# Patient Record
Sex: FEMALE | Race: WHITE | Employment: OTHER | URBAN - METROPOLITAN AREA
[De-identification: names, ages, dates, MRNs, and addresses within clinical notes are randomized per-mention and may not be internally consistent; named-entity substitution may affect disease eponyms.]

---

## 2018-10-17 ENCOUNTER — OFFICE VISIT (OUTPATIENT)
Dept: GASTROENTEROLOGY | Age: 63
End: 2018-10-17
Payer: MEDICARE

## 2018-10-17 VITALS — DIASTOLIC BLOOD PRESSURE: 81 MMHG | WEIGHT: 162.9 LBS | HEART RATE: 71 BPM | SYSTOLIC BLOOD PRESSURE: 143 MMHG

## 2018-10-17 DIAGNOSIS — K21.9 GASTRO-ESOPHAGEAL REFLUX DISEASE WITHOUT ESOPHAGITIS: ICD-10-CM

## 2018-10-17 DIAGNOSIS — R10.13 EPIGASTRIC ABDOMINAL PAIN: ICD-10-CM

## 2018-10-17 PROCEDURE — 3017F COLORECTAL CA SCREEN DOC REV: CPT | Performed by: INTERNAL MEDICINE

## 2018-10-17 PROCEDURE — 99204 OFFICE O/P NEW MOD 45 MIN: CPT | Performed by: INTERNAL MEDICINE

## 2018-10-17 PROCEDURE — G8421 BMI NOT CALCULATED: HCPCS | Performed by: INTERNAL MEDICINE

## 2018-10-17 PROCEDURE — G8484 FLU IMMUNIZE NO ADMIN: HCPCS | Performed by: INTERNAL MEDICINE

## 2018-10-17 PROCEDURE — 1090F PRES/ABSN URINE INCON ASSESS: CPT | Performed by: INTERNAL MEDICINE

## 2018-10-17 PROCEDURE — G8427 DOCREV CUR MEDS BY ELIG CLIN: HCPCS | Performed by: INTERNAL MEDICINE

## 2018-10-17 PROCEDURE — 1101F PT FALLS ASSESS-DOCD LE1/YR: CPT | Performed by: INTERNAL MEDICINE

## 2018-10-17 RX ORDER — ATORVASTATIN CALCIUM 10 MG/1
20 TABLET, FILM COATED ORAL NIGHTLY
COMMUNITY
End: 2020-04-20 | Stop reason: SDUPTHER

## 2018-10-17 RX ORDER — SERTRALINE HYDROCHLORIDE 100 MG/1
200 TABLET, FILM COATED ORAL NIGHTLY
COMMUNITY

## 2018-10-17 RX ORDER — LEVOFLOXACIN 5 MG/ML
500 INJECTION, SOLUTION INTRAVENOUS
Status: ON HOLD | COMMUNITY
End: 2018-10-26 | Stop reason: ALTCHOICE

## 2018-10-17 RX ORDER — LISINOPRIL 5 MG/1
5 TABLET ORAL DAILY
COMMUNITY
End: 2019-09-10 | Stop reason: ALTCHOICE

## 2018-10-17 RX ORDER — MULTIVIT WITH MINERALS/LUTEIN
1000 TABLET ORAL DAILY
COMMUNITY

## 2018-10-17 RX ORDER — PHENOL 1.4 %
1 AEROSOL, SPRAY (ML) MUCOUS MEMBRANE 2 TIMES DAILY
COMMUNITY

## 2018-10-17 RX ORDER — AZELASTINE HYDROCHLORIDE 0.5 MG/ML
1 SOLUTION/ DROPS OPHTHALMIC 2 TIMES DAILY
COMMUNITY
End: 2019-03-12

## 2018-10-17 RX ORDER — LORAZEPAM 1 MG/1
1 TABLET ORAL EVERY 6 HOURS PRN
COMMUNITY
End: 2019-12-10 | Stop reason: CLARIF

## 2018-10-17 RX ORDER — FLUTICASONE PROPIONATE 50 MCG
1 SPRAY, SUSPENSION (ML) NASAL DAILY
COMMUNITY
End: 2019-03-12

## 2018-10-17 RX ORDER — AMLODIPINE BESYLATE 10 MG/1
10 TABLET ORAL NIGHTLY
COMMUNITY
End: 2020-03-29 | Stop reason: SDUPTHER

## 2018-10-17 ASSESSMENT — ENCOUNTER SYMPTOMS
VOMITING: 0
CONSTIPATION: 0
ANAL BLEEDING: 0
ABDOMINAL DISTENTION: 0
COUGH: 0
RECTAL PAIN: 0
BLOOD IN STOOL: 0
NAUSEA: 0
ABDOMINAL PAIN: 1
DIARRHEA: 0
CHOKING: 0

## 2018-10-17 NOTE — PROGRESS NOTES
Subjective:      Patient ID: Baljeet Lundberg is a 70 y.o. female. HPI . Dr. Galina Carney, APRN - CNP has requested that I see Baljeet Lundberg for a consult for   1. Gastro-esophageal reflux disease without esophagitis    2. Epigastric abdominal pain     . Patient seen along with her . Main symptom appears to be epigastric discomfort, chronic heartburn, dysphagia. Patient does symptoms on and off for several years. About 20 years ago she had hiatal hernia surgery done. I believe that this was Nissen fundoplication. However this surgery appears to be failed. Subsequently she had 2nd surgery done about 20 years ago. Looks like patient did well for awhile and a gain recently is having recurrent symptoms in the last 3-4 years. She was investigated by different gastroenterologist and had several investigations done 4-5 years ago. At present she has ongoing symptom of dysphagia. She feels food staying longer in the substernal area. No symptom of complete obstruction of esophagus. Has chronic GERD. Also has nocturnal symptoms. Denies symptoms suggestive of extra esophageal manifestation of GERD. Recently she had esophagogram done and was told that she may have recurrent hernia. Patient is very anxious stating that she has damage esophagus. She also has history of abdominal bloating, cramps and change of bowel habits. No weight loss. No melena or hematochezia. On further discussion it appears that this patient has significant anxiety /nervousness. She has been on medication. Past Medical, Family, and Social History reviewed and does contribute to the patient presenting condition. patient\"s PMH/PSH,SH,PSYCH hx, MEDs, ALLERGIES, and ROS was all reviewed and updated ion the appropriate sections    Review of Systems   Constitutional: Negative for activity change, appetite change and fatigue. HENT: Negative.     Eyes: Positive for visual disturbance (wears eye

## 2018-10-25 ENCOUNTER — TELEPHONE (OUTPATIENT)
Dept: GASTROENTEROLOGY | Age: 63
End: 2018-10-25

## 2018-10-25 NOTE — TELEPHONE ENCOUNTER
Writer LUCY for patient to return call to office to confirm EGD scheduled 10/26 at [facility] and that patient has a .

## 2018-10-26 ENCOUNTER — HOSPITAL ENCOUNTER (OUTPATIENT)
Age: 63
Setting detail: OUTPATIENT SURGERY
Discharge: HOME | End: 2018-10-26
Attending: INTERNAL MEDICINE | Admitting: INTERNAL MEDICINE
Payer: COMMERCIAL

## 2018-10-26 VITALS
RESPIRATION RATE: 14 BRPM | HEART RATE: 77 BPM | HEIGHT: 64 IN | OXYGEN SATURATION: 98 % | WEIGHT: 160 LBS | TEMPERATURE: 97.9 F | SYSTOLIC BLOOD PRESSURE: 103 MMHG | DIASTOLIC BLOOD PRESSURE: 78 MMHG | BODY MASS INDEX: 27.31 KG/M2

## 2018-10-26 PROCEDURE — 6360000002 HC RX W HCPCS: Performed by: INTERNAL MEDICINE

## 2018-10-26 PROCEDURE — 99152 MOD SED SAME PHYS/QHP 5/>YRS: CPT | Performed by: INTERNAL MEDICINE

## 2018-10-26 PROCEDURE — 2580000003 HC RX 258: Performed by: INTERNAL MEDICINE

## 2018-10-26 PROCEDURE — 2709999900 HC NON-CHARGEABLE SUPPLY: Performed by: INTERNAL MEDICINE

## 2018-10-26 PROCEDURE — 7100000011 HC PHASE II RECOVERY - ADDTL 15 MIN: Performed by: INTERNAL MEDICINE

## 2018-10-26 PROCEDURE — 7100000010 HC PHASE II RECOVERY - FIRST 15 MIN: Performed by: INTERNAL MEDICINE

## 2018-10-26 PROCEDURE — 88305 TISSUE EXAM BY PATHOLOGIST: CPT

## 2018-10-26 PROCEDURE — 6370000000 HC RX 637 (ALT 250 FOR IP): Performed by: INTERNAL MEDICINE

## 2018-10-26 PROCEDURE — 3609012400 HC EGD TRANSORAL BIOPSY SINGLE/MULTIPLE: Performed by: INTERNAL MEDICINE

## 2018-10-26 RX ORDER — SODIUM CHLORIDE 9 MG/ML
INJECTION, SOLUTION INTRAVENOUS CONTINUOUS
Status: DISCONTINUED | OUTPATIENT
Start: 2018-10-26 | End: 2018-10-26 | Stop reason: HOSPADM

## 2018-10-26 RX ORDER — FENTANYL CITRATE 50 UG/ML
INJECTION, SOLUTION INTRAMUSCULAR; INTRAVENOUS PRN
Status: DISCONTINUED | OUTPATIENT
Start: 2018-10-26 | End: 2018-10-26 | Stop reason: HOSPADM

## 2018-10-26 RX ORDER — MIDAZOLAM HYDROCHLORIDE 1 MG/ML
INJECTION INTRAMUSCULAR; INTRAVENOUS PRN
Status: DISCONTINUED | OUTPATIENT
Start: 2018-10-26 | End: 2018-10-26 | Stop reason: HOSPADM

## 2018-10-26 RX ADMIN — SODIUM CHLORIDE: 9 INJECTION, SOLUTION INTRAVENOUS at 07:03

## 2018-10-26 ASSESSMENT — PAIN SCALES - GENERAL
PAINLEVEL_OUTOF10: 0

## 2018-10-26 ASSESSMENT — PAIN - FUNCTIONAL ASSESSMENT: PAIN_FUNCTIONAL_ASSESSMENT: 0-10

## 2018-10-26 NOTE — OP NOTE
Nissen fundoplication changes seen. Body: normal    Antrum: normal    Duodenum:     Descending: normal    Bulb: normal    While withdrawing the scope the above findings were verified and the scope was removed. The patient has tolerated the procedure and conscious sedation without unusual events. Recommendations/Plan:   1. F/U Biopsies  2. F/U In Office as instructed  3. Discussed with the family and  4.   5.  make food into small pieces, chew well and swallow.   6.                    Electronically signed by Dread Moses MD  on 10/26/2018 at 7:59 AM

## 2018-10-29 LAB — SURGICAL PATHOLOGY REPORT: NORMAL

## 2019-03-12 ENCOUNTER — OFFICE VISIT (OUTPATIENT)
Dept: FAMILY MEDICINE CLINIC | Age: 64
End: 2019-03-12
Payer: MEDICARE

## 2019-03-12 VITALS
HEART RATE: 71 BPM | RESPIRATION RATE: 16 BRPM | TEMPERATURE: 97.5 F | SYSTOLIC BLOOD PRESSURE: 100 MMHG | DIASTOLIC BLOOD PRESSURE: 60 MMHG | HEIGHT: 64 IN | OXYGEN SATURATION: 97 % | BODY MASS INDEX: 27.52 KG/M2 | WEIGHT: 161.2 LBS

## 2019-03-12 DIAGNOSIS — K44.9 PARAESOPHAGEAL HERNIA: ICD-10-CM

## 2019-03-12 DIAGNOSIS — M17.0 PRIMARY OSTEOARTHRITIS OF BOTH KNEES: ICD-10-CM

## 2019-03-12 DIAGNOSIS — E78.1 PRIMARY HYPERTRIGLYCERIDEMIA: ICD-10-CM

## 2019-03-12 DIAGNOSIS — R10.13 EPIGASTRIC ABDOMINAL PAIN: ICD-10-CM

## 2019-03-12 DIAGNOSIS — K21.9 GASTRO-ESOPHAGEAL REFLUX DISEASE WITHOUT ESOPHAGITIS: ICD-10-CM

## 2019-03-12 DIAGNOSIS — K22.4 ESOPHAGEAL SPASM: ICD-10-CM

## 2019-03-12 DIAGNOSIS — F41.9 ANXIETY: ICD-10-CM

## 2019-03-12 DIAGNOSIS — I10 ESSENTIAL HYPERTENSION: ICD-10-CM

## 2019-03-12 DIAGNOSIS — F33.9 EPISODE OF RECURRENT MAJOR DEPRESSIVE DISORDER, UNSPECIFIED DEPRESSION EPISODE SEVERITY (HCC): ICD-10-CM

## 2019-03-12 DIAGNOSIS — D12.6 TUBULAR ADENOMA OF COLON: Primary | ICD-10-CM

## 2019-03-12 DIAGNOSIS — E78.2 MIXED HYPERLIPIDEMIA: ICD-10-CM

## 2019-03-12 PROBLEM — K92.89 GAS BLOAT SYNDROME: Status: ACTIVE | Noted: 2018-10-30

## 2019-03-12 PROBLEM — M17.9 OSTEOARTHRITIS OF KNEE: Status: ACTIVE | Noted: 2017-06-21

## 2019-03-12 PROCEDURE — G8427 DOCREV CUR MEDS BY ELIG CLIN: HCPCS | Performed by: INTERNAL MEDICINE

## 2019-03-12 PROCEDURE — 3017F COLORECTAL CA SCREEN DOC REV: CPT | Performed by: INTERNAL MEDICINE

## 2019-03-12 PROCEDURE — 1101F PT FALLS ASSESS-DOCD LE1/YR: CPT | Performed by: INTERNAL MEDICINE

## 2019-03-12 PROCEDURE — 99203 OFFICE O/P NEW LOW 30 MIN: CPT | Performed by: INTERNAL MEDICINE

## 2019-03-12 PROCEDURE — G8419 CALC BMI OUT NRM PARAM NOF/U: HCPCS | Performed by: INTERNAL MEDICINE

## 2019-03-12 PROCEDURE — 4040F PNEUMOC VAC/ADMIN/RCVD: CPT | Performed by: INTERNAL MEDICINE

## 2019-03-12 PROCEDURE — G8400 PT W/DXA NO RESULTS DOC: HCPCS | Performed by: INTERNAL MEDICINE

## 2019-03-12 PROCEDURE — 1036F TOBACCO NON-USER: CPT | Performed by: INTERNAL MEDICINE

## 2019-03-12 PROCEDURE — 1123F ACP DISCUSS/DSCN MKR DOCD: CPT | Performed by: INTERNAL MEDICINE

## 2019-03-12 PROCEDURE — 1090F PRES/ABSN URINE INCON ASSESS: CPT | Performed by: INTERNAL MEDICINE

## 2019-03-12 PROCEDURE — G8484 FLU IMMUNIZE NO ADMIN: HCPCS | Performed by: INTERNAL MEDICINE

## 2019-03-12 RX ORDER — OMEPRAZOLE 40 MG/1
40 CAPSULE, DELAYED RELEASE ORAL 2 TIMES DAILY
COMMUNITY
End: 2019-09-10 | Stop reason: SDUPTHER

## 2019-03-12 ASSESSMENT — PATIENT HEALTH QUESTIONNAIRE - PHQ9
1. LITTLE INTEREST OR PLEASURE IN DOING THINGS: 0
SUM OF ALL RESPONSES TO PHQ9 QUESTIONS 1 & 2: 0
SUM OF ALL RESPONSES TO PHQ QUESTIONS 1-9: 0
SUM OF ALL RESPONSES TO PHQ QUESTIONS 1-9: 0
2. FEELING DOWN, DEPRESSED OR HOPELESS: 0

## 2019-03-18 ENCOUNTER — TELEPHONE (OUTPATIENT)
Dept: FAMILY MEDICINE CLINIC | Age: 64
End: 2019-03-18

## 2019-04-01 ENCOUNTER — OFFICE VISIT (OUTPATIENT)
Dept: FAMILY MEDICINE CLINIC | Age: 64
End: 2019-04-01
Payer: MEDICARE

## 2019-04-01 VITALS
SYSTOLIC BLOOD PRESSURE: 132 MMHG | OXYGEN SATURATION: 98 % | BODY MASS INDEX: 28.77 KG/M2 | WEIGHT: 165 LBS | DIASTOLIC BLOOD PRESSURE: 70 MMHG | HEART RATE: 80 BPM | TEMPERATURE: 98 F

## 2019-04-01 DIAGNOSIS — J01.91 ACUTE RECURRENT SINUSITIS, UNSPECIFIED LOCATION: Primary | ICD-10-CM

## 2019-04-01 PROCEDURE — 4040F PNEUMOC VAC/ADMIN/RCVD: CPT | Performed by: NURSE PRACTITIONER

## 2019-04-01 PROCEDURE — G8419 CALC BMI OUT NRM PARAM NOF/U: HCPCS | Performed by: NURSE PRACTITIONER

## 2019-04-01 PROCEDURE — G8400 PT W/DXA NO RESULTS DOC: HCPCS | Performed by: NURSE PRACTITIONER

## 2019-04-01 PROCEDURE — 3017F COLORECTAL CA SCREEN DOC REV: CPT | Performed by: NURSE PRACTITIONER

## 2019-04-01 PROCEDURE — 1036F TOBACCO NON-USER: CPT | Performed by: NURSE PRACTITIONER

## 2019-04-01 PROCEDURE — 1090F PRES/ABSN URINE INCON ASSESS: CPT | Performed by: NURSE PRACTITIONER

## 2019-04-01 PROCEDURE — 99213 OFFICE O/P EST LOW 20 MIN: CPT | Performed by: NURSE PRACTITIONER

## 2019-04-01 PROCEDURE — 1123F ACP DISCUSS/DSCN MKR DOCD: CPT | Performed by: NURSE PRACTITIONER

## 2019-04-01 PROCEDURE — G8427 DOCREV CUR MEDS BY ELIG CLIN: HCPCS | Performed by: NURSE PRACTITIONER

## 2019-04-01 RX ORDER — FLUTICASONE PROPIONATE 50 MCG
1 SPRAY, SUSPENSION (ML) NASAL DAILY
Qty: 1 BOTTLE | Refills: 2 | COMMUNITY
Start: 2019-04-01 | End: 2020-03-05 | Stop reason: ALTCHOICE

## 2019-04-01 RX ORDER — AMOXICILLIN AND CLAVULANATE POTASSIUM 875; 125 MG/1; MG/1
1 TABLET, FILM COATED ORAL 2 TIMES DAILY
Qty: 20 TABLET | Refills: 0 | Status: SHIPPED | OUTPATIENT
Start: 2019-04-01 | End: 2019-04-11

## 2019-04-01 ASSESSMENT — ENCOUNTER SYMPTOMS
WHEEZING: 0
COUGH: 1
VOICE CHANGE: 1
SORE THROAT: 1
ALLERGIC/IMMUNOLOGIC NEGATIVE: 1
SINUS PRESSURE: 1
SHORTNESS OF BREATH: 0
EYES NEGATIVE: 1
SINUS PAIN: 1

## 2019-04-01 ASSESSMENT — PATIENT HEALTH QUESTIONNAIRE - PHQ9
SUM OF ALL RESPONSES TO PHQ9 QUESTIONS 1 & 2: 0
2. FEELING DOWN, DEPRESSED OR HOPELESS: 0
SUM OF ALL RESPONSES TO PHQ QUESTIONS 1-9: 0
SUM OF ALL RESPONSES TO PHQ QUESTIONS 1-9: 0
1. LITTLE INTEREST OR PLEASURE IN DOING THINGS: 0

## 2019-04-01 NOTE — PROGRESS NOTES
[facility] PHYSICIANS  [facility] PHYS POINT [address] [address] [id number]  Dept: [phone]  Dept Fax: [phone]      Clint Cervantes is a 70 y.o. female who presents today for hermedical conditions/complaints as noted below. Clint Cervantes is c/o of Cough and Congestion            HPI:      JOSHUA Gan is here today for 2 wk illness. She has been congested, coughing, hoarse voice, ear pain, facial pain and pressure. She is not bringing up secretions. She has not had fever or chills. Headaches and fogginess.  was ill. She has had sinus surgery in the past. She cannot recall the provider. No results found for: LABA1C          ( goal A1Cis < 7)   No results found for: LABMICR  No results found for: LDLCHOLESTEROL, LDLCALC    (goal LDL is <100)   No results found for: AST, ALT, BUN  BP Readings from Last 3 Encounters:   19 132/70   19 100/60   10/26/18 103/78          (goal 120/80)    Past Medical History:   Diagnosis Date    ADHD     Hyperlipidemia     Hypertension       Past Surgical History:   Procedure Laterality Date    APPENDECTOMY      BACK SURGERY      CHOLECYSTECTOMY      COLONOSCOPY N/A 2016    ESOPHAGOGASTRIC FUNDOPLICATION      REVISION TOTAL KNEE ARTHROPLASTY Right     UPPER GASTROINTESTINAL ENDOSCOPY      UPPER GASTROINTESTINAL ENDOSCOPY N/A 10/26/2018    EGD BIOPSY AND DILITATION WITH DAMION performed by Kameron Love MD at [address]       No family history on file.        Social History     Tobacco Use    Smoking status: Former Smoker     Packs/day: 0.50     Years: 40.00     Pack years: 20.00     Types: Cigarettes     Last attempt to quit: 2018     Years since quittin.9    Smokeless tobacco: Never Used   Substance Use Topics    Alcohol use: No         Current Outpatient Medications   Medication Sig Dispense Refill    amoxicillin-clavulanate (AUGMENTIN) 875-125 MG per tablet Take 1 tablet by mouth 2 times daily for Negative. Negative for chest pain and palpitations. Endocrine: Negative. Negative for cold intolerance and heat intolerance. Musculoskeletal: Positive for myalgias. Negative for arthralgias. Skin: Negative. Negative for pallor and rash. Allergic/Immunologic: Negative. Neurological: Positive for headaches. Negative for weakness and numbness. Hematological: Negative. Psychiatric/Behavioral: Negative. Negative for sleep disturbance. The patient is not nervous/anxious. Objective:     /70 (Site: Left Upper Arm, Position: Sitting, Cuff Size: Small Adult)   Pulse 80   Temp 98 °F (36.7 °C) (Oral)   Wt 165 lb (74.8 kg)   SpO2 98%   Breastfeeding? No   BMI 28.77 kg/m²   Physical Exam   Constitutional: She is oriented to person, place, and time. Vital signs are normal. She appears well-developed and well-nourished. HENT:   Head: Normocephalic and atraumatic. Right Ear: Tympanic membrane is erythematous. Nose: Mucosal edema present. Right sinus exhibits maxillary sinus tenderness and frontal sinus tenderness. Left sinus exhibits maxillary sinus tenderness and frontal sinus tenderness. Eyes: Conjunctivae are normal. Right eye exhibits no discharge. Left eye exhibits no discharge. Neck: Normal range of motion. Cardiovascular: Normal rate, regular rhythm and normal heart sounds. No murmur heard. Pulmonary/Chest: Effort normal and breath sounds normal. No respiratory distress. She has no wheezes. Musculoskeletal: Normal range of motion. Neurological: She is alert and oriented to person, place, and time. Skin: Skin is warm and dry. Psychiatric: She has a normal mood and affect. Her behavior is normal. Judgment and thought content normal.   Vitals reviewed. Assessment:      1. Acute recurrent sinusitis, unspecified location                     Plan:      No orders of the defined types were placed in this encounter.     Orders Placed This Encounter   Medications    amoxicillin-clavulanate (AUGMENTIN) 875-125 MG per tablet     Sig: Take 1 tablet by mouth 2 times daily for 10 days     Dispense:  20 tablet     Refill:  0    fluticasone (FLONASE) 50 MCG/ACT nasal spray     Si spray by Nasal route daily     Dispense:  1 Bottle     Refill:  2      push fluids, ibu or acetamin as needed. RTO if syx do not improve after 10-14 days. No follow-ups on file. Patient given educational materials - see patientinstructions. Discussed use, benefit, and side effects of prescribed medications. All patient questions answered. Pt voiced understanding. Reviewed health maintenance. Instructed to continue current medications, diet and exercise. Patient agreedwith treatment plan. Follow up as directed.      Electronically signed by ERICA Cai CNP on 2019

## 2019-04-01 NOTE — PATIENT INSTRUCTIONS
Patient Education        Sinusitis: Care Instructions  Your Care Instructions    Sinusitis is an infection of the lining of the sinus cavities in your head. Sinusitis often follows a cold. It causes pain and pressure in your head and face. In most cases, sinusitis gets better on its own in 1 to 2 weeks. But some mild symptoms may last for several weeks. Sometimes antibiotics are needed. Follow-up care is a key part of your treatment and safety. Be sure to make and go to all appointments, and call your doctor if you are having problems. It's also a good idea to know your test results and keep a list of the medicines you take. How can you care for yourself at home? · Take an over-the-counter pain medicine, such as acetaminophen (Tylenol), ibuprofen (Advil, Motrin), or naproxen (Aleve). Read and follow all instructions on the label. · If the doctor prescribed antibiotics, take them as directed. Do not stop taking them just because you feel better. You need to take the full course of antibiotics. · Be careful when taking over-the-counter cold or flu medicines and Tylenol at the same time. Many of these medicines have acetaminophen, which is Tylenol. Read the labels to make sure that you are not taking more than the recommended dose. Too much acetaminophen (Tylenol) can be harmful. · Breathe warm, moist air from a steamy shower, a hot bath, or a sink filled with hot water. Avoid cold, dry air. Using a humidifier in your home may help. Follow the directions for cleaning the machine. · Use saline (saltwater) nasal washes to help keep your nasal passages open and wash out mucus and bacteria. You can buy saline nose drops at a grocery store or drugstore. Or you can make your own at home by adding 1 teaspoon of salt and 1 teaspoon of baking soda to 2 cups of distilled water. If you make your own, fill a bulb syringe with the solution, insert the tip into your nostril, and squeeze gently. [place] your nose.   · Put a hot, wet towel or a warm gel pack on your face 3 or 4 times a day for 5 to 10 minutes each time. · Try a decongestant nasal spray like oxymetazoline (Afrin). Do not use it for more than 3 days in a row. Using it for more than 3 days can make your congestion worse. When should you call for help? Call your doctor now or seek immediate medical care if:    · You have new or worse swelling or redness in your face or around your eyes.     · You have a new or higher fever.    Watch closely for changes in your health, and be sure to contact your doctor if:    · You have new or worse facial pain.     · The mucus from your nose becomes thicker (like pus) or has new blood in it.     · You are not getting better as expected. Where can you learn more? Go to https://[email or web address] Securitypepiceweb.[employer]. org and sign in to your [employer] account. Enter Y915 in the [employer] box to learn more about \"Sinusitis: Care Instructions. \"     If you do not have an account, please click on the \"Sign Up Now\" link. Current as of: March 27, 2018  Content Version: 11.9  © 4567-9811 [employer]. Care instructions adapted under license by [facility] ([facility]). If you have questions about a medical condition or this instruction, always ask your healthcare professional. [address] any warranty or liability for your use of this information.

## 2019-04-17 ENCOUNTER — OFFICE VISIT (OUTPATIENT)
Dept: FAMILY MEDICINE CLINIC | Age: 64
End: 2019-04-17
Payer: MEDICARE

## 2019-04-17 ENCOUNTER — HOSPITAL ENCOUNTER (OUTPATIENT)
Age: 64
Setting detail: SPECIMEN
Discharge: HOME | End: 2019-04-17
Payer: COMMERCIAL

## 2019-04-17 VITALS
HEIGHT: 64 IN | HEART RATE: 75 BPM | DIASTOLIC BLOOD PRESSURE: 64 MMHG | BODY MASS INDEX: 28.31 KG/M2 | OXYGEN SATURATION: 95 % | TEMPERATURE: 97.2 F | SYSTOLIC BLOOD PRESSURE: 100 MMHG | RESPIRATION RATE: 16 BRPM | WEIGHT: 165.8 LBS

## 2019-04-17 DIAGNOSIS — Z13.1 SCREENING FOR DIABETES MELLITUS: ICD-10-CM

## 2019-04-17 DIAGNOSIS — M85.89 OSTEOPENIA OF MULTIPLE SITES: ICD-10-CM

## 2019-04-17 DIAGNOSIS — Z11.59 NEED FOR HEPATITIS C SCREENING TEST: ICD-10-CM

## 2019-04-17 DIAGNOSIS — H60.502 ACUTE OTITIS EXTERNA OF LEFT EAR, UNSPECIFIED TYPE: ICD-10-CM

## 2019-04-17 DIAGNOSIS — E78.5 HYPERLIPIDEMIA, UNSPECIFIED HYPERLIPIDEMIA TYPE: Primary | ICD-10-CM

## 2019-04-17 DIAGNOSIS — E78.5 HYPERLIPIDEMIA, UNSPECIFIED HYPERLIPIDEMIA TYPE: ICD-10-CM

## 2019-04-17 DIAGNOSIS — S62.101D CLOSED FRACTURE OF RIGHT WRIST WITH ROUTINE HEALING, SUBSEQUENT ENCOUNTER: ICD-10-CM

## 2019-04-17 DIAGNOSIS — Z00.00 ROUTINE GENERAL MEDICAL EXAMINATION AT A HEALTH CARE FACILITY: ICD-10-CM

## 2019-04-17 DIAGNOSIS — J30.2 SEASONAL ALLERGIC RHINITIS, UNSPECIFIED TRIGGER: ICD-10-CM

## 2019-04-17 LAB
ALBUMIN SERPL-MCNC: 4.5 G/DL (ref 3.5–5.2)
ALBUMIN/GLOBULIN RATIO: 1.7 (ref 1–2.5)
ALP BLD-CCNC: 96 U/L (ref 35–104)
ALT SERPL-CCNC: 19 U/L (ref 5–33)
ANION GAP SERPL CALCULATED.3IONS-SCNC: 12 MMOL/L (ref 9–17)
AST SERPL-CCNC: 22 U/L
BILIRUB SERPL-MCNC: 0.17 MG/DL (ref 0.3–1.2)
BUN BLDV-MCNC: 14 MG/DL (ref 8–23)
BUN/CREAT BLD: ABNORMAL (ref 9–20)
CALCIUM SERPL-MCNC: 9 MG/DL (ref 8.6–10.4)
CHLORIDE BLD-SCNC: 104 MMOL/L (ref 98–107)
CHOLESTEROL, FASTING: 167 MG/DL
CHOLESTEROL/HDL RATIO: 3.1
CO2: 24 MMOL/L (ref 20–31)
CREAT SERPL-MCNC: 0.83 MG/DL (ref 0.5–0.9)
GFR AFRICAN AMERICAN: >60 ML/MIN
GFR NON-AFRICAN AMERICAN: >60 ML/MIN
GFR SERPL CREATININE-BSD FRML MDRD: ABNORMAL ML/MIN/{1.73_M2}
GFR SERPL CREATININE-BSD FRML MDRD: ABNORMAL ML/MIN/{1.73_M2}
GLUCOSE BLD-MCNC: 95 MG/DL (ref 70–99)
HDLC SERPL-MCNC: 54 MG/DL
HEPATITIS C ANTIBODY: NONREACTIVE
LDL CHOLESTEROL: 67 MG/DL (ref 0–130)
POTASSIUM SERPL-SCNC: 4.2 MMOL/L (ref 3.7–5.3)
SODIUM BLD-SCNC: 140 MMOL/L (ref 135–144)
TOTAL PROTEIN: 7.1 G/DL (ref 6.4–8.3)
TRIGLYCERIDE, FASTING: 229 MG/DL
VLDLC SERPL CALC-MCNC: ABNORMAL MG/DL (ref 1–30)

## 2019-04-17 PROCEDURE — 4040F PNEUMOC VAC/ADMIN/RCVD: CPT | Performed by: INTERNAL MEDICINE

## 2019-04-17 PROCEDURE — G0402 INITIAL PREVENTIVE EXAM: HCPCS | Performed by: INTERNAL MEDICINE

## 2019-04-17 RX ORDER — OFLOXACIN 3 MG/ML
5 SOLUTION AURICULAR (OTIC) 2 TIMES DAILY
Qty: 10 ML | Refills: 0 | Status: SHIPPED | OUTPATIENT
Start: 2019-04-17 | End: 2019-04-27

## 2019-04-17 ASSESSMENT — PATIENT HEALTH QUESTIONNAIRE - PHQ9: SUM OF ALL RESPONSES TO PHQ QUESTIONS 1-9: 8

## 2019-04-17 ASSESSMENT — ANXIETY QUESTIONNAIRES: GAD7 TOTAL SCORE: 1

## 2019-04-17 ASSESSMENT — LIFESTYLE VARIABLES: HOW OFTEN DO YOU HAVE A DRINK CONTAINING ALCOHOL: 0

## 2019-04-17 NOTE — PROGRESS NOTES
N/A 2016    ESOPHAGOGASTRIC FUNDOPLICATION      FINGER SURGERY Left     4th finger    REVISION TOTAL KNEE ARTHROPLASTY Right     UPPER GASTROINTESTINAL ENDOSCOPY      UPPER GASTROINTESTINAL ENDOSCOPY N/A 10/26/2018    EGD BIOPSY AND DILITATION WITH DAMION performed by Fernanda Bull MD at [address]     No family history on file. CareTeam (Including outside providers/suppliers regularly involved in providing care):   Patient Care Team:  Michelle Bazan MD as PCP - General (Internal Medicine)  Fernanda Bull MD as Consulting Physician (Gastroenterology)  Avery Ingram as Consulting Physician (Obstetrics & Gynecology)  Leia Arceo DPM as Consulting Physician (Podiatry)  Magdalena Orozco MD as Consulting Physician (Orthopedic Surgery)    Wt Readings from Last 3 Encounters:   04/17/19 165 lb 12.8 oz (75.2 kg)   04/01/19 165 lb (74.8 kg)   03/12/19 161 lb 3.2 oz (73.1 kg)     Vitals:    04/17/19 1422   BP: 100/64   Site: Right Upper Arm   Position: Sitting   Cuff Size: Medium Adult   Pulse: 75   Resp: 16   Temp: 97.2 °F (36.2 °C)   TempSrc: Oral   SpO2: 95%   Weight: 165 lb 12.8 oz (75.2 kg)   Height: 5' 3.75\" (1.619 m)     Body mass index is 28.68 kg/m². Based upon direct observation of the patient, evaluation of cognition reveals recent and remote memory intact.     General Appearance: alert and oriented to person, place and time, well developed and well- nourished, in no acute distress  Skin: warm and dry, no rash or erythema  Head: normocephalic and atraumatic  Eyes: pupils equal, round, and reactive to light, extraocular eye movements intact, conjunctivae normal  ENT: tympanic membrane, external ear and ear canal normal bilaterally, nose without deformity, nasal mucosa and turbinates normal without polyps  Neck: supple and non-tender without mass, no thyromegaly or thyroid nodules, no cervical lymphadenopathy  Pulmonary/Chest: clear to auscultation bilaterally- no wheezes, rales or rhonchi, normal air movement, no respiratory distress  Cardiovascular: normal rate, regular rhythm, normal S1 and S2, no murmurs, rubs, clicks, or gallops, distal pulses intact, no carotid bruits  Abdomen: soft, non-tender, non-distended, normal bowel sounds, no masses or organomegaly  Extremities: no cyanosis, clubbing or edema  Musculoskeletal: normal range of motion, no joint swelling, deformity or tenderness  Neurologic: reflexes normal and symmetric, no cranial nerve deficit, gait, coordination and speech normal    Patient's complete Health Risk Assessment and screening values have been reviewed and are found in Flowsheets. The following problems were reviewed today and where indicated follow up appointments were made and/or referrals ordered. Positive Risk Factor Screenings with Interventions:     Fall Risk:  Timed Up and Go Test > 12 seconds?: no  2 or more falls in past year?: (!) yes  Fall with injury in past year?: (!) yes  Fall Risk Assessment[de-identified] (!) Positive Screening for Falls  fell just before med when her foot got caught on the ice and she tripped while getting out the car. Hurt hand - fractured her wrist. See ortho - Dr Pacheco Delgaod, declined surgery. Fall Risk Interventions:    · Home safety tips provided  · Home exercises provided to promote strength and balance  · Patient declines any further evaluation/treatment for this issue    General Health:  General  In general, how would you say your health is?: Good  In the past 7 days, have you experienced any of the following?  New or Increased Pain, New or Increased Fatigue, Loneliness, Social Isolation, Stress or Anger?: (!) Stress  Do you get the social and emotional support that you need?: Yes  Do you have a Living Will?: (!) No  General Health Risk Interventions:  · Stress: patient's comments regarding reasons for stress and/or anger: just came back from taking care of her 80year old mother, so getting better now    Health Habits/Nutrition:  Health Habits/Nutrition  Do you exercise for at least 20 minutes 2-3 times per week?: (!) No  Have you lost any weight without trying in the past 3 months?: No  Do you eat fewer than 2 meals per day?: No  Have you seen a dentist within the past year?: Yes  Body mass index is 28.68 kg/m².   Health Habits/Nutrition Interventions:  · Inadequate physical activity:  educational materials provided to promote increased physical activity    Hearing/Vision:  Hearing/Vision  Do you or your family notice any trouble with your hearing?: (!) Yes  Do you have difficulty driving, watching TV, or doing any of your daily activities because of your eyesight?: (!) Yes(reading)  Have you had an eye exam within the past year?: Yes  Hearing/Vision Interventions:  · Hearing concerns:  audiology referral provided  · Vision concerns:  patient encouraged to make appointment with his/her eye specialist    Personalized Preventive Plan   Current Health Maintenance Status  Immunization History   Administered Date(s) Administered    Influenza Vaccine, unspecified formulation 10/17/2017    Influenza Virus Vaccine 10/20/2016    Influenza, Intradermal, Preservative free 11/02/2011, 09/27/2012, 09/30/2015    Influenza, Triv, 3 Years and older, IM (Afluria (5 yrs and older) 10/29/2013, 10/17/2014        Health Maintenance   Topic Date Due    Potassium monitoring  1955    Creatinine monitoring  1955    Hepatitis C screen  1955    Cervical cancer screen  09/16/1974    Lipid screen  09/16/1993    Diabetes screen  09/16/1993    Breast cancer screen  09/16/2003    Colon cancer screen colonoscopy  09/16/2003    DEXA (modify frequency per FRAX score)  09/16/2018    Shingles Vaccine (1 of 2) 03/12/2020 (Originally 9/16/2003)    HIV screen  03/12/2020 (Originally 9/16/1968)    DTaP/Tdap/Td vaccine (1 - Tdap) 04/17/2020 (Originally 9/16/1972)    Pneumococcal 65+ years Vaccine (1 of 2 - PCV13) 04/17/2020 (Originally 9/16/2018)    Flu vaccine (Season Ended) 09/01/2019     Recommendations for Preventive Services Due: see orders and patient instructions/AVS.  .   Recommended screening schedule for the next 5-10 years is provided to the patient in written form: see Patient Instructions/AVS.

## 2019-04-17 NOTE — PATIENT INSTRUCTIONS
that they can break easily. The older you are, the more likely you are to get osteoporosis. But with plenty of calcium, vitamin D, and exercise, you can help prevent osteoporosis. The preteen and teen years are a key time for bone building. With the help of calcium, vitamin D, and exercise in those early years and beyond, the bones reach their peak density and strength by age 27. After age 27, your bones naturally start to thin and weaken. The stronger your bones are at around age 27, the lower your risk for osteoporosis. But no matter what your age and risk are, your bones still need calcium, vitamin D, and exercise to stay strong. Also avoid smoking, and limit alcohol. Smoking and heavy alcohol use can make your bones thinner. Talk to your doctor about any special risks you might have, such as having a close relative with osteoporosis or taking a medicine that can weaken bones. Your doctor can tell you the best ways to protect your bones from thinning. Follow-up care is a key part of your treatment and safety. Be sure to make and go to all appointments, and call your doctor if you are having problems. It's also a good idea to know your test results and keep a list of the medicines you take. How can you care for yourself at home? Get enough calcium and vitamin D. The [place] of [employer] recommends adults younger than age 46 need 1,000 mg of calcium and 600 IU of vitamin D each day. Women ages 46 to 79 need 1,200 mg of calcium and 600 IU of vitamin D each day. Men ages 46 to 79 need 1,000 mg of calcium and 600 IU of vitamin D each day. Adults 71 and older need 1,200 mg of calcium and 800 IU of vitamin D each day. Eat foods rich in calcium, like yogurt, cheese, milk, and dark green vegetables. Eat foods rich in vitamin D, like eggs, fatty fish, cereal, and fortified milk. Get some sunshine. Your body uses sunshine to make its own vitamin D.  The safest time to be out in the sun is before 10 a.m. or after 3 p.m. Avoid getting sunburned. Sunburn can increase your risk of skin cancer. Talk to your doctor about taking a calcium plus vitamin D supplement. Ask about what type of calcium is right for you, and how much to take at a time. Adults ages 23 to 48 should not get more than 2,500 mg of calcium and 4,000 IU of vitamin D each day, whether it is from supplements and/or food. Adults ages 46 and older should not get more than 2,000 mg of calcium and 4,000 IU of vitamin D each day from supplements and/or food. Get regular bone-building exercise. Weight-bearing and resistance exercises keep bones healthy by working the muscles and bones against gravity. Start out at an exercise level that feels right for you. Add a little at a time until you can do the following:  Do 30 minutes of weight-bearing exercise on most days of the week. Walking, jogging, stair climbing, and dancing are good choices. Do resistance exercises with weights or elastic bands 2 to 3 days a week. Limit alcohol. Drink no more than 1 alcohol drink a day if you are a woman. Drink no more than 2 alcohol drinks a day if you are a man. Do not smoke. Smoking can make bones thin faster. If you need help quitting, talk to your doctor about stop-smoking programs and medicines. These can increase your chances of quitting for good. When should you call for help? Watch closely for changes in your health, and be sure to contact your doctor if:  You need help with a healthy eating plan. You need help with an exercise plan    © 8815-8842 [employer]. Care instructions adapted under license by [facility]. This care instruction is for use with your licensed healthcare professional. If you have questions about a medical condition or this instruction, always ask your healthcare professional. [place] [address] any warranty or liability for your use of this information. Content Version: 9.4.92313;  Last Revised: June 20, 2011              ·     Keeping Home a [facility]       As we get older, changes in balance, gait, strength, vision, hearing, and cognition make even the most youthful senior more prone to accidents. Falls are one of the leading health risks for older people. This increased risk of falling is related to:   Aging process (eg, decreased muscle strength, slowed reflexes)   Higher incidence of chronic health problems (eg, arthritis, diabetes) that may limit mobility, agility or sensory awareness   Side effects of medicine (eg, dizziness, blurred vision)especially medicines like prescription pain medicines and drugs used to treat mental health conditions   Depending on the brittleness of your bones, the consequences of a fall can be serious and long lasting. Home Life   Research by the Association of Aging [facility]) shows that some home accidents among older adults can be prevented by making simple lifestyle changes and basic modifications and repairs to the home environment. Here are some lifestyle changes that experts recommend:   Have your hearing and vision checked regularly. Be sure to wear prescription glasses that are right for you. Speak to your doctor or pharmacist about the possible side effects of your medicines. A number of medicines can cause dizziness. If you have problems with sleep, talk to your doctor. Limit your intake of alcohol. If necessary, use a cane or walker to help maintain your balance. Wear supportive, rubber-soled shoes, even at home. If you live in a region that gets wintry weather, you may want to put special cleats on your shoes to prevent you from slipping on the snow and ice. Exercise regularly to help maintain muscle tone, agility, and balance. Always hold the banister when going up or down stairs. Also, use  bars when getting in or out of the bath or shower, or using the toilet. To avoid dizziness, get up slowly from a lying down position.  Sit up first, dangling your legs for a minute or two before rising to a standing position. Overall Home Safety Check   According to the Consumer Product Safety Commision's \"Older Consumer Home Safety Checklist,\" it is important to check for potential hazards in each room. And remember, proper lighting is an essential factor in home safety. If you cannot see clearly, you are more likely to fall. Important questions to ask yourself include:   Are lamp, electric, extension, and telephone cords placed out of the flow of traffic and maintained in good condition? Have frayed cords been replaced? Are all small rugs and runners slip resistant? If not, you can secure them to the floor with a special double-sided carpet tape. Are smoke detectors properly locatedone on every floor of your home and one outside of every sleeping area? Are they in good working order? Are batteries replaced at least once a year? Do you have a well-maintained carbon monoxide detector outside every sleeping are in your home? Does your furniture layout leave plenty of space to maneuver between and around chairs, tables, beds, and sofas? Are hallways, stairs and passages between rooms well lit? Can you reach a lamp without getting out of bed? Are floor surfaces well maintained? Shag rugs, high-pile carpeting, tile floors, and polished wood floors can be particularly slippery. Stairs should always have handrails and be carpeted or fitted with a non-skid tread. Is your telephone easily reachable. Is the cord safely tucked away? Room by Room   According to the Association of Aging, bathrooms and ramiro are the two most potentially hazardous rooms in your home. In the Kitchen    Be sure your stove is in proper working order and always make sure burners and the oven are off before you go out or go to sleep. Keep pots on the back burners, turn handles away from the front of the stove, and keep stove clean and free of grease build-up.     Kitchen ventilation systems and range exhausts should be working properly. Keep flammable objects such as towels and pot holders away from the cooking area except when in use. Make sure kitchen curtains are tied back. Move cords and appliances away from the sink and hot surfaces. If extension cords are needed, install wiring guides so they do not hang over the sink, range, or working areas. Look for coffee pots, kettles and toaster ovens with automatic shut-offs. Keep a mop handy in the kitchen so you can wipe up spills instantly. You should also have a small fire extinguisher. Arrange your kitchen with frequently used items on lower shelves to avoid the need to stand on a stepstool to reach them. Make sure countertops are well-lit to avoid injuries while cutting and preparing food. In the Bathroom    Use a non-slip mat or decals in the tub and shower, since wet, soapy tile or porcelain surfaces are extremely slippery. Make sure bathroom rugs are non-skid or tape them firmly to the floor. Bathtubs should have at least one, preferably two, grab bars, firmly attached to structural supports in the wall. (Do not use built-in soap holders or glass shower doors as grab bars.)    Tub seats fitted with non-slip material on the legs allow you to wash sitting down. For people with limited mobility, bathtub transfer benches allow you to slide safely into the tub. Raised toilet seats and toilet safety rails are helpful for those with knee or hip problems. In the [facility]    Make sure you use a nightlight and that the area around your bed is clear of potential obstacles. Be careful with electric blankets and never go to sleep with a heating pad, which can cause serious burns even if on a low setting. Use fire-resistant mattress covers and pillows, and NEVER smoke in bed. Keep a phone next to the bed that is programmed to dial 911 at the push of a button.       If you have a chronic condition, you may want to sign on with an automatic call-in service. Typically the system includes a small pendant that connects directly to an emergency medical voice-response system. You should also make arrangements to stay in contact with someonefriend, neighbor, family memberon a regular schedule. Fire Prevention   According to the [employer]. (Smoke Alarms for Every) [address] [place] [address], senior citizens are one of the two highest risk groups for death and serious injuries due to residential fires. When cooking, wear short-sleeved items, never a bulky long-sleeved robe. The [facility]'s Safety Checklist for Older Consumers emphasizes the importance of checking basements, garages, workshops and storage areas for fire hazards, such as volatile liquids, piles of old rags or clothing and overloaded circuits. Never smoke in bed or when lying down on a couch or recliner chair. Small portable electric or kerosene heaters are responsible for many home fires and should be used cautiously if at all. If you do use one, be sure to keep them away from flammable materials. In case of fire, make sure you have a pre-established emergency exit plan. Have a professional check your fireplace and other fuel-burning appliances yearly. [employer]   Baby boomers entering the davis years will continue to see the development of new products to help older adults live safely and independently in spite of age-related changes. Making Life More Livable  , by Sarita Cabrera, lists over 1,000 products for \"living well in the mature years,\" such as bathing and mobility aids, household security devices, ergonomically designed knives and peelers, and faucet valves and knobs for temperature control. Medical supply stores and organizations are good sources of information about products that improve your quality of life and insure your safety.      Last Reviewed: November 2009 Nini Cat MD   Updated: 3/7/2011     ·

## 2019-04-17 NOTE — PROGRESS NOTES
Patient is present for an annual wellness check. Medications and pharmacy reviewed. Visit Information    Have you changed or started any medications since your last visit including any over-the-counter medicines, vitamins, or herbal medicines? no   Have you stopped taking any of your medications? Is so, why? -  no  Are you having any side effects from any of your medications? - no    Have you seen any other physician or provider since your last visit?  no   Have you had any other diagnostic tests since your last visit? Yes mammo, dexa   Have you been seen in the emergency room and/or had an admission in a hospital since we last saw you?  no   Have you had your routine dental cleaning in the past 6 months?  yes -      Do you have an active MyChart account? If no, what is the barrier?   Yes    Patient Care Team:  Thais Rosario MD as PCP - General (Internal Medicine)  Bridgett Gavin MD as Consulting Physician (Gastroenterology)  Ofe Oviedo as Consulting Physician (Obstetrics & Gynecology)  Merry Caal DPM as Consulting Physician (Podiatry)  Hannah Angel MD as Consulting Physician (Orthopedic Surgery)    Medical History Review  Past Medical, Family, and Social History reviewed and does not contribute to the patient presenting condition    Health Maintenance   Topic Date Due    Potassium monitoring  1955    Creatinine monitoring  1955    Hepatitis C screen  1955    DTaP/Tdap/Td vaccine (1 - Tdap) 09/16/1972    Cervical cancer screen  09/16/1974    Lipid screen  09/16/1993    Diabetes screen  09/16/1993    Breast cancer screen  09/16/2003    Colon cancer screen colonoscopy  09/16/2003    DEXA (modify frequency per FRAX score)  09/16/2018    Pneumococcal 65+ years Vaccine (1 of 2 - PCV13) 09/16/2018    Shingles Vaccine (1 of 2) 03/12/2020 (Originally 9/16/2003)    HIV screen  03/12/2020 (Originally 9/16/1968)    Flu vaccine (Season Ended) 09/01/2019

## 2019-08-02 ENCOUNTER — OFFICE VISIT (OUTPATIENT)
Dept: FAMILY MEDICINE CLINIC | Age: 64
End: 2019-08-02
Payer: MEDICARE

## 2019-08-02 VITALS
HEIGHT: 64 IN | HEART RATE: 82 BPM | WEIGHT: 160 LBS | SYSTOLIC BLOOD PRESSURE: 118 MMHG | BODY MASS INDEX: 27.31 KG/M2 | TEMPERATURE: 97.2 F | DIASTOLIC BLOOD PRESSURE: 60 MMHG | OXYGEN SATURATION: 98 %

## 2019-08-02 DIAGNOSIS — B96.89 ACUTE BACTERIAL SINUSITIS: Primary | ICD-10-CM

## 2019-08-02 DIAGNOSIS — M94.0 COSTOCHONDRITIS, ACUTE: ICD-10-CM

## 2019-08-02 DIAGNOSIS — J01.90 ACUTE BACTERIAL SINUSITIS: Primary | ICD-10-CM

## 2019-08-02 PROCEDURE — 4040F PNEUMOC VAC/ADMIN/RCVD: CPT | Performed by: INTERNAL MEDICINE

## 2019-08-02 PROCEDURE — G8400 PT W/DXA NO RESULTS DOC: HCPCS | Performed by: INTERNAL MEDICINE

## 2019-08-02 PROCEDURE — G8419 CALC BMI OUT NRM PARAM NOF/U: HCPCS | Performed by: INTERNAL MEDICINE

## 2019-08-02 PROCEDURE — 1036F TOBACCO NON-USER: CPT | Performed by: INTERNAL MEDICINE

## 2019-08-02 PROCEDURE — 1123F ACP DISCUSS/DSCN MKR DOCD: CPT | Performed by: INTERNAL MEDICINE

## 2019-08-02 PROCEDURE — 1090F PRES/ABSN URINE INCON ASSESS: CPT | Performed by: INTERNAL MEDICINE

## 2019-08-02 PROCEDURE — G8427 DOCREV CUR MEDS BY ELIG CLIN: HCPCS | Performed by: INTERNAL MEDICINE

## 2019-08-02 PROCEDURE — 99213 OFFICE O/P EST LOW 20 MIN: CPT | Performed by: INTERNAL MEDICINE

## 2019-08-02 PROCEDURE — 3017F COLORECTAL CA SCREEN DOC REV: CPT | Performed by: INTERNAL MEDICINE

## 2019-08-02 RX ORDER — AMOXICILLIN AND CLAVULANATE POTASSIUM 875; 125 MG/1; MG/1
1 TABLET, FILM COATED ORAL 2 TIMES DAILY
Qty: 14 TABLET | Refills: 0 | Status: SHIPPED | OUTPATIENT
Start: 2019-08-02 | End: 2019-08-09

## 2019-08-02 ASSESSMENT — ENCOUNTER SYMPTOMS
SHORTNESS OF BREATH: 1
HEMOPTYSIS: 0
HEARTBURN: 0
RHINORRHEA: 1
WHEEZING: 0
COUGH: 1
SORE THROAT: 1

## 2019-08-02 NOTE — PROGRESS NOTES
[facility]  [facility] [address]  [facility] [address]  Dept: [phone]  Dept Fax: [phone]      Geraldine Raya is a 70 y.o. female who presents today for hermedical conditions/complaints as noted below. Geraldine Raya is c/o of Cough; Pharyngitis; and Otalgia (pain in both ears )            HPI:     Cough   This is a new problem. The current episode started 1 to 4 weeks ago. The problem has been gradually worsening. The problem occurs constantly. The cough is non-productive. Associated symptoms include ear pain (R >L), nasal congestion, postnasal drip, rhinorrhea, a sore throat (hurts all the time, worse at night and when she first wakes up) and shortness of breath. Pertinent negatives include no chest pain, chills, ear congestion, fever, headaches, heartburn, hemoptysis, myalgias, rash, sweats, weight loss or wheezing. The symptoms are aggravated by lying down. Treatments tried: allergy med,OTC cough suppressant, tylenol/ibuprofen. The treatment provided mild relief. Her past medical history is significant for environmental allergies. There is no history of asthma or COPD.        No results found for: LABA1C          ( goal A1Cis < 7)   No results found for: LABMICR  LDL Cholesterol (mg/dL)   Date Value   04/17/2019 67       (goal LDL is <100)   AST (U/L)   Date Value   04/17/2019 22     ALT (U/L)   Date Value   04/17/2019 19     BUN (mg/dL)   Date Value   04/17/2019 14     BP Readings from Last 3 Encounters:   08/02/19 118/60   04/17/19 100/64   04/01/19 132/70          (goal 120/80)    Past Medical History:   Diagnosis Date    ADHD     Hyperlipidemia     Hypertension 2007      Past Surgical History:   Procedure Laterality Date    APPENDECTOMY      BACK SURGERY      CHOLECYSTECTOMY      COLONOSCOPY N/A 2016    ESOPHAGOGASTRIC FUNDOPLICATION      FINGER SURGERY Left     4th finger    REVISION TOTAL KNEE ARTHROPLASTY Right     UPPER GASTROINTESTINAL normal and breath sounds normal. No respiratory distress. She has no wheezes. Abdominal: Soft. Bowel sounds are normal. She exhibits no mass. There is no tenderness. There is no guarding. Musculoskeletal: Normal range of motion. Lymphadenopathy:     She has cervical adenopathy. Right cervical: Superficial cervical and posterior cervical adenopathy present. Left cervical: Superficial cervical and posterior cervical adenopathy present. Neurological: She is alert. Nursing note and vitals reviewed. Assessment/Plan:     1. Acute bacterial sinusitis  - amoxicillin-clavulanate (AUGMENTIN) 875-125 MG per tablet; Take 1 tablet by mouth 2 times daily for 7 days  Dispense: 14 tablet; Refill: 0    2. Costochondritis, acute  Reassurance - PRN tylenol/ibuprofen         No follow-ups on file. Patient given educational materials- see patient instructions. Discussed use, benefit, and side effects of prescribedmedications. All patient questions answered. Pt voiced understanding. Reviewedhealth maintenance. Instructed to continue current medications, diet and exercise. Patient agreed with treatment plan. Follow up as directed.      Electronically signedby Caryl Mcbride MD on 8/2/2019

## 2019-08-02 NOTE — PROGRESS NOTES
Visit Information    Have you changed or started any medications since your last visit including any over-the-counter medicines, vitamins, or herbal medicines? no   Have you stopped taking any of your medications? Is so, why? -  no  Are you having any side effects from any of your medications? - no    Have you seen any other physician or provider since your last visit?  no   Have you had any other diagnostic tests since your last visit?  no   Have you been seen in the emergency room and/or had an admission in a hospital since we last saw you?  no   Have you had your routine dental cleaning in the past 6 months?  no     Do you have an active MyChart account? If no, what is the barrier?   Yes    Patient Care Team:  Matty Reid MD as PCP - General (Internal Medicine)  Matty Reid MD as PCP - [facility]  Chacho Vazquez MD as Consulting Physician (Gastroenterology)  Gerry Nguyen as Consulting Physician (Obstetrics & Gynecology)  Elaine Rodriguez DPM as Consulting Physician (Podiatry)  Keya Nix MD as Consulting Physician (Orthopedic Surgery)    Medical History Review  Past Medical, Family, and Social History reviewed and does not contribute to the patient presenting condition    Health Maintenance   Topic Date Due    Cervical cancer screen  09/16/1974    Breast cancer screen  09/16/2003    DEXA (modify frequency per FRAX score)  09/16/2018    Shingles Vaccine (1 of 2) 03/12/2020 (Originally 9/16/2003)    HIV screen  03/12/2020 (Originally 9/16/1968)    DTaP/Tdap/Td vaccine (1 - Tdap) 04/17/2020 (Originally 9/16/1972)    Pneumococcal 65+ years Vaccine (1 of 2 - PCV13) 04/17/2020 (Originally 9/16/2018)    Flu vaccine (1) 09/01/2019    Annual Wellness Visit (AWV)  04/16/2020    Potassium monitoring  04/17/2020    Creatinine monitoring  04/17/2020    Lipid screen  04/17/2024    Colon cancer screen colonoscopy  09/06/2027    Hepatitis C screen  Completed

## 2019-09-10 ENCOUNTER — OFFICE VISIT (OUTPATIENT)
Dept: FAMILY MEDICINE CLINIC | Age: 64
End: 2019-09-10
Payer: MEDICARE

## 2019-09-10 VITALS
HEIGHT: 64 IN | WEIGHT: 165.6 LBS | DIASTOLIC BLOOD PRESSURE: 63 MMHG | BODY MASS INDEX: 28.27 KG/M2 | TEMPERATURE: 97.1 F | SYSTOLIC BLOOD PRESSURE: 102 MMHG | HEART RATE: 80 BPM

## 2019-09-10 DIAGNOSIS — F33.1 MODERATE EPISODE OF RECURRENT MAJOR DEPRESSIVE DISORDER (HCC): ICD-10-CM

## 2019-09-10 DIAGNOSIS — E78.1 PRIMARY HYPERTRIGLYCERIDEMIA: ICD-10-CM

## 2019-09-10 DIAGNOSIS — R42 VERTIGO: Primary | ICD-10-CM

## 2019-09-10 DIAGNOSIS — K21.9 GASTRO-ESOPHAGEAL REFLUX DISEASE WITHOUT ESOPHAGITIS: ICD-10-CM

## 2019-09-10 DIAGNOSIS — M17.0 PRIMARY OSTEOARTHRITIS OF BOTH KNEES: ICD-10-CM

## 2019-09-10 DIAGNOSIS — H91.93 HEARING DIFFICULTY OF BOTH EARS: ICD-10-CM

## 2019-09-10 DIAGNOSIS — Z23 FLU VACCINE NEED: ICD-10-CM

## 2019-09-10 DIAGNOSIS — Z87.891 PERSONAL HISTORY OF TOBACCO USE: ICD-10-CM

## 2019-09-10 DIAGNOSIS — E78.2 MIXED HYPERLIPIDEMIA: ICD-10-CM

## 2019-09-10 DIAGNOSIS — I10 ESSENTIAL HYPERTENSION: ICD-10-CM

## 2019-09-10 PROCEDURE — G8427 DOCREV CUR MEDS BY ELIG CLIN: HCPCS | Performed by: INTERNAL MEDICINE

## 2019-09-10 PROCEDURE — 99214 OFFICE O/P EST MOD 30 MIN: CPT | Performed by: INTERNAL MEDICINE

## 2019-09-10 PROCEDURE — 4040F PNEUMOC VAC/ADMIN/RCVD: CPT | Performed by: INTERNAL MEDICINE

## 2019-09-10 PROCEDURE — 90653 IIV ADJUVANT VACCINE IM: CPT | Performed by: INTERNAL MEDICINE

## 2019-09-10 PROCEDURE — 1036F TOBACCO NON-USER: CPT | Performed by: INTERNAL MEDICINE

## 2019-09-10 PROCEDURE — G8400 PT W/DXA NO RESULTS DOC: HCPCS | Performed by: INTERNAL MEDICINE

## 2019-09-10 PROCEDURE — 1090F PRES/ABSN URINE INCON ASSESS: CPT | Performed by: INTERNAL MEDICINE

## 2019-09-10 PROCEDURE — 1123F ACP DISCUSS/DSCN MKR DOCD: CPT | Performed by: INTERNAL MEDICINE

## 2019-09-10 PROCEDURE — 3017F COLORECTAL CA SCREEN DOC REV: CPT | Performed by: INTERNAL MEDICINE

## 2019-09-10 PROCEDURE — G0008 ADMIN INFLUENZA VIRUS VAC: HCPCS | Performed by: INTERNAL MEDICINE

## 2019-09-10 PROCEDURE — G8419 CALC BMI OUT NRM PARAM NOF/U: HCPCS | Performed by: INTERNAL MEDICINE

## 2019-09-10 PROCEDURE — G0296 VISIT TO DETERM LDCT ELIG: HCPCS | Performed by: INTERNAL MEDICINE

## 2019-09-10 RX ORDER — OMEPRAZOLE 40 MG/1
40 CAPSULE, DELAYED RELEASE ORAL 2 TIMES DAILY
Qty: 180 CAPSULE | Refills: 1 | Status: SHIPPED | OUTPATIENT
Start: 2019-09-10 | End: 2020-02-28

## 2019-09-10 RX ORDER — MECLIZINE HCL 12.5 MG/1
12.5 TABLET ORAL 3 TIMES DAILY PRN
Qty: 30 TABLET | Refills: 3 | Status: SHIPPED | OUTPATIENT
Start: 2019-09-10 | End: 2020-01-20

## 2019-09-15 ENCOUNTER — HOSPITAL ENCOUNTER (EMERGENCY)
Age: 64
Discharge: HOME | End: 2019-09-15
Attending: EMERGENCY MEDICINE
Payer: MEDICARE

## 2019-09-15 ENCOUNTER — APPOINTMENT (OUTPATIENT)
Dept: GENERAL RADIOLOGY | Age: 64
End: 2019-09-15
Payer: MEDICARE

## 2019-09-15 ENCOUNTER — APPOINTMENT (OUTPATIENT)
Dept: CT IMAGING | Age: 64
End: 2019-09-15
Payer: MEDICARE

## 2019-09-15 VITALS
BODY MASS INDEX: 28.17 KG/M2 | WEIGHT: 165 LBS | RESPIRATION RATE: 18 BRPM | HEIGHT: 64 IN | TEMPERATURE: 98.8 F | HEART RATE: 77 BPM | SYSTOLIC BLOOD PRESSURE: 141 MMHG | DIASTOLIC BLOOD PRESSURE: 90 MMHG | OXYGEN SATURATION: 99 %

## 2019-09-15 DIAGNOSIS — S43.014A ANTERIOR DISLOCATION OF RIGHT SHOULDER, INITIAL ENCOUNTER: Primary | ICD-10-CM

## 2019-09-15 PROCEDURE — 23650 CLTX SHO DSLC W/MNPJ WO ANES: CPT

## 2019-09-15 PROCEDURE — 96375 TX/PRO/DX INJ NEW DRUG ADDON: CPT

## 2019-09-15 PROCEDURE — 73020 X-RAY EXAM OF SHOULDER: CPT

## 2019-09-15 PROCEDURE — 72125 CT NECK SPINE W/O DYE: CPT

## 2019-09-15 PROCEDURE — 94761 N-INVAS EAR/PLS OXIMETRY MLT: CPT

## 2019-09-15 PROCEDURE — 2700000000 HC OXYGEN THERAPY PER DAY

## 2019-09-15 PROCEDURE — 94770 HC ETCO2 MONITOR DAILY: CPT

## 2019-09-15 PROCEDURE — 6360000002 HC RX W HCPCS: Performed by: PHYSICIAN ASSISTANT

## 2019-09-15 PROCEDURE — 6360000002 HC RX W HCPCS

## 2019-09-15 PROCEDURE — 96374 THER/PROPH/DIAG INJ IV PUSH: CPT

## 2019-09-15 PROCEDURE — 2500000003 HC RX 250 WO HCPCS: Performed by: EMERGENCY MEDICINE

## 2019-09-15 PROCEDURE — 99284 EMERGENCY DEPT VISIT MOD MDM: CPT

## 2019-09-15 PROCEDURE — 96376 TX/PRO/DX INJ SAME DRUG ADON: CPT

## 2019-09-15 PROCEDURE — 73030 X-RAY EXAM OF SHOULDER: CPT

## 2019-09-15 PROCEDURE — 72040 X-RAY EXAM NECK SPINE 2-3 VW: CPT

## 2019-09-15 RX ORDER — FENTANYL CITRATE 50 UG/ML
INJECTION, SOLUTION INTRAMUSCULAR; INTRAVENOUS
Status: COMPLETED
Start: 2019-09-15 | End: 2019-09-15

## 2019-09-15 RX ORDER — MORPHINE SULFATE 4 MG/ML
4 INJECTION, SOLUTION INTRAMUSCULAR; INTRAVENOUS ONCE
Status: COMPLETED | OUTPATIENT
Start: 2019-09-15 | End: 2019-09-15

## 2019-09-15 RX ORDER — ETOMIDATE 2 MG/ML
INJECTION INTRAVENOUS DAILY PRN
Status: COMPLETED | OUTPATIENT
Start: 2019-09-15 | End: 2019-09-15

## 2019-09-15 RX ORDER — ONDANSETRON 2 MG/ML
4 INJECTION INTRAMUSCULAR; INTRAVENOUS ONCE
Status: COMPLETED | OUTPATIENT
Start: 2019-09-15 | End: 2019-09-15

## 2019-09-15 RX ORDER — ETOMIDATE 2 MG/ML
INJECTION INTRAVENOUS
Status: DISCONTINUED
Start: 2019-09-15 | End: 2019-09-15 | Stop reason: HOSPADM

## 2019-09-15 RX ORDER — ONDANSETRON 2 MG/ML
INJECTION INTRAMUSCULAR; INTRAVENOUS
Status: COMPLETED
Start: 2019-09-15 | End: 2019-09-15

## 2019-09-15 RX ORDER — FENTANYL CITRATE 50 UG/ML
25 INJECTION, SOLUTION INTRAMUSCULAR; INTRAVENOUS ONCE
Status: COMPLETED | OUTPATIENT
Start: 2019-09-15 | End: 2019-09-15

## 2019-09-15 RX ORDER — HYDROCODONE BITARTRATE AND ACETAMINOPHEN 5; 325 MG/1; MG/1
1 TABLET ORAL EVERY 6 HOURS PRN
Qty: 18 TABLET | Refills: 0 | Status: SHIPPED | OUTPATIENT
Start: 2019-09-15 | End: 2019-09-18

## 2019-09-15 RX ADMIN — FENTANYL CITRATE 100 MCG: 50 INJECTION, SOLUTION INTRAMUSCULAR; INTRAVENOUS at 19:10

## 2019-09-15 RX ADMIN — FENTANYL CITRATE 100 MCG: 50 INJECTION INTRAMUSCULAR; INTRAVENOUS at 19:10

## 2019-09-15 RX ADMIN — ONDANSETRON 4 MG: 2 INJECTION INTRAMUSCULAR; INTRAVENOUS at 19:11

## 2019-09-15 RX ADMIN — ONDANSETRON 4 MG: 2 INJECTION INTRAMUSCULAR; INTRAVENOUS at 18:08

## 2019-09-15 RX ADMIN — ETOMIDATE 5 MG: 2 INJECTION, SOLUTION INTRAVENOUS at 19:14

## 2019-09-15 RX ADMIN — MORPHINE SULFATE 4 MG: 4 INJECTION INTRAVENOUS at 18:08

## 2019-09-15 RX ADMIN — ETOMIDATE 5 MG: 2 INJECTION, SOLUTION INTRAVENOUS at 19:09

## 2019-09-15 ASSESSMENT — PAIN DESCRIPTION - ORIENTATION: ORIENTATION: RIGHT

## 2019-09-15 ASSESSMENT — ENCOUNTER SYMPTOMS
WHEEZING: 0
CHEST TIGHTNESS: 0
SHORTNESS OF BREATH: 0
BACK PAIN: 0

## 2019-09-15 ASSESSMENT — PAIN DESCRIPTION - DESCRIPTORS: DESCRIPTORS: CONSTANT;DISCOMFORT;THROBBING

## 2019-09-15 ASSESSMENT — PAIN SCALES - GENERAL
PAINLEVEL_OUTOF10: 5
PAINLEVEL_OUTOF10: 10
PAINLEVEL_OUTOF10: 10
PAINLEVEL_OUTOF10: 6

## 2019-09-15 ASSESSMENT — PAIN DESCRIPTION - PAIN TYPE: TYPE: ACUTE PAIN

## 2019-09-15 ASSESSMENT — PAIN DESCRIPTION - FREQUENCY: FREQUENCY: CONTINUOUS

## 2019-09-15 ASSESSMENT — PAIN DESCRIPTION - LOCATION: LOCATION: SHOULDER;ARM

## 2019-09-15 NOTE — ED PROVIDER NOTES
days. Take lowest dose possible to manage pain         (Please note that portions of this note were completed with a voice recognition program.  Efforts were made to edit the dictations but occasionally words are mis-transcribed.)    Fatoumata HAMILTON PA-C  Attending Emergency Physician         Anthony Ferrera PA-C  09/15/19 1935

## 2019-09-16 ENCOUNTER — TELEPHONE (OUTPATIENT)
Dept: ONCOLOGY | Age: 64
End: 2019-09-16

## 2019-09-16 DIAGNOSIS — Z87.891 PERSONAL HISTORY OF NICOTINE DEPENDENCE: Primary | ICD-10-CM

## 2019-09-16 NOTE — LETTER
9/16/2019        Angela [address] 35    Dear Kyree William: Your healthcare provider has ordered a low dose CT scan of the chest for lung cancer screening. You will find enclosed, information about CT lung screening. Please review the statement of understanding, you will be asked to sign a copy of this at the time of your CT scan    If you have not already been contacted to make the appointment for your scan, please call our scheduling department at [phone]    Keep in mind that CT lung screening does not take the place of smoking cessation. If you are a current smoker, you will find enclosed smoking cessation resources. Please do not hesitate to contact me if you have any questions or concerns.     [address] [place] [address] Lung Screening Program  [phone]

## 2019-09-19 ENCOUNTER — HOSPITAL ENCOUNTER (OUTPATIENT)
Dept: CT IMAGING | Age: 64
Discharge: HOME | End: 2019-09-21
Payer: MEDICARE

## 2019-09-19 DIAGNOSIS — Z87.891 PERSONAL HISTORY OF TOBACCO USE: ICD-10-CM

## 2019-09-19 PROCEDURE — G0297 LDCT FOR LUNG CA SCREEN: HCPCS

## 2019-10-15 ENCOUNTER — HOSPITAL ENCOUNTER (OUTPATIENT)
Dept: ULTRASOUND IMAGING | Facility: CLINIC | Age: 64
Discharge: HOME | End: 2019-10-17
Payer: MEDICARE

## 2019-10-15 DIAGNOSIS — M25.511 RIGHT SHOULDER PAIN, UNSPECIFIED CHRONICITY: ICD-10-CM

## 2019-10-15 PROCEDURE — 76881 US COMPL JOINT R-T W/IMG: CPT

## 2019-11-13 ENCOUNTER — OFFICE VISIT (OUTPATIENT)
Dept: FAMILY MEDICINE CLINIC | Age: 64
End: 2019-11-13
Payer: MEDICARE

## 2019-11-13 VITALS
OXYGEN SATURATION: 98 % | HEIGHT: 64 IN | WEIGHT: 167 LBS | BODY MASS INDEX: 28.51 KG/M2 | DIASTOLIC BLOOD PRESSURE: 69 MMHG | HEART RATE: 82 BPM | SYSTOLIC BLOOD PRESSURE: 119 MMHG

## 2019-11-13 DIAGNOSIS — K21.9 GASTRO-ESOPHAGEAL REFLUX DISEASE WITHOUT ESOPHAGITIS: ICD-10-CM

## 2019-11-13 DIAGNOSIS — I10 ESSENTIAL HYPERTENSION: ICD-10-CM

## 2019-11-13 DIAGNOSIS — R42 DIZZINESS ON STANDING: Primary | ICD-10-CM

## 2019-11-13 DIAGNOSIS — M85.89 OSTEOPENIA OF MULTIPLE SITES: ICD-10-CM

## 2019-11-13 DIAGNOSIS — R29.6 FREQUENT FALLS: ICD-10-CM

## 2019-11-13 DIAGNOSIS — E78.2 MIXED HYPERLIPIDEMIA: ICD-10-CM

## 2019-11-13 PROCEDURE — G8427 DOCREV CUR MEDS BY ELIG CLIN: HCPCS | Performed by: INTERNAL MEDICINE

## 2019-11-13 PROCEDURE — G8400 PT W/DXA NO RESULTS DOC: HCPCS | Performed by: INTERNAL MEDICINE

## 2019-11-13 PROCEDURE — G8482 FLU IMMUNIZE ORDER/ADMIN: HCPCS | Performed by: INTERNAL MEDICINE

## 2019-11-13 PROCEDURE — 99214 OFFICE O/P EST MOD 30 MIN: CPT | Performed by: INTERNAL MEDICINE

## 2019-11-13 PROCEDURE — 4040F PNEUMOC VAC/ADMIN/RCVD: CPT | Performed by: INTERNAL MEDICINE

## 2019-11-13 PROCEDURE — 3017F COLORECTAL CA SCREEN DOC REV: CPT | Performed by: INTERNAL MEDICINE

## 2019-11-13 PROCEDURE — G8417 CALC BMI ABV UP PARAM F/U: HCPCS | Performed by: INTERNAL MEDICINE

## 2019-11-13 PROCEDURE — 1123F ACP DISCUSS/DSCN MKR DOCD: CPT | Performed by: INTERNAL MEDICINE

## 2019-11-13 PROCEDURE — 1090F PRES/ABSN URINE INCON ASSESS: CPT | Performed by: INTERNAL MEDICINE

## 2019-11-13 PROCEDURE — 1036F TOBACCO NON-USER: CPT | Performed by: INTERNAL MEDICINE

## 2019-11-13 RX ORDER — FLUDROCORTISONE ACETATE 0.1 MG/1
0.1 TABLET ORAL DAILY
Qty: 30 TABLET | Refills: 3 | Status: SHIPPED | OUTPATIENT
Start: 2019-11-13 | End: 2019-12-10 | Stop reason: SDUPTHER

## 2019-11-18 ENCOUNTER — HOSPITAL ENCOUNTER (OUTPATIENT)
Dept: VASCULAR LAB | Age: 64
Discharge: HOME | End: 2019-11-18
Payer: MEDICARE

## 2019-11-18 DIAGNOSIS — R42 DIZZINESS ON STANDING: ICD-10-CM

## 2019-11-18 PROCEDURE — 93880 EXTRACRANIAL BILAT STUDY: CPT

## 2019-11-22 ENCOUNTER — TELEPHONE (OUTPATIENT)
Dept: FAMILY MEDICINE CLINIC | Age: 64
End: 2019-11-22

## 2019-11-22 DIAGNOSIS — I65.21 MORE THAN 50 PERCENT STENOSIS OF RIGHT INTERNAL CAROTID ARTERY: Primary | ICD-10-CM

## 2019-11-27 ENCOUNTER — TELEPHONE (OUTPATIENT)
Dept: FAMILY MEDICINE CLINIC | Age: 64
End: 2019-11-27

## 2019-11-27 DIAGNOSIS — I65.21 MORE THAN 50 PERCENT STENOSIS OF RIGHT INTERNAL CAROTID ARTERY: Primary | ICD-10-CM

## 2019-12-05 ENCOUNTER — HOSPITAL ENCOUNTER (OUTPATIENT)
Dept: CT IMAGING | Age: 64
Discharge: HOME | End: 2019-12-07
Payer: MEDICARE

## 2019-12-05 DIAGNOSIS — I65.21 MORE THAN 50 PERCENT STENOSIS OF RIGHT INTERNAL CAROTID ARTERY: ICD-10-CM

## 2019-12-05 LAB
CREAT SERPL-MCNC: 0.85 MG/DL (ref 0.5–0.9)
GFR AFRICAN AMERICAN: >60 ML/MIN
GFR NON-AFRICAN AMERICAN: >60 ML/MIN
GFR SERPL CREATININE-BSD FRML MDRD: NORMAL ML/MIN/{1.73_M2}
GFR SERPL CREATININE-BSD FRML MDRD: NORMAL ML/MIN/{1.73_M2}

## 2019-12-05 PROCEDURE — 82565 ASSAY OF CREATININE: CPT

## 2019-12-05 PROCEDURE — 70496 CT ANGIOGRAPHY HEAD: CPT

## 2019-12-05 PROCEDURE — 2580000003 HC RX 258: Performed by: INTERNAL MEDICINE

## 2019-12-05 PROCEDURE — 6360000004 HC RX CONTRAST MEDICATION: Performed by: INTERNAL MEDICINE

## 2019-12-05 PROCEDURE — 36415 COLL VENOUS BLD VENIPUNCTURE: CPT

## 2019-12-05 RX ORDER — SODIUM CHLORIDE 0.9 % (FLUSH) 0.9 %
10 SYRINGE (ML) INJECTION PRN
Status: DISCONTINUED | OUTPATIENT
Start: 2019-12-05 | End: 2019-12-08 | Stop reason: HOSPADM

## 2019-12-05 RX ORDER — 0.9 % SODIUM CHLORIDE 0.9 %
80 INTRAVENOUS SOLUTION INTRAVENOUS ONCE
Status: COMPLETED | OUTPATIENT
Start: 2019-12-05 | End: 2019-12-05

## 2019-12-05 RX ADMIN — SODIUM CHLORIDE 80 ML: 9 INJECTION, SOLUTION INTRAVENOUS at 15:02

## 2019-12-05 RX ADMIN — Medication 10 ML: at 15:02

## 2019-12-05 RX ADMIN — IOPAMIDOL 75 ML: 755 INJECTION, SOLUTION INTRAVENOUS at 15:02

## 2019-12-10 ENCOUNTER — OFFICE VISIT (OUTPATIENT)
Dept: FAMILY MEDICINE CLINIC | Age: 64
End: 2019-12-10
Payer: MEDICARE

## 2019-12-10 VITALS
OXYGEN SATURATION: 97 % | HEIGHT: 64 IN | WEIGHT: 167 LBS | HEART RATE: 77 BPM | BODY MASS INDEX: 28.51 KG/M2 | SYSTOLIC BLOOD PRESSURE: 120 MMHG | DIASTOLIC BLOOD PRESSURE: 73 MMHG

## 2019-12-10 DIAGNOSIS — R42 DIZZINESS ON STANDING: ICD-10-CM

## 2019-12-10 DIAGNOSIS — R42 DIZZINESS: ICD-10-CM

## 2019-12-10 DIAGNOSIS — I65.23 BILATERAL CAROTID ARTERY STENOSIS: Primary | ICD-10-CM

## 2019-12-10 PROCEDURE — 1090F PRES/ABSN URINE INCON ASSESS: CPT | Performed by: INTERNAL MEDICINE

## 2019-12-10 PROCEDURE — 4040F PNEUMOC VAC/ADMIN/RCVD: CPT | Performed by: INTERNAL MEDICINE

## 2019-12-10 PROCEDURE — G8427 DOCREV CUR MEDS BY ELIG CLIN: HCPCS | Performed by: INTERNAL MEDICINE

## 2019-12-10 PROCEDURE — 1036F TOBACCO NON-USER: CPT | Performed by: INTERNAL MEDICINE

## 2019-12-10 PROCEDURE — G8417 CALC BMI ABV UP PARAM F/U: HCPCS | Performed by: INTERNAL MEDICINE

## 2019-12-10 PROCEDURE — G8482 FLU IMMUNIZE ORDER/ADMIN: HCPCS | Performed by: INTERNAL MEDICINE

## 2019-12-10 PROCEDURE — 3017F COLORECTAL CA SCREEN DOC REV: CPT | Performed by: INTERNAL MEDICINE

## 2019-12-10 PROCEDURE — 99214 OFFICE O/P EST MOD 30 MIN: CPT | Performed by: INTERNAL MEDICINE

## 2019-12-10 PROCEDURE — 1123F ACP DISCUSS/DSCN MKR DOCD: CPT | Performed by: INTERNAL MEDICINE

## 2019-12-10 PROCEDURE — G8400 PT W/DXA NO RESULTS DOC: HCPCS | Performed by: INTERNAL MEDICINE

## 2019-12-10 PROCEDURE — G8599 NO ASA/ANTIPLAT THER USE RNG: HCPCS | Performed by: INTERNAL MEDICINE

## 2019-12-10 RX ORDER — FLUDROCORTISONE ACETATE 0.1 MG/1
0.2 TABLET ORAL DAILY
Qty: 60 TABLET | Refills: 3 | Status: SHIPPED | OUTPATIENT
Start: 2019-12-10 | End: 2020-01-20

## 2019-12-10 RX ORDER — CILOSTAZOL 100 MG/1
100 TABLET ORAL 2 TIMES DAILY
Qty: 60 TABLET | Refills: 3 | Status: SHIPPED | OUTPATIENT
Start: 2019-12-10 | End: 2019-12-23

## 2019-12-23 ENCOUNTER — TELEPHONE (OUTPATIENT)
Dept: FAMILY MEDICINE CLINIC | Age: 64
End: 2019-12-23

## 2019-12-23 DIAGNOSIS — I65.23 BILATERAL CAROTID ARTERY STENOSIS: ICD-10-CM

## 2019-12-23 DIAGNOSIS — R42 DIZZINESS: ICD-10-CM

## 2019-12-23 RX ORDER — CILOSTAZOL 50 MG/1
50 TABLET ORAL 2 TIMES DAILY
Qty: 60 TABLET | Refills: 3 | Status: SHIPPED | OUTPATIENT
Start: 2019-12-23 | End: 2020-01-29 | Stop reason: SINTOL

## 2019-12-30 ENCOUNTER — HOSPITAL ENCOUNTER (OUTPATIENT)
Age: 64
Discharge: HOME | End: 2019-12-30
Payer: MEDICARE

## 2019-12-30 ENCOUNTER — OFFICE VISIT (OUTPATIENT)
Dept: FAMILY MEDICINE CLINIC | Age: 64
End: 2019-12-30
Payer: MEDICARE

## 2019-12-30 ENCOUNTER — TELEPHONE (OUTPATIENT)
Dept: FAMILY MEDICINE CLINIC | Age: 64
End: 2019-12-30

## 2019-12-30 VITALS
WEIGHT: 167 LBS | TEMPERATURE: 98.5 F | DIASTOLIC BLOOD PRESSURE: 87 MMHG | OXYGEN SATURATION: 97 % | BODY MASS INDEX: 29.12 KG/M2 | HEART RATE: 81 BPM | SYSTOLIC BLOOD PRESSURE: 160 MMHG

## 2019-12-30 DIAGNOSIS — M79.671 FOOT PAIN, RIGHT: Primary | ICD-10-CM

## 2019-12-30 DIAGNOSIS — M79.671 FOOT PAIN, RIGHT: ICD-10-CM

## 2019-12-30 LAB — URIC ACID: 4.1 MG/DL (ref 2.4–5.7)

## 2019-12-30 PROCEDURE — 99213 OFFICE O/P EST LOW 20 MIN: CPT | Performed by: FAMILY MEDICINE

## 2019-12-30 PROCEDURE — G8417 CALC BMI ABV UP PARAM F/U: HCPCS | Performed by: FAMILY MEDICINE

## 2019-12-30 PROCEDURE — 4040F PNEUMOC VAC/ADMIN/RCVD: CPT | Performed by: FAMILY MEDICINE

## 2019-12-30 PROCEDURE — G8482 FLU IMMUNIZE ORDER/ADMIN: HCPCS | Performed by: FAMILY MEDICINE

## 2019-12-30 PROCEDURE — 1123F ACP DISCUSS/DSCN MKR DOCD: CPT | Performed by: FAMILY MEDICINE

## 2019-12-30 PROCEDURE — G8427 DOCREV CUR MEDS BY ELIG CLIN: HCPCS | Performed by: FAMILY MEDICINE

## 2019-12-30 PROCEDURE — G8400 PT W/DXA NO RESULTS DOC: HCPCS | Performed by: FAMILY MEDICINE

## 2019-12-30 PROCEDURE — G8599 NO ASA/ANTIPLAT THER USE RNG: HCPCS | Performed by: FAMILY MEDICINE

## 2019-12-30 PROCEDURE — 84550 ASSAY OF BLOOD/URIC ACID: CPT

## 2019-12-30 PROCEDURE — 1036F TOBACCO NON-USER: CPT | Performed by: FAMILY MEDICINE

## 2019-12-30 PROCEDURE — 3017F COLORECTAL CA SCREEN DOC REV: CPT | Performed by: FAMILY MEDICINE

## 2019-12-30 PROCEDURE — 1090F PRES/ABSN URINE INCON ASSESS: CPT | Performed by: FAMILY MEDICINE

## 2019-12-30 PROCEDURE — 36415 COLL VENOUS BLD VENIPUNCTURE: CPT

## 2019-12-30 RX ORDER — PREDNISONE 20 MG/1
TABLET ORAL
Qty: 10 TABLET | Refills: 0 | Status: SHIPPED | OUTPATIENT
Start: 2019-12-30 | End: 2020-01-13

## 2019-12-30 ASSESSMENT — ENCOUNTER SYMPTOMS
GASTROINTESTINAL NEGATIVE: 1
RESPIRATORY NEGATIVE: 1
COLOR CHANGE: 1

## 2020-01-15 ENCOUNTER — TELEPHONE (OUTPATIENT)
Dept: FAMILY MEDICINE CLINIC | Age: 65
End: 2020-01-15

## 2020-01-15 NOTE — TELEPHONE ENCOUNTER
Pt called in stating that she lost her Pletal?    She knows its not the omeprazole and she cant remember the name of the one she lost. She states its one that was recent and she takes 2x daily, so I assumed it was this one.  If appropriate may she have a new prescription

## 2020-01-20 ENCOUNTER — OFFICE VISIT (OUTPATIENT)
Dept: NEUROLOGY | Age: 65
End: 2020-01-20
Payer: MEDICARE

## 2020-01-20 VITALS
BODY MASS INDEX: 28.85 KG/M2 | DIASTOLIC BLOOD PRESSURE: 88 MMHG | WEIGHT: 169 LBS | HEART RATE: 90 BPM | SYSTOLIC BLOOD PRESSURE: 144 MMHG | HEIGHT: 64 IN

## 2020-01-20 PROCEDURE — G8427 DOCREV CUR MEDS BY ELIG CLIN: HCPCS | Performed by: PSYCHIATRY & NEUROLOGY

## 2020-01-20 PROCEDURE — G8482 FLU IMMUNIZE ORDER/ADMIN: HCPCS | Performed by: PSYCHIATRY & NEUROLOGY

## 2020-01-20 PROCEDURE — G8417 CALC BMI ABV UP PARAM F/U: HCPCS | Performed by: PSYCHIATRY & NEUROLOGY

## 2020-01-20 PROCEDURE — 1090F PRES/ABSN URINE INCON ASSESS: CPT | Performed by: PSYCHIATRY & NEUROLOGY

## 2020-01-20 PROCEDURE — 99204 OFFICE O/P NEW MOD 45 MIN: CPT | Performed by: PSYCHIATRY & NEUROLOGY

## 2020-01-20 NOTE — PROGRESS NOTES
will radiate up to her head. The pain is brief, last for a few seconds but severe, 10/10. They also occur typically in the mornings, and has been more frequent recently. They occur at least 5 times a week, with no clear inciting event, no aggravating or alleviating factors. In the past 6 to 9 months, patient also reports worsening blurring of vision. She has always worn glasses, but her vision has deteriorated to the point that she has trouble reading and sometimes even driving. She does not have an ophthalmologist.       Prior testing reviewed:  CTA head and neck with contrast 12/5/2019: Atherosclerotic plaque at the carotid bifurcations and bulbs causing 60%   stenosis of the proximal bilateral internal carotid arteries.  Otherwise, no   significant arterial stenosis in the head or neck. CT cervical spine without contrast 9/15/2019:  Mild C4-5 and C5-6 degenerative disc disease.  Right   greater than left C4-5 and C5-6 facet hypertrophy.        PAST MEDICAL HISTORY:         Diagnosis Date    ADHD     Arthritis     Carotid artery disease (HCC)     Cataracts, both eyes     GERD (gastroesophageal reflux disease)     Hearing loss     High cholesterol     Hx of gastric ulcer     Hyperlipidemia     Hypertension 2007    Tension headache         PAST SURGICAL HISTORY:         Procedure Laterality Date    APPENDECTOMY      BACK SURGERY      CHOLECYSTECTOMY      COLONOSCOPY N/A 2016    ESOPHAGOGASTRIC FUNDOPLICATION      FINGER SURGERY Left     4th finger    REVISION TOTAL KNEE ARTHROPLASTY Right     UPPER GASTROINTESTINAL ENDOSCOPY      UPPER GASTROINTESTINAL ENDOSCOPY N/A 10/26/2018    EGD BIOPSY AND DILITATION WITH Rodolfo Cobb performed by Dudleyeliu Hardy MD at [address] [place] [address]:     Social History     Socioeconomic History    Marital status:      Spouse name: Not on file    Number of children: Not on file    Years of education: Not on file    Highest education level: absent, Lymph gland changes: absent           PHYSICAL EXAMINATION:       Vitals:    01/20/20 1258   BP: (!) 144/88   Pulse: 90                                              .                                                                                                    General Appearance:  Alert, cooperative, no signs of distress, appears stated age   Head:  Normocephalic, no signs of trauma   Eyes:  Conjunctiva/corneas clear;  eyelids intact   Ears:  Normal external ear and canals   Nose: Nares normal, mucosa normal, no drainage    Throat: Lips and tongue normal; teeth normal;  gums normal   Neck: Supple, intact flexion, extension and rotation;   trachea midline;  no adenopathy;   thyroid: not enlarged;   no carotid pulse abnormality   Back:   Symmetric, no curvature, ROM adequate   Lungs:   Respirations unlabored   Heart:  Regular rate and rhythm           Extremities: Extremities normal, no cyanosis, no edema   Pulses: Symmetric over head and neck   Skin: Skin color, texture normal, no rashes, no lesions                                     NEUROLOGIC EXAMINATION      Mental status    Alert and oriented x 3; intact memory with no confusion, speech or language problems; no hallucinations or delusions  Fund of information appropriate for level of education    Cranial nerves    II - visual fields intact to confrontation , fundoscopy showed no  papiledema                                                III, IV, VI - extra-ocular muscles full: no pupillary defect; no KEYUR, no nystagmus, no ptosis   V - normal facial sensation                                                               VII - normal facial symmetry                                                             VIII - intact hearing                                                                             IX, X - symmetrical palate                                                                  XI - symmetrical shoulder shrug XII - tongue midline without atrophy or fasciculation      Motor function  Normal muscle bulk and tone; strength 5/5 on all 4 extremities, no pronator drift      Sensory function Decreased to light touch and pinprick on the left upper and lower extremity      Cerebellar Intact fine motor movement. No involuntary movements or tremors. No ataxia or dysmetria on finger to nose or heel to shin testing      Reflex function DTR 2+ on bilateral UE and LE, symmetric. Negative Babinski      Gait                   normal base and arm swing              ASSESSMENT AND PLAN:       This is a 75 y.o. female who comes in for valuation for a 1 year history of recurrent falls as well as postural dizziness and right-sided facial pain. 1.  Her falls seem to be occurring independent of her dizzy spells. On exam, she has left-sided numbness. We will pursue an MRI of the brain with and without contrast.  If unremarkable, will consider an EMG of the bilateral lower extremities. No evidence of myelopathy seen on exam today  2. She is on medications that both increase in lower blood pressure. Advised patient to stop Florinef for now, and will obtain a tilt table test to evaluate her for orthostatic hypotension. If this is positive, first step will be to decrease her BP meds. 3.  Patient would like to transfer to a different PT facility that can help with her balance. We will send [facility]  4. Patient also reports worsening blurring of vision, causing trouble with reading and even driving. Will send referral to ophthalmology. Follow-up in 8 to 10 weeks      Rosio Garcia MD  Neurology and Sleep Medicine  [place] [address].    Please note that this chart was generated using voice recognition Dragon dictation software. Although every effort was made to ensure the accuracy of this automated transcription, some errors in transcription may have occurred.

## 2020-01-23 ENCOUNTER — HOSPITAL ENCOUNTER (OUTPATIENT)
Dept: MRI IMAGING | Age: 65
Discharge: HOME | End: 2020-01-25
Payer: MEDICARE

## 2020-01-23 LAB
BUN BLDV-MCNC: 18 MG/DL (ref 8–23)
CREAT SERPL-MCNC: 0.89 MG/DL (ref 0.5–0.9)
GFR AFRICAN AMERICAN: >60 ML/MIN
GFR NON-AFRICAN AMERICAN: >60 ML/MIN
GFR SERPL CREATININE-BSD FRML MDRD: NORMAL ML/MIN/{1.73_M2}
GFR SERPL CREATININE-BSD FRML MDRD: NORMAL ML/MIN/{1.73_M2}

## 2020-01-23 PROCEDURE — 82565 ASSAY OF CREATININE: CPT

## 2020-01-23 PROCEDURE — 6360000004 HC RX CONTRAST MEDICATION: Performed by: PSYCHIATRY & NEUROLOGY

## 2020-01-23 PROCEDURE — 36415 COLL VENOUS BLD VENIPUNCTURE: CPT

## 2020-01-23 PROCEDURE — 2580000003 HC RX 258: Performed by: PSYCHIATRY & NEUROLOGY

## 2020-01-23 PROCEDURE — A9579 GAD-BASE MR CONTRAST NOS,1ML: HCPCS | Performed by: PSYCHIATRY & NEUROLOGY

## 2020-01-23 PROCEDURE — 84520 ASSAY OF UREA NITROGEN: CPT

## 2020-01-23 PROCEDURE — 70553 MRI BRAIN STEM W/O & W/DYE: CPT

## 2020-01-23 RX ORDER — SODIUM CHLORIDE 0.9 % (FLUSH) 0.9 %
10 SYRINGE (ML) INJECTION PRN
Status: DISCONTINUED | OUTPATIENT
Start: 2020-01-23 | End: 2020-01-26 | Stop reason: HOSPADM

## 2020-01-23 RX ADMIN — Medication 10 ML: at 10:51

## 2020-01-23 RX ADMIN — GADOTERIDOL 15 ML: 279.3 INJECTION, SOLUTION INTRAVENOUS at 10:51

## 2020-01-27 ENCOUNTER — INITIAL CONSULT (OUTPATIENT)
Dept: VASCULAR SURGERY | Age: 65
End: 2020-01-27
Payer: MEDICARE

## 2020-01-27 VITALS
SYSTOLIC BLOOD PRESSURE: 132 MMHG | DIASTOLIC BLOOD PRESSURE: 78 MMHG | OXYGEN SATURATION: 98 % | BODY MASS INDEX: 28.48 KG/M2 | WEIGHT: 166.8 LBS | HEIGHT: 64 IN | HEART RATE: 84 BPM

## 2020-01-27 PROCEDURE — 1090F PRES/ABSN URINE INCON ASSESS: CPT | Performed by: SURGERY

## 2020-01-27 PROCEDURE — 99203 OFFICE O/P NEW LOW 30 MIN: CPT | Performed by: SURGERY

## 2020-01-27 PROCEDURE — G8482 FLU IMMUNIZE ORDER/ADMIN: HCPCS | Performed by: SURGERY

## 2020-01-27 PROCEDURE — G8417 CALC BMI ABV UP PARAM F/U: HCPCS | Performed by: SURGERY

## 2020-01-27 PROCEDURE — G8427 DOCREV CUR MEDS BY ELIG CLIN: HCPCS | Performed by: SURGERY
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
## 2020-01-29 ASSESSMENT — ENCOUNTER SYMPTOMS
ALLERGIC/IMMUNOLOGIC NEGATIVE: 1
COLOR CHANGE: 0
CHEST TIGHTNESS: 0
ABDOMINAL PAIN: 0
SHORTNESS OF BREATH: 0

## 2020-02-06 ENCOUNTER — HOSPITAL ENCOUNTER (OUTPATIENT)
Dept: CARDIAC CATH/INVASIVE PROCEDURES | Age: 65
Discharge: HOME | End: 2020-02-06
Payer: MEDICARE

## 2020-02-06 PROCEDURE — 93660 TILT TABLE EVALUATION: CPT | Performed by: INTERNAL MEDICINE

## 2020-02-07 NOTE — PROCEDURES
[facility] Card[facility] Cardiology    Tilt Table Test Report                       Today's Date: 2/7/2020  Patient Name: Andrea Solano  Date of admission: 2/6/2020  8:09 AM  Patient's age: 75 y.o., 1955  Admission Dx: Syncope [R55]    Requesting Physician: No admitting provider for patient encounter. Procedures performed:    Tilt table testing    Indication of the procedure:  Andrea Solano is a 75 y.o. female presented with syncope. Details of procedure:    Procedure's risks, benefits and alternatives of procedure were explained to patient. All questions were answered. Patient understood and informed consent was obtained. The patient was brought to the electrophysiology lab in a fasting nonsedated state. Peripheral IV access was obtained and he was put on the tilt table test.    Initial vital signs at baseline:    BP: 134/79 . HR: 67     Then the tilt table was raised to 70 degree. Immediately upon raising the table the vitals were:     BP: 123/80  HR: 77     After 20 minutes, patient received 0.4 mg NTG sublingual. 10 minutes after NTG, patient vital signs were:    BP: 69/32  HR: 105      At this time patient felt hot, nauseous, light headed . The Tilt table test was immediately brought back to [place] position and she received IV fluid bolus. At the end of procedure:  BP: 126/83  HR: 83      The patient tolerated the procedure well and there were no complications. Conclusion:    Positive tilt table test for neurocardiogenic syncope    Recommendation:   (choose applicable one)    · Recommended to avoid dehydration, paying close attention to any prodromal symptoms and how to avert syncope by lying down and elevating legs. · Patient also to have compression stocking and encouraged to use them when working long hours and need to stand for a long time.    · Specific techniques to decrease the pooling of blood in legs such as foot exercises, or tensing leg muscles when standing and increasing salt in diet were given. · Medication (e.g. fluorinef or others as instructed) if symptoms recurs despite these measurements, then medication can be used. Discussed with Nurse.       Electronically signed by Gato Guaman MD on 02/06/2020 at [place] [address] [facility]  [phone]

## 2020-02-28 RX ORDER — OMEPRAZOLE 40 MG/1
CAPSULE, DELAYED RELEASE ORAL
Qty: 180 CAPSULE | Refills: 1 | Status: SHIPPED | OUTPATIENT
Start: 2020-02-28 | End: 2020-10-12

## 2020-02-28 NOTE — TELEPHONE ENCOUNTER
spasm     Gas bloat syndrome     Hyperlipidemia     Hypertension     Irritable bowel syndrome     Osteoarthritis of knee     Osteopenia     Paraesophageal hernia     Primary hypertriglyceridemia     Tubular adenoma of colon

## 2020-03-05 RX ORDER — EPINEPHRINE 0.3 MG/.3ML
0.3 INJECTION SUBCUTANEOUS PRN
COMMUNITY
End: 2020-07-01 | Stop reason: SDUPTHER

## 2020-03-10 ENCOUNTER — HOSPITAL ENCOUNTER (OUTPATIENT)
Age: 65
Setting detail: OUTPATIENT SURGERY
Discharge: HOME | End: 2020-03-10
Attending: OPHTHALMOLOGY | Admitting: OPHTHALMOLOGY
Payer: MEDICARE

## 2020-03-10 ENCOUNTER — ANESTHESIA (OUTPATIENT)
Dept: OPERATING ROOM | Age: 65
End: 2020-03-10
Payer: MEDICARE

## 2020-03-10 ENCOUNTER — ANESTHESIA EVENT (OUTPATIENT)
Dept: OPERATING ROOM | Age: 65
End: 2020-03-10
Payer: MEDICARE

## 2020-03-10 VITALS — SYSTOLIC BLOOD PRESSURE: 99 MMHG | DIASTOLIC BLOOD PRESSURE: 67 MMHG | OXYGEN SATURATION: 100 %

## 2020-03-10 VITALS
HEIGHT: 64 IN | OXYGEN SATURATION: 98 % | WEIGHT: 163.14 LBS | TEMPERATURE: 97.3 F | HEART RATE: 75 BPM | BODY MASS INDEX: 27.85 KG/M2 | DIASTOLIC BLOOD PRESSURE: 72 MMHG | SYSTOLIC BLOOD PRESSURE: 113 MMHG | RESPIRATION RATE: 18 BRPM

## 2020-03-10 PROBLEM — H35.351 MACULAR EDEMA, CYSTOID, RIGHT: Status: ACTIVE | Noted: 2020-03-10

## 2020-03-10 PROBLEM — H35.371 MACULAR PUCKER, RIGHT: Status: ACTIVE | Noted: 2020-03-10

## 2020-03-10 LAB
EKG ATRIAL RATE: 72 BPM
EKG P AXIS: 51 DEGREES
EKG P-R INTERVAL: 160 MS
EKG Q-T INTERVAL: 404 MS
EKG QRS DURATION: 74 MS
EKG QTC CALCULATION (BAZETT): 442 MS
EKG R AXIS: 41 DEGREES
EKG T AXIS: 57 DEGREES
EKG VENTRICULAR RATE: 72 BPM
GFR NON-AFRICAN AMERICAN: 54 ML/MIN
GFR SERPL CREATININE-BSD FRML MDRD: >60 ML/MIN
GFR SERPL CREATININE-BSD FRML MDRD: ABNORMAL ML/MIN/{1.73_M2}
POC CHLORIDE: 107 MMOL/L (ref 98–107)
POC CREATININE: 1.03 MG/DL (ref 0.51–1.19)
POC HEMATOCRIT: 35 % (ref 36–46)
POC HEMOGLOBIN: 12 G/DL (ref 12–16)
POC IONIZED CALCIUM: 1.23 MMOL/L (ref 1.15–1.33)
POC POTASSIUM: 4.1 MMOL/L (ref 3.5–4.5)
POC SODIUM: 146 MMOL/L (ref 138–146)

## 2020-03-10 PROCEDURE — 7100000040 HC SPAR PHASE II RECOVERY - FIRST 15 MIN: Performed by: OPHTHALMOLOGY

## 2020-03-10 PROCEDURE — 3600000014 HC SURGERY LEVEL 4 ADDTL 15MIN: Performed by: OPHTHALMOLOGY

## 2020-03-10 PROCEDURE — 6360000002 HC RX W HCPCS: Performed by: OPHTHALMOLOGY

## 2020-03-10 PROCEDURE — 84132 ASSAY OF SERUM POTASSIUM: CPT

## 2020-03-10 PROCEDURE — 2720000010 HC SURG SUPPLY STERILE: Performed by: OPHTHALMOLOGY

## 2020-03-10 PROCEDURE — 3700000000 HC ANESTHESIA ATTENDED CARE: Performed by: OPHTHALMOLOGY

## 2020-03-10 PROCEDURE — 2709999900 HC NON-CHARGEABLE SUPPLY: Performed by: OPHTHALMOLOGY

## 2020-03-10 PROCEDURE — 93005 ELECTROCARDIOGRAM TRACING: CPT | Performed by: ANESTHESIOLOGY

## 2020-03-10 PROCEDURE — 2580000003 HC RX 258: Performed by: OPHTHALMOLOGY

## 2020-03-10 PROCEDURE — 6360000002 HC RX W HCPCS: Performed by: NURSE ANESTHETIST, CERTIFIED REGISTERED

## 2020-03-10 PROCEDURE — 6370000000 HC RX 637 (ALT 250 FOR IP): Performed by: OPHTHALMOLOGY

## 2020-03-10 PROCEDURE — 3700000001 HC ADD 15 MINUTES (ANESTHESIA): Performed by: OPHTHALMOLOGY

## 2020-03-10 PROCEDURE — 85014 HEMATOCRIT: CPT

## 2020-03-10 PROCEDURE — 82435 ASSAY OF BLOOD CHLORIDE: CPT

## 2020-03-10 PROCEDURE — 82330 ASSAY OF CALCIUM: CPT

## 2020-03-10 PROCEDURE — 7100000041 HC SPAR PHASE II RECOVERY - ADDTL 15 MIN: Performed by: OPHTHALMOLOGY

## 2020-03-10 PROCEDURE — 84295 ASSAY OF SERUM SODIUM: CPT

## 2020-03-10 PROCEDURE — 2500000003 HC RX 250 WO HCPCS: Performed by: NURSE ANESTHETIST, CERTIFIED REGISTERED

## 2020-03-10 PROCEDURE — 2500000003 HC RX 250 WO HCPCS: Performed by: OPHTHALMOLOGY

## 2020-03-10 PROCEDURE — 3600000004 HC SURGERY LEVEL 4 BASE: Performed by: OPHTHALMOLOGY

## 2020-03-10 PROCEDURE — 82565 ASSAY OF CREATININE: CPT

## 2020-03-10 PROCEDURE — 2580000003 HC RX 258: Performed by: ANESTHESIOLOGY

## 2020-03-10 RX ORDER — LIDOCAINE HYDROCHLORIDE 10 MG/ML
INJECTION, SOLUTION EPIDURAL; INFILTRATION; INTRACAUDAL; PERINEURAL PRN
Status: DISCONTINUED | OUTPATIENT
Start: 2020-03-10 | End: 2020-03-10 | Stop reason: SDUPTHER

## 2020-03-10 RX ORDER — CYCLOPENTOLATE HYDROCHLORIDE 10 MG/ML
SOLUTION/ DROPS OPHTHALMIC PRN
Status: DISCONTINUED | OUTPATIENT
Start: 2020-03-10 | End: 2020-03-10 | Stop reason: ALTCHOICE

## 2020-03-10 RX ORDER — SODIUM CHLORIDE, SODIUM LACTATE, POTASSIUM CHLORIDE, CALCIUM CHLORIDE 600; 310; 30; 20 MG/100ML; MG/100ML; MG/100ML; MG/100ML
INJECTION, SOLUTION INTRAVENOUS CONTINUOUS
Status: DISCONTINUED | OUTPATIENT
Start: 2020-03-10 | End: 2020-03-10 | Stop reason: HOSPADM

## 2020-03-10 RX ORDER — TOBRAMYCIN AND DEXAMETHASONE 3; 1 MG/ML; MG/ML
1 SUSPENSION/ DROPS OPHTHALMIC
Status: COMPLETED | OUTPATIENT
Start: 2020-03-10 | End: 2020-03-10

## 2020-03-10 RX ORDER — PHENYLEPHRINE HYDROCHLORIDE 100 MG/ML
1 SOLUTION/ DROPS OPHTHALMIC EVERY 5 MIN PRN
Status: COMPLETED | OUTPATIENT
Start: 2020-03-10 | End: 2020-03-10

## 2020-03-10 RX ORDER — BALANCED SALT SOLUTION ENRICHED WITH BICARBONATE, DEXTROSE, AND GLUTATHIONE
KIT INTRAOCULAR PRN
Status: DISCONTINUED | OUTPATIENT
Start: 2020-03-10 | End: 2020-03-10 | Stop reason: ALTCHOICE

## 2020-03-10 RX ORDER — PROPOFOL 10 MG/ML
INJECTION, EMULSION INTRAVENOUS PRN
Status: DISCONTINUED | OUTPATIENT
Start: 2020-03-10 | End: 2020-03-10 | Stop reason: SDUPTHER

## 2020-03-10 RX ORDER — MIDAZOLAM HYDROCHLORIDE 1 MG/ML
INJECTION INTRAMUSCULAR; INTRAVENOUS PRN
Status: DISCONTINUED | OUTPATIENT
Start: 2020-03-10 | End: 2020-03-10 | Stop reason: SDUPTHER

## 2020-03-10 RX ORDER — DEXTROSE MONOHYDRATE 25 G/50ML
INJECTION, SOLUTION INTRAVENOUS PRN
Status: DISCONTINUED | OUTPATIENT
Start: 2020-03-10 | End: 2020-03-10 | Stop reason: ALTCHOICE

## 2020-03-10 RX ORDER — TROPICAMIDE 10 MG/ML
1 SOLUTION/ DROPS OPHTHALMIC EVERY 5 MIN PRN
Status: COMPLETED | OUTPATIENT
Start: 2020-03-10 | End: 2020-03-10

## 2020-03-10 RX ORDER — INDOCYANINE GREEN AND WATER 25 MG
KIT INJECTION PRN
Status: DISCONTINUED | OUTPATIENT
Start: 2020-03-10 | End: 2020-03-10 | Stop reason: ALTCHOICE

## 2020-03-10 RX ORDER — ERYTHROMYCIN 5 MG/G
OINTMENT OPHTHALMIC PRN
Status: DISCONTINUED | OUTPATIENT
Start: 2020-03-10 | End: 2020-03-10 | Stop reason: ALTCHOICE

## 2020-03-10 RX ADMIN — LIDOCAINE HYDROCHLORIDE 50 MG: 10 INJECTION, SOLUTION EPIDURAL; INFILTRATION; INTRACAUDAL; PERINEURAL at 07:59

## 2020-03-10 RX ADMIN — MIDAZOLAM HYDROCHLORIDE 1 MG: 1 INJECTION, SOLUTION INTRAMUSCULAR; INTRAVENOUS at 07:53

## 2020-03-10 RX ADMIN — TOBRAMYCIN AND DEXAMETHASONE 1 DROP: 3; 1 SUSPENSION/ DROPS OPHTHALMIC at 06:47

## 2020-03-10 RX ADMIN — PHENYLEPHRINE HYDROCHLORIDE 1 DROP: 100 SOLUTION/ DROPS OPHTHALMIC at 07:01

## 2020-03-10 RX ADMIN — MIDAZOLAM HYDROCHLORIDE 1 MG: 1 INJECTION, SOLUTION INTRAMUSCULAR; INTRAVENOUS at 07:58

## 2020-03-10 RX ADMIN — TROPICAMIDE 1 DROP: 10 SOLUTION/ DROPS OPHTHALMIC at 06:37

## 2020-03-10 RX ADMIN — TROPICAMIDE 1 DROP: 10 SOLUTION/ DROPS OPHTHALMIC at 06:47

## 2020-03-10 RX ADMIN — SODIUM CHLORIDE, POTASSIUM CHLORIDE, SODIUM LACTATE AND CALCIUM CHLORIDE: 600; 310; 30; 20 INJECTION, SOLUTION INTRAVENOUS at 07:11

## 2020-03-10 RX ADMIN — TROPICAMIDE 1 DROP: 10 SOLUTION/ DROPS OPHTHALMIC at 07:01

## 2020-03-10 RX ADMIN — PHENYLEPHRINE HYDROCHLORIDE 1 DROP: 100 SOLUTION/ DROPS OPHTHALMIC at 06:47

## 2020-03-10 RX ADMIN — PHENYLEPHRINE HYDROCHLORIDE 1 DROP: 100 SOLUTION/ DROPS OPHTHALMIC at 06:37

## 2020-03-10 RX ADMIN — PROPOFOL 60 MG: 10 INJECTION, EMULSION INTRAVENOUS at 07:59

## 2020-03-10 ASSESSMENT — PULMONARY FUNCTION TESTS
PIF_VALUE: 1
PIF_VALUE: 1
PIF_VALUE: 0
PIF_VALUE: 1
PIF_VALUE: 0
PIF_VALUE: 1
PIF_VALUE: 1
PIF_VALUE: 0
PIF_VALUE: 1
PIF_VALUE: 1
PIF_VALUE: 0
PIF_VALUE: 0
PIF_VALUE: 1
PIF_VALUE: 0
PIF_VALUE: 1
PIF_VALUE: 0
PIF_VALUE: 1
PIF_VALUE: 0
PIF_VALUE: 1
PIF_VALUE: 0
PIF_VALUE: 1

## 2020-03-10 ASSESSMENT — PAIN SCALES - GENERAL
PAINLEVEL_OUTOF10: 0

## 2020-03-10 ASSESSMENT — PAIN - FUNCTIONAL ASSESSMENT: PAIN_FUNCTIONAL_ASSESSMENT: 0-10

## 2020-03-10 NOTE — OP NOTE
[facility]                                OPERATIVE REPORT    PATIENT NAME: Zuleyma Sanford                   :        1955  MED REC NO:   [id number]                             ROOM:  ACCOUNT NO:   [de-identified]                           ADMIT DATE: 03/10/2020  PROVIDER:     Yetta Holter Dabbs    DATE OF PROCEDURE:  03/10/2020    PREOPERATIVE DIAGNOSES:  1. Macular pucker, right eye. 2.  Pseudophakia, right eye. 3.  Macular edema, right eye. POSTOPERATIVE DIAGNOSES:  1. Macular pucker, right eye. 2.  Pseudophakia, right eye. 3.  Macular edema, right eye. SURGEON:  Yetta Holter. MD Roe    BLOOD LOSS:  None. COMPLICATIONS:  None. SURGERIES PERFORMED:  1.  Pars plana vitrectomy. 2.  Membrane stripping and membrane removal of macular pucker under ICG  guidance. 3.  Partial fluid-air exchange, right eye. JUSTIFICATION:  This is a very pleasant 68-year-old female who has a  history of painless progressive loss of vision with significant and  symptomatic distortion in the right eye from a macular pucker. The  patient was told of the risks, benefits, options, and management and  signed her consent. She was taken to the operating room, where IV  sedation was given, and a retrobulbar block was applied with a 50:50  mixture of Marcaine and lidocaine. After good akinesia and anesthesia  were established, the patient was prepped and draped in the usual  sterile fashion. A wire lid speculum was used to open up the lids of  the right eye. 23-gauge trocars were placed 3 mm posterior to the  limbus in the temporal and superonasal quadrants. The infusion cannula  was placed in the inferotemporal trocar and directly visually inspected  prior to being turned on. Core vitrectomy was performed. The  peripheral vitreous was trimmed out to the limits of visualization.   ICG  was then added to the eye to

## 2020-03-10 NOTE — ANESTHESIA PRE PROCEDURE
Department of Anesthesiology  Preprocedure Note       Name:  Kathy Lyons   Age:  75 y.o.  :  1955                                          MRN:  [id number]         Date:  3/10/2020      Surgeon: Donny Puente):  Chucho Fontanez MD    Procedure: VITRECTOMY 23 GAUGE, MEMBRANE PEELING, GAS FLUID EXCHANGE, ICG (Right )    Medications prior to admission:   Prior to Admission medications    Medication Sig Start Date End Date Taking? Authorizing Provider   aspirin 81 MG tablet Take 81 mg by mouth daily   Yes Historical Provider, MD   EPINEPHrine (EPIPEN 2-JONATHAN) 0.3 MG/0.3ML SOAJ injection Inject 0.3 mg into the muscle as needed Use as directed for allergic reaction   Yes Historical Provider, MD   omeprazole (PRILOSEC) 40 MG delayed release capsule TAKE ONE CAPSULE BY MOUTH TWICE A DAY 20  Yes Rosalia Celis MD   Elastic Bandages & Supports (SLING & SWATHE SHLDR IMMOBILIZ) MISC 1 box by Does not apply route daily 9/15/19  Yes China Rain PA-C   OMEGA-3 KRILL OIL PO Take by mouth daily   Yes Historical Provider, MD   atorvastatin (LIPITOR) 10 MG tablet Take 10 mg by mouth nightly    Yes Historical Provider, MD   amLODIPine (NORVASC) 10 MG tablet Take 10 mg by mouth nightly    Yes Historical Provider, MD   calcium carbonate 600 MG TABS tablet Take 1 tablet by mouth 2 times daily   Yes Historical Provider, MD   vitamin D 1000 units CAPS Take by mouth daily   Yes Historical Provider, MD   sertraline (ZOLOFT) 100 MG tablet Take 200 mg by mouth nightly    Yes Historical Provider, MD   vitamin E 1000 units capsule Take 1,000 Units by mouth daily   Yes Historical Provider, MD       Current medications:    No current facility-administered medications for this encounter. Allergies:     Allergies   Allergen Reactions    Morphine     Bee Venom     Morpholine Salicylate Itching       Problem List:    Patient Active Problem List   Diagnosis Code    Gastro-esophageal reflux disease without esophagitis K21.9  Epigastric abdominal pain R10.13    Allergic to bees Z91.030    Anxiety F41.9    Depression F32.9    Esophageal spasm K22.4    Gas bloat syndrome K92.89    Hyperlipidemia E78.5    Hypertension I10    Irritable bowel syndrome K58.9    Osteoarthritis of knee M17.10    Osteopenia M85.80    Paraesophageal hernia K44.9    Primary hypertriglyceridemia E78.1    Tubular adenoma of colon D12.6       Past Medical History:        Diagnosis Date    ADHD     mild, no RX    Arthritis     Blurred vision, right eye     Carotid artery disease (HCC)     dr Masha Haskins vascular last appt     GERD (gastroesophageal reflux disease)     Hearing loss     High cholesterol     History of closed shoulder dislocation     rt from recent fall    [place] (hard of hearing)     bet[employer] hearing aides    Hx of gastric ulcer     Hyperlipidemia     Hypertension     IBS (irritable bowel syndrome)     Macular pucker, right eye     Skin cancer of forehead     Syncope     hx recent falls    Tension headache     Wears dentures     lower bridge plate,1 tooth upper    Wears glasses        Past Surgical History:        Procedure Laterality Date    APPENDECTOMY      BACK SURGERY      CATARACT REMOVAL WITH IMPLANT Bilateral     CHOLECYSTECTOMY      COLONOSCOPY N/A     ESOPHAGOGASTRIC FUNDOPLICATION      EYE SURGERY      FINGER SURGERY Left     4th finger    HIATAL HERNIA REPAIR  1999    x2    REVISION TOTAL KNEE ARTHROPLASTY Right     UPPER GASTROINTESTINAL ENDOSCOPY      UPPER GASTROINTESTINAL ENDOSCOPY N/A 10/26/2018    EGD BIOPSY AND DILITATION WITH Bailey Anglin performed by Debora Martinez MD at [address]       Social History:    Social History     Tobacco Use    Smoking status: Former Smoker     Packs/day: 1.00     Years: 40.00     Pack years: 40.00     Types: Cigarettes     Last attempt to quit: 2018     Years since quittin.8    Smokeless tobacco: Never Used    Tobacco comment: spoke with Pt to update the most she has smoked   Substance Use Topics    Alcohol use: No                                Counseling given: Not Answered  Comment: spoke with Pt to update the most she has smoked      Vital Signs (Current):   Vitals:    03/05/20 0952 03/10/20 0646   BP:  120/79   Pulse:  83   Resp:  18   Temp:  97.3 °F (36.3 °C)   TempSrc:  Temporal   SpO2:  94%   Weight: 163 lb (73.9 kg) 163 lb 2.3 oz (74 kg)   Height: 5' 4\" (1.626 m) 5' 4\" (1.626 m)                                              BP Readings from Last 3 Encounters:   03/10/20 120/79   01/27/20 132/78   01/20/20 (!) 144/88       NPO Status: Time of last liquid consumption: 2200                        Time of last solid consumption: 2200                        Date of last liquid consumption: 03/09/20                        Date of last solid food consumption: 03/09/20    BMI:   Wt Readings from Last 3 Encounters:   03/10/20 163 lb 2.3 oz (74 kg)   01/27/20 166 lb 12.8 oz (75.7 kg)   01/20/20 169 lb (76.7 kg)     Body mass index is 28 kg/m². CBC: No results found for: WBC, RBC, HGB, HCT, MCV, RDW, PLT    CMP:   Lab Results   Component Value Date     04/17/2019    K 4.2 04/17/2019     04/17/2019    CO2 24 04/17/2019    BUN 18 01/23/2020    CREATININE 0.89 01/23/2020    GFRAA >60 01/23/2020    LABGLOM >60 01/23/2020    GLUCOSE 95 04/17/2019    PROT 7.1 04/17/2019    CALCIUM 9.0 04/17/2019    BILITOT 0.17 04/17/2019    ALKPHOS 96 04/17/2019    AST 22 04/17/2019    ALT 19 04/17/2019       POC Tests: No results for input(s): POCGLU, POCNA, POCK, POCCL, POCBUN, POCHEMO, POCHCT in the last 72 hours.     Coags: No results found for: PROTIME, INR, APTT    HCG (If Applicable): No results found for: PREGTESTUR, PREGSERUM, HCG, HCGQUANT     ABGs: No results found for: PHART, PO2ART, PJO3AYH, OAP9TSM, BEART, F2GHNDID     Type & Screen (If Applicable):  No results found for: LABABO, LABRH    Anesthesia Evaluation    Airway: Mallampati: I     Neck ROM:

## 2020-03-10 NOTE — BRIEF OP NOTE
Brief Postoperative Note  ______________________________________________________________    Patient: Ashish Burrows  YOB: 1955  MRN: [id number]  Date of Procedure: 3/10/2020    Pre-Op Diagnosis: MACULAR PUCKER RIGHT EYE    Post-Op Diagnosis: Same       Procedure(s):  VITRECTOMY 23 GAUGE, MEMBRANE PEELING, AIR FLUID EXCHANGE, ICG    Anesthesia: Monitor Anesthesia Care    Surgeon(s):  Jennie Mccurdy MD    Assistant: Laura Kaiser    Estimated Blood Loss (mL): less than 50     Complications: None    Specimens:   * No specimens in log *    Implants:  * No implants in log *      Drains: * No LDAs found *    Findings: Maliha Ureña MD  Date: 3/10/2020  Time: 8:33 AM

## 2020-03-10 NOTE — ANESTHESIA POSTPROCEDURE EVALUATION
Department of Anesthesiology  Postprocedure Note    Patient: Vernal Scale  MRN: [id number]  YOB: 1955  Date of evaluation: 3/10/2020  Time:  9:53 AM     Procedure Summary     Date:  03/10/20 Room / Location:  [address]    Anesthesia Start:  1755 Anesthesia Stop:  3343    Procedure:  VITRECTOMY 23 GAUGE, MEMBRANE PEELING, AIR FLUID EXCHANGE, ICG (Right ) Diagnosis:  (MACULAR PUCKER RIGHT EYE)    Surgeon:  Alfonso Anderson MD Responsible Provider:  Lisbet Pedraza MD    Anesthesia Type:  MAC ASA Status:  2          Anesthesia Type: MAC    Kristel Phase I:      Kristel Phase II: Kristel Score: 9    Last vitals: Reviewed and per EMR flowsheets.        Anesthesia Post Evaluation    Patient location during evaluation: PACU  Patient participation: complete - patient participated  Level of consciousness: awake and alert  Pain score: 0  Airway patency: patent  Nausea & Vomiting: no nausea and no vomiting  Complications: no  Cardiovascular status: hemodynamically stable  Respiratory status: acceptable  Hydration status: euvolemic

## 2020-03-10 NOTE — H&P
History and Physical    Pt Name: Sebastian Beach  MRN: [id number]  YOB: 1955  Date of evaluation: 3/10/2020  Primary Care Physician: Jacqui Jimenez MD    SUBJECTIVE:   History of Chief Complaint:    Sebastian Beach is a 75 y.o. female who is scheduled today for right vitrectomy. She complains of distorted vision. She reports this issue for a couple of years, progressively worsening. She has been found to have a macular pucker. Allergies  is allergic to morphine; bee venom; and morpholine salicylate. Medications  Prior to Admission medications    Medication Sig Start Date End Date Taking?  Authorizing Provider   aspirin 81 MG tablet Take 81 mg by mouth daily   Yes Historical Provider, MD   EPINEPHrine (EPIPEN 2-JONATHAN) 0.3 MG/0.3ML SOAJ injection Inject 0.3 mg into the muscle as needed Use as directed for allergic reaction   Yes Historical Provider, MD   omeprazole (PRILOSEC) 40 MG delayed release capsule TAKE ONE CAPSULE BY MOUTH TWICE A DAY 2/28/20  Yes Rosalia Dunn MD   Elastic Bandages & Supports (SLING & SWATHE SHLDR IMMOBILIZ) MISC 1 box by Does not apply route daily 9/15/19  Yes Yadiel Souza PA-C   OMEGA-3 KRILL OIL PO Take by mouth daily   Yes Historical Provider, MD   atorvastatin (LIPITOR) 10 MG tablet Take 10 mg by mouth nightly    Yes Historical Provider, MD   amLODIPine (NORVASC) 10 MG tablet Take 10 mg by mouth nightly    Yes Historical Provider, MD   calcium carbonate 600 MG TABS tablet Take 1 tablet by mouth 2 times daily   Yes Historical Provider, MD   vitamin D 1000 units CAPS Take by mouth daily   Yes Historical Provider, MD   sertraline (ZOLOFT) 100 MG tablet Take 200 mg by mouth nightly    Yes Historical Provider, MD   vitamin E 1000 units capsule Take 1,000 Units by mouth daily   Yes Historical Provider, MD     Past Medical History    has a past medical history of ADHD, Arthritis, Blurred vision, right eye, Carotid artery disease ([address] GERD (gastroesophageal reflux disease), Hearing loss, High cholesterol, History of closed shoulder dislocation, [place] (hard of hearing), Hx of gastric ulcer, Hyperlipidemia, Hypertension, IBS (irritable bowel syndrome), Macular pucker, right eye, Skin cancer of forehead, Syncope, Tension headache, Wears dentures, and Wears glasses. Past Surgical History   has a past surgical history that includes Colonoscopy (N/A, 2016); Revision total knee arthroplasty (Right); Esophagogastric fundoplication; Appendectomy; Upper gastrointestinal endoscopy; Cholecystectomy; Upper gastrointestinal endoscopy (N/A, 10/26/2018); back surgery; Finger surgery (Left); Cataract removal with implant (Bilateral); eye surgery; and hiatal hernia repair (1999). Social History   reports that she quit smoking about 22 months ago. Her smoking use included cigarettes. She has a 40.00 pack-year smoking history. She has never used smokeless tobacco.   reports no history of alcohol use. reports no history of drug use. Marital Status    Children   Occupation retired   Family History  Family Status   Relation Name Status    Father  (Not Specified)     family history includes Cancer in her father. OBJECTIVE:   VITALS:  height is 5' 4\" (1.626 m) and weight is 163 lb 2.3 oz (74 kg). Her temporal temperature is 97.3 °F (36.3 °C). Her blood pressure is 120/79 and her pulse is 83. Her respiration is 18 and oxygen saturation is 94%. CONSTITUTIONAL:Alert and orientated to person, place and time. No acute distress. Friendly. SKIN:  Warm & dry, no rashes on exposed skin. Hyperpigmented lesion on forehead. HEENT: HEAD: Normocephalic, atraumatic        EYES:  PERRL, EOMs intact, conjunctiva clear, wearing glasses       EARS:  Equal bilaterally, no edema or thickening, skin is intact without lumps or lesions. No discharge. Hard of hearing.        NOSE:  Nares patent, septum midline, no rhinorrhea      MOUTH/THROAT:  Mucous membranes moist, tongue is pink, uvula midline, teeth appear to be intact, one upper ttooth denture x 1, lower bridge not in use today. NECK:  Supple, no lymphadenopathy, full ROM  LUNGS: Respirations even and non-labored. Clear to auscultation bilaterally, no wheezes/rales/rhonchi   CARDIOVASCULAR: regular rate and rhythm, no murmurs/rubs/gallops   ABDOMEN: soft, non-tender, non-distended, bowel sounds active x 4   MUSCULOSKELETAL: Full ROM bilateral upper extremities, Full ROM bilateral lower extremities. Strength of 3/5 bilateral upper extremities. Strength 5/5 bilateral lower extremities. VASCULAR:  Brisk cap refill bilateral fingers. Radial pulses are intact, 2+ bilaterally. Dorsalis pedis pulse 2+ bilaterally. No edema or varicosities bilateral lower extremities  NEUROLOGIC: CN II-XII are grossly intact. Gait not assessed. IMPRESSIONS:   1. Macular pucker      Diagnosis Date    ADHD     mild, no RX    Arthritis     Blurred vision, right eye     Carotid artery disease ([address]     dr Temitope Brown vascular last appt 1/20    GERD (gastroesophageal reflux disease)     Hearing loss     High cholesterol     History of closed shoulder dislocation     rt from recent fall    [place] (hard of hearing)     beto hearing aides    Hx of gastric ulcer     Hyperlipidemia     Hypertension 2007    IBS (irritable bowel syndrome)     Macular pucker, right eye     Skin cancer of forehead     Syncope     hx recent falls    Tension headache     Wears dentures     lower bridge plate,1 tooth upper    Wears glasses    2.    PLANS:   1. Vitrectomy- OD    AUSTIN MALDONADO-CNP  Electronically signed 3/10/2020 at 6:49 AM

## 2020-03-26 ENCOUNTER — TELEPHONE (OUTPATIENT)
Dept: FAMILY MEDICINE CLINIC | Age: 65
End: 2020-03-26

## 2020-03-26 RX ORDER — PERMETHRIN 50 MG/G
CREAM TOPICAL
Qty: 1 TUBE | Refills: 0 | Status: SHIPPED
Start: 2020-03-26 | End: 2020-04-03 | Stop reason: CLARIF

## 2020-03-26 NOTE — TELEPHONE ENCOUNTER
Patient called stating that her  was diagnosed with scabies.  She is wondering if she can get a script called in as a precaution

## 2020-03-27 ENCOUNTER — E-VISIT (OUTPATIENT)
Dept: FAMILY MEDICINE CLINIC | Facility: CLINIC | Age: 65
End: 2020-03-27
Payer: MEDICARE

## 2020-03-27 PROCEDURE — 99421 OL DIG E/M SVC 5-10 MIN: CPT | Performed by: NURSE PRACTITIONER

## 2020-03-29 RX ORDER — AMLODIPINE BESYLATE 10 MG/1
10 TABLET ORAL NIGHTLY
Qty: 30 TABLET | Refills: 0 | Status: SHIPPED | OUTPATIENT
Start: 2020-03-29 | End: 2020-04-20 | Stop reason: SDUPTHER

## 2020-04-03 ENCOUNTER — TELEMEDICINE (OUTPATIENT)
Dept: FAMILY MEDICINE CLINIC | Age: 65
End: 2020-04-03
Payer: MEDICARE

## 2020-04-03 VITALS — WEIGHT: 164 LBS | BODY MASS INDEX: 28 KG/M2 | HEIGHT: 64 IN

## 2020-04-03 PROCEDURE — G8400 PT W/DXA NO RESULTS DOC: HCPCS | Performed by: NURSE PRACTITIONER

## 2020-04-03 PROCEDURE — 1123F ACP DISCUSS/DSCN MKR DOCD: CPT | Performed by: NURSE PRACTITIONER

## 2020-04-03 PROCEDURE — G8427 DOCREV CUR MEDS BY ELIG CLIN: HCPCS | Performed by: NURSE PRACTITIONER

## 2020-04-03 PROCEDURE — 99213 OFFICE O/P EST LOW 20 MIN: CPT | Performed by: NURSE PRACTITIONER

## 2020-04-03 PROCEDURE — 1090F PRES/ABSN URINE INCON ASSESS: CPT | Performed by: NURSE PRACTITIONER

## 2020-04-03 PROCEDURE — 3017F COLORECTAL CA SCREEN DOC REV: CPT | Performed by: NURSE PRACTITIONER

## 2020-04-03 PROCEDURE — 4040F PNEUMOC VAC/ADMIN/RCVD: CPT | Performed by: NURSE PRACTITIONER

## 2020-04-03 RX ORDER — ALBUTEROL SULFATE 90 UG/1
2 AEROSOL, METERED RESPIRATORY (INHALATION) 4 TIMES DAILY PRN
Qty: 1 INHALER | Refills: 0 | Status: SHIPPED | OUTPATIENT
Start: 2020-04-03 | End: 2021-05-04 | Stop reason: SDUPTHER

## 2020-04-03 RX ORDER — METHYLPREDNISOLONE 4 MG/1
TABLET ORAL
Qty: 1 KIT | Refills: 0 | Status: SHIPPED | OUTPATIENT
Start: 2020-04-03 | End: 2020-04-09

## 2020-04-03 ASSESSMENT — ENCOUNTER SYMPTOMS
SHORTNESS OF BREATH: 1
COUGH: 0

## 2020-04-03 NOTE — PROGRESS NOTES
tooth upper    Wears glasses    ,   Past Surgical History:   Procedure Laterality Date    APPENDECTOMY      BACK SURGERY      CATARACT REMOVAL WITH IMPLANT Bilateral     CHOLECYSTECTOMY      COLONOSCOPY N/A 2016    ESOPHAGOGASTRIC FUNDOPLICATION      EYE SURGERY      FINGER SURGERY Left     4th finger    HIATAL HERNIA REPAIR  1999    x2    REVISION TOTAL KNEE ARTHROPLASTY Right     UPPER GASTROINTESTINAL ENDOSCOPY      UPPER GASTROINTESTINAL ENDOSCOPY N/A 10/26/2018    EGD BIOPSY AND DILITATION WITH DAMION performed by Einar Scheuermann, MD at [address] VITRECTOMY Right 03/10/2020    VITRECTOMY 23 GAUGE, MEMBRANE PEELING, GAS FLUID EXCHANGE, ICG     VITRECTOMY Right 3/10/2020    VITRECTOMY 23 GAUGE, MEMBRANE PEELING, AIR FLUID EXCHANGE, ICG performed by Micki Rose MD at [address]:     [x] Alert  [x] Oriented to person/place/time    [x] No apparent distress  [] Toxic appearing     Skin tone normal      [x] Breathing appears normal  [] Appears tachypneic      [] Rash on visible skin      [x] Motor grossly intact in visible upper extremities    [x] Motor grossly intact in visible lower extremities    [x] Normal Mood  [] Anxious appearing    [] Depressed appearing  [] Confused appearing         [] OTHER:      Due to this being a TeleHealth encounter, evaluation of the following organ systems is limited: Vitals/Constitutional/EENT/Resp/CV/GI//MS/Neuro/Skin/Heme-Lymph-Imm. ASSESSMENT/PLAN:  Encounter Diagnoses   Name Primary?     LAZO (dyspnea on exertion) Yes    Viral URI        Orders Placed This Encounter   Medications    methylPREDNISolone (MEDROL, JONATHAN,) 4 MG tablet     Sig: Take as directed     Dispense:  1 kit     Refill:  0    albuterol sulfate HFA (VENTOLIN HFA) 108 (90 Base) MCG/ACT inhaler     Sig: Inhale 2 puffs into the lungs 4 times daily as needed for Shortness of Breath     Dispense:  1 Inhaler     Refill:  0     Angela is no acute distress, she

## 2020-04-17 NOTE — PROGRESS NOTES
HISTORY:         Procedure Laterality Date    APPENDECTOMY      BACK SURGERY      CATARACT REMOVAL WITH IMPLANT Bilateral     CHOLECYSTECTOMY      COLONOSCOPY N/A 2016    ESOPHAGOGASTRIC FUNDOPLICATION      EYE SURGERY      FINGER SURGERY Left     4th finger    HIATAL HERNIA REPAIR  1999    x2    REVISION TOTAL KNEE ARTHROPLASTY Right     UPPER GASTROINTESTINAL ENDOSCOPY      UPPER GASTROINTESTINAL ENDOSCOPY N/A 10/26/2018    EGD BIOPSY AND DILITATION WITH DAMION performed by Latrell Duffy MD at [address] VITRECTOMY Right 03/10/2020    VITRECTOMY 23 GAUGE, MEMBRANE PEELING, GAS FLUID EXCHANGE, ICG     VITRECTOMY Right 3/10/2020    VITRECTOMY 23 GAUGE, MEMBRANE PEELING, AIR FLUID EXCHANGE, ICG performed by Lalito De La Rosa MD at [address] [place] [address] [place]:     Social History     Socioeconomic History    Marital status:      Spouse name: Not on file    Number of children: Not on file    Years of education: Not on file    Highest education level: Not on file   Occupational History    Not on file   Social Needs    Financial resource strain: Not on file    Food insecurity     Worry: Not on file     Inability: Not on file    Transportation needs     Medical: Not on file     Non-medical: Not on file   Tobacco Use    Smoking status: Former Smoker     Packs/day: 1.00     Years: 40.00     Pack years: 40.00     Types: Cigarettes     Last attempt to quit: 2018     Years since quittin.0    Smokeless tobacco: Never Used    Tobacco comment: spoke with Pt to update the most she has smoked   Substance and Sexual Activity    Alcohol use: No    Drug use: No    Sexual activity: Not Currently   Lifestyle    Physical activity     Days per week: Not on file     Minutes per session: Not on file    Stress: Not on file   Relationships    Social connections     Talks on phone: Not on file     Gets together: Not on file     Attends [employer] service: Not on file     Active member of club or organization: Not on file     Attends meetings of clubs or organizations: Not on file     Relationship status: Not on file    Intimate partner violence     Fear of current or ex partner: Not on file     Emotionally abused: Not on file     Physically abused: Not on file     Forced sexual activity: Not on file   Other Topics Concern    Not on file   Social History Narrative    Not on file       CURRENT MEDICATIONS:     Current Outpatient Medications   Medication Sig Dispense Refill    Probiotic Product (PROBIOTIC-10 PO) Take 1 capsule by mouth daily      albuterol sulfate HFA (VENTOLIN HFA) 108 (90 Base) MCG/ACT inhaler Inhale 2 puffs into the lungs 4 times daily as needed for Shortness of Breath 1 Inhaler 0    amLODIPine (NORVASC) 10 MG tablet Take 1 tablet by mouth nightly 30 tablet 0    aspirin 81 MG tablet Take 81 mg by mouth daily      EPINEPHrine (EPIPEN 2-JONATHAN) 0.3 MG/0.3ML SOAJ injection Inject 0.3 mg into the muscle as needed Use as directed for allergic reaction      omeprazole (PRILOSEC) 40 MG delayed release capsule TAKE ONE CAPSULE BY MOUTH TWICE A  capsule 1    OMEGA-3 KRILL OIL PO Take by mouth daily      atorvastatin (LIPITOR) 10 MG tablet Take 20 mg by mouth nightly       calcium carbonate 600 MG TABS tablet Take 1 tablet by mouth 2 times daily      vitamin D 1000 units CAPS Take by mouth daily      sertraline (ZOLOFT) 100 MG tablet Take 200 mg by mouth nightly       vitamin E 1000 units capsule Take 1,000 Units by mouth daily       No current facility-administered medications for this visit. ALLERGIES:     Allergies   Allergen Reactions    Morphine     Bee Venom     Morpholine Salicylate Itching                             REVIEW OF SYSTEMS  10 point Review of Systems was performed and was negative other than for those mentioned above.       PHYSICAL EXAMINATION:                                         . General Appearance:  Alert, cooperative, no signs of distress, appears stated age   Head:  Normocephalic, no signs of trauma   Eyes:  Conjunctiva/corneas clear;  eyelids intact   Ears:  Normal external ear and canals   Nose: Nares normal, no drainage    Throat: Lips and tongue normal; teeth normal;  gums normal   Extremities: Extremities normal, no cyanosis, no edema   Skin: Skin color, texture normal, no rashes, no lesions                                     NEUROLOGIC EXAMINATION      Mental status    Alert and oriented x 3; able to follow commands, speech and language intact; no hallucinations or delusions  Fund of information appropriate for level of education    Cranial nerves    II - grossly intact  III, IV, VI - extra-ocular muscles full: no nystagmus, no ptosis   V - normal facial sensation                                                               VII - normal facial symmetry                                                             VIII - intact hearing                                                                             IX, X - symmetrical palate                                                                  XI - symmetrical shoulder shrug                                                       XII - tongue midline without atrophy      Motor function  Normal muscle bulk. Strength at least 4+/5 on all 4 extremities, no pronator drift      Sensory function Unable to test      Cerebellar Intact fine motor movement. No involuntary movements or tremors. No ataxia or dysmetria on finger to nose or heel to shin testing      Reflex function Unable to test      Gait                   normal base and arm swing              ASSESSMENT AND PLAN:       This is a 75 y.o. female who comes in for follow up for recurrent falls, dizziness and right-sided facial pain.   Her balance has improved after doing gait training through PT, and getting eye surgery for a macular

## 2020-04-19 ENCOUNTER — PATIENT MESSAGE (OUTPATIENT)
Dept: FAMILY MEDICINE CLINIC | Age: 65
End: 2020-04-19

## 2020-04-20 RX ORDER — AMLODIPINE BESYLATE 10 MG/1
10 TABLET ORAL NIGHTLY
Qty: 90 TABLET | Refills: 1 | Status: SHIPPED | OUTPATIENT
Start: 2020-04-20 | End: 2020-11-30

## 2020-04-20 RX ORDER — ATORVASTATIN CALCIUM 10 MG/1
20 TABLET, FILM COATED ORAL NIGHTLY
Qty: 90 TABLET | Refills: 1 | Status: SHIPPED | OUTPATIENT
Start: 2020-04-20 | End: 2020-08-19 | Stop reason: ALTCHOICE

## 2020-04-20 NOTE — TELEPHONE ENCOUNTER
From: Rosie Hutchinson  To: Ryan Smith MD  Sent: 4/19/2020 1:54 PM EDT  Subject: Prescription Question    I need 2 prescriptions refilled. They were originally prescribed by Nela Araujo, past health care provider. Amlodipine 10mg  Atorvastatin 10 mg.   [place] on [address]

## 2020-04-22 ENCOUNTER — TELEMEDICINE (OUTPATIENT)
Dept: NEUROLOGY | Age: 65
End: 2020-04-22
Payer: MEDICARE

## 2020-04-22 PROCEDURE — 1090F PRES/ABSN URINE INCON ASSESS: CPT | Performed by: PSYCHIATRY & NEUROLOGY

## 2020-04-22 PROCEDURE — 99214 OFFICE O/P EST MOD 30 MIN: CPT | Performed by: PSYCHIATRY & NEUROLOGY

## 2020-04-22 PROCEDURE — 4040F PNEUMOC VAC/ADMIN/RCVD: CPT | Performed by: PSYCHIATRY & NEUROLOGY

## 2020-04-22 PROCEDURE — G8400 PT W/DXA NO RESULTS DOC: HCPCS | Performed by: PSYCHIATRY & NEUROLOGY

## 2020-04-22 PROCEDURE — 1123F ACP DISCUSS/DSCN MKR DOCD: CPT | Performed by: PSYCHIATRY & NEUROLOGY

## 2020-04-22 PROCEDURE — 3017F COLORECTAL CA SCREEN DOC REV: CPT | Performed by: PSYCHIATRY & NEUROLOGY

## 2020-04-22 PROCEDURE — G8427 DOCREV CUR MEDS BY ELIG CLIN: HCPCS | Performed by: PSYCHIATRY & NEUROLOGY

## 2020-04-27 ENCOUNTER — TELEMEDICINE (OUTPATIENT)
Dept: FAMILY MEDICINE CLINIC | Age: 65
End: 2020-04-27
Payer: MEDICARE

## 2020-04-27 PROCEDURE — 1123F ACP DISCUSS/DSCN MKR DOCD: CPT | Performed by: INTERNAL MEDICINE

## 2020-04-27 PROCEDURE — 1036F TOBACCO NON-USER: CPT | Performed by: INTERNAL MEDICINE

## 2020-04-27 PROCEDURE — 4040F PNEUMOC VAC/ADMIN/RCVD: CPT | Performed by: INTERNAL MEDICINE

## 2020-04-27 PROCEDURE — 99213 OFFICE O/P EST LOW 20 MIN: CPT | Performed by: INTERNAL MEDICINE

## 2020-04-27 PROCEDURE — G8400 PT W/DXA NO RESULTS DOC: HCPCS | Performed by: INTERNAL MEDICINE

## 2020-04-27 PROCEDURE — G8427 DOCREV CUR MEDS BY ELIG CLIN: HCPCS | Performed by: INTERNAL MEDICINE

## 2020-04-27 PROCEDURE — 1090F PRES/ABSN URINE INCON ASSESS: CPT | Performed by: INTERNAL MEDICINE

## 2020-04-27 PROCEDURE — 3017F COLORECTAL CA SCREEN DOC REV: CPT | Performed by: INTERNAL MEDICINE

## 2020-04-27 PROCEDURE — G8417 CALC BMI ABV UP PARAM F/U: HCPCS | Performed by: INTERNAL MEDICINE

## 2020-04-27 RX ORDER — TRAZODONE HYDROCHLORIDE 50 MG/1
1 TABLET ORAL DAILY
COMMUNITY
Start: 2020-04-07 | End: 2022-10-26

## 2020-04-27 RX ORDER — PHENAZOPYRIDINE HYDROCHLORIDE 200 MG/1
200 TABLET, FILM COATED ORAL 3 TIMES DAILY PRN
Qty: 15 TABLET | Refills: 0 | Status: SHIPPED | OUTPATIENT
Start: 2020-04-27 | End: 2020-05-02

## 2020-04-27 RX ORDER — SULFAMETHOXAZOLE AND TRIMETHOPRIM 800; 160 MG/1; MG/1
1 TABLET ORAL 2 TIMES DAILY
Qty: 14 TABLET | Refills: 0 | Status: SHIPPED | OUTPATIENT
Start: 2020-04-27 | End: 2020-05-04

## 2020-04-27 NOTE — PROGRESS NOTES
URINARY    Patient calling with symptoms of possible urinary tract infection  Symptoms include urgency, burnign and mid back pain  Symptoms have persisted for 4 day  Patient is also complaining of back pain and burning when urinating  When was their last UTI 3 months ago    *This condition is eligible for an eVisit. If not already active, sign patient up for MyChart to improve access and communication with the provider. *

## 2020-06-26 ENCOUNTER — PATIENT MESSAGE (OUTPATIENT)
Dept: NEUROLOGY | Age: 65
End: 2020-06-26

## 2020-07-01 ENCOUNTER — OFFICE VISIT (OUTPATIENT)
Dept: FAMILY MEDICINE CLINIC | Age: 65
End: 2020-07-01
Payer: MEDICARE

## 2020-07-01 VITALS
SYSTOLIC BLOOD PRESSURE: 122 MMHG | HEART RATE: 76 BPM | WEIGHT: 166.6 LBS | HEIGHT: 64 IN | TEMPERATURE: 96.9 F | OXYGEN SATURATION: 98 % | DIASTOLIC BLOOD PRESSURE: 80 MMHG | BODY MASS INDEX: 28.44 KG/M2

## 2020-07-01 PROCEDURE — 1123F ACP DISCUSS/DSCN MKR DOCD: CPT | Performed by: INTERNAL MEDICINE

## 2020-07-01 PROCEDURE — G8417 CALC BMI ABV UP PARAM F/U: HCPCS | Performed by: INTERNAL MEDICINE

## 2020-07-01 PROCEDURE — 4040F PNEUMOC VAC/ADMIN/RCVD: CPT | Performed by: INTERNAL MEDICINE

## 2020-07-01 PROCEDURE — G8427 DOCREV CUR MEDS BY ELIG CLIN: HCPCS | Performed by: INTERNAL MEDICINE

## 2020-07-01 PROCEDURE — 3017F COLORECTAL CA SCREEN DOC REV: CPT | Performed by: INTERNAL MEDICINE

## 2020-07-01 PROCEDURE — G8400 PT W/DXA NO RESULTS DOC: HCPCS | Performed by: INTERNAL MEDICINE

## 2020-07-01 PROCEDURE — 1090F PRES/ABSN URINE INCON ASSESS: CPT | Performed by: INTERNAL MEDICINE

## 2020-07-01 PROCEDURE — 99214 OFFICE O/P EST MOD 30 MIN: CPT | Performed by: INTERNAL MEDICINE

## 2020-07-01 PROCEDURE — 1036F TOBACCO NON-USER: CPT | Performed by: INTERNAL MEDICINE

## 2020-07-01 RX ORDER — EPINEPHRINE 0.3 MG/.3ML
0.3 INJECTION SUBCUTANEOUS PRN
Qty: 2 EACH | Refills: 1 | Status: SHIPPED | OUTPATIENT
Start: 2020-07-01 | End: 2022-05-17 | Stop reason: SDUPTHER

## 2020-07-01 ASSESSMENT — ENCOUNTER SYMPTOMS
CHOKING: 0
ANAL BLEEDING: 0
BLOOD IN STOOL: 0
NAUSEA: 0
DIARRHEA: 0
CHEST TIGHTNESS: 0
CONSTIPATION: 0
COUGH: 0
VOMITING: 0
ORTHOPNEA: 0
SHORTNESS OF BREATH: 0
WHEEZING: 0
BLURRED VISION: 0
ABDOMINAL PAIN: 0

## 2020-07-01 ASSESSMENT — VISUAL ACUITY: OU: 1

## 2020-07-01 NOTE — PROGRESS NOTES
[facility]  [address] [address]  Dept: [phone]  Dept Fax: [phone]      Judith Chandra is a 75 y.o. female who presents today for hermedical conditions/complaints as noted below. Judith Chandra is c/o of Discuss Medications (statin) and Orders (wants testing on thyroid)            HPI:     Hyperlipidemia   This is a chronic problem. The current episode started more than 1 year ago. The problem is uncontrolled. Recent lipid tests were reviewed and are high. There are no known factors aggravating her hyperlipidemia. Pertinent negatives include no chest pain, focal sensory loss, focal weakness, leg pain, myalgias or shortness of breath. Current antihyperlipidemic treatment includes statins. Compliance problems include medication side effects. Risk factors for coronary artery disease include hypertension. Hypertension   This is a chronic problem. The current episode started more than 1 year ago. The problem is unchanged. The problem is controlled. Pertinent negatives include no anxiety, blurred vision, chest pain, headaches, malaise/fatigue, neck pain, orthopnea, palpitations, peripheral edema, PND, shortness of breath or sweats. There are no associated agents to hypertension. Risk factors for coronary artery disease include dyslipidemia. Past treatments include calcium channel blockers. The current treatment provides significant improvement. There are no compliance problems.         No results found for: LABA1C          ( goal A1Cis < 7)   No results found for: LABMICR  LDL Cholesterol (mg/dL)   Date Value   04/17/2019 67       (goal LDL is <100)   AST (U/L)   Date Value   04/17/2019 22     ALT (U/L)   Date Value   04/17/2019 19     BUN (mg/dL)   Date Value   01/23/2020 18     BP Readings from Last 3 Encounters:   07/01/20 122/80   03/10/20 113/72   03/10/20 99/67          (goal 120/80)    Past Medical History:   Diagnosis Date    ADHD     mild, no RX    Arthritis     Blurred vision, right eye     Carotid artery disease ([address]     dr Priya Elise vascular last appt     GERD (gastroesophageal reflux disease)     Hearing loss     High cholesterol     History of closed shoulder dislocation     rt from recent fall    [place] (hard of hearing)     beto hearing aides    Hx of gastric ulcer     Hyperlipidemia     Hypertension     IBS (irritable bowel syndrome)     Macular pucker, right eye     Skin cancer of forehead     Syncope     hx recent falls    Tension headache     Wears dentures     lower bridge plate,1 tooth upper    Wears glasses       Past Surgical History:   Procedure Laterality Date    APPENDECTOMY      BACK SURGERY      CATARACT REMOVAL WITH IMPLANT Bilateral     CHOLECYSTECTOMY      COLONOSCOPY N/A     ESOPHAGOGASTRIC FUNDOPLICATION      EYE SURGERY      FINGER SURGERY Left     4th finger    HIATAL HERNIA REPAIR  1999    x2    REVISION TOTAL KNEE ARTHROPLASTY Right     UPPER GASTROINTESTINAL ENDOSCOPY      UPPER GASTROINTESTINAL ENDOSCOPY N/A 10/26/2018    EGD BIOPSY AND DILITATION WITH Ivonne Jones performed by Smiley Shook MD at [address] [place] [address] VITRECTOMY Right 03/10/2020    VITRECTOMY 23 GAUGE, MEMBRANE PEELING, GAS FLUID EXCHANGE, ICG     VITRECTOMY Right 3/10/2020    VITRECTOMY 23 GAUGE, MEMBRANE PEELING, AIR FLUID EXCHANGE, ICG performed by Priscilla Benítez MD at [place] History   Problem Relation Age of Onset    Cancer Father        Social History     Tobacco Use    Smoking status: Former Smoker     Packs/day: 1.00     Years: 40.00     Pack years: 40.00     Types: Cigarettes     Last attempt to quit: 2018     Years since quittin.2    Smokeless tobacco: Never Used    Tobacco comment: spoke with Pt to update the most she has smoked   Substance Use Topics    Alcohol use: No      Current Outpatient Medications   Medication Sig Dispense Refill    traZODone (DESYREL) 50 MG tablet Take 1 tablet by mouth daily      amLODIPine (NORVASC) 10 MG tablet Take 1 tablet by mouth nightly 90 tablet 1    atorvastatin (LIPITOR) 10 MG tablet Take 2 tablets by mouth nightly 90 tablet 1    Probiotic Product (PROBIOTIC-10 PO) Take 1 capsule by mouth daily      aspirin 81 MG tablet Take 81 mg by mouth daily      EPINEPHrine (EPIPEN 2-JONATHAN) 0.3 MG/0.3ML SOAJ injection Inject 0.3 mg into the muscle as needed Use as directed for allergic reaction      omeprazole (PRILOSEC) 40 MG delayed release capsule TAKE ONE CAPSULE BY MOUTH TWICE A  capsule 1    OMEGA-3 KRILL OIL PO Take by mouth daily      calcium carbonate 600 MG TABS tablet Take 1 tablet by mouth 2 times daily      vitamin D 1000 units CAPS Take by mouth daily      sertraline (ZOLOFT) 100 MG tablet Take 200 mg by mouth nightly       vitamin E 1000 units capsule Take 1,000 Units by mouth daily      albuterol sulfate HFA (VENTOLIN HFA) 108 (90 Base) MCG/ACT inhaler Inhale 2 puffs into the lungs 4 times daily as needed for Shortness of Breath (Patient not taking: Reported on 7/1/2020) 1 Inhaler 0     No current facility-administered medications for this visit.       Allergies   Allergen Reactions    Morphine     Bee Venom        Health Maintenance   Topic Date Due    DTaP/Tdap/Td vaccine (1 - Tdap) 09/16/1972    DEXA (modify frequency per FRAX score)  09/16/2008    Shingles Vaccine (2 of 3) 11/20/2016    Pneumococcal 65+ years Vaccine (1 of 1 - PPSV23) 09/16/2018    Annual Wellness Visit (AWV)  05/29/2019    Lipid screen  04/17/2020    Flu vaccine (1) 09/01/2020    Low dose CT lung screening  09/19/2020    Breast cancer screen  03/21/2021    Colon cancer screen colonoscopy  09/06/2027    Hepatitis C screen  Completed    Hepatitis A vaccine  Aged Out    Hepatitis B vaccine  Aged Out    Hib vaccine  Aged Out    Meningococcal (ACWY) vaccine  Aged Out          Review of Systems   Constitutional: Negative for fatigue, fever, malaise/fatigue and unexpected weight change. Eyes: Negative for blurred vision. Respiratory: Negative for cough, choking, chest tightness, shortness of breath and wheezing. Cardiovascular: Negative for chest pain, palpitations, orthopnea, leg swelling and PND. Gastrointestinal: Negative for abdominal pain, anal bleeding, blood in stool, constipation, diarrhea, nausea and vomiting. Endocrine: Negative. Musculoskeletal: Negative for joint swelling, myalgias and neck pain. Skin: Negative. Neurological: Negative for dizziness, focal weakness and headaches. Psychiatric/Behavioral: Negative for sleep disturbance. All other systems reviewed and are negative. Objective:     /80 (Site: Right Upper Arm, Position: Sitting, Cuff Size: Medium Adult)   Pulse 76   Temp 96.9 °F (36.1 °C) (Temporal)   Ht 5' 4\" (1.626 m)   Wt 166 lb 9.6 oz (75.6 kg)   SpO2 98%   BMI 28.60 kg/m²    Wt Readings from Last 3 Encounters:   07/01/20 166 lb 9.6 oz (75.6 kg)   04/03/20 164 lb (74.4 kg)   03/10/20 163 lb 2.3 oz (74 kg)       Physical Exam  Vitals signs and nursing note reviewed. Constitutional:       General: She is not in acute distress. Appearance: She is well-developed. She is not ill-appearing. Eyes:      General: Lids are normal. Vision grossly intact. Cardiovascular:      Rate and Rhythm: Normal rate and regular rhythm. Heart sounds: Normal heart sounds, S1 normal and S2 normal. No murmur. No friction rub. No gallop. Pulmonary:      Effort: Pulmonary effort is normal. No respiratory distress. Breath sounds: Normal breath sounds. No wheezing. Abdominal:      General: Bowel sounds are normal.      Palpations: Abdomen is soft. There is no mass. Tenderness: There is no abdominal tenderness. There is no guarding. Musculoskeletal: Normal range of motion. Skin:     General: Skin is warm and dry. Capillary Refill: Capillary refill takes less than 2 seconds. Neurological:      General: No focal deficit present. Mental Status: She is alert and oriented to person, place, and time. Assessment/Plan:     1. Essential hypertension  Continue amlodipine   - Comprehensive Metabolic Panel; Future    2. Mixed hyperlipidemia  D/c atorvastatin  Will check labs, will start lovastain as needed   - Lipid Panel; Future  - Comprehensive Metabolic Panel; Future    3. Primary hypertriglyceridemia  Continue omega 3 supplements   - Lipid Panel; Future  - Comprehensive Metabolic Panel; Future    4. Moderate episode of recurrent major depressive disorder ([facility] [address]  Continue current regimen per psychiatry     5. Allergy to honey bee venom  - EPINEPHrine (EPIPEN 2-JONATHAN) 0.3 MG/0.3ML SOAJ injection; Inject 0.3 mLs into the muscle as needed (PRN) Use as directed for allergic reaction  Dispense: 2 each; Refill: 1    6. Screening for thyroid disorder  - TSH With Reflex Ft4; Future    7. Screening, anemia, deficiency, iron  - CBC With Auto Differential; Future    8. Recurrent falls  - External Referral To Physical Therapy        No follow-ups on file. Patient given educational materials- see patient instructions. Discussed use, benefit, and side effects of prescribedmedications. All patient questions answered. Pt voiced understanding. Reviewedhealth maintenance. Instructed to continue current medications, diet and exercise. Patient agreed with treatment plan. Follow up as directed.      Electronically signedby Samantha Zheng MD on 7/1/2020

## 2020-07-01 NOTE — PROGRESS NOTES
Pt is here today to discuss Atorvastatin due to causing her legs to hurt. She states is having severe dry skin, hair and tired. She states thyroid issues run in her family and she would like it checked.

## 2020-07-14 NOTE — TELEPHONE ENCOUNTER
I called Angela's phone and left a message about the reason the EMG test was ordered and she can call the office if she does want to schedule it.

## 2020-07-17 ENCOUNTER — PATIENT MESSAGE (OUTPATIENT)
Dept: FAMILY MEDICINE CLINIC | Age: 65
End: 2020-07-17

## 2020-07-17 ENCOUNTER — HOSPITAL ENCOUNTER (OUTPATIENT)
Age: 65
Discharge: HOME | End: 2020-07-17
Payer: MEDICARE

## 2020-07-17 LAB
ABSOLUTE EOS #: 0.1 K/UL (ref 0–0.4)
ABSOLUTE IMMATURE GRANULOCYTE: ABNORMAL K/UL (ref 0–0.3)
ABSOLUTE LYMPH #: 1.8 K/UL (ref 1–4.8)
ABSOLUTE MONO #: 0.5 K/UL (ref 0.1–1.3)
ALBUMIN SERPL-MCNC: 4.2 G/DL (ref 3.5–5.2)
ALBUMIN/GLOBULIN RATIO: ABNORMAL (ref 1–2.5)
ALP BLD-CCNC: 91 U/L (ref 35–104)
ALT SERPL-CCNC: 21 U/L (ref 5–33)
ANION GAP SERPL CALCULATED.3IONS-SCNC: 7 MMOL/L (ref 9–17)
AST SERPL-CCNC: 22 U/L
BASOPHILS # BLD: 1 % (ref 0–2)
BASOPHILS ABSOLUTE: 0 K/UL (ref 0–0.2)
BILIRUB SERPL-MCNC: 0.19 MG/DL (ref 0.3–1.2)
BUN BLDV-MCNC: 17 MG/DL (ref 8–23)
BUN/CREAT BLD: ABNORMAL (ref 9–20)
CALCIUM SERPL-MCNC: 9.3 MG/DL (ref 8.6–10.4)
CHLORIDE BLD-SCNC: 107 MMOL/L (ref 98–107)
CHOLESTEROL/HDL RATIO: 4.6
CHOLESTEROL: 264 MG/DL
CO2: 25 MMOL/L (ref 20–31)
CREAT SERPL-MCNC: 0.92 MG/DL (ref 0.5–0.9)
DIFFERENTIAL TYPE: ABNORMAL
EOSINOPHILS RELATIVE PERCENT: 2 % (ref 0–4)
GFR AFRICAN AMERICAN: >60 ML/MIN
GFR NON-AFRICAN AMERICAN: >60 ML/MIN
GFR SERPL CREATININE-BSD FRML MDRD: ABNORMAL ML/MIN/{1.73_M2}
GFR SERPL CREATININE-BSD FRML MDRD: ABNORMAL ML/MIN/{1.73_M2}
GLUCOSE BLD-MCNC: 92 MG/DL (ref 70–99)
HCT VFR BLD CALC: 40.9 % (ref 36–46)
HDLC SERPL-MCNC: 57 MG/DL
HEMOGLOBIN: 13.4 G/DL (ref 12–16)
IMMATURE GRANULOCYTES: ABNORMAL %
LDL CHOLESTEROL: 159 MG/DL (ref 0–130)
LYMPHOCYTES # BLD: 31 % (ref 24–44)
MCH RBC QN AUTO: 27.7 PG (ref 26–34)
MCHC RBC AUTO-ENTMCNC: 32.8 G/DL (ref 31–37)
MCV RBC AUTO: 84.4 FL (ref 80–100)
MONOCYTES # BLD: 9 % (ref 1–7)
NRBC AUTOMATED: ABNORMAL PER 100 WBC
PDW BLD-RTO: 14 % (ref 11.5–14.9)
PLATELET # BLD: 241 K/UL (ref 150–450)
PLATELET ESTIMATE: ABNORMAL
PMV BLD AUTO: 7.8 FL (ref 6–12)
POTASSIUM SERPL-SCNC: 4.6 MMOL/L (ref 3.7–5.3)
RBC # BLD: 4.84 M/UL (ref 4–5.2)
RBC # BLD: ABNORMAL 10*6/UL
SEG NEUTROPHILS: 57 % (ref 36–66)
SEGMENTED NEUTROPHILS ABSOLUTE COUNT: 3.2 K/UL (ref 1.3–9.1)
SODIUM BLD-SCNC: 139 MMOL/L (ref 135–144)
TOTAL PROTEIN: 7.2 G/DL (ref 6.4–8.3)
TRIGL SERPL-MCNC: 242 MG/DL
TSH SERPL DL<=0.05 MIU/L-ACNC: 2.14 MIU/L (ref 0.3–5)
VLDLC SERPL CALC-MCNC: ABNORMAL MG/DL (ref 1–30)
WBC # BLD: 5.7 K/UL (ref 3.5–11)
WBC # BLD: ABNORMAL 10*3/UL

## 2020-07-17 PROCEDURE — 80061 LIPID PANEL: CPT

## 2020-07-17 PROCEDURE — 85025 COMPLETE CBC W/AUTO DIFF WBC: CPT

## 2020-07-17 PROCEDURE — 36415 COLL VENOUS BLD VENIPUNCTURE: CPT

## 2020-07-17 PROCEDURE — 84443 ASSAY THYROID STIM HORMONE: CPT

## 2020-07-17 PROCEDURE — 80053 COMPREHEN METABOLIC PANEL: CPT

## 2020-07-20 NOTE — TELEPHONE ENCOUNTER
From: Sierra Hutchinson  To: Maia Garces MD  Sent: 7/17/2020 9:13 PM EDT  Subject: Non-Urgent Medical Question    I will make an appointment to get a tetanus shot.  I also need a dexa scan

## 2020-07-21 RX ORDER — LOVASTATIN 20 MG/1
20 TABLET ORAL NIGHTLY
Qty: 30 TABLET | Refills: 3 | Status: SHIPPED
Start: 2020-07-21 | End: 2020-10-20 | Stop reason: SINTOL

## 2020-08-03 ENCOUNTER — TELEPHONE (OUTPATIENT)
Dept: FAMILY MEDICINE CLINIC | Age: 65
End: 2020-08-03

## 2020-08-03 NOTE — TELEPHONE ENCOUNTER
Please get more information, I will call her when I am in office tomorrow morning. Is she being treated? Is there a UA that was done somewhere?

## 2020-08-03 NOTE — TELEPHONE ENCOUNTER
Pt has a bladder infection and would like her doctor to call her. This is her second infection in 6 weeks and would like a call from doctor about what she should do about it.

## 2020-08-03 NOTE — TELEPHONE ENCOUNTER
Patient states she is having urinary frequency, pressure, burning, and cloudy urine. States she has IBS and states when she has diarrhea she usually gets a UTI. Patient states she does not remember when she talked to you about this. Last thing was a virtual visit for this in April.

## 2020-08-04 RX ORDER — PHENAZOPYRIDINE HYDROCHLORIDE 200 MG/1
200 TABLET, FILM COATED ORAL 3 TIMES DAILY PRN
Qty: 15 TABLET | Refills: 0 | Status: SHIPPED | OUTPATIENT
Start: 2020-08-04 | End: 2020-08-09

## 2020-08-04 RX ORDER — SULFAMETHOXAZOLE AND TRIMETHOPRIM 800; 160 MG/1; MG/1
1 TABLET ORAL 2 TIMES DAILY
Qty: 20 TABLET | Refills: 0 | Status: SHIPPED | OUTPATIENT
Start: 2020-08-04 | End: 2020-08-14

## 2020-08-04 NOTE — TELEPHONE ENCOUNTER
States that she never used to gt UTIs, she seems to get them when she has diarrhea from her IBS for the last four years. Thinks this year has been the worst.   She does have a bladder stimulator, but hasnt used it in years. Having dysuria, lower abdominal pain. No fevers, chills, nausea, vomiting. Diarrhea is resolved. She has been having bladder symptoms now for five days. Will treat with bactrim, she feels the bactrim works but she might be developing resistance to it. Will do ten day course, then check urine culture. Will treat with pyridium for now. Once urine culture completed she would be candidate for preventive antibiotic therapy for recurrent UTI. Pt agreeable to plan.

## 2020-08-17 ENCOUNTER — HOSPITAL ENCOUNTER (OUTPATIENT)
Age: 65
Setting detail: SPECIMEN
Discharge: HOME | End: 2020-08-17
Payer: MEDICARE

## 2020-08-17 ENCOUNTER — NURSE ONLY (OUTPATIENT)
Dept: FAMILY MEDICINE CLINIC | Age: 65
End: 2020-08-17
Payer: MEDICARE

## 2020-08-17 LAB
BILIRUBIN, POC: ABNORMAL
BLOOD URINE, POC: ABNORMAL
CLARITY, POC: ABNORMAL
COLOR, POC: ABNORMAL
GLUCOSE URINE, POC: ABNORMAL
KETONES, POC: ABNORMAL
LEUKOCYTE EST, POC: ABNORMAL
NITRITE, POC: ABNORMAL
PH, POC: 5
PROTEIN, POC: 30
SPECIFIC GRAVITY, POC: 1.01
UROBILINOGEN, POC: 0.2

## 2020-08-17 PROCEDURE — 81002 URINALYSIS NONAUTO W/O SCOPE: CPT | Performed by: INTERNAL MEDICINE

## 2020-08-18 LAB
CULTURE: NORMAL
Lab: NORMAL
SPECIMEN DESCRIPTION: NORMAL

## 2020-08-19 ENCOUNTER — TELEMEDICINE (OUTPATIENT)
Dept: NEUROLOGY | Age: 65
End: 2020-08-19
Payer: MEDICARE

## 2020-08-19 PROCEDURE — 1090F PRES/ABSN URINE INCON ASSESS: CPT | Performed by: PSYCHIATRY & NEUROLOGY

## 2020-08-19 PROCEDURE — 1123F ACP DISCUSS/DSCN MKR DOCD: CPT | Performed by: PSYCHIATRY & NEUROLOGY

## 2020-08-19 PROCEDURE — G8428 CUR MEDS NOT DOCUMENT: HCPCS | Performed by: PSYCHIATRY & NEUROLOGY

## 2020-08-19 PROCEDURE — 4040F PNEUMOC VAC/ADMIN/RCVD: CPT | Performed by: PSYCHIATRY & NEUROLOGY

## 2020-08-19 PROCEDURE — G8400 PT W/DXA NO RESULTS DOC: HCPCS | Performed by: PSYCHIATRY & NEUROLOGY

## 2020-08-19 PROCEDURE — 3017F COLORECTAL CA SCREEN DOC REV: CPT | Performed by: PSYCHIATRY & NEUROLOGY

## 2020-08-19 PROCEDURE — 99214 OFFICE O/P EST MOD 30 MIN: CPT | Performed by: PSYCHIATRY & NEUROLOGY

## 2020-08-19 NOTE — PROGRESS NOTES
[facility]  Offices: [address], [facility], [address] [facility]  [address] [facility], [address], [place], [address] [facility]  [address] [place] [address] Jess[place], [facility], [address]  Phone: [phone]  Fax: [phone]    MD Frandy Davis MD Ahmed B. Evetta Pitter, MD Criss Heckle, MD Branson Churches, MD Joetta Generous, CNP    TELEHEALTH VISIT        8/19/2020      HISTORY OF PRESENT ILLNESS:       I had the pleasure of seeing Meggan Guevara, who returns for continuing neurologic care for facial pain, recurrent falls and dizziness. Today, patient reports she has not been able to go back to therapy because of the pandemic. She has been more careful with walking and climbing steps, she feels that she just moves too quickly and too fast, and this causes her to become out of balance. She reports having a fall about a month ago when she was climbing up steps and carrying something heavy, denies any injuries. She also continues to report episodes of lightheadedness and dizziness that are postural, usually occurs if she stands up from a sitting or laying position. She has not talked her PCP yet about this, still on the same blood pressure medications. She has also not been checking her blood pressure regularly at home. She denies any actual syncope, chest pain or palpitations. With regards to her facial pain, she reports this is doing reasonably well. She was supposed to have an implant removed, but has not been able to do it yet because of the pandemic. Despite this, she only has occasional episodes of pain, about once a week or so, which is dull and achy with a severity ranging from 5-8 over 10. It only lasts for a few seconds and typically does not recur later on in the day. Typically, her pain is sharp and stabbing, starts in the cheek and then radiates up to her head and last for a few seconds.   The pain is severe, 10/10 and typically occurs when she first wakes up in the morning. They occur on a daily basis with no clear inciting event or aggravating/alleviating factors. Prior testing reviewed:  MRI brain with and without contrast 1/23/2020:  1. No acute intracranial abnormality.  No acute infarct. 2. Minimal global parenchymal volume loss with minimal chronic microvascular   ischemic changes. CTA head and neck with contrast 12/5/2019: Atherosclerotic plaque at the carotid bifurcations and bulbs causing 60%   stenosis of the proximal bilateral internal carotid arteries.  Otherwise, no   significant arterial stenosis in the head or neck. CT cervical spine without contrast 9/15/2019:  Mild C4-5 and C5-6 degenerative disc disease.  Right   greater than left C4-5 and C5-6 facet hypertrophy.          PAST MEDICAL HISTORY:         Diagnosis Date    ADHD     mild, no RX    Arthritis     Blurred vision, right eye     Carotid artery disease (HCC)     dr Marlys Mack vascular last appt 1/20    GERD (gastroesophageal reflux disease)     Hearing loss     High cholesterol     History of closed shoulder dislocation     rt from recent fall    [place] (hard of hearing)     beto hearing aides    Hx of gastric ulcer     Hyperlipidemia     Hypertension 2007    IBS (irritable bowel syndrome)     Macular pucker, right eye     Skin cancer of forehead     Syncope     hx recent falls    Tension headache     Wears dentures     lower bridge plate,1 tooth upper    Wears glasses         PAST SURGICAL HISTORY:         Procedure Laterality Date    APPENDECTOMY      BACK SURGERY      CATARACT REMOVAL WITH IMPLANT Bilateral     CHOLECYSTECTOMY      COLONOSCOPY N/A 2016    ESOPHAGOGASTRIC FUNDOPLICATION      EYE SURGERY      FINGER SURGERY Left     4th finger    HIATAL HERNIA REPAIR  1999    x2    REVISION TOTAL KNEE ARTHROPLASTY Right     UPPER GASTROINTESTINAL ENDOSCOPY      UPPER GASTROINTESTINAL ENDOSCOPY N/A 10/26/2018    EGD BIOPSY AND DILITATION WITH Vester Milling performed by Bienvenido Palmer MD at [facility] VITRECTOMY Right 03/10/2020    VITRECTOMY 23 GAUGE, MEMBRANE PEELING, GAS FLUID EXCHANGE, ICG     VITRECTOMY Right 3/10/2020    VITRECTOMY 23 GAUGE, MEMBRANE PEELING, AIR FLUID EXCHANGE, ICG performed by Lauren De La Paz MD at [facility] [place]:     Social History     Socioeconomic History    Marital status:      Spouse name: Not on file    Number of children: Not on file    Years of education: Not on file    Highest education level: Not on file   Occupational History    Not on file   Social Needs    Financial resource strain: Not on file    Food insecurity     Worry: Not on file     Inability: Not on file    Transportation needs     Medical: Not on file     Non-medical: Not on file   Tobacco Use    Smoking status: Former Smoker     Packs/day: 1.00     Years: 40.00     Pack years: 40.00     Types: Cigarettes     Last attempt to quit: 2018     Years since quittin.3    Smokeless tobacco: Never Used    Tobacco comment: spoke with Pt to update the most she has smoked   Substance and Sexual Activity    Alcohol use: No    Drug use: No    Sexual activity: Not Currently   Lifestyle    Physical activity     Days per week: Not on file     Minutes per session: Not on file    Stress: Not on file   Relationships    Social connections     Talks on phone: Not on file     Gets together: Not on file     Attends Spiritism service: Not on file     Active member of club or organization: Not on file     Attends meetings of clubs or organizations: Not on file     Relationship status: Not on file    Intimate partner violence     Fear of current or ex partner: Not on file     Emotionally abused: Not on file     Physically abused: Not on file     Forced sexual activity: Not on file   Other Topics Concern    Not on file   Social History Narrative    Not on file       CURRENT MEDICATIONS:     Current Outpatient Medications   Medication Sig Dispense Refill    lovastatin (MEVACOR) 20 MG tablet Take 1 tablet by mouth nightly 30 tablet 3    EPINEPHrine (EPIPEN 2-JONATHAN) 0.3 MG/0.3ML SOAJ injection Inject 0.3 mLs into the muscle as needed (PRN) Use as directed for allergic reaction 2 each 1    traZODone (DESYREL) 50 MG tablet Take 1 tablet by mouth daily      amLODIPine (NORVASC) 10 MG tablet Take 1 tablet by mouth nightly 90 tablet 1    Probiotic Product (PROBIOTIC-10 PO) Take 1 capsule by mouth daily      aspirin 81 MG tablet Take 81 mg by mouth daily      omeprazole (PRILOSEC) 40 MG delayed release capsule TAKE ONE CAPSULE BY MOUTH TWICE A  capsule 1    OMEGA-3 KRILL OIL PO Take by mouth daily      calcium carbonate 600 MG TABS tablet Take 1 tablet by mouth 2 times daily      vitamin D 1000 units CAPS Take by mouth daily      sertraline (ZOLOFT) 100 MG tablet Take 200 mg by mouth nightly       vitamin E 1000 units capsule Take 1,000 Units by mouth daily      albuterol sulfate HFA (VENTOLIN HFA) 108 (90 Base) MCG/ACT inhaler Inhale 2 puffs into the lungs 4 times daily as needed for Shortness of Breath (Patient not taking: Reported on 7/1/2020) 1 Inhaler 0     No current facility-administered medications for this visit. ALLERGIES:     Allergies   Allergen Reactions    Morphine     Bee Venom                              REVIEW OF SYSTEMS     All items selected indicate a positive finding. Those items not selected are negative.   Constitutional [] Weight loss/gain   [] Fatigue  [] Fever/Chills   HEENT [] Hearing Loss  [] Visual Disturbance  [] Tinnitus  [] Eye pain   Respiratory [] Shortness of Breath  [] Cough  [] Snoring   Cardiovascular [] Chest Pain  [] Palpitations  [] Lightheaded   GI [] Constipation  [] Diarrhea  [] Swallowing change    [] Urinary Frequency  [] Urinary Urgency   Musculoskeletal [] Neck pain  [] Back pain  [] Muscle pain  [] Restless legs   Dermatologic [] Skin changes   Neurologic [] Memory loss/confusion  [] Seizures  [] Trouble walking or imbalance  [] Dizziness  [] Weakness  [] Numbness  [] Tremors  [] Speech Difficulty  [] Headaches  [] Light Sensitivity  [] Sound Sensitivity   Endocrinology []Excessive thirst  []Excessive hunger   Psychiatric [] Anxiety/Depression  [] Hallucination   Allergy/immunology []Hives/environmental allergies   Hematologic/lymph [] Abnormal bleeding  [] Abnormal bruising           PHYSICAL EXAMINATION:                                         .                                                                                                    General Appearance:  Alert, cooperative, no signs of distress, appears stated age   Head:  Normocephalic, no signs of trauma   Eyes:  Conjunctiva/corneas clear;  eyelids intact   Ears:  Normal external ear and canals   Nose: Nares normal, no drainage    Throat: Lips and tongue normal; teeth normal;  gums normal   Extremities: Extremities normal, no cyanosis, no edema   Skin: Skin color, texture normal, no rashes, no lesions                                     NEUROLOGIC EXAMINATION      Mental status    Alert and oriented x 3; able to follow commands, speech and language intact; no hallucinations or delusions  Fund of information appropriate for level of education    Cranial nerves    II - grossly intact  III, IV, VI - extra-ocular muscles full: no nystagmus, no ptosis   V - normal facial sensation                                                               VII - normal facial symmetry                                                             VIII - intact hearing                                                                             IX, X - symmetrical palate                                                                  XI - symmetrical shoulder shrug                                                       XII - tongue midline without atrophy      Motor function  Normal muscle bulk.  Strength at least 4+/5 on all 4 extremities, no pronator drift      Sensory function Unable to test      Cerebellar Intact fine motor movement. No involuntary movements or tremors. No ataxia or dysmetria on finger to nose or heel to shin testing      Reflex function Unable to test      Gait                   normal base and arm swing              ASSESSMENT AND PLAN:       This is a 75 y.o. female who comes in for follow up for recurrent falls, postural dizziness and right-sided facial pain. She had improvement with physical therapy, but has been unable to do it since the spring due to the pandemic. She also has persistent postural dizziness, again encourage patient to speak with her PCP about possibly changing her blood pressure medications. Also encouraged her to check her blood pressure on a regular basis daily. We will reassess her again in 6 months. Patient ID verified by me prior to start of this visit    This is a telehealth visit that was performed with the originating site at Patient Location: home and Provider Location of [place], [place]. Verbal consent to participate in video visit was obtained. Pursuant to the emergency declaration under the [facility], [facility] waiver authority and the [employer] Act, this Virtual Visit was conducted, with patient's consent, to reduce the patient's risk of exposure to COVID-19 and provide continuity of care for an established/new patient. Services were provided through a video synchronous discussion virtually to substitute for in-person clinic visit.  I discussed with the patient the nature of our telehealth visits via interactive/real-time audio/video that:  - I would evaluate the patient and recommend diagnostics and treatments based on my assessment  - Our sessions are not being recorded and that personal health information is protected  - Our team would provide follow up care in person if/when the patient needs it. Mylinda Cogan, MD  Neurology and Sleep Medicine  [facility]    Please note that this chart was generated using voice recognition Dragon dictation software. Although every effort was made to ensure the accuracy of this automated transcription, some errors in transcription may have occurred.

## 2020-08-21 ENCOUNTER — PATIENT MESSAGE (OUTPATIENT)
Dept: FAMILY MEDICINE CLINIC | Age: 65
End: 2020-08-21

## 2020-08-21 NOTE — TELEPHONE ENCOUNTER
From: Hernesto Hutchinson  To: Rosemary Vu MD  Sent: 8/21/2020 1:31 PM EDT  Subject: Non-Urgent Medical Question    I'm do for a lung screening test. Can u please schedule this for me ASAP since I'm having trouble breathing. Also can you explain my urinalysis resaults to me.

## 2020-08-28 ENCOUNTER — HOSPITAL ENCOUNTER (OUTPATIENT)
Dept: WOMENS IMAGING | Age: 65
Discharge: HOME | End: 2020-08-30
Payer: MEDICARE

## 2020-08-28 PROCEDURE — 77080 DXA BONE DENSITY AXIAL: CPT

## 2020-09-09 ENCOUNTER — NURSE ONLY (OUTPATIENT)
Dept: FAMILY MEDICINE CLINIC | Age: 65
End: 2020-09-09
Payer: MEDICARE

## 2020-09-09 PROCEDURE — G0008 ADMIN INFLUENZA VIRUS VAC: HCPCS | Performed by: INTERNAL MEDICINE

## 2020-09-09 PROCEDURE — 90694 VACC AIIV4 NO PRSRV 0.5ML IM: CPT | Performed by: INTERNAL MEDICINE

## 2020-09-09 PROCEDURE — 90471 IMMUNIZATION ADMIN: CPT | Performed by: INTERNAL MEDICINE

## 2020-09-09 PROCEDURE — 90714 TD VACC NO PRESV 7 YRS+ IM: CPT | Performed by: INTERNAL MEDICINE

## 2020-10-05 ENCOUNTER — TELEPHONE (OUTPATIENT)
Dept: ONCOLOGY | Age: 65
End: 2020-10-05

## 2020-10-05 NOTE — LETTER
10/5/2020        Angela REBECCA Hutchinson  [address] [facility] [address]    Dear Alyson Hess: Your healthcare provider has ordered a low dose CT scan of the chest for lung cancer screening. You will find enclosed, information about CT lung screening. Please review the statement of understanding, you will be asked to sign a copy of this at the time of your CT scan    If you have not already been contacted to make the appointment for your scan, please call our scheduling department at [phone]    Keep in mind that CT lung screening does not take the place of smoking cessation. If you are a current smoker, you will find enclosed smoking cessation resources. Please do not hesitate to contact me if you have any questions or concerns.     [address] [facility],      [facility] Lung Screening Program  [phone]

## 2020-10-08 ENCOUNTER — HOSPITAL ENCOUNTER (OUTPATIENT)
Dept: CT IMAGING | Age: 65
Discharge: HOME | End: 2020-10-10
Payer: MEDICARE

## 2020-10-08 PROCEDURE — G0297 LDCT FOR LUNG CA SCREEN: HCPCS

## 2020-10-12 ENCOUNTER — PATIENT MESSAGE (OUTPATIENT)
Dept: FAMILY MEDICINE CLINIC | Age: 65
End: 2020-10-12

## 2020-10-12 NOTE — TELEPHONE ENCOUNTER
Last visit: 7/1/20  Last Med refill: 2/28/20  Does patient have enough medication for 72 hours: No:     Next Visit Date:  Future Appointments   Date Time Provider Bekah Mittal   10/20/2020  2:30 PM Rosalia Sears MD [facility]   1/4/2021  1:00 PM [facility]   3/3/2021  2:00 PM Patricia Bull MD [facility] [address] Maintenance   Topic Date Due    DTaP/Tdap/Td vaccine (1 - Tdap) 09/16/1972    Shingles Vaccine (2 of 3) 11/20/2016    Annual Wellness Visit (AWV)  05/29/2019    Breast cancer screen  03/21/2021    Lipid screen  07/17/2021    Pneumococcal 65+ years Vaccine (2 of 2 - PPSV23) 07/26/2021    Low dose CT lung screening  10/08/2021    Colon cancer screen colonoscopy  09/06/2027    DEXA (modify frequency per FRAX score)  Completed    Flu vaccine  Completed    Hepatitis C screen  Completed    Hepatitis A vaccine  Aged Out    Hepatitis B vaccine  Aged Out    Hib vaccine  Aged Out    Meningococcal (ACWY) vaccine  Aged Out       No results found for: LABA1C          ( goal A1C is < 7)   No results found for: LABMICR  LDL Cholesterol (mg/dL)   Date Value   07/17/2020 159 (H)   04/17/2019 67       (goal LDL is <100)   AST (U/L)   Date Value   07/17/2020 22     ALT (U/L)   Date Value   07/17/2020 21     BUN (mg/dL)   Date Value   07/17/2020 17     BP Readings from Last 3 Encounters:   07/01/20 122/80   03/10/20 113/72   03/10/20 99/67          (goal 120/80)    All Future Testing planned in [facility]  Lab Frequency Next Occurrence   EMG Once 10/30/2020   VL DUP CAROTID BILATERAL Once 01/27/2021               Patient Active Problem List:     Gastro-esophageal reflux disease without esophagitis     Epigastric abdominal pain     Allergic to bees     Anxiety     Depression     Esophageal spasm     Gas bloat syndrome     Hyperlipidemia     Hypertension     Irritable bowel syndrome     Osteoarthritis of knee     Osteopenia     Paraesophageal hernia     Primary hypertriglyceridemia     Tubular adenoma of colon     Macular pucker, right     Macular edema, cystoid, right

## 2020-10-12 NOTE — TELEPHONE ENCOUNTER
From: Jayant Hutchinson  To: Anaid Conti MD  Sent: 10/12/2020 11:22 AM EDT  Subject: Non-Urgent Medical Question    I am wondering if my results from my lung test have come in

## 2020-10-14 RX ORDER — OMEPRAZOLE 40 MG/1
CAPSULE, DELAYED RELEASE ORAL
Qty: 180 CAPSULE | Refills: 1 | Status: SHIPPED | OUTPATIENT
Start: 2020-10-14 | End: 2021-04-20

## 2020-10-19 ASSESSMENT — LIFESTYLE VARIABLES
AUDIT-C TOTAL SCORE: INCOMPLETE
AUDIT TOTAL SCORE: INCOMPLETE
HOW OFTEN DO YOU HAVE A DRINK CONTAINING ALCOHOL: NEVER
HOW OFTEN DO YOU HAVE A DRINK CONTAINING ALCOHOL: 0

## 2020-10-19 ASSESSMENT — PATIENT HEALTH QUESTIONNAIRE - PHQ9
2. FEELING DOWN, DEPRESSED OR HOPELESS: 0
SUM OF ALL RESPONSES TO PHQ QUESTIONS 1-9: 1
1. LITTLE INTEREST OR PLEASURE IN DOING THINGS: 1
SUM OF ALL RESPONSES TO PHQ QUESTIONS 1-9: 1
SUM OF ALL RESPONSES TO PHQ9 QUESTIONS 1 & 2: 1
SUM OF ALL RESPONSES TO PHQ QUESTIONS 1-9: 1

## 2020-10-20 ENCOUNTER — OFFICE VISIT (OUTPATIENT)
Dept: FAMILY MEDICINE CLINIC | Age: 65
End: 2020-10-20
Payer: MEDICARE

## 2020-10-20 VITALS
TEMPERATURE: 98 F | SYSTOLIC BLOOD PRESSURE: 124 MMHG | WEIGHT: 165.2 LBS | HEIGHT: 64 IN | OXYGEN SATURATION: 99 % | DIASTOLIC BLOOD PRESSURE: 80 MMHG | BODY MASS INDEX: 28.2 KG/M2 | HEART RATE: 70 BPM

## 2020-10-20 PROCEDURE — 3017F COLORECTAL CA SCREEN DOC REV: CPT | Performed by: INTERNAL MEDICINE

## 2020-10-20 PROCEDURE — 99497 ADVNCD CARE PLAN 30 MIN: CPT | Performed by: INTERNAL MEDICINE

## 2020-10-20 PROCEDURE — G0446 INTENS BEHAVE THER CARDIO DX: HCPCS | Performed by: INTERNAL MEDICINE

## 2020-10-20 PROCEDURE — G8484 FLU IMMUNIZE NO ADMIN: HCPCS | Performed by: INTERNAL MEDICINE

## 2020-10-20 PROCEDURE — 1123F ACP DISCUSS/DSCN MKR DOCD: CPT | Performed by: INTERNAL MEDICINE

## 2020-10-20 PROCEDURE — 4040F PNEUMOC VAC/ADMIN/RCVD: CPT | Performed by: INTERNAL MEDICINE

## 2020-10-20 PROCEDURE — G0438 PPPS, INITIAL VISIT: HCPCS | Performed by: INTERNAL MEDICINE

## 2020-10-20 RX ORDER — FLUTICASONE PROPIONATE 50 MCG
1 SPRAY, SUSPENSION (ML) NASAL DAILY
Qty: 1 BOTTLE | Refills: 6 | Status: SHIPPED | OUTPATIENT
Start: 2020-10-20 | End: 2022-05-17 | Stop reason: ALTCHOICE

## 2020-10-20 RX ORDER — UMECLIDINIUM 62.5 UG/1
1 AEROSOL, POWDER ORAL DAILY
Qty: 1 EACH | Refills: 3 | Status: SHIPPED | OUTPATIENT
Start: 2020-10-20 | End: 2021-01-04 | Stop reason: SDUPTHER

## 2020-10-20 RX ORDER — OLOPATADINE HYDROCHLORIDE 1 MG/ML
1 SOLUTION/ DROPS OPHTHALMIC
COMMUNITY
Start: 2020-08-24 | End: 2022-01-25 | Stop reason: ALTCHOICE

## 2020-10-20 RX ORDER — PRAVASTATIN SODIUM 20 MG
20 TABLET ORAL DAILY
Qty: 30 TABLET | Refills: 3 | Status: SHIPPED | OUTPATIENT
Start: 2020-10-20 | End: 2021-02-18

## 2020-10-20 NOTE — PROGRESS NOTES
Pt is here today for her yearly Medicare [address]  She states had Tetanus at [facility] [address], I'll call for info   Depression screening done

## 2020-10-20 NOTE — PROGRESS NOTES
Medicare Annual Wellness Visit  Name: Nai Finley Date: 10/20/2020   MRN: [id number] Sex: Female   Age: 77 y.o. Ethnicity: Non-/Non    : 1955 Race: Ayan Hutchinson is here for Medicare AWV    Screenings for behavioral, psychosocial and functional/safety risks, and cognitive dysfunction are all negative except as indicated below. These results, as well as other patient data from the [address] form, are documented in Flowsheets linked to this Encounter. Allergies   Allergen Reactions    Morphine     Bee Venom        Prior to Visit Medications    Medication Sig Taking?  Authorizing Provider   olopatadine (PATADAY) 0.1 % ophthalmic solution Apply 1 drop to eye Yes Historical Provider, MD   omeprazole (PRILOSEC) 40 MG delayed release capsule TAKE ONE CAPSULE BY MOUTH TWICE A DAY Yes Rosalia Ibarra MD   lovastatin (MEVACOR) 20 MG tablet Take 1 tablet by mouth nightly Yes Rosalia Ibarra MD   EPINEPHrine (EPIPEN 2-JONATHAN) 0.3 MG/0.3ML SOAJ injection Inject 0.3 mLs into the muscle as needed (PRN) Use as directed for allergic reaction Yes Rosalia Ibarra MD   traZODone (DESYREL) 50 MG tablet Take 1 tablet by mouth daily Yes ERICA Rocha CNP   amLODIPine (NORVASC) 10 MG tablet Take 1 tablet by mouth nightly Yes Shellie Swo MD   Probiotic Product (PROBIOTIC-10 PO) Take 1 capsule by mouth daily Yes Historical Provider, MD   albuterol sulfate HFA (VENTOLIN HFA) 108 (90 Base) MCG/ACT inhaler Inhale 2 puffs into the lungs 4 times daily as needed for Shortness of Breath Yes ERICA Jacinto CNP   aspirin 81 MG tablet Take 81 mg by mouth daily Yes Historical Provider, MD   OMEGA-3 KRILL OIL PO Take by mouth daily Yes Historical Provider, MD   calcium carbonate 600 MG TABS tablet Take 1 tablet by mouth 2 times daily Yes Historical Provider, MD   vitamin D 1000 units CAPS Take by mouth daily Yes Historical Provider, MD   sertraline (ZOLOFT) 100 MG tablet Take 200 mg by mouth nightly  Yes Historical Provider, MD   vitamin E 1000 units capsule Take 1,000 Units by mouth daily Yes Historical Provider, MD       Past Medical History:   Diagnosis Date    ADHD     mild, no RX    Arthritis     Blurred vision, right eye     Carotid artery disease ([address]     dr Trice Carrillo vascular last appt 1/20    GERD (gastroesophageal reflux disease)     Hearing loss     High cholesterol     History of closed shoulder dislocation     rt from recent fall    [place] (hard of hearing)     beto hearing aides    Hx of gastric ulcer     Hyperlipidemia     Hypertension 2007    IBS (irritable bowel syndrome)     Macular pucker, right eye     Skin cancer of forehead     Syncope     hx recent falls    Tension headache     Wears dentures     lower bridge plate,1 tooth upper    Wears glasses        Past Surgical History:   Procedure Laterality Date    APPENDECTOMY      BACK SURGERY      CATARACT REMOVAL WITH IMPLANT Bilateral     CHOLECYSTECTOMY      COLONOSCOPY N/A 2016    ESOPHAGOGASTRIC FUNDOPLICATION      EYE SURGERY      FINGER SURGERY Left     4th finger    HIATAL HERNIA REPAIR  1999    x2    REVISION TOTAL KNEE ARTHROPLASTY Right     UPPER GASTROINTESTINAL ENDOSCOPY      UPPER GASTROINTESTINAL ENDOSCOPY N/A 10/26/2018    EGD BIOPSY AND DILITATION WITH Ian Buster performed by Cherelle Murphy MD at [address] [place] [address] VITRECTOMY Right 03/10/2020    VITRECTOMY 23 GAUGE, MEMBRANE PEELING, GAS FLUID EXCHANGE, ICG     VITRECTOMY Right 3/10/2020    VITRECTOMY 23 GAUGE, MEMBRANE PEELING, AIR FLUID EXCHANGE, ICG performed by Tramaine Dooley MD at [place] History   Problem Relation Age of Onset    Cancer Father        CareTeam (Including outside providers/suppliers regularly involved in providing care):   Patient Care Team:  Marla Melendrez MD as PCP - General (Internal Medicine)  Marla Melendrez MD as PCP - [facility] Empaneled Provider  Cherelle Murphy MD as Consulting Physician (Gastroenterology)  Corry Palm as Consulting Physician ([address] Gynecology)  Elmira Keane DPM as Consulting Physician (Podiatry)  Adelaida Peter MD as Consulting Physician (Orthopedic Surgery)  Damien Green, RN as Nurse Navigator    Wt Readings from Last 3 Encounters:   10/20/20 165 lb 3.2 oz (74.9 kg)   07/01/20 166 lb 9.6 oz (75.6 kg)   04/03/20 164 lb (74.4 kg)     Vitals:    10/20/20 1441   BP: 124/80   Site: Left Upper Arm   Position: Sitting   Cuff Size: Medium Adult   Pulse: 70   Temp: 98 °F (36.7 °C)   TempSrc: Temporal   SpO2: 99%   Weight: 165 lb 3.2 oz (74.9 kg)   Height: 5' 4\" (1.626 m)     Body mass index is 28.36 kg/m². Based upon direct observation of the patient, evaluation of cognition reveals recent and remote memory intact. She has been having myalgias due to the statin, muscles are tight. She is going to try CoQ-10 to see if that helps.      General Appearance: alert and oriented to person, place and time, well developed and well- nourished, in no acute distress  Skin: warm and dry, no rash or erythema  Head: normocephalic and atraumatic  Eyes: pupils equal, round, and reactive to light, extraocular eye movements intact, conjunctivae normal  ENT: tympanic membrane, external ear and ear canal normal bilaterally, nose without deformity, nasal mucosa and turbinates pale and swollen with clear discharge   Neck: supple and non-tender without mass, no thyromegaly or thyroid nodules, no cervical lymphadenopathy  Pulmonary/Chest: clear to auscultation bilaterally- no wheezes, rales or rhonchi, normal air movement, no respiratory distress  Cardiovascular: normal rate, regular rhythm, normal S1 and S2, no murmurs, rubs, clicks, or gallops, distal pulses intact, no carotid bruits  Abdomen: soft, non-tender, non-distended, normal bowel sounds, no masses or organomegaly  Extremities: no cyanosis, clubbing or edema  Musculoskeletal: normal range of motion, no joint swelling, deformity or tenderness  Neurologic: reflexes normal and symmetric, no cranial nerve deficit, gait, coordination and speech normal    Patient's complete Health Risk Assessment and screening values have been reviewed and are found in Flowsheets. The following problems were reviewed today and where indicated follow up appointments were made and/or referrals ordered. Positive Risk Factor Screenings with Interventions:     General Health and ACP:  General  In general, how would you say your health is?: Good  In the past 7 days, have you experienced any of the following?  New or Increased Pain, New or Increased Fatigue, Loneliness, Social Isolation, Stress or Anger?: (!) New or Increased Pain  Do you get the social and emotional support that you need?: Yes  Do you have a Living Will?: (!) No  Advance Directives     Power of  Living Will ACP-Advance Directive ACP-Power of     Not on File Not on File Filed [address] [facility] Risk Interventions:  · Pain issues: will try Co-Q-10, if does not work will switch to pravastatin  · No Living Will: Advance Care Planning addressed with patient today    Hearing/Vision:  No exam data present  Hearing/Vision  Do you or your family notice any trouble with your hearing?: (!) Yes  Do you have difficulty driving, watching TV, or doing any of your daily activities because of your eyesight?: (!) Yes  Have you had an eye exam within the past year?: Yes  Hearing/Vision Interventions:  · Hearing concerns:  patient declines any further evaluation/treatment for hearing issues  · Vision concerns:  following with ophthalmology for eye issues, has an appointment in three days     Personalized Preventive Plan   Current Health Maintenance Status  Immunization History   Administered Date(s) Administered    Hepatitis A Ped/Adol (Havrix, Vaqta) 05/05/2003    Influenza Vaccine, unspecified formulation 10/17/2017    Influenza Virus Vaccine 10/20/2016    Influenza, Intradermal, Preservative free 11/02/2011, 09/27/2012, 09/30/2015    Influenza, Sheri Reichmann, IM, PF (6 mo and older Fluzone, Flulaval, Fluarix, and 3 yrs and older Afluria) 10/17/2017, 10/28/2018    Influenza, Quadv, adjuvanted, 65 yrs +, IM, PF (Fluad) 09/09/2020    Influenza, Triv, 3 Years and older, IM (Afluria (5 yrs and older) 10/29/2013, 10/17/2014    Influenza, Triv, inactivated, subunit, adjuvanted, IM (Fluad 65 yrs and older) 09/10/2019    Pneumococcal Conjugate 13-valent ([id number]) 07/26/2020    Td (Adult), 2 Lf Tetanus Toxoid, Pf (Td, Absorbed) 09/09/2020    Td vaccine (adult) 05/05/2003    Zoster Live (Zostavax) 09/25/2016        Health Maintenance   Topic Date Due    DTaP/Tdap/Td vaccine (1 - Tdap) 09/16/1972    Shingles Vaccine (2 of 3) 11/20/2016    Annual Wellness Visit (AWV)  05/29/2019    Breast cancer screen  03/21/2021    Lipid screen  07/17/2021    Pneumococcal 65+ years Vaccine (2 of 2 - PPSV23) 07/26/2021    Low dose CT lung screening  10/08/2021    Colon cancer screen colonoscopy  09/06/2027    DEXA (modify frequency per FRAX score)  Completed    Flu vaccine  Completed    Hepatitis C screen  Completed    Hepatitis A vaccine  Aged Out    Hepatitis B vaccine  Aged Out    Hib vaccine  Aged Out    Meningococcal (ACWY) vaccine  Aged Out     Recommendations for [email or web address] Due: see orders and patient instructions/AVS.  . Recommended screening schedule for the next 5-10 years is provided to the patient in written form: see Patient Instructions/AVS.    Bam Velasquez was seen today for medicare awv. Diagnoses and all orders for this visit:    Routine general medical examination at a health care facility    Centrilobular emphysema ([address]  -     Umeclidinium Bromide (INCRUSE ELLIPTA) 62.5 MCG/INH AEPB; Inhale 1 puff into the lungs daily  -     Full PFT Study With Bronchodilator;  Future    Chronic fatigue    Seasonal allergic rhinitis due to pollen  -     fluticasone (FLONASE) 50 MCG/ACT nasal spray; 1 spray by Nasal route daily    Mixed hyperlipidemia  -     pravastatin (PRAVACHOL) 20 MG tablet; Take 1 tablet by mouth daily    ACP (advance care planning)  -     CT ADVANCED CARE PLAN FACE TO [address], 1ST 30MIN [id number]    Screening for cardiovascular condition  -     CT Intens behave ther cardio dx, 15 minutes []                  Advance Care Planning   Advanced Care Planning: Discussed the patients choices for care and treatment in case of a health event that adversely affects decision-making abilities. Also discussed the patients long-term treatment options. Reviewed with the patient the [address] [place] [address] of [address] [place] [address] Declaration forms  Reviewed the process of designating a competent adult as an Agent (or -in-fact) that could take make health care decisions for the patient if incompetent. Patient was asked to complete the declaration forms, either acknowledge the forms by a public notary or an eligible witness and provide a signed copy to the practice office. Time spent (minutes):  15  Cardiovascular Disease Risk Counseling: Assessed the patient's risk to develop cardiovascular disease and reviewed main risk factors. Reviewed steps to reduce disease risk including:   · Quitting tobacco use, reducing amount smoked, or not starting the habit  · Making healthy food choices  · Being physically active and gradualy increasing activity levels   · Reduce weight and determine a healthy BMI goal  · Monitor blood pressure and treat if higher than 140/90 mmHg  · Maintain blood total cholesterol levels under 5 mmol/l or 190 mg/dl  · Maintain LDL cholesterol levels under 3.0 mmol/l or 115 mg/dl   · Control blood glucose levels  · Consider taking aspirin (75 mg daily), once blood pressure is controlled   Provided a follow up plan.   Time spent (minutes): 10

## 2020-11-30 RX ORDER — AMLODIPINE BESYLATE 10 MG/1
TABLET ORAL
Qty: 90 TABLET | Refills: 1 | Status: SHIPPED | OUTPATIENT
Start: 2020-11-30 | End: 2021-05-04 | Stop reason: SDUPTHER

## 2020-11-30 NOTE — TELEPHONE ENCOUNTER
Last visit: 10/20/20  Last Med refill: 4/20/20  Does patient have enough medication for 72 hours: No:     Next Visit Date:  Future Appointments   Date Time Provider Bekah Daxa   12/1/2020 10:00 AM STC VASCULAR RM [address]   12/1/2020 11:00 AM STC EMG  STCZ EMG [facility]   12/1/2020 11:00 AM Taurus Sewell MD [facility]   12/26/2020 11:30 AM SCHEDULE, STC COVID19 PAT SCREENING STCZ PAT [facility]   12/30/2020  9:30 AM STV PFT RM 1 STVZ PFT [facility]   1/4/2021  1:00 PM Rosalia Lagos MD [facility] TOLP   3/17/2021 11:40 AM Evelina Alistair Nieto MD Neuro Spec TOLPP   10/25/2021  3:30 PM Rosalia Lagos  [address] Maintenance   Topic Date Due    DTaP/Tdap/Td vaccine (1 - Tdap) 09/16/1972    Shingles Vaccine (2 of 3) 11/20/2016    Breast cancer screen  03/21/2021    Lipid screen  07/17/2021    Pneumococcal 65+ years Vaccine (2 of 2 - PPSV23) 07/26/2021    Low dose CT lung screening  10/08/2021    Annual Wellness Visit (AWV)  10/21/2021    Colon cancer screen colonoscopy  09/06/2027    DEXA (modify frequency per FRAX score)  Completed    Flu vaccine  Completed    Hepatitis C screen  Completed    Hepatitis A vaccine  Aged Out    Hepatitis B vaccine  Aged Out    Hib vaccine  Aged Out    Meningococcal (ACWY) vaccine  Aged Out       No results found for: LABA1C          ( goal A1C is < 7)   No results found for: LABMICR  LDL Cholesterol (mg/dL)   Date Value   07/17/2020 159 (H)   04/17/2019 67       (goal LDL is <100)   AST (U/L)   Date Value   07/17/2020 22     ALT (U/L)   Date Value   07/17/2020 21     BUN (mg/dL)   Date Value   07/17/2020 17     BP Readings from Last 3 Encounters:   10/20/20 124/80   07/01/20 122/80   03/10/20 113/72          (goal 120/80)    All Future Testing planned in [facility]  Lab Frequency Next Occurrence   EMG Once 12/04/2020   VL DUP CAROTID BILATERAL Once 01/27/2021   Full PFT Study With Bronchodilator Once 11/03/2020               Patient Active Problem List:     Gastro-esophageal reflux disease without esophagitis     Epigastric abdominal pain     Allergic to bees     Anxiety     Depression     Esophageal spasm     Gas bloat syndrome     Hyperlipidemia     Hypertension     Irritable bowel syndrome     Osteoarthritis of knee     Osteopenia     Paraesophageal hernia     Primary hypertriglyceridemia     Tubular adenoma of colon     Macular pucker, right     Macular edema, cystoid, right

## 2020-12-01 ENCOUNTER — HOSPITAL ENCOUNTER (OUTPATIENT)
Dept: NEUROLOGY | Age: 65
Discharge: HOME | End: 2020-12-01
Payer: MEDICARE

## 2020-12-01 ENCOUNTER — HOSPITAL ENCOUNTER (OUTPATIENT)
Dept: VASCULAR LAB | Age: 65
Discharge: HOME | End: 2020-12-01
Payer: MEDICARE

## 2020-12-01 PROCEDURE — 95909 NRV CNDJ TST 5-6 STUDIES: CPT | Performed by: PHYSICAL MEDICINE & REHABILITATION

## 2020-12-01 PROCEDURE — 93880 EXTRACRANIAL BILAT STUDY: CPT

## 2020-12-01 PROCEDURE — 95886 MUSC TEST DONE W/N TEST COMP: CPT | Performed by: PHYSICAL MEDICINE & REHABILITATION

## 2021-01-04 ENCOUNTER — OFFICE VISIT (OUTPATIENT)
Dept: FAMILY MEDICINE CLINIC | Age: 66
End: 2021-01-04
Payer: MEDICARE

## 2021-01-04 VITALS
BODY MASS INDEX: 28.7 KG/M2 | HEART RATE: 82 BPM | WEIGHT: 167.2 LBS | TEMPERATURE: 97.2 F | OXYGEN SATURATION: 98 % | DIASTOLIC BLOOD PRESSURE: 78 MMHG | SYSTOLIC BLOOD PRESSURE: 126 MMHG

## 2021-01-04 DIAGNOSIS — E78.2 MIXED HYPERLIPIDEMIA: ICD-10-CM

## 2021-01-04 DIAGNOSIS — F41.9 ANXIETY: ICD-10-CM

## 2021-01-04 DIAGNOSIS — E78.1 PRIMARY HYPERTRIGLYCERIDEMIA: ICD-10-CM

## 2021-01-04 DIAGNOSIS — I10 ESSENTIAL HYPERTENSION: Primary | ICD-10-CM

## 2021-01-04 DIAGNOSIS — J43.2 CENTRILOBULAR EMPHYSEMA (HCC): ICD-10-CM

## 2021-01-04 DIAGNOSIS — F32.4 MAJOR DEPRESSIVE DISORDER WITH SINGLE EPISODE, IN PARTIAL REMISSION (HCC): ICD-10-CM

## 2021-01-04 DIAGNOSIS — M17.0 PRIMARY OSTEOARTHRITIS OF BOTH KNEES: ICD-10-CM

## 2021-01-04 PROCEDURE — 4040F PNEUMOC VAC/ADMIN/RCVD: CPT | Performed by: INTERNAL MEDICINE

## 2021-01-04 PROCEDURE — 1123F ACP DISCUSS/DSCN MKR DOCD: CPT | Performed by: INTERNAL MEDICINE

## 2021-01-04 PROCEDURE — 1090F PRES/ABSN URINE INCON ASSESS: CPT | Performed by: INTERNAL MEDICINE

## 2021-01-04 PROCEDURE — G8926 SPIRO NO PERF OR DOC: HCPCS | Performed by: INTERNAL MEDICINE

## 2021-01-04 PROCEDURE — G8484 FLU IMMUNIZE NO ADMIN: HCPCS | Performed by: INTERNAL MEDICINE

## 2021-01-04 PROCEDURE — G8399 PT W/DXA RESULTS DOCUMENT: HCPCS | Performed by: INTERNAL MEDICINE

## 2021-01-04 PROCEDURE — 3023F SPIROM DOC REV: CPT | Performed by: INTERNAL MEDICINE

## 2021-01-04 PROCEDURE — 99214 OFFICE O/P EST MOD 30 MIN: CPT | Performed by: INTERNAL MEDICINE

## 2021-01-04 PROCEDURE — G8427 DOCREV CUR MEDS BY ELIG CLIN: HCPCS | Performed by: INTERNAL MEDICINE

## 2021-01-04 PROCEDURE — G8417 CALC BMI ABV UP PARAM F/U: HCPCS | Performed by: INTERNAL MEDICINE

## 2021-01-04 PROCEDURE — 3017F COLORECTAL CA SCREEN DOC REV: CPT | Performed by: INTERNAL MEDICINE

## 2021-01-04 PROCEDURE — 1036F TOBACCO NON-USER: CPT | Performed by: INTERNAL MEDICINE

## 2021-01-04 RX ORDER — UMECLIDINIUM 62.5 UG/1
1 AEROSOL, POWDER ORAL DAILY
Qty: 1 EACH | Refills: 3 | Status: SHIPPED | OUTPATIENT
Start: 2021-01-04 | End: 2021-05-04

## 2021-01-04 RX ORDER — AZELASTINE HYDROCHLORIDE 0.5 MG/ML
SOLUTION/ DROPS OPHTHALMIC PRN
COMMUNITY
Start: 2020-11-06 | End: 2022-08-17

## 2021-01-04 RX ORDER — ALBUTEROL SULFATE 90 UG/1
2 AEROSOL, METERED RESPIRATORY (INHALATION) 4 TIMES DAILY PRN
Qty: 1 INHALER | Refills: 3 | Status: SHIPPED | OUTPATIENT
Start: 2021-01-04 | End: 2021-05-04 | Stop reason: SDUPTHER

## 2021-01-04 SDOH — ECONOMIC STABILITY: FOOD INSECURITY: WITHIN THE PAST 12 MONTHS, YOU WORRIED THAT YOUR FOOD WOULD RUN OUT BEFORE YOU GOT MONEY TO BUY MORE.: NEVER TRUE

## 2021-01-04 SDOH — ECONOMIC STABILITY: FOOD INSECURITY: WITHIN THE PAST 12 MONTHS, THE FOOD YOU BOUGHT JUST DIDN'T LAST AND YOU DIDN'T HAVE MONEY TO GET MORE.: NEVER TRUE

## 2021-01-04 ASSESSMENT — ENCOUNTER SYMPTOMS
FREQUENT THROAT CLEARING: 0
TROUBLE SWALLOWING: 0
HEMOPTYSIS: 0
SPUTUM PRODUCTION: 0
CHEST TIGHTNESS: 1
WHEEZING: 0
SORE THROAT: 0
ORTHOPNEA: 0
BLURRED VISION: 0
SHORTNESS OF BREATH: 0
DIFFICULTY BREATHING: 0
HEARTBURN: 0
RHINORRHEA: 0
COUGH: 1
HOARSE VOICE: 0

## 2021-01-04 ASSESSMENT — COPD QUESTIONNAIRES: COPD: 1

## 2021-01-04 NOTE — PROGRESS NOTES
Subjective:       Patient ID:     Tram Rojas is a 77 y.o. female who presents for   Chief Complaint   Patient presents with    Hypertension    6 Month Follow-Up    Other     want to discuss inhalers       HPI:  Nursing note reviewed and discussed with patient. Hypertension  This is a chronic problem. The current episode started more than 1 year ago. The problem is unchanged. The problem is controlled. Pertinent negatives include no anxiety, blurred vision, chest pain, headaches, malaise/fatigue, orthopnea, palpitations, peripheral edema, PND, shortness of breath or sweats. There are no associated agents to hypertension. Past treatments include calcium channel blockers. There are no compliance problems. There is no history of chronic renal disease. Hyperlipidemia  This is a chronic problem. The current episode started more than 1 year ago. The problem is controlled. She has no history of chronic renal disease, diabetes, hypothyroidism, liver disease, obesity or nephrotic syndrome. Pertinent negatives include no chest pain, focal sensory loss, focal weakness, leg pain, myalgias or shortness of breath. Current antihyperlipidemic treatment includes statins. There are no compliance problems. COPD  She complains of chest tightness and cough. There is no difficulty breathing, frequent throat clearing, hemoptysis, hoarse voice, shortness of breath, sputum production or wheezing. This is a chronic problem. The current episode started more than 1 year ago. The problem occurs intermittently. The problem has been unchanged. The cough is non-productive. Pertinent negatives include no appetite change, chest pain, dyspnea on exertion, ear congestion, ear pain, fever, headaches, heartburn, malaise/fatigue, myalgias, nasal congestion, orthopnea, PND, postnasal drip, rhinorrhea, sneezing, sore throat, sweats, trouble swallowing or weight loss. Her symptoms are aggravated by URI.  She reports moderate improvement on treatment. Her past medical history is significant for COPD. Patient's medications, allergies, past medical, surgical, social and family histories were reviewed and updated as appropriate.     Past Medical History:   Diagnosis Date    ADHD     mild, no RX    Arthritis     Blurred vision, right eye     Carotid artery disease ([address]     dr Mikael Greenwood vascular last appt     GERD (gastroesophageal reflux disease)     Hearing loss     High cholesterol     History of closed shoulder dislocation     rt from recent fall    [place] (hard of hearing)     beto hearing aides    Hx of gastric ulcer     Hyperlipidemia     Hypertension     IBS (irritable bowel syndrome)     Macular pucker, right eye     Skin cancer of forehead     Syncope     hx recent falls    Tension headache     Wears dentures     lower bridge plate,1 tooth upper    Wears glasses      Past Surgical History:   Procedure Laterality Date    APPENDECTOMY      BACK SURGERY      CATARACT REMOVAL WITH IMPLANT Bilateral     CHOLECYSTECTOMY      COLONOSCOPY N/A     ESOPHAGOGASTRIC FUNDOPLICATION      EYE SURGERY      FINGER SURGERY Left     4th finger    HIATAL HERNIA REPAIR  1999    x2    REVISION TOTAL KNEE ARTHROPLASTY Right     UPPER GASTROINTESTINAL ENDOSCOPY      UPPER GASTROINTESTINAL ENDOSCOPY N/A 10/26/2018    EGD BIOPSY AND DILITATION WITH Marvine Redder performed by Mayra Haas MD at [address] VITRECTOMY Right 03/10/2020    VITRECTOMY 23 GAUGE, MEMBRANE PEELING, GAS FLUID EXCHANGE, ICG     VITRECTOMY Right 3/10/2020    VITRECTOMY 23 GAUGE, MEMBRANE PEELING, AIR FLUID EXCHANGE, ICG performed by Yordy Zazueta MD at [address] [place] [address] History     Tobacco Use    Smoking status: Former Smoker     Packs/day: 1.00     Years: 40.00     Pack years: 40.00     Types: Cigarettes     Quit date: 2018     Years since quittin.7    Smokeless tobacco: Never Used    Tobacco comment: spoke with Pt to update the most she has smoked   Substance Use Topics    Alcohol use: No      Patient Active Problem List   Diagnosis    Gastro-esophageal reflux disease without esophagitis    Epigastric abdominal pain    Allergic to bees    Anxiety    Depression    Esophageal spasm    Gas bloat syndrome    Hyperlipidemia    Hypertension    Irritable bowel syndrome    Osteoarthritis of knee    Osteopenia    Paraesophageal hernia    Primary hypertriglyceridemia    Tubular adenoma of colon    Macular pucker, right    Macular edema, cystoid, right         Prior to Visit Medications    Medication Sig Taking?  Authorizing Provider   amLODIPine (NORVASC) 10 MG tablet TAKE ONE TABLET BY MOUTH ONCE NIGHTLY Yes Rosalia Kenney MD   olopatadine (PATADAY) 0.1 % ophthalmic solution Apply 1 drop to eye Yes Historical Provider, MD   Umeclidinium Bromide (INCRUSE ELLIPTA) 62.5 MCG/INH AEPB Inhale 1 puff into the lungs daily Yes Darlene Mars MD   fluticasone (FLONASE) 50 MCG/ACT nasal spray 1 spray by Nasal route daily Yes Darlene Mars MD   pravastatin (PRAVACHOL) 20 MG tablet Take 1 tablet by mouth daily Yes Darlene Mars MD   omeprazole (PRILOSEC) 40 MG delayed release capsule TAKE ONE CAPSULE BY MOUTH TWICE A DAY Yes Rosalia Kenney MD   EPINEPHrine (EPIPEN 2-JONATHAN) 0.3 MG/0.3ML SOAJ injection Inject 0.3 mLs into the muscle as needed (PRN) Use as directed for allergic reaction Yes Rosalia Kenney MD   traZODone (DESYREL) 50 MG tablet Take 1 tablet by mouth daily Yes Nancy Anna Belgica, APRN - CNP   Probiotic Product (PROBIOTIC-10 PO) Take 1 capsule by mouth daily Yes Historical Provider, MD   albuterol sulfate HFA (VENTOLIN HFA) 108 (90 Base) MCG/ACT inhaler Inhale 2 puffs into the lungs 4 times daily as needed for Shortness of Breath Yes Randell Umanzor, APRN - CNP   aspirin 81 MG tablet Take 81 mg by mouth daily Yes Historical Provider, MD   OMEGA-3 KRILL OIL PO Take by mouth daily Yes Historical Provider, MD   calcium carbonate 600 MG TABS tablet Take 1 tablet by mouth 2 times daily Yes Historical Provider, MD   vitamin D 1000 units CAPS Take by mouth daily Yes Historical Provider, MD   sertraline (ZOLOFT) 100 MG tablet Take 200 mg by mouth nightly  Yes Historical Provider, MD   vitamin E 1000 units capsule Take 1,000 Units by mouth daily Yes Historical Provider, MD   azelastine (OPTIVAR) 0.05 % ophthalmic solution as needed  Aquiles Khan MD     Review of Systems  Review of Systems   Constitutional: Negative for appetite change, fever, malaise/fatigue and weight loss. HENT: Negative for ear pain, hoarse voice, postnasal drip, rhinorrhea, sneezing, sore throat and trouble swallowing. Eyes: Negative for blurred vision. Respiratory: Positive for cough. Negative for hemoptysis, sputum production, shortness of breath and wheezing. Cardiovascular: Negative for chest pain, dyspnea on exertion, palpitations, orthopnea and PND. Gastrointestinal: Negative for heartburn. Musculoskeletal: Negative for myalgias. Neurological: Negative for focal weakness and headaches. Objective:       Physical Exam:  /78 (Site: Left Upper Arm, Position: Sitting, Cuff Size: Medium Adult)   Pulse 82   Temp 97.2 °F (36.2 °C) (Temporal)   Wt 167 lb 3.2 oz (75.8 kg)   SpO2 98%   BMI 28.70 kg/m²   Physical Exam  Vitals signs and nursing note reviewed. Constitutional:       General: She is not in acute distress. Appearance: She is well-developed. She is not ill-appearing. Eyes:      General: Lids are normal. Vision grossly intact. Cardiovascular:      Rate and Rhythm: Normal rate and regular rhythm. Heart sounds: Normal heart sounds, S1 normal and S2 normal. No murmur. No friction rub. No gallop. Pulmonary:      Effort: Pulmonary effort is normal. No respiratory distress. Breath sounds: Normal breath sounds. No wheezing. Abdominal:      General: Bowel sounds are normal.      Palpations: Abdomen is soft. There is no mass. Tenderness: There is no abdominal tenderness. There is no guarding. Musculoskeletal: Normal range of motion. Skin:     General: Skin is warm and dry. Capillary Refill: Capillary refill takes less than 2 seconds. Neurological:      General: No focal deficit present. Mental Status: She is alert and oriented to person, place, and time. Data Review  No visits with results within 2 Month(s) from this visit. Latest known visit with results is:   Hospital Outpatient Visit on 08/17/2020   Component Date Value    Specimen Description 08/17/2020 . CLEAN CATCH URINE     Special Requests 08/17/2020 NOT REPORTED     Culture 08/17/2020 NO SIGNIFICANT GROWTH           Assessment/Plan:      1. Centrilobular emphysema (HCC)  - albuterol sulfate HFA (VENTOLIN HFA) 108 (90 Base) MCG/ACT inhaler; Inhale 2 puffs into the lungs 4 times daily as needed for Wheezing  Dispense: 1 Inhaler; Refill: 3  - Umeclidinium Bromide (INCRUSE ELLIPTA) 62.5 MCG/INH AEPB; Inhale 1 puff into the lungs daily  Dispense: 1 each; Refill: 3    2. Essential hypertension  Continue amlodipine    3. Primary hypertriglyceridemia  Continue pravastatin, diet and lifestyle management. Continue omega-3 supplements. 4. Primary osteoarthritis of both knees  Continue current management, follow-up orthopedics. 5. Mixed hyperlipidemia  Continue pravastatin. 6. Major depressive disorder with single episode, in partial remission ([facility] [address]  Continue current regimen per psychiatry    7.  Anxiety  Continue current regimen per psychiatry           Health Maintenance Due   Topic Date Due    DTaP/Tdap/Td vaccine (1 - Tdap) 09/16/1972    Shingles Vaccine (2 of 3) 11/20/2016       Electronically signed by Ashley Siddiqui MD on 1/4/2021 at 1:25 PM

## 2021-01-08 ASSESSMENT — VISUAL ACUITY: OU: 1

## 2021-01-27 ENCOUNTER — PATIENT MESSAGE (OUTPATIENT)
Dept: FAMILY MEDICINE CLINIC | Age: 66
End: 2021-01-27

## 2021-01-27 DIAGNOSIS — Z12.31 ENCOUNTER FOR SCREENING MAMMOGRAM FOR BREAST CANCER: Primary | ICD-10-CM

## 2021-01-28 NOTE — TELEPHONE ENCOUNTER
Colonoscopy done 9/2017, GI note stated repeat 3 years - in [facility]   She should call Dr Linda Murray office to schedule.

## 2021-01-28 NOTE — TELEPHONE ENCOUNTER
From: Simran Hutchinson  To: Magdalena Farooq MD  Sent: 1/27/2021 8:20 PM EST  Subject: Non-Urgent Medical Question    Please have someone go over my medications.  Also I need to schedule a mammogram. Do I make the appointment first then the office sends the referral?

## 2021-01-28 NOTE — TELEPHONE ENCOUNTER
Spoke with patient regarding medications. She stated albuterol is listed on her med list twice. She also stated she was able to  the incruse ellipta. Patient is also asking for an order for a mammogram.    She would also like to know when she needs to have a colonoscopy again. States she has that doctors name and will be calling them.

## 2021-02-10 ENCOUNTER — TELEMEDICINE (OUTPATIENT)
Dept: NEUROLOGY | Age: 66
End: 2021-02-10
Payer: MEDICARE

## 2021-02-10 DIAGNOSIS — R29.6 RECURRENT FALLS: ICD-10-CM

## 2021-02-10 DIAGNOSIS — R51.9 FACIAL PAIN: ICD-10-CM

## 2021-02-10 DIAGNOSIS — R20.2 PARESTHESIAS: ICD-10-CM

## 2021-02-10 DIAGNOSIS — G25.81 RLS (RESTLESS LEGS SYNDROME): Primary | ICD-10-CM

## 2021-02-10 PROCEDURE — 4040F PNEUMOC VAC/ADMIN/RCVD: CPT | Performed by: PSYCHIATRY & NEUROLOGY

## 2021-02-10 PROCEDURE — G8417 CALC BMI ABV UP PARAM F/U: HCPCS | Performed by: PSYCHIATRY & NEUROLOGY

## 2021-02-10 PROCEDURE — 3017F COLORECTAL CA SCREEN DOC REV: CPT | Performed by: PSYCHIATRY & NEUROLOGY

## 2021-02-10 PROCEDURE — 1123F ACP DISCUSS/DSCN MKR DOCD: CPT | Performed by: PSYCHIATRY & NEUROLOGY

## 2021-02-10 PROCEDURE — G8484 FLU IMMUNIZE NO ADMIN: HCPCS | Performed by: PSYCHIATRY & NEUROLOGY

## 2021-02-10 PROCEDURE — 99214 OFFICE O/P EST MOD 30 MIN: CPT | Performed by: PSYCHIATRY & NEUROLOGY

## 2021-02-10 PROCEDURE — G8399 PT W/DXA RESULTS DOCUMENT: HCPCS | Performed by: PSYCHIATRY & NEUROLOGY

## 2021-02-10 PROCEDURE — 1090F PRES/ABSN URINE INCON ASSESS: CPT | Performed by: PSYCHIATRY & NEUROLOGY

## 2021-02-10 PROCEDURE — 1036F TOBACCO NON-USER: CPT | Performed by: PSYCHIATRY & NEUROLOGY

## 2021-02-10 PROCEDURE — G8427 DOCREV CUR MEDS BY ELIG CLIN: HCPCS | Performed by: PSYCHIATRY & NEUROLOGY

## 2021-02-10 RX ORDER — GABAPENTIN 300 MG/1
300 CAPSULE ORAL NIGHTLY
Qty: 30 CAPSULE | Refills: 5 | Status: SHIPPED | OUTPATIENT
Start: 2021-02-10 | End: 2021-10-25 | Stop reason: SINTOL

## 2021-02-10 NOTE — PROGRESS NOTES
[facility]  Offices: [address], [place], [address] [facility]  [address] [facility], [address], [place], [address] [facility]  [address] [place] [address] [place], [facility], [facility] [address]  Phone: [phone]  Fax: [phone]    MD Dayo Rossi, MD Selena Chang MD Bernadette Spark, MD Marja Sequin, CNP    TELEHEALTH VISIT        2/10/2021      HISTORY OF PRESENT ILLNESS:       I had the pleasure of seeing Lashon Hickey, who returns for continuing neurologic care for facial pain, recurrent falls and dizziness.     Today, patient reports she has been doing well. She finished physical therapy last year and feels that her balance has remained stable. She is doing some techniques and exercises she learned from therapy. She finds it is small she is careful and takes her time, she does not have any balance issues or falls. It is when she rushes herself that she gets into trouble. She continues to walk without any assistance and denies any recent falls or injuries. She did have an EMG in December which showed no evidence of large fiber neuropathy, with findings consistent with chronic right L4-L5 radiculopathy. She does have a history of lower back surgery. Of note, in the past few months she has noted an urge to move her legs at night, as well as uncomfortable sensation in both her legs that starts as soon as she lays down for bed. This can impair her ability to fall asleep and stay asleep, does not take anything for it. With regards to her postural dizziness, patient reports it has actually gotten better the past few months she does not get any dizzy spells when she changes position. She does have a history of hypertension and is on multiple BP meds. Her facial pain has also been well controlled and only occurs on a sporadic basis. Typically, her pain is sharp and stabbing, starts in the cheek and then radiates up to her head and last for a few seconds.  The pain is severe, 10/10 and typically occurs when she first wakes up in the morning. Prior testing reviewed:  EMG 12/1/2020: Chronic L4-L5 radiculopathy  MRI brain with and without contrast 1/23/2020:  1. No acute intracranial abnormality.  No acute infarct. 2. Minimal global parenchymal volume loss with minimal chronic microvascular   ischemic changes. CTA head and neck with contrast 12/5/2019: Atherosclerotic plaque at the carotid bifurcations and bulbs causing 60%   stenosis of the proximal bilateral internal carotid arteries.  Otherwise, no   significant arterial stenosis in the head or neck. CT cervical spine without contrast 9/15/2019:  Mild C4-5 and C5-6 degenerative disc disease.  Right   greater than left C4-5 and C5-6 facet hypertrophy.        PAST MEDICAL HISTORY:         Diagnosis Date    ADHD     mild, no RX    Arthritis     Blurred vision, right eye     Carotid artery disease ([facility] [address]     dr Padilla Exon vascular last appt 1/20    GERD (gastroesophageal reflux disease)     Hearing loss     High cholesterol     History of closed shoulder dislocation     rt from recent fall    [place] (hard of hearing)     beto hearing aides    Hx of gastric ulcer     Hyperlipidemia     Hypertension 2007    IBS (irritable bowel syndrome)     Macular pucker, right eye     Skin cancer of forehead     Syncope     hx recent falls    Tension headache     Wears dentures     lower bridge plate,1 tooth upper    Wears glasses         PAST SURGICAL HISTORY:         Procedure Laterality Date    APPENDECTOMY      BACK SURGERY      CATARACT REMOVAL WITH IMPLANT Bilateral     CHOLECYSTECTOMY      COLONOSCOPY N/A 2016    ESOPHAGOGASTRIC FUNDOPLICATION      EYE SURGERY      FINGER SURGERY Left 4th finger    HIATAL HERNIA REPAIR  1999    x2    REVISION TOTAL KNEE ARTHROPLASTY Right     UPPER GASTROINTESTINAL ENDOSCOPY      UPPER GASTROINTESTINAL ENDOSCOPY N/A 10/26/2018    EGD BIOPSY AND DILITATION WITH DAMION performed by Jarett Vines MD at [address] VITRECTOMY Right 03/10/2020    VITRECTOMY 23 GAUGE, MEMBRANE PEELING, GAS FLUID EXCHANGE, ICG     VITRECTOMY Right 3/10/2020    VITRECTOMY 23 GAUGE, MEMBRANE PEELING, AIR FLUID EXCHANGE, ICG performed by Jose Angel Sesay MD at [address] [facility]:     Social History     Socioeconomic History    Marital status:      Spouse name: Not on file    Number of children: Not on file    Years of education: Not on file    Highest education level: Not on file   Occupational History    Not on file   Social Needs    Financial resource strain: Not very hard    Food insecurity     Worry: Never true     Inability: Never true    Transportation needs     Medical: No     Non-medical: No   Tobacco Use    Smoking status: Former Smoker     Packs/day: 1.00     Years: 40.00     Pack years: 40.00     Types: Cigarettes     Quit date: 2018     Years since quittin.8    Smokeless tobacco: Never Used    Tobacco comment: spoke with Pt to update the most she has smoked   Substance and Sexual Activity    Alcohol use: No    Drug use: No    Sexual activity: Not Currently   Lifestyle    Physical activity     Days per week: Not on file     Minutes per session: Not on file    Stress: Not on file   Relationships    Social connections     Talks on phone: Not on file     Gets together: Not on file     Attends Mandaen service: Not on file     Active member of club or organization: Not on file     Attends meetings of clubs or organizations: Not on file     Relationship status: Not on file    Intimate partner violence     Fear of current or ex partner: Not on file     Emotionally abused: Not on file     Physically abused: Not on file No current facility-administered medications for this visit. ALLERGIES:     Allergies   Allergen Reactions    Morphine     Bee Venom                              REVIEW OF SYSTEMS    10 point review of systems unremarkable other than for those mentioned above    PHYSICAL EXAMINATION:                                         .                                                                                                    General Appearance:  Alert, cooperative, no signs of distress, appears stated age   Head:  Normocephalic, no signs of trauma   Eyes:  Conjunctiva/corneas clear;  eyelids intact   Ears:  Normal external ear and canals   Nose: Nares normal, no drainage    Throat: Lips and tongue normal; teeth normal;  gums normal   Extremities: Extremities normal, no cyanosis, no edema   Skin: Skin color, texture normal, no rashes, no lesions                                     NEUROLOGIC EXAMINATION      Mental status    Alert and oriented x 3; able to follow commands, speech and language intact; no hallucinations or delusions  Fund of information appropriate for level of education    Cranial nerves    II - grossly intact  III, IV, VI  extra-ocular muscles full: no nystagmus, no ptosis   V - normal facial sensation                                                               VII - normal facial symmetry                                                             VIII - intact hearing                                                                             IX, X - symmetrical palate                                                                  XI - symmetrical shoulder shrug                                                       XII - tongue midline without atrophy      Motor function  Normal muscle bulk.  Strength at least 4+/5 on all 4 extremities, no pronator drift      Sensory function Unable to test Cerebellar Intact fine motor movement. No involuntary movements or tremors. No ataxia or dysmetria on finger to nose or heel to shin testing      Reflex function Unable to test      Gait                   normal base and arm swing              ASSESSMENT AND PLAN:       This is a 77 y.o. female who comes in for follow up for recurrent falls, postural dizziness and right-sided facial pain. Thankfully, her symptoms are currently doing well, previously finished gait training through physical therapy. Recent EMG showed chronic L4-L5 radiculopathy, with no evidence of large fiber neuropathy. Today, she does report symptoms highly suggestive for RLS, typically occurs at night. Will start gabapentin 300 mg at bedtime. Side effects, risks and benefits discussed in detail with the patient. We will reassess her again in 6 months. Patient ID verified by me prior to start of this visit    This is a telehealth visit that was performed with the originating site at Patient Location: home and Provider Location of [place], [place]. Verbal consent to participate in video visit was obtained. Pursuant to the emergency declaration under the [facility], [facility] waiver authority and the [employer] and [employer] Act, this Virtual Visit was conducted, with patient's consent, to reduce the patient's risk of exposure to COVID-19 and provide continuity of care for an established/new patient. Services were provided through a video synchronous discussion virtually to substitute for in-person clinic visit.  I discussed with the patient the nature of our telehealth visits via interactive/real-time audio/video that:  - I would evaluate the patient and recommend diagnostics and treatments based on my assessment  - Our sessions are not being recorded and that personal health information is protected - Our team would provide follow up care in person if/when the patient needs it. Bird Delgado MD  Neurology and Sleep Medicine  [facility].    Please note that this chart was generated using voice recognition Dragon dictation software. Although every effort was made to ensure the accuracy of this automated transcription, some errors in transcription may have occurred.

## 2021-02-18 DIAGNOSIS — E78.2 MIXED HYPERLIPIDEMIA: ICD-10-CM

## 2021-02-18 NOTE — TELEPHONE ENCOUNTER
Hyperlipidemia     Hypertension     Irritable bowel syndrome     Osteoarthritis of knee     Osteopenia     Paraesophageal hernia     Primary hypertriglyceridemia     Tubular adenoma of colon     Macular pucker, right     Macular edema, cystoid, right

## 2021-02-19 RX ORDER — PRAVASTATIN SODIUM 20 MG
TABLET ORAL
Qty: 90 TABLET | Refills: 1 | Status: SHIPPED | OUTPATIENT
Start: 2021-02-19 | End: 2021-08-23

## 2021-03-17 ENCOUNTER — PATIENT MESSAGE (OUTPATIENT)
Dept: FAMILY MEDICINE CLINIC | Age: 66
End: 2021-03-17

## 2021-03-17 NOTE — TELEPHONE ENCOUNTER
From: Parisa Moe [facility]  To: Ashley Siddiqui MD  Sent: 3/17/2021 9:49 AM EDT  Subject: Prescription Question    Thank you for getting back to me so quickly. I called my pharmacy and found that the inhaler is $374. Before I pay that much could you please find out if another one could be ordered that has a generic form that would be cheaper?

## 2021-04-20 DIAGNOSIS — K21.9 GASTRO-ESOPHAGEAL REFLUX DISEASE WITHOUT ESOPHAGITIS: ICD-10-CM

## 2021-04-20 RX ORDER — OMEPRAZOLE 40 MG/1
CAPSULE, DELAYED RELEASE ORAL
Qty: 180 CAPSULE | Refills: 1 | Status: SHIPPED | OUTPATIENT
Start: 2021-04-20 | End: 2021-10-21

## 2021-04-20 NOTE — TELEPHONE ENCOUNTER
Last visit: 1/4/21  Last Med refill: 10/14/20  Does patient have enough medication for 72 hours: No:     Next Visit Date:  Future Appointments   Date Time Provider Bekah Mittal   5/4/2021  1:00 PM Rosalia Deng MD [facility] [address] [facility]   8/11/2021 11:20 AM Easton Tony MD Neuro Spec [id number]   10/25/2021  3:30 PM Rosalia Deng  [address] Maintenance   Topic Date Due    COVID-19 Vaccine (1) Never done    Shingles Vaccine (2 of 3) 11/20/2016    Breast cancer screen  03/21/2021    DTaP/Tdap/Td vaccine (1 - Tdap) 09/09/2030 (Originally 9/16/1974)    Lipid screen  07/17/2021    Pneumococcal 65+ years Vaccine (2 of 2 - PPSV23) 07/26/2021    Low dose CT lung screening  10/08/2021    Annual Wellness Visit (AWV)  10/21/2021    Colon cancer screen colonoscopy  09/06/2027    DEXA (modify frequency per FRAX score)  Completed    Flu vaccine  Completed    Hepatitis C screen  Completed    Hepatitis A vaccine  Aged Out    Hepatitis B vaccine  Aged Out    Hib vaccine  Aged Out    Meningococcal (ACWY) vaccine  Aged Out       No results found for: LABA1C          ( goal A1C is < 7)   No results found for: LABMICR  LDL Cholesterol (mg/dL)   Date Value   07/17/2020 159 (H)   04/17/2019 67       (goal LDL is <100)   AST (U/L)   Date Value   07/17/2020 22     ALT (U/L)   Date Value   07/17/2020 21     BUN (mg/dL)   Date Value   07/17/2020 17     BP Readings from Last 3 Encounters:   01/04/21 126/78   10/20/20 124/80   07/01/20 122/80          (goal 120/80)    All Future Testing planned in [facility]  Lab Frequency Next Occurrence   Full PFT Study With Bronchodilator Once 11/03/2020   SPENCER DIGITAL SCREEN W OR WO CAD BILATERAL Once 07/28/2021               Patient Active Problem List:     Gastro-esophageal reflux disease without esophagitis     Epigastric abdominal pain     Allergic to bees     Anxiety     Depression     Esophageal spasm     Gas bloat syndrome     Hyperlipidemia Hypertension     Irritable bowel syndrome     Osteoarthritis of knee     Osteopenia     Paraesophageal hernia     Primary hypertriglyceridemia     Tubular adenoma of colon     Macular pucker, right     Macular edema, cystoid, right

## 2021-05-04 ENCOUNTER — OFFICE VISIT (OUTPATIENT)
Dept: FAMILY MEDICINE CLINIC | Age: 66
End: 2021-05-04
Payer: MEDICARE

## 2021-05-04 VITALS
DIASTOLIC BLOOD PRESSURE: 70 MMHG | OXYGEN SATURATION: 94 % | WEIGHT: 164.8 LBS | BODY MASS INDEX: 28.29 KG/M2 | SYSTOLIC BLOOD PRESSURE: 108 MMHG | TEMPERATURE: 98.7 F | HEART RATE: 78 BPM

## 2021-05-04 DIAGNOSIS — G89.29 CHRONIC BILATERAL LOW BACK PAIN WITH RIGHT-SIDED SCIATICA: ICD-10-CM

## 2021-05-04 DIAGNOSIS — E78.2 MIXED HYPERLIPIDEMIA: ICD-10-CM

## 2021-05-04 DIAGNOSIS — F32.4 MAJOR DEPRESSIVE DISORDER WITH SINGLE EPISODE, IN PARTIAL REMISSION (HCC): ICD-10-CM

## 2021-05-04 DIAGNOSIS — I10 ESSENTIAL HYPERTENSION: ICD-10-CM

## 2021-05-04 DIAGNOSIS — J43.2 CENTRILOBULAR EMPHYSEMA (HCC): Primary | ICD-10-CM

## 2021-05-04 DIAGNOSIS — M54.41 CHRONIC BILATERAL LOW BACK PAIN WITH RIGHT-SIDED SCIATICA: ICD-10-CM

## 2021-05-04 DIAGNOSIS — K44.9 PARAESOPHAGEAL HERNIA: ICD-10-CM

## 2021-05-04 PROCEDURE — G8926 SPIRO NO PERF OR DOC: HCPCS | Performed by: INTERNAL MEDICINE

## 2021-05-04 PROCEDURE — G8399 PT W/DXA RESULTS DOCUMENT: HCPCS | Performed by: INTERNAL MEDICINE

## 2021-05-04 PROCEDURE — 4040F PNEUMOC VAC/ADMIN/RCVD: CPT | Performed by: INTERNAL MEDICINE

## 2021-05-04 PROCEDURE — 3017F COLORECTAL CA SCREEN DOC REV: CPT | Performed by: INTERNAL MEDICINE

## 2021-05-04 PROCEDURE — G8417 CALC BMI ABV UP PARAM F/U: HCPCS | Performed by: INTERNAL MEDICINE

## 2021-05-04 PROCEDURE — 1036F TOBACCO NON-USER: CPT | Performed by: INTERNAL MEDICINE

## 2021-05-04 PROCEDURE — 3023F SPIROM DOC REV: CPT | Performed by: INTERNAL MEDICINE

## 2021-05-04 PROCEDURE — G8427 DOCREV CUR MEDS BY ELIG CLIN: HCPCS | Performed by: INTERNAL MEDICINE

## 2021-05-04 PROCEDURE — 1090F PRES/ABSN URINE INCON ASSESS: CPT | Performed by: INTERNAL MEDICINE

## 2021-05-04 PROCEDURE — 1123F ACP DISCUSS/DSCN MKR DOCD: CPT | Performed by: INTERNAL MEDICINE

## 2021-05-04 PROCEDURE — 99214 OFFICE O/P EST MOD 30 MIN: CPT | Performed by: INTERNAL MEDICINE

## 2021-05-04 RX ORDER — AMLODIPINE BESYLATE 10 MG/1
TABLET ORAL
Qty: 90 TABLET | Refills: 1 | Status: SHIPPED | OUTPATIENT
Start: 2021-05-04 | End: 2021-10-25 | Stop reason: SDUPTHER

## 2021-05-04 RX ORDER — ALBUTEROL SULFATE 90 UG/1
2 AEROSOL, METERED RESPIRATORY (INHALATION) 4 TIMES DAILY PRN
Qty: 1 INHALER | Refills: 3 | Status: SHIPPED | OUTPATIENT
Start: 2021-05-04 | End: 2022-07-24 | Stop reason: SDUPTHER

## 2021-05-04 RX ORDER — UMECLIDINIUM 62.5 UG/1
1 AEROSOL, POWDER ORAL DAILY
Qty: 1 EACH | Refills: 3 | Status: CANCELLED | OUTPATIENT
Start: 2021-05-04

## 2021-05-04 ASSESSMENT — ENCOUNTER SYMPTOMS
ANAL BLEEDING: 0
CHOKING: 0
SHORTNESS OF BREATH: 0
CHEST TIGHTNESS: 0
WHEEZING: 0
ABDOMINAL PAIN: 0
COUGH: 0
DIARRHEA: 0
NAUSEA: 0
BLOOD IN STOOL: 0
CONSTIPATION: 0
VOMITING: 0

## 2021-05-04 ASSESSMENT — VISUAL ACUITY: OU: 1

## 2021-05-04 NOTE — PROGRESS NOTES
Subjective:       Patient ID:     Alejandro Hammond is a 77 y.o. female who presents for No chief complaint on file. HPI:  Nursing note reviewed and discussed with patient. Hypertension-tolerating current regimen without chest pain, palpitations, dizziness, peripheral edema, dyspnea on exertion, orthopnea, paroxysmal nocturnal dyspnea. Hyperlipidemia-tolerating current regimen without myalgias, dyspepsia, jaundice. Mostly compliant with diet recommendations for low salt diet, tries to limit greasy/cheesy/fried foods, not very compliant with exercise recommendations. COPD:  Current treatment includes short-acting beta agonist inhaler, combined beta agonist/antichoinergic inhaler. Residual symptoms: chronic dyspnea. She denies any other symptoms. She requires her rescue inhaler 1 time(s) per week. Cardiovascular risk factors: advanced age (older than 54 for men, 72 for women), dyslipidemia, hypertension and sedentary lifestyle  Right knee has been giving her some problems so saw ortho - Dr Karen Alarcon feels it is her back. She has referred to ortho spine. Needs to set up appointment. Has a ?cyst on right palm that has been there for a while - painless, no impact on function of hand. Patient's medications, allergies, past medical, surgical, social and family histories were reviewed and updated as appropriate.     Past Medical History:   Diagnosis Date    ADHD     mild, no RX    Arthritis     Blurred vision, right eye     Carotid artery disease ([address]     dr Thor Araujo vascular last appt 1/20    GERD (gastroesophageal reflux disease)     Hearing loss     High cholesterol     History of closed shoulder dislocation     rt from recent fall    [place] (hard of hearing)     beto hearing aides    Hx of gastric ulcer     Hyperlipidemia     Hypertension 2007    IBS (irritable bowel syndrome)     Macular pucker, right eye     Skin cancer of forehead     Syncope     hx recent falls    Tension headache     Wears dentures     lower bridge plate,1 tooth upper    Wears glasses      Past Surgical History:   Procedure Laterality Date    APPENDECTOMY      BACK SURGERY      CATARACT REMOVAL WITH IMPLANT Bilateral     CHOLECYSTECTOMY      COLONOSCOPY N/A 2016    ESOPHAGOGASTRIC FUNDOPLICATION      EYE SURGERY      FINGER SURGERY Left     4th finger    HIATAL HERNIA REPAIR  1999    x2    REVISION TOTAL KNEE ARTHROPLASTY Right     UPPER GASTROINTESTINAL ENDOSCOPY      UPPER GASTROINTESTINAL ENDOSCOPY N/A 10/26/2018    EGD BIOPSY AND DILITATION WITH BRUNO performed by Karina Schrader MD at [address] VITRECTOMY Right 03/10/2020    VITRECTOMY 23 GAUGE, MEMBRANE PEELING, GAS FLUID EXCHANGE, ICG     VITRECTOMY Right 3/10/2020    VITRECTOMY 23 GAUGE, MEMBRANE PEELING, AIR FLUID EXCHANGE, ICG [facility] History     Tobacco Use    Smoking status: Former Smoker     Packs/day: 1.00     Years: 40.00     Pack years: 40.00     Types: Cigarettes     Quit date: 4/17/2018     Years since quitting: 3.0    Smokeless tobacco: Never Used    Tobacco comment: spoke with Pt to update the most she has smoked   Substance Use Topics    Alcohol use: No      Patient Active Problem List   Diagnosis    Gastro-esophageal reflux disease without esophagitis    Epigastric abdominal pain    Allergic to bees    Anxiety    Depression    Esophageal spasm    Gas bloat syndrome    Hyperlipidemia    Hypertension    Irritable bowel syndrome    Osteoarthritis of knee    Osteopenia    Paraesophageal hernia    Primary hypertriglyceridemia    Tubular adenoma of colon    Macular pucker, right    Macular edema, cystoid, right         Prior to Visit Medications    Medication Sig Taking?  Authorizing Provider   omeprazole (PRILOSEC) 40 MG delayed release capsule TAKE ONE CAPSULE BY MOUTH TWICE A DAY Yes Rosalia Nunez MD   ipratropium (ATROVENT) 0.02 % nebulizer solution Respiratory: Negative for cough, choking, chest tightness, shortness of breath and wheezing. Cardiovascular: Negative for chest pain, palpitations and leg swelling. Gastrointestinal: Negative for abdominal pain, anal bleeding, blood in stool, constipation, diarrhea, nausea and vomiting. Endocrine: Negative. Musculoskeletal: Negative for joint swelling and myalgias. Skin: Negative. Neurological: Negative for dizziness. Psychiatric/Behavioral: Negative for sleep disturbance. All other systems reviewed and are negative. Objective:       Physical Exam:  /70 (Site: Left Upper Arm, Position: Sitting, Cuff Size: Medium Adult)   Pulse 78   Temp 98.7 °F (37.1 °C) (Temporal)   Wt 164 lb 12.8 oz (74.8 kg)   SpO2 94%   BMI 28.29 kg/m²   Physical Exam  Vitals signs and nursing note reviewed. Constitutional:       General: She is not in acute distress. Appearance: She is well-developed. She is not ill-appearing. Eyes:      General: Lids are normal. Vision grossly intact. Cardiovascular:      Rate and Rhythm: Normal rate and regular rhythm. Heart sounds: Normal heart sounds, S1 normal and S2 normal. No murmur. No friction rub. No gallop. Pulmonary:      Effort: Pulmonary effort is normal. No respiratory distress. Breath sounds: Normal breath sounds. No wheezing. Abdominal:      General: Bowel sounds are normal.      Palpations: Abdomen is soft. There is no mass. Tenderness: There is no abdominal tenderness. There is no guarding. Musculoskeletal: Normal range of motion. Skin:     General: Skin is warm and dry. Capillary Refill: Capillary refill takes less than 2 seconds. Neurological:      General: No focal deficit present. Mental Status: She is alert and oriented to person, place, and time. 8mm firm nodule R 4th finger tendon on palmar surface just proximal to MCP joint.    Data Review  No visits with results within 6 Month(s) from this visit. Latest known visit with results is:   Hospital Outpatient Visit on 08/17/2020   Component Date Value    Specimen Description 08/17/2020 . CLEAN CATCH URINE     Special Requests 08/17/2020 NOT REPORTED     Culture 08/17/2020 NO SIGNIFICANT GROWTH      Ortho note reviewed in [address]        Assessment/Plan:      1. Centrilobular emphysema (HCC)  D/c incruse  Continue atrovent every 6 hours   - albuterol sulfate HFA (VENTOLIN HFA) 108 (90 Base) MCG/ACT inhaler; Inhale 2 puffs into the lungs 4 times daily as needed for Wheezing  Dispense: 1 Inhaler; Refill: 3    2. Mixed hyperlipidemia  Continue current medications - pravastatin, omega 3 supplements     3. Essential hypertension  - amLODIPine (NORVASC) 10 MG tablet; TAKE ONE TABLET BY MOUTH ONCE NIGHTLY  Dispense: 90 tablet; Refill: 1    4. Major depressive disorder with single episode, in partial remission [facility])  F/u psychiatry, continue current meds per psychiatry     5. Paraesophageal hernia  Continue omeprazole     6.  Chronic bilateral low back pain with right-sided sciatica  Pt has been referred to ortho spine   Burning sensation on top of feet may be related to back issues - previously had similar symptoms prior to back surgery            Health Maintenance Due   Topic Date Due    Shingles Vaccine (2 of 3) 11/20/2016    Breast cancer screen  03/21/2021       Electronically signed by Zandra Mckeon MD on 5/4/2021 at 1:22 PM

## 2021-07-12 NOTE — TELEPHONE ENCOUNTER
Last visit: 5/4/21  Last Med refill: 3/19/21  Does patient have enough medication for 72 hours: No:     Next Visit Date:  Future Appointments   Date Time Provider Bekah Daxa   7/19/2021  2:00 PM [address] [facility]   8/11/2021 11:20 AM Alphonso Enciso MD Neuro Spec [facility]   10/25/2021  3:30 PM Rosalia Jimenez MD [facility]   11/8/2021  1:00 PM Rosalia Jimenez  [address] Maintenance   Topic Date Due    Shingles Vaccine (2 of 3) 11/20/2016    Breast cancer screen  03/21/2021    Lipid screen  07/17/2021    DTaP/Tdap/Td vaccine (1 - Tdap) 09/09/2030 (Originally 9/10/2020)    Pneumococcal 65+ years Vaccine (2 of 2 - PPSV23) 07/26/2021    Flu vaccine (1) 09/01/2021    Low dose CT lung screening  10/08/2021    Annual Wellness Visit (AWV)  10/21/2021    Colon cancer screen colonoscopy  09/06/2027    DEXA (modify frequency per FRAX score)  Completed    COVID-19 Vaccine  Completed    Hepatitis C screen  Completed    Hepatitis A vaccine  Aged Out    Hepatitis B vaccine  Aged Out    Hib vaccine  Aged Out    Meningococcal (ACWY) vaccine  Aged Out       No results found for: LABA1C          ( goal A1C is < 7)   No results found for: LABMICR  LDL Cholesterol (mg/dL)   Date Value   07/17/2020 159 (H)   04/17/2019 67       (goal LDL is <100)   AST (U/L)   Date Value   07/17/2020 22     ALT (U/L)   Date Value   07/17/2020 21     BUN (mg/dL)   Date Value   07/17/2020 17     BP Readings from Last 3 Encounters:   05/04/21 108/70   01/04/21 126/78   10/20/20 124/80          (goal 120/80)    All Future Testing planned in [facility]  Lab Frequency Next Occurrence   Full PFT Study With Bronchodilator Once 11/03/2020   SPENCER DIGITAL SCREEN W OR WO CAD BILATERAL Once 07/28/2021               Patient Active Problem List:     Gastro-esophageal reflux disease without esophagitis     Epigastric abdominal pain     Allergic to bees     Anxiety     Depression

## 2021-07-19 ENCOUNTER — HOSPITAL ENCOUNTER (OUTPATIENT)
Dept: WOMENS IMAGING | Age: 66
Discharge: HOME | End: 2021-07-21
Payer: MEDICARE

## 2021-07-19 DIAGNOSIS — Z12.31 ENCOUNTER FOR SCREENING MAMMOGRAM FOR BREAST CANCER: ICD-10-CM

## 2021-07-19 PROCEDURE — 77063 BREAST TOMOSYNTHESIS BI: CPT

## 2021-08-21 DIAGNOSIS — E78.2 MIXED HYPERLIPIDEMIA: ICD-10-CM

## 2021-08-23 RX ORDER — PRAVASTATIN SODIUM 20 MG
TABLET ORAL
Qty: 90 TABLET | Refills: 1 | Status: SHIPPED | OUTPATIENT
Start: 2021-08-23 | End: 2022-02-27

## 2021-08-23 NOTE — TELEPHONE ENCOUNTER
Last visit: 5/4/21  Last Med refill: 2/19/21  Does patient have enough medication for 72 hours: No:     Next Visit Date:  Future Appointments   Date Time Provider Bekah Mittal   10/25/2021  3:30 PM Rosalia Barnhart MD [facility]   11/8/2021  1:00 PM Rosalia Barnhart  [address] Maintenance   Topic Date Due    Shingles Vaccine (2 of 3) 11/20/2016    Lipid screen  07/17/2021    Pneumococcal 65+ years Vaccine (2 of 2 - PPSV23) 07/26/2021    DTaP/Tdap/Td vaccine (1 - Tdap) 09/09/2030 (Originally 9/10/2020)    Flu vaccine (1) 09/01/2021    Low dose CT lung screening  10/08/2021    Annual Wellness Visit (AWV)  10/21/2021    Breast cancer screen  07/19/2023    Colon cancer screen colonoscopy  09/06/2027    DEXA (modify frequency per FRAX score)  Completed    COVID-19 Vaccine  Completed    Hepatitis C screen  Completed    Hepatitis A vaccine  Aged Out    Hepatitis B vaccine  Aged Out    Hib vaccine  Aged Out    Meningococcal (ACWY) vaccine  Aged Out       No results found for: LABA1C          ( goal A1C is < 7)   No results found for: LABMICR  LDL Cholesterol (mg/dL)   Date Value   07/17/2020 159 (H)   04/17/2019 67       (goal LDL is <100)   AST (U/L)   Date Value   07/17/2020 22     ALT (U/L)   Date Value   07/17/2020 21     BUN (mg/dL)   Date Value   07/17/2020 17     BP Readings from Last 3 Encounters:   05/04/21 108/70   01/04/21 126/78   10/20/20 124/80          (goal 120/80)    All Future Testing planned in [facility]  Lab Frequency Next Occurrence   Full PFT Study With Bronchodilator Once 11/03/2020               Patient Active Problem List:     Gastro-esophageal reflux disease without esophagitis     Epigastric abdominal pain     Allergic to bees     Anxiety     Depression     Esophageal spasm     Gas bloat syndrome     Hyperlipidemia     Hypertension     Irritable bowel syndrome     Osteoarthritis of knee     Osteopenia     Paraesophageal hernia     Primary hypertriglyceridemia     Tubular adenoma of colon     Macular pucker, right     Macular edema, cystoid, right     Centrilobular emphysema (HCC)     Chronic bilateral low back pain with right-sided sciatica

## 2021-09-21 ENCOUNTER — TELEPHONE (OUTPATIENT)
Dept: ONCOLOGY | Age: 66
End: 2021-09-21

## 2021-09-21 DIAGNOSIS — Z87.891 PERSONAL HISTORY OF NICOTINE DEPENDENCE: Primary | ICD-10-CM

## 2021-09-21 NOTE — TELEPHONE ENCOUNTER
Our records indicate that your patient is coming due for their annual lung cancer screening follow up testing. For your convenience, we have pended the order for the scan for you. If you do not agree with the need for the test, please cancel the order and let us know. Sincerely,    [address] [place] [address] Screening Program    Auto printed reminder letter sent to patient.

## 2021-10-21 DIAGNOSIS — K21.9 GASTRO-ESOPHAGEAL REFLUX DISEASE WITHOUT ESOPHAGITIS: ICD-10-CM

## 2021-10-21 RX ORDER — OMEPRAZOLE 40 MG/1
CAPSULE, DELAYED RELEASE ORAL
Qty: 180 CAPSULE | Refills: 1 | Status: SHIPPED | OUTPATIENT
Start: 2021-10-21 | End: 2022-04-18

## 2021-10-21 NOTE — TELEPHONE ENCOUNTER
Last visit: 5/4/21  Last Med refill: 4/20/21  Does patient have enough medication for 72 hours: No:     Next Visit Date:  Future Appointments   Date Time Provider Bekah Daxa   10/25/2021  3:30 PM Rosalia Del Castillo MD [facility]   11/22/2021 11:30 AM Elida Mendez MD [facility] [address] Maintenance   Topic Date Due    Shingles Vaccine (2 of 3) 11/20/2016    Lipid screen  07/17/2021    Pneumococcal 65+ years Vaccine (2 of 2 - PPSV23) 07/26/2021    Low dose CT lung screening  10/08/2021    Annual Wellness Visit (AWV)  10/21/2021    DTaP/Tdap/Td vaccine (1 - Tdap) 09/09/2030 (Originally 9/10/2020)    Breast cancer screen  07/19/2023    Colon cancer screen colonoscopy  09/06/2027    DEXA (modify frequency per FRAX score)  Completed    Flu vaccine  Completed    COVID-19 Vaccine  Completed    Hepatitis C screen  Completed    Hepatitis A vaccine  Aged Out    Hepatitis B vaccine  Aged Out    Hib vaccine  Aged Out    Meningococcal (ACWY) vaccine  Aged Out       No results found for: LABA1C          ( goal A1C is < 7)   No results found for: LABMICR  LDL Cholesterol (mg/dL)   Date Value   07/17/2020 159 (H)   04/17/2019 67       (goal LDL is <100)   AST (U/L)   Date Value   07/17/2020 22     ALT (U/L)   Date Value   07/17/2020 21     BUN (mg/dL)   Date Value   07/17/2020 17     BP Readings from Last 3 Encounters:   05/04/21 108/70   01/04/21 126/78   10/20/20 124/80          (goal 120/80)    All Future Testing planned in [facility]  Lab Frequency Next Occurrence   CT lung screen [Initial/Annual] Once 10/08/2021               Patient Active Problem List:     Gastro-esophageal reflux disease without esophagitis     Epigastric abdominal pain     Allergic to bees     Anxiety     Depression     Esophageal spasm     Gas bloat syndrome     Hyperlipidemia     Hypertension     Irritable bowel syndrome     Osteoarthritis of knee     Osteopenia     Paraesophageal hernia     Primary hypertriglyceridemia     Tubular adenoma of colon     Macular pucker, right     Macular edema, cystoid, right     Centrilobular emphysema (HCC)     Chronic bilateral low back pain with right-sided sciatica

## 2021-10-25 ENCOUNTER — OFFICE VISIT (OUTPATIENT)
Dept: FAMILY MEDICINE CLINIC | Age: 66
End: 2021-10-25
Payer: MEDICARE

## 2021-10-25 VITALS
HEIGHT: 63 IN | BODY MASS INDEX: 30.41 KG/M2 | TEMPERATURE: 97.5 F | WEIGHT: 171.6 LBS | HEART RATE: 85 BPM | OXYGEN SATURATION: 96 % | SYSTOLIC BLOOD PRESSURE: 120 MMHG | DIASTOLIC BLOOD PRESSURE: 62 MMHG

## 2021-10-25 DIAGNOSIS — K92.89 GAS BLOAT SYNDROME: ICD-10-CM

## 2021-10-25 DIAGNOSIS — I10 ESSENTIAL HYPERTENSION: ICD-10-CM

## 2021-10-25 DIAGNOSIS — Z00.00 ROUTINE GENERAL MEDICAL EXAMINATION AT A HEALTH CARE FACILITY: ICD-10-CM

## 2021-10-25 DIAGNOSIS — E78.2 MIXED HYPERLIPIDEMIA: ICD-10-CM

## 2021-10-25 DIAGNOSIS — D12.6 TUBULAR ADENOMA OF COLON: ICD-10-CM

## 2021-10-25 DIAGNOSIS — J43.2 CENTRILOBULAR EMPHYSEMA (HCC): ICD-10-CM

## 2021-10-25 PROCEDURE — G8484 FLU IMMUNIZE NO ADMIN: HCPCS | Performed by: INTERNAL MEDICINE

## 2021-10-25 PROCEDURE — G0439 PPPS, SUBSEQ VISIT: HCPCS | Performed by: INTERNAL MEDICINE

## 2021-10-25 PROCEDURE — 1123F ACP DISCUSS/DSCN MKR DOCD: CPT | Performed by: INTERNAL MEDICINE

## 2021-10-25 PROCEDURE — 4040F PNEUMOC VAC/ADMIN/RCVD: CPT | Performed by: INTERNAL MEDICINE

## 2021-10-25 PROCEDURE — 3017F COLORECTAL CA SCREEN DOC REV: CPT | Performed by: INTERNAL MEDICINE

## 2021-10-25 RX ORDER — AMLODIPINE BESYLATE 10 MG/1
TABLET ORAL
Qty: 90 TABLET | Refills: 1 | Status: SHIPPED | OUTPATIENT
Start: 2021-10-25 | End: 2022-04-22

## 2021-10-25 ASSESSMENT — PATIENT HEALTH QUESTIONNAIRE - PHQ9
SUM OF ALL RESPONSES TO PHQ QUESTIONS 1-9: 0
1. LITTLE INTEREST OR PLEASURE IN DOING THINGS: 0
2. FEELING DOWN, DEPRESSED OR HOPELESS: 0
SUM OF ALL RESPONSES TO PHQ9 QUESTIONS 1 & 2: 0
SUM OF ALL RESPONSES TO PHQ QUESTIONS 1-9: 0
SUM OF ALL RESPONSES TO PHQ QUESTIONS 1-9: 0

## 2021-10-25 ASSESSMENT — LIFESTYLE VARIABLES
AUDIT TOTAL SCORE: INCOMPLETE
HOW OFTEN DO YOU HAVE A DRINK CONTAINING ALCOHOL: NEVER
AUDIT-C TOTAL SCORE: INCOMPLETE
HOW OFTEN DO YOU HAVE A DRINK CONTAINING ALCOHOL: 0

## 2021-10-25 NOTE — PROGRESS NOTES
Medicare Annual Wellness Visit  Name: Marlo Corey Date: 10/25/2021   MRN: [id number] Sex: Female   Age: 74 y.o. Ethnicity: Non- / Non    : 1955 Race: White (non-)      Yudelka Martinez is here for Medicare AWV    Screenings for behavioral, psychosocial and functional/safety risks, and cognitive dysfunction are all negative except as indicated below. These results, as well as other patient data from the [address] form, are documented in Flowsheets linked to this Encounter. Allergies   Allergen Reactions    Morphine     Bee Venom        Prior to Visit Medications    Medication Sig Taking?  Authorizing Provider   omeprazole (PRILOSEC) 40 MG delayed release capsule TAKE ONE CAPSULE BY MOUTH TWICE A DAY Yes Rosalia Carter MD   pravastatin (PRAVACHOL) 20 MG tablet TAKE ONE TABLET BY MOUTH DAILY Yes Roaslia Carter MD   ipratropium (ATROVENT) 0.02 % nebulizer solution TAKE 1 VIAL (2.5 MILLILITERS) BY NEBULIZER FOUR TIMES A DAY Yes ERICA Quinones NP   amLODIPine (NORVASC) 10 MG tablet TAKE ONE TABLET BY MOUTH ONCE NIGHTLY Yes Rosalia Carter MD   albuterol sulfate HFA (VENTOLIN HFA) 108 (90 Base) MCG/ACT inhaler Inhale 2 puffs into the lungs 4 times daily as needed for Wheezing Yes Juan R Brock MD   azelastine (OPTIVAR) 0.05 % ophthalmic solution as needed Yes Hemalatha Boswell MD   olopatadine (PATADAY) 0.1 % ophthalmic solution Apply 1 drop to eye Yes Historical Provider, MD   fluticasone (FLONASE) 50 MCG/ACT nasal spray 1 spray by Nasal route daily Yes Rosalia Carter MD   EPINEPHrine (EPIPEN 2-JONATHAN) 0.3 MG/0.3ML SOAJ injection Inject 0.3 mLs into the muscle as needed (PRN) Use as directed for allergic reaction Yes Rosalia Carter MD   traZODone (DESYREL) 50 MG tablet Take 1 tablet by mouth daily Yes ERICA Hill CNP   Probiotic Product (PROBIOTIC-10 PO) Take 1 capsule by mouth daily Yes Historical Provider, MD   aspirin 81 MG tablet Take 81 mg by mouth daily Yes Historical Provider, MD   OMEGA-3 KRILL OIL PO Take by mouth daily Yes Historical Provider, MD   calcium carbonate 600 MG TABS tablet Take 1 tablet by mouth 2 times daily Yes Historical Provider, MD   vitamin D 1000 units CAPS Take by mouth daily Yes Historical Provider, MD   sertraline (ZOLOFT) 100 MG tablet Take 200 mg by mouth nightly  Yes Historical Provider, MD   vitamin E 1000 units capsule Take 1,000 Units by mouth daily Yes Historical Provider, MD       Past Medical History:   Diagnosis Date    ADHD     mild, no RX    Arthritis     Blurred vision, right eye     Carotid artery disease ([facility] [address]     dr Colón Begun vascular last appt 1/20    GERD (gastroesophageal reflux disease)     Hearing loss     High cholesterol     History of closed shoulder dislocation     rt from recent fall    [place] (hard of hearing)     beto hearing aides    Hx of gastric ulcer     Hyperlipidemia     Hypertension 2007    IBS (irritable bowel syndrome)     Macular pucker, right eye     Skin cancer of forehead     Syncope     hx recent falls    Tension headache     Wears dentures     lower bridge plate,1 tooth upper    Wears glasses        Past Surgical History:   Procedure Laterality Date    APPENDECTOMY      BACK SURGERY      CATARACT REMOVAL WITH IMPLANT Bilateral     CHOLECYSTECTOMY      COLONOSCOPY N/A 2016    ESOPHAGOGASTRIC FUNDOPLICATION      EYE SURGERY      FINGER SURGERY Left     4th finger    HIATAL HERNIA REPAIR  1999    x2    REVISION TOTAL KNEE ARTHROPLASTY Right     UPPER GASTROINTESTINAL ENDOSCOPY      UPPER GASTROINTESTINAL ENDOSCOPY N/A 10/26/2018    EGD BIOPSY AND DILITATION WITH DAMION performed by Paresh Wing MD at [address] [place] [address] VITRECTOMY Right 03/10/2020    VITRECTOMY 23 GAUGE, MEMBRANE PEELING, GAS FLUID EXCHANGE, ICG     VITRECTOMY Right 3/10/2020    VITRECTOMY 23 GAUGE, MEMBRANE PEELING, AIR FLUID EXCHANGE, ICG [facility] has a card from implantation stating it is MRI safe. Has not used the stimulator since a year after it was put in. General Appearance: alert and oriented to person, place and time, well developed and well- nourished, in no acute distress  Skin: warm and dry, no rash or erythema  Head: normocephalic and atraumatic  Eyes: pupils equal, round, and reactive to light, extraocular eye movements intact, conjunctivae normal  ENT: tympanic membrane, external ear and ear canal normal bilaterally, nose without deformity, nasal mucosa and turbinates normal without polyps  Neck: supple and non-tender without mass, no thyromegaly or thyroid nodules, no cervical lymphadenopathy  Pulmonary/Chest: clear to auscultation bilaterally- no wheezes, rales or rhonchi, normal air movement, no respiratory distress  Cardiovascular: normal rate, regular rhythm, normal S1 and S2, no murmurs, rubs, clicks, or gallops, distal pulses intact, no carotid bruits  Abdomen: soft, non-tender, non-distended, normal bowel sounds, no masses or organomegaly  Extremities: no cyanosis, clubbing or edema  Musculoskeletal: normal range of motion, no joint swelling, deformity or tenderness  Neurologic: reflexes normal and symmetric, no cranial nerve deficit, gait, coordination and speech normal    Patient's complete Health Risk Assessment and screening values have been reviewed and are found in Flowsheets. The following problems were reviewed today and where indicated follow up appointments were made and/or referrals ordered. Positive Risk Factor Screenings with Interventions:          General Health and ACP:  General  In general, how would you say your health is?: Fair  In the past 7 days, have you experienced any of the following?  New or Increased Pain, New or Increased Fatigue, Loneliness, Social Isolation, Stress or Anger?: (!) New or Increased Pain  Do you get the social and emotional support that you need?: Yes  Do you have a Living Will?: Pneumococcal Conjugate 13-valent ([id number]) 07/26/2020    Td (Adult), 2 Lf Tetanus Toxoid, Pf (Td, Absorbed) 09/09/2020    Td vaccine (adult) 05/05/2003    Zoster Live (Zostavax) 09/25/2016        Health Maintenance   Topic Date Due    Shingles Vaccine (2 of 3) 11/20/2016    Lipid screen  07/17/2021    Pneumococcal 65+ years Vaccine (2 of 2 - PPSV23) 07/26/2021    Low dose CT lung screening  10/08/2021    Annual Wellness Visit (AWV)  10/21/2021    DTaP/Tdap/Td vaccine (1 - Tdap) 09/09/2030 (Originally 9/10/2020)    Breast cancer screen  07/19/2023    Colon cancer screen colonoscopy  09/06/2027    DEXA (modify frequency per FRAX score)  Completed    Flu vaccine  Completed    COVID-19 Vaccine  Completed    Hepatitis C screen  Completed    Hepatitis A vaccine  Aged Out    Hepatitis B vaccine  Aged Out    Hib vaccine  Aged Out    Meningococcal (ACWY) vaccine  Aged Out     Recommendations for [employer] Due: see orders and patient instructions/AVS.  . Recommended screening schedule for the next 5-10 years is provided to the patient in written form: see Patient Instructions/AVS.    Jorge [place] was seen today for medicare awv. Diagnoses and all orders for this visit:    Essential hypertension  -     amLODIPine (NORVASC) 10 MG tablet; TAKE ONE TABLET BY MOUTH ONCE NIGHTLY  -     Comprehensive Metabolic Panel; Future    Mixed hyperlipidemia  -     Lipid, Fasting; Future  -     Comprehensive Metabolic Panel;  Future    Gas bloat syndrome    Centrilobular emphysema ([facility] [address]

## 2021-10-25 NOTE — PATIENT INSTRUCTIONS
Personalized Preventive Plan for Dion Newman - 10/25/2021  Medicare offers a range of preventive health benefits. Some of the tests and screenings are paid in full while other may be subject to a deductible, co-insurance, and/or copay. Some of these benefits include a comprehensive review of your medical history including lifestyle, illnesses that may run in your family, and various assessments and screenings as appropriate. After reviewing your medical record and screening and assessments performed today your provider may have ordered immunizations, labs, imaging, and/or referrals for you. A list of these orders (if applicable) as well as your Preventive Care list are included within your After Visit Summary for your review. Other Preventive Recommendations:    · A preventive eye exam performed by an eye specialist is recommended every 1-2 years to screen for glaucoma; cataracts, macular degeneration, and other eye disorders. · A preventive dental visit is recommended every 6 months. · Try to get at least 150 minutes of exercise per week or 10,000 steps per day on a pedometer . · Order or download the FREE \"Exercise & Physical Activity: Your Everyday Guide\" from [employer]. Call 1-904.845.9897 or search [employer] online. · You need 1144-4366 mg of calcium and 0514-4058 IU of vitamin D per day. It is possible to meet your calcium requirement with diet alone, but a vitamin D supplement is usually necessary to meet this goal.  · When exposed to the sun, use a sunscreen that protects against both UVA and UVB radiation with an SPF of 30 or greater. Reapply every 2 to 3 hours or after sweating, drying off with a towel, or swimming. · Always wear a seat belt when traveling in a car. Always wear a helmet when riding a bicycle or motorcycle.

## 2021-10-26 ENCOUNTER — HOSPITAL ENCOUNTER (OUTPATIENT)
Age: 66
Setting detail: SPECIMEN
Discharge: HOME | End: 2021-10-26
Payer: MEDICARE

## 2021-10-26 DIAGNOSIS — I10 ESSENTIAL HYPERTENSION: ICD-10-CM

## 2021-10-26 DIAGNOSIS — E78.2 MIXED HYPERLIPIDEMIA: ICD-10-CM

## 2021-10-26 DIAGNOSIS — I65.23 BILATERAL CAROTID ARTERY STENOSIS: Primary | ICD-10-CM

## 2021-10-26 LAB
ALBUMIN SERPL-MCNC: 4.4 G/DL (ref 3.5–5.2)
ALBUMIN/GLOBULIN RATIO: 1.4 (ref 1–2.5)
ALP BLD-CCNC: 110 U/L (ref 35–104)
ALT SERPL-CCNC: 22 U/L (ref 5–33)
ANION GAP SERPL CALCULATED.3IONS-SCNC: 15 MMOL/L (ref 9–17)
AST SERPL-CCNC: 25 U/L
BILIRUB SERPL-MCNC: 0.23 MG/DL (ref 0.3–1.2)
BUN BLDV-MCNC: 19 MG/DL (ref 8–23)
BUN/CREAT BLD: ABNORMAL (ref 9–20)
CALCIUM SERPL-MCNC: 9.5 MG/DL (ref 8.6–10.4)
CHLORIDE BLD-SCNC: 104 MMOL/L (ref 98–107)
CHOLESTEROL, FASTING: 234 MG/DL
CHOLESTEROL/HDL RATIO: 3.5
CO2: 23 MMOL/L (ref 20–31)
CREAT SERPL-MCNC: 0.85 MG/DL (ref 0.5–0.9)
GFR AFRICAN AMERICAN: >60 ML/MIN
GFR NON-AFRICAN AMERICAN: >60 ML/MIN
GFR SERPL CREATININE-BSD FRML MDRD: ABNORMAL ML/MIN/{1.73_M2}
GFR SERPL CREATININE-BSD FRML MDRD: ABNORMAL ML/MIN/{1.73_M2}
GLUCOSE BLD-MCNC: 84 MG/DL (ref 70–99)
HDLC SERPL-MCNC: 67 MG/DL
LDL CHOLESTEROL: 128 MG/DL (ref 0–130)
POTASSIUM SERPL-SCNC: 4.5 MMOL/L (ref 3.7–5.3)
SODIUM BLD-SCNC: 142 MMOL/L (ref 135–144)
TOTAL PROTEIN: 7.5 G/DL (ref 6.4–8.3)
TRIGLYCERIDE, FASTING: 197 MG/DL
VLDLC SERPL CALC-MCNC: ABNORMAL MG/DL (ref 1–30)

## 2021-11-03 ENCOUNTER — TELEPHONE (OUTPATIENT)
Dept: FAMILY MEDICINE CLINIC | Age: 66
End: 2021-11-03

## 2021-11-03 NOTE — TELEPHONE ENCOUNTER
Called Radiology at [facility], spoke with Derek Mccarty in radiology about clearance regarding spinal stimulator and controller device.    Need to call back tomorrow to speak with someone regarding policy: [phone], option 3

## 2021-11-09 ENCOUNTER — HOSPITAL ENCOUNTER (OUTPATIENT)
Dept: CT IMAGING | Age: 66
Discharge: HOME | End: 2021-11-11
Payer: MEDICARE

## 2021-11-09 ENCOUNTER — HOSPITAL ENCOUNTER (OUTPATIENT)
Dept: VASCULAR LAB | Age: 66
Discharge: HOME | End: 2021-11-09
Payer: MEDICARE

## 2021-11-09 DIAGNOSIS — I65.23 BILATERAL CAROTID ARTERY STENOSIS: ICD-10-CM

## 2021-11-09 DIAGNOSIS — Z87.891 PERSONAL HISTORY OF NICOTINE DEPENDENCE: ICD-10-CM

## 2021-11-09 PROCEDURE — 71271 CT THORAX LUNG CANCER SCR C-: CPT

## 2021-11-09 PROCEDURE — 93880 EXTRACRANIAL BILAT STUDY: CPT

## 2021-12-01 ENCOUNTER — OFFICE VISIT (OUTPATIENT)
Dept: VASCULAR SURGERY | Age: 66
End: 2021-12-01
Payer: MEDICARE

## 2021-12-01 VITALS
BODY MASS INDEX: 28.68 KG/M2 | WEIGHT: 168 LBS | SYSTOLIC BLOOD PRESSURE: 125 MMHG | RESPIRATION RATE: 18 BRPM | TEMPERATURE: 98.2 F | DIASTOLIC BLOOD PRESSURE: 74 MMHG | HEIGHT: 64 IN | HEART RATE: 84 BPM | OXYGEN SATURATION: 91 %

## 2021-12-01 DIAGNOSIS — I65.23 CAROTID STENOSIS, ASYMPTOMATIC, BILATERAL: Primary | ICD-10-CM

## 2021-12-01 PROCEDURE — 1036F TOBACCO NON-USER: CPT | Performed by: SURGERY

## 2021-12-01 PROCEDURE — 99214 OFFICE O/P EST MOD 30 MIN: CPT | Performed by: SURGERY

## 2021-12-01 PROCEDURE — G8417 CALC BMI ABV UP PARAM F/U: HCPCS | Performed by: SURGERY

## 2021-12-01 PROCEDURE — G8484 FLU IMMUNIZE NO ADMIN: HCPCS | Performed by: SURGERY

## 2021-12-01 PROCEDURE — G8399 PT W/DXA RESULTS DOCUMENT: HCPCS | Performed by: SURGERY

## 2021-12-01 PROCEDURE — G8427 DOCREV CUR MEDS BY ELIG CLIN: HCPCS | Performed by: SURGERY

## 2021-12-01 PROCEDURE — 3017F COLORECTAL CA SCREEN DOC REV: CPT | Performed by: SURGERY

## 2021-12-01 PROCEDURE — 1123F ACP DISCUSS/DSCN MKR DOCD: CPT | Performed by: SURGERY

## 2021-12-01 PROCEDURE — 1090F PRES/ABSN URINE INCON ASSESS: CPT | Performed by: SURGERY

## 2021-12-01 PROCEDURE — 4040F PNEUMOC VAC/ADMIN/RCVD: CPT | Performed by: SURGERY

## 2021-12-01 NOTE — PROGRESS NOTES
03/10/2020    VITRECTOMY 23 GAUGE, MEMBRANE PEELING, GAS FLUID EXCHANGE, ICG     VITRECTOMY Right 3/10/2020    VITRECTOMY 23 GAUGE, MEMBRANE PEELING, AIR FLUID EXCHANGE, ICG performed by Shasta White MD at [facility] History:     Family History   Problem Relation Age of Onset    Cancer Father        Allergies:       Morphine and Bee venom    Medications:      Current Outpatient Medications   Medication Sig Dispense Refill    amLODIPine (NORVASC) 10 MG tablet TAKE ONE TABLET BY MOUTH ONCE NIGHTLY 90 tablet 1    omeprazole (PRILOSEC) 40 MG delayed release capsule TAKE ONE CAPSULE BY MOUTH TWICE A  capsule 1    pravastatin (PRAVACHOL) 20 MG tablet TAKE ONE TABLET BY MOUTH DAILY 90 tablet 1    ipratropium (ATROVENT) 0.02 % nebulizer solution TAKE 1 VIAL (2.5 MILLILITERS) BY NEBULIZER FOUR TIMES A  mL 5    albuterol sulfate HFA (VENTOLIN HFA) 108 (90 Base) MCG/ACT inhaler Inhale 2 puffs into the lungs 4 times daily as needed for Wheezing 1 Inhaler 3    azelastine (OPTIVAR) 0.05 % ophthalmic solution as needed      olopatadine (PATADAY) 0.1 % ophthalmic solution Apply 1 drop to eye      EPINEPHrine (EPIPEN 2-JONATHAN) 0.3 MG/0.3ML SOAJ injection Inject 0.3 mLs into the muscle as needed (PRN) Use as directed for allergic reaction 2 each 1    traZODone (DESYREL) 50 MG tablet Take 1 tablet by mouth daily      Probiotic Product (PROBIOTIC-10 PO) Take 1 capsule by mouth daily      aspirin 81 MG tablet Take 81 mg by mouth daily      OMEGA-3 KRILL OIL PO Take by mouth daily      calcium carbonate 600 MG TABS tablet Take 1 tablet by mouth 2 times daily      vitamin D 1000 units CAPS Take by mouth daily      sertraline (ZOLOFT) 100 MG tablet Take 200 mg by mouth nightly       vitamin E 1000 units capsule Take 1,000 Units by mouth daily      fluticasone (FLONASE) 50 MCG/ACT nasal spray 1 spray by Nasal route daily 1 Bottle 6     No current facility-administered medications for this visit. Social History:     Tobacco:    reports that she quit smoking about 3 years ago. Her smoking use included cigarettes. She has a 40.00 pack-year smoking history. She has never used smokeless tobacco.  Alcohol:      reports no history of alcohol use. Drug Use:  reports no history of drug use. Review of Systems:     Review of Systems   Constitutional: Negative for chills and fever. HENT: Negative for congestion. Eyes: Negative for visual disturbance. Respiratory: Negative for chest tightness and shortness of breath. Cardiovascular: Negative for chest pain and leg swelling. Gastrointestinal: Negative for abdominal pain. Endocrine: Negative. Genitourinary: Negative. Musculoskeletal: Positive for arthralgias and back pain. Skin: Negative for color change and wound. Allergic/Immunologic: Negative. Neurological: Positive for numbness. Negative for facial asymmetry, speech difficulty and weakness. Hematological: Negative. Psychiatric/Behavioral: Negative. Physical Exam:     Vitals:  /74   Pulse 84   Temp 98.2 °F (36.8 °C) (Temporal)   Resp 18   Ht 5' 4\" (1.626 m)   Wt 168 lb (76.2 kg)   SpO2 91%   BMI 28.84 kg/m²     Physical Exam  Constitutional:       Appearance: She is well-developed and well-groomed. Eyes:      Extraocular Movements: Extraocular movements intact. Conjunctiva/sclera: Conjunctivae normal.   Neck:      Vascular: No carotid bruit. Cardiovascular:      Rate and Rhythm: Normal rate and regular rhythm. Pulses:           Radial pulses are 2+ on the right side and 2+ on the left side. Dorsalis pedis pulses are 2+ on the right side and 2+ on the left side. Pulmonary:      Effort: Pulmonary effort is normal. No respiratory distress. Abdominal:      Palpations: Abdomen is soft. Tenderness: There is no abdominal tenderness. Musculoskeletal:      Cervical back: Full passive range of motion without pain.       Right lower leg: No swelling or tenderness. No edema. Left lower leg: No swelling or tenderness. No edema. Right foot: Normal capillary refill. No swelling or tenderness. Left foot: Normal capillary refill. No swelling or tenderness. Feet:      Right foot:      Skin integrity: No ulcer or skin breakdown. Left foot:      Skin integrity: No ulcer or skin breakdown. Skin:     General: Skin is warm. Capillary Refill: Capillary refill takes less than 2 seconds. Neurological:      Mental Status: She is alert and oriented to person, place, and time. GCS: GCS eye subscore is 4. GCS verbal subscore is 5. GCS motor subscore is 6. Sensory: Sensation is intact. Motor: Motor function is intact. Psychiatric:         Mood and Affect: Mood normal.         Speech: Speech normal.         Behavior: Behavior normal.       Imaging/Labs:     CTA 2019 reveals 60% stenosis of bilateral Internal Carotid artery (ICA)    Carotid duplex (2021)      Assessment and Plan:     Bilateral carotid artery stenosis, asymptomatic  · Optimal medical therapy with aspirin and statins  · Continued smoking cessation  · Yearly surveillance with carotid duplex  · Overall level of stenosis is mild, elective repair when stenosis closer to 80-99%     Optimal medical management is an important part of overall treatment of all patients with carotid bifurcation disease, regardless of the degree of stenosis or the plan for intervention. This therapy is directed both at the reduction of stroke and overall cardiovascular events, including cardiovascular-related mortality. Clinical guidelines issued by the American Heart Association and the American Stroke Association recommends:    I. Lowering blood pressure to a target 140/90 mm Hg by lifestyle interventions and antihypertensive treatment is recommended in individuals who have hypertension with asymptomatic carotid atherosclerosis. II.  Glucose control to nearly normoglycemic levels (target hemoglobin A1C 7%) is recommended among diabetic patients to reduce microvascular complications and, with lesser certainty, macrovascular complications other than stroke. III. Patients with known atherosclerosis have demonstrated reduced stroke rates when treated with lipid-lowering therapy. And continued low dose statin will help stabilize plaque and decrease the inflammatory response of atherosclerosis. IV. Smoking nearly doubles the risk of stroke and with cessation there is a reduction in the risk of stroke. V. Antiplatelet therapy in asymptomatic patients with carotid atherosclerosis is recommended to reduce overall cardiovascular morbidity although it has not been shown to be effective in the primary prevention of stroke. Electronically signed by Madhav Oquendo MD on 12/1/21 at 1:27 PM [address]: [phone]  C: [phone]  Email: [email or web address]

## 2021-12-12 ASSESSMENT — ENCOUNTER SYMPTOMS
BACK PAIN: 1
CHEST TIGHTNESS: 0
SHORTNESS OF BREATH: 0
ABDOMINAL PAIN: 0
ALLERGIC/IMMUNOLOGIC NEGATIVE: 1
COLOR CHANGE: 0

## 2021-12-27 ENCOUNTER — TELEPHONE (OUTPATIENT)
Dept: FAMILY MEDICINE CLINIC | Age: 66
End: 2021-12-27

## 2021-12-27 NOTE — TELEPHONE ENCOUNTER
Patient called stating pharmacy did not receive Fosamax script. Script did not go through to pharmacy; verbally called in.

## 2022-01-25 ENCOUNTER — OFFICE VISIT (OUTPATIENT)
Dept: FAMILY MEDICINE CLINIC | Age: 67
End: 2022-01-25
Payer: MEDICARE

## 2022-01-25 VITALS
TEMPERATURE: 99 F | BODY MASS INDEX: 29.15 KG/M2 | OXYGEN SATURATION: 97 % | HEART RATE: 80 BPM | DIASTOLIC BLOOD PRESSURE: 78 MMHG | WEIGHT: 169.8 LBS | SYSTOLIC BLOOD PRESSURE: 134 MMHG

## 2022-01-25 DIAGNOSIS — M54.41 CHRONIC BILATERAL LOW BACK PAIN WITH RIGHT-SIDED SCIATICA: Primary | ICD-10-CM

## 2022-01-25 DIAGNOSIS — E78.1 PRIMARY HYPERTRIGLYCERIDEMIA: ICD-10-CM

## 2022-01-25 DIAGNOSIS — R30.0 BURNING WITH URINATION: ICD-10-CM

## 2022-01-25 DIAGNOSIS — J43.2 CENTRILOBULAR EMPHYSEMA (HCC): ICD-10-CM

## 2022-01-25 DIAGNOSIS — K44.9 PARAESOPHAGEAL HERNIA: ICD-10-CM

## 2022-01-25 DIAGNOSIS — G89.29 CHRONIC BILATERAL LOW BACK PAIN WITH RIGHT-SIDED SCIATICA: Primary | ICD-10-CM

## 2022-01-25 DIAGNOSIS — F32.4 MAJOR DEPRESSIVE DISORDER WITH SINGLE EPISODE, IN PARTIAL REMISSION (HCC): ICD-10-CM

## 2022-01-25 DIAGNOSIS — D12.6 TUBULAR ADENOMA OF COLON: ICD-10-CM

## 2022-01-25 LAB
BILIRUBIN, POC: ABNORMAL
BLOOD URINE, POC: ABNORMAL
CLARITY, POC: ABNORMAL
COLOR, POC: ABNORMAL
GLUCOSE URINE, POC: ABNORMAL
KETONES, POC: ABNORMAL
LEUKOCYTE EST, POC: ABNORMAL
NITRITE, POC: ABNORMAL
PH, POC: 7.5
PROTEIN, POC: 30
SPECIFIC GRAVITY, POC: 1.01
UROBILINOGEN, POC: 0.2

## 2022-01-25 PROCEDURE — 99214 OFFICE O/P EST MOD 30 MIN: CPT | Performed by: INTERNAL MEDICINE

## 2022-01-25 PROCEDURE — G8399 PT W/DXA RESULTS DOCUMENT: HCPCS | Performed by: INTERNAL MEDICINE

## 2022-01-25 PROCEDURE — 1090F PRES/ABSN URINE INCON ASSESS: CPT | Performed by: INTERNAL MEDICINE

## 2022-01-25 PROCEDURE — 3023F SPIROM DOC REV: CPT | Performed by: INTERNAL MEDICINE

## 2022-01-25 PROCEDURE — 1123F ACP DISCUSS/DSCN MKR DOCD: CPT | Performed by: INTERNAL MEDICINE

## 2022-01-25 PROCEDURE — 1036F TOBACCO NON-USER: CPT | Performed by: INTERNAL MEDICINE

## 2022-01-25 PROCEDURE — 81003 URINALYSIS AUTO W/O SCOPE: CPT | Performed by: INTERNAL MEDICINE

## 2022-01-25 PROCEDURE — 4040F PNEUMOC VAC/ADMIN/RCVD: CPT | Performed by: INTERNAL MEDICINE

## 2022-01-25 PROCEDURE — G8484 FLU IMMUNIZE NO ADMIN: HCPCS | Performed by: INTERNAL MEDICINE

## 2022-01-25 PROCEDURE — G8427 DOCREV CUR MEDS BY ELIG CLIN: HCPCS | Performed by: INTERNAL MEDICINE

## 2022-01-25 PROCEDURE — G8417 CALC BMI ABV UP PARAM F/U: HCPCS | Performed by: INTERNAL MEDICINE

## 2022-01-25 PROCEDURE — 3017F COLORECTAL CA SCREEN DOC REV: CPT | Performed by: INTERNAL MEDICINE

## 2022-01-25 RX ORDER — CELECOXIB 100 MG/1
100 CAPSULE ORAL DAILY
Qty: 30 CAPSULE | Refills: 3 | Status: SHIPPED | OUTPATIENT
Start: 2022-01-25 | End: 2022-05-17 | Stop reason: SDUPTHER

## 2022-01-25 RX ORDER — GABAPENTIN 100 MG/1
100 CAPSULE ORAL 2 TIMES DAILY
Qty: 60 CAPSULE | Refills: 1 | Status: SHIPPED | OUTPATIENT
Start: 2022-01-25 | End: 2022-02-23

## 2022-01-25 SDOH — ECONOMIC STABILITY: FOOD INSECURITY: WITHIN THE PAST 12 MONTHS, YOU WORRIED THAT YOUR FOOD WOULD RUN OUT BEFORE YOU GOT MONEY TO BUY MORE.: NEVER TRUE

## 2022-01-25 SDOH — ECONOMIC STABILITY: FOOD INSECURITY: WITHIN THE PAST 12 MONTHS, THE FOOD YOU BOUGHT JUST DIDN'T LAST AND YOU DIDN'T HAVE MONEY TO GET MORE.: NEVER TRUE

## 2022-01-25 ASSESSMENT — SOCIAL DETERMINANTS OF HEALTH (SDOH): HOW HARD IS IT FOR YOU TO PAY FOR THE VERY BASICS LIKE FOOD, HOUSING, MEDICAL CARE, AND HEATING?: NOT HARD AT ALL

## 2022-01-25 NOTE — PROGRESS NOTES
Subjective:       Patient ID:     Kathleen Pierce is a 74 y.o. female who presents for   Chief Complaint   Patient presents with    Urinary Tract Infection    Back Pain     Follow up       HPI:  Nursing note reviewed and discussed with patient. Here for follow-up today. She has a neurosurgery, who told she was not a candidate for surgery and that nothing could be done for her back pain. She is very frustrated and tearful about this, she states her back pain is getting worse, midline constant back pain radiating down left leg. She has currently not been taking anything besides Tylenol for pain. She states her back pain is interfering with her ability to do anything, including to housework or leave the house and Friday of shopping because it hurts after she starts to walk or stand for more than a few minutes. She is wondering if this is what the rest of her life is going to look like. Not been on gabapentin before, and is agreeable to trying gabapentin  She also feels like she might have a UTI, having some intermittent dysuria for the last couple weeks. No fevers, chills, frequency, nausea, vomiting. Patient's medications, allergies, past medical, surgical, social and family histories were reviewed and updated as appropriate.     Past Medical History:   Diagnosis Date    ADHD     mild, no RX    Arthritis     Blurred vision, right eye     Carotid artery disease ([address]     dr Kendra Don vascular last appt 1/20    GERD (gastroesophageal reflux disease)     Hearing loss     High cholesterol     History of closed shoulder dislocation     rt from recent fall    [place] (hard of hearing)     beto hearing aides    Hx of gastric ulcer     Hyperlipidemia     Hypertension 2007    IBS (irritable bowel syndrome)     Macular pucker, right eye     Skin cancer of forehead     Syncope     hx recent falls    Tension headache     Wears dentures     lower bridge plate,1 tooth upper    Wears glasses      Past Surgical History:   Procedure Laterality Date    APPENDECTOMY      BACK SURGERY      CATARACT REMOVAL WITH IMPLANT Bilateral     CHOLECYSTECTOMY      COLONOSCOPY N/A 2016    ESOPHAGOGASTRIC FUNDOPLICATION      EYE SURGERY      FINGER SURGERY Left     4th finger    HIATAL HERNIA REPAIR  1999    x2    REVISION TOTAL KNEE ARTHROPLASTY Right     UPPER GASTROINTESTINAL ENDOSCOPY      UPPER GASTROINTESTINAL ENDOSCOPY N/A 10/26/2018    EGD BIOPSY AND DILITATION WITH DAMION performed by Cristóbal Juarez MD at [address] VITRECTOMY Right 03/10/2020    VITRECTOMY 23 GAUGE, MEMBRANE PEELING, GAS FLUID EXCHANGE, ICG     VITRECTOMY Right 3/10/2020    VITRECTOMY 23 GAUGE, MEMBRANE PEELING, AIR FLUID EXCHANGE, ICG performed by Bhaskar Pastor MD at [facility] History     Tobacco Use    Smoking status: Former Smoker     Packs/day: 1.00     Years: 40.00     Pack years: 40.00     Types: Cigarettes     Quit date: 4/17/2018     Years since quitting: 3.7    Smokeless tobacco: Never Used    Tobacco comment: spoke with Pt to update the most she has smoked   Substance Use Topics    Alcohol use: No      Patient Active Problem List   Diagnosis    Gastro-esophageal reflux disease without esophagitis    Epigastric abdominal pain    Allergic to bees    Anxiety    Depression    Esophageal spasm    Gas bloat syndrome    Hyperlipidemia    Hypertension    Irritable bowel syndrome    Osteoarthritis of knee    Osteopenia    Paraesophageal hernia    Primary hypertriglyceridemia    Tubular adenoma of colon    Macular pucker, right    Macular edema, cystoid, right    Centrilobular emphysema ([address]    Chronic bilateral low back pain with right-sided sciatica         Prior to Visit Medications    Medication Sig Taking?  Authorizing Provider   alendronate (FOSAMAX) 70 MG tablet Take 1 tablet by mouth every 7 days Yes Rosalia Martin MD   amLODIPine (NORVASC) 10 MG tablet TAKE ONE TABLET BY MOUTH ONCE NIGHTLY Yes Rosalia Aly MD   omeprazole (PRILOSEC) 40 MG delayed release capsule TAKE ONE CAPSULE BY MOUTH TWICE A DAY Yes Rosalia Aly MD   pravastatin (PRAVACHOL) 20 MG tablet TAKE ONE TABLET BY MOUTH DAILY Yes Rosalia Aly MD   ipratropium (ATROVENT) 0.02 % nebulizer solution TAKE 1 VIAL (2.5 MILLILITERS) BY NEBULIZER FOUR TIMES A DAY Yes ERICA Manzanares NP   albuterol sulfate HFA (VENTOLIN HFA) 108 (90 Base) MCG/ACT inhaler Inhale 2 puffs into the lungs 4 times daily as needed for Wheezing Yes Zenobia Le MD   azelastine (OPTIVAR) 0.05 % ophthalmic solution as needed Yes Alejandra Lewis MD   fluticasone (FLONASE) 50 MCG/ACT nasal spray 1 spray by Nasal route daily Yes Zenobia Le MD   EPINEPHrine (EPIPEN 2-JONATHAN) 0.3 MG/0.3ML SOAJ injection Inject 0.3 mLs into the muscle as needed (PRN) Use as directed for allergic reaction Yes Rosalia Aly MD   traZODone (DESYREL) 50 MG tablet Take 1 tablet by mouth daily Yes ERICA Gonzalez CNP   Probiotic Product (PROBIOTIC-10 PO) Take 1 capsule by mouth daily Yes Historical Provider, MD   aspirin 81 MG tablet Take 81 mg by mouth daily Yes Historical Provider, MD   OMEGA-3 KRILL OIL PO Take by mouth daily Yes Historical Provider, MD   calcium carbonate 600 MG TABS tablet Take 1 tablet by mouth 2 times daily Yes Historical Provider, MD   vitamin D 1000 units CAPS Take by mouth daily Yes Historical Provider, MD   sertraline (ZOLOFT) 100 MG tablet Take 200 mg by mouth nightly  Yes Historical Provider, MD   vitamin E 1000 units capsule Take 1,000 Units by mouth daily Yes Historical Provider, MD     Review of Systems  Review of Systems   Constitutional: Negative for fatigue, fever and unexpected weight change. Respiratory: Negative for cough, choking, chest tightness, shortness of breath and wheezing. Cardiovascular: Negative for chest pain, palpitations and leg swelling.    Gastrointestinal: Negative for abdominal pain, anal bleeding, blood in stool, constipation, diarrhea, nausea and vomiting. Endocrine: Negative. Genitourinary: Positive for dysuria. Musculoskeletal: Positive for arthralgias and back pain. Negative for joint swelling and myalgias. Skin: Negative. Neurological: Negative for dizziness. Psychiatric/Behavioral: Negative for sleep disturbance. All other systems reviewed and are negative. Objective:       Physical Exam:  /78 (Site: Left Upper Arm, Position: Sitting, Cuff Size: Medium Adult)   Pulse 80   Temp 99 °F (37.2 °C) (Temporal)   Wt 169 lb 12.8 oz (77 kg)   SpO2 97%   BMI 29.15 kg/m²   Physical Exam  Vitals and nursing note reviewed. Constitutional:       General: She is not in acute distress. Appearance: She is well-developed. She is not ill-appearing. Eyes:      General: Lids are normal. Vision grossly intact. Cardiovascular:      Rate and Rhythm: Normal rate and regular rhythm. Heart sounds: Normal heart sounds, S1 normal and S2 normal. No murmur heard. No friction rub. No gallop. Pulmonary:      Effort: Pulmonary effort is normal. No respiratory distress. Breath sounds: Normal breath sounds. No wheezing. Abdominal:      General: Bowel sounds are normal.      Palpations: Abdomen is soft. There is no mass. Tenderness: There is no abdominal tenderness. There is no guarding. Musculoskeletal:         General: Normal range of motion. Skin:     General: Skin is warm and dry. Capillary Refill: Capillary refill takes less than 2 seconds. Neurological:      General: No focal deficit present. Mental Status: She is alert and oriented to person, place, and time. Data Review  not applicable       Assessment/Plan:      1. Chronic bilateral low back pain with right-sided sciatica  - celecoxib (CELEBREX) 100 MG capsule; Take 1 capsule by mouth daily  Dispense: 30 capsule; Refill: 3  - gabapentin (NEURONTIN) 100 MG capsule;  Take 1 capsule by mouth 2 times daily for 30 days. Dispense: 60 capsule; Refill: 1    2. Centrilobular emphysema (HCC)  - tiotropium (SPIRIVA RESPIMAT) 2.5 MCG/ACT AERS inhaler; Inhale 2 puffs into the lungs daily  Dispense: 1 each; Refill: 3    3. Major depressive disorder with single episode, in partial remission (HCC)  Continue current regimen    4. Tubular adenoma of colon  Follow-up gastroenterology    5. Primary hypertriglyceridemia  Continue current regimen    6. Paraesophageal hernia  Continue omeprazole    7.  Burning with urination    - POCT Urinalysis No Micro (Auto)           Health Maintenance Due   Topic Date Due    Shingles Vaccine (2 of 3) 11/20/2016    Pneumococcal 65+ years Vaccine (2 of 2 - PPSV23) 07/26/2021       Electronically signed by Minnie Davenport MD on 1/25/2022 at 5:22 PM

## 2022-01-25 NOTE — PATIENT INSTRUCTIONS
Patient Education      Patient Education         Fitness: Increasing Your Core Stability (02:03)  Your health professional recommends that you watch this short online health video. Learn how to keep your core strong and prevent injury with two simple exercises. Purpose:  Shows belly tightening and bridging to improve core stability. Emphasizes how a strong core helps prevent injury. Goal:  The user will learn belly tightening and bridging to improve core stability. How to watch the video    Scan the QR code   OR Visit the website    [email or web address]   Current as of: May 12, 2021               Content Version: 13.1  © 2797-6578 [employer]. Care instructions adapted under license by [facility] ([facility]). If you have questions about a medical condition or this instruction, always ask your healthcare professional. [address] any warranty or liability for your use of this information. Low Back Pain: Exercises  Introduction  Here are some examples of exercises for you to try. The exercises may be suggested for a condition or for rehabilitation. Start each exercise slowly. Ease off the exercises if you start to have pain. You will be told when to start these exercises and which ones will work best for you. How to do the exercises  Press-up    1. Lie on your stomach, supporting your body with your forearms. 2. Press your elbows down into the floor to raise your upper back. As you do this, relax your stomach muscles and allow your back to arch without using your back muscles. As your press up, do not let your hips or pelvis come off the floor. 3. Hold for 15 to 30 seconds, then relax. 4. Repeat 2 to 4 times. Alternate arm and leg (bird dog) exercise    Do this exercise slowly. Try to keep your body straight at all times, and do not let one hip drop lower than the other. 1. Start on the floor, on your hands and knees. 2. Tighten your belly muscles.   3. Raise one leg off the floor, and hold it straight out behind you. Be careful not to let your hip drop down, because that will twist your trunk. 4. Hold for about 6 seconds, then lower your leg and switch to the other leg. 5. Repeat 8 to 12 times on each leg. 6. Over time, work up to holding for 10 to 30 seconds each time. 7. If you feel stable and secure with your leg raised, try raising the opposite arm straight out in front of you at the same time. Knee-to-chest exercise    1. Lie on your back with your knees bent and your feet flat on the floor. 2. Bring one knee to your chest, keeping the other foot flat on the floor (or keeping the other leg straight, whichever feels better on your lower back). 3. Keep your lower back pressed to the floor. Hold for at least 15 to 30 seconds. 4. Relax, and lower the knee to the starting position. 5. Repeat with the other leg. Repeat 2 to 4 times with each leg. 6. To get more stretch, put your other leg flat on the floor while pulling your knee to your chest.  Curl-ups    1. Lie on the floor on your back with your knees bent at a 90-degree angle. Your feet should be flat on the floor, about 12 inches from your buttocks. 2. Cross your arms over your chest. If this bothers your neck, try putting your hands behind your neck (not your head), with your elbows spread apart. 3. Slowly tighten your belly muscles and raise your shoulder blades off the floor. 4. Keep your head in line with your body, and do not press your chin to your chest.  5. Hold this position for 1 or 2 seconds, then slowly lower yourself back down to the floor. 6. Repeat 8 to 12 times. Pelvic tilt exercise    1. Lie on your back with your knees bent. 2. \"Brace\" your stomach. This means to tighten your muscles by pulling in and imagining your belly button moving toward your spine. You should feel like your back is pressing to the floor and your hips and pelvis are rocking back.   3. Hold for about 6 seconds while you breathe smoothly. 4. Repeat 8 to 12 times. Heel dig bridging    1. Lie on your back with both knees bent and your ankles bent so that only your heels are digging into the floor. Your knees should be bent about 90 degrees. 2. Then push your heels into the floor, squeeze your buttocks, and lift your hips off the floor until your shoulders, hips, and knees are all in a straight line. 3. Hold for about 6 seconds as you continue to breathe normally, and then slowly lower your hips back down to the floor and rest for up to 10 seconds. 4. Do 8 to 12 repetitions. Hamstring stretch in doorway    1. Lie on your back in a doorway, with one leg through the open door. 2. Slide your leg up the wall to straighten your knee. You should feel a gentle stretch down the back of your leg. 3. Hold the stretch for at least 15 to 30 seconds. Do not arch your back, point your toes, or bend either knee. Keep one heel touching the floor and the other heel touching the wall. 4. Repeat with your other leg. 5. Do 2 to 4 times for each leg. Hip flexor stretch    1. Kneel on the floor with one knee bent and one leg behind you. Place your forward knee over your foot. Keep your other knee touching the floor. 2. Slowly push your hips forward until you feel a stretch in the upper thigh of your rear leg. 3. Hold the stretch for at least 15 to 30 seconds. Repeat with your other leg. 4. Do 2 to 4 times on each side. Wall sit    1. Stand with your back 10 to 12 inches away from a wall. 2. Lean into the wall until your back is flat against it. 3. Slowly slide down until your knees are slightly bent, pressing your lower back into the wall. 4. Hold for about 6 seconds, then slide back up the wall. 5. Repeat 8 to 12 times. Follow-up care is a key part of your treatment and safety. Be sure to make and go to all appointments, and call your doctor if you are having problems.  It's also a good idea to know your test results and keep a list of the medicines you take. Where can you learn more? Go to [email or web address] and sign in to your [employer] account. Enter Q599 in the Comuto box to learn more about \"Low Back Pain: Exercises. \"     If you do not have an account, please click on the \"Sign Up Now\" link. Current as of: July 1, 2021               Content Version: 13.1  © 2006-2021 [employer]. Care instructions adapted under license by [facility] ([facility]). If you have questions about a medical condition or this instruction, always ask your healthcare professional. [address] any warranty or liability for your use of this information.

## 2022-01-25 NOTE — PROGRESS NOTES
Pt is here for a F/U on her back pain.   Pt states she is having UTI symptoms: burning, frequency and some discomfort    She is going to pharmacy for PPSV23 and check into Shingrix vaccine

## 2022-01-30 ASSESSMENT — ENCOUNTER SYMPTOMS
COUGH: 0
DIARRHEA: 0
CHEST TIGHTNESS: 0
CONSTIPATION: 0
ABDOMINAL PAIN: 0
SHORTNESS OF BREATH: 0
VOMITING: 0
NAUSEA: 0
CHOKING: 0
BACK PAIN: 1
WHEEZING: 0
BLOOD IN STOOL: 0
ANAL BLEEDING: 0

## 2022-01-30 ASSESSMENT — VISUAL ACUITY: OU: 1

## 2022-02-07 ENCOUNTER — PATIENT MESSAGE (OUTPATIENT)
Dept: FAMILY MEDICINE CLINIC | Age: 67
End: 2022-02-07

## 2022-02-10 ENCOUNTER — TELEPHONE (OUTPATIENT)
Dept: FAMILY MEDICINE CLINIC | Age: 67
End: 2022-02-10

## 2022-02-10 DIAGNOSIS — J43.2 CENTRILOBULAR EMPHYSEMA (HCC): ICD-10-CM

## 2022-02-10 RX ORDER — UMECLIDINIUM BROMIDE AND VILANTEROL TRIFENATATE 62.5; 25 UG/1; UG/1
1 POWDER RESPIRATORY (INHALATION) DAILY
Qty: 1 EACH | Refills: 3 | Status: SHIPPED | OUTPATIENT
Start: 2022-02-10 | End: 2022-05-17 | Stop reason: SDUPTHER

## 2022-02-10 NOTE — TELEPHONE ENCOUNTER
[facility] not [facility] for the Spiriva. There is no alternatives listed.     PLEASE ADVISE

## 2022-02-23 ENCOUNTER — OFFICE VISIT (OUTPATIENT)
Dept: FAMILY MEDICINE CLINIC | Age: 67
End: 2022-02-23
Payer: MEDICARE

## 2022-02-23 VITALS
SYSTOLIC BLOOD PRESSURE: 120 MMHG | WEIGHT: 171.2 LBS | HEART RATE: 78 BPM | OXYGEN SATURATION: 98 % | BODY MASS INDEX: 29.39 KG/M2 | DIASTOLIC BLOOD PRESSURE: 72 MMHG | TEMPERATURE: 98.1 F

## 2022-02-23 DIAGNOSIS — G89.29 CHRONIC BILATERAL LOW BACK PAIN WITH RIGHT-SIDED SCIATICA: ICD-10-CM

## 2022-02-23 DIAGNOSIS — R30.0 BURNING WITH URINATION: ICD-10-CM

## 2022-02-23 DIAGNOSIS — G25.0 BENIGN ESSENTIAL TREMOR: Primary | ICD-10-CM

## 2022-02-23 DIAGNOSIS — M54.41 CHRONIC BILATERAL LOW BACK PAIN WITH RIGHT-SIDED SCIATICA: ICD-10-CM

## 2022-02-23 LAB
BILIRUBIN, POC: NORMAL
BLOOD URINE, POC: NORMAL
CLARITY, POC: CLEAR
COLOR, POC: NORMAL
GLUCOSE URINE, POC: NORMAL
KETONES, POC: NORMAL
LEUKOCYTE EST, POC: NORMAL
NITRITE, POC: NORMAL
PH, POC: 5
PROTEIN, POC: NORMAL
SPECIFIC GRAVITY, POC: 1.02
UROBILINOGEN, POC: 0.2

## 2022-02-23 PROCEDURE — 1123F ACP DISCUSS/DSCN MKR DOCD: CPT | Performed by: INTERNAL MEDICINE

## 2022-02-23 PROCEDURE — 99214 OFFICE O/P EST MOD 30 MIN: CPT | Performed by: INTERNAL MEDICINE

## 2022-02-23 PROCEDURE — 1090F PRES/ABSN URINE INCON ASSESS: CPT | Performed by: INTERNAL MEDICINE

## 2022-02-23 PROCEDURE — G8484 FLU IMMUNIZE NO ADMIN: HCPCS | Performed by: INTERNAL MEDICINE

## 2022-02-23 PROCEDURE — 1036F TOBACCO NON-USER: CPT | Performed by: INTERNAL MEDICINE

## 2022-02-23 PROCEDURE — G8427 DOCREV CUR MEDS BY ELIG CLIN: HCPCS | Performed by: INTERNAL MEDICINE

## 2022-02-23 PROCEDURE — 4040F PNEUMOC VAC/ADMIN/RCVD: CPT | Performed by: INTERNAL MEDICINE

## 2022-02-23 PROCEDURE — 3017F COLORECTAL CA SCREEN DOC REV: CPT | Performed by: INTERNAL MEDICINE

## 2022-02-23 PROCEDURE — G8399 PT W/DXA RESULTS DOCUMENT: HCPCS | Performed by: INTERNAL MEDICINE

## 2022-02-23 PROCEDURE — 81003 URINALYSIS AUTO W/O SCOPE: CPT | Performed by: INTERNAL MEDICINE

## 2022-02-23 PROCEDURE — G8417 CALC BMI ABV UP PARAM F/U: HCPCS | Performed by: INTERNAL MEDICINE

## 2022-02-23 RX ORDER — GABAPENTIN 100 MG/1
100 CAPSULE ORAL 3 TIMES DAILY
Qty: 90 CAPSULE | Refills: 1 | Status: SHIPPED
Start: 2022-02-23 | End: 2022-05-17 | Stop reason: ALTCHOICE

## 2022-02-23 RX ORDER — PRIMIDONE 50 MG/1
50 TABLET ORAL 3 TIMES DAILY
Qty: 90 TABLET | Refills: 3 | Status: SHIPPED | OUTPATIENT
Start: 2022-02-23 | End: 2022-05-17 | Stop reason: SDUPTHER

## 2022-02-23 NOTE — PROGRESS NOTES
Pt is here due to having some shaking  She states 2 weeks ago was treated for UTI but she is still having burning when voiding.

## 2022-02-23 NOTE — PROGRESS NOTES
Subjective:       Patient ID:     Eva Mcginnis is a 74 y.o. female who presents for   Chief Complaint   Patient presents with    Shaking    Urinary Tract Infection     burning when voiding       HPI:  Nursing note reviewed and discussed with patient. HPI  Being treated for UTI, on bactrim   Tremors getting worse, wax and wanes. Would like to try something to help with the tremors since she has a hard time holding a coffee cup now. Gabapentin and celebrex are helping quite a bit, has not needed heating pad at night for ten nights. Very pleased with improvement, thinks would be better if she could take the gabapentin TID since morning dose wears off by afternoon so finds herself adjusting timing of dose if she has plans to go out. Patient's medications, allergies, past medical, surgical, social and family histories were reviewed and updated as appropriate.     Past Medical History:   Diagnosis Date    ADHD     mild, no RX    Arthritis     Blurred vision, right eye     [facility]otid [facility] ([facility]     dr Justine Lee vascular last appt 1/20    GERD (gastroesophageal reflux disease)     Hearing loss     High cholesterol     History of closed shoulder dislocation     rt from recent fall    [place] (hard of hearing)     beto hearing aides    Hx of gastric ulcer     Hyperlipidemia     Hypertension 2007    IBS (irritable bowel syndrome)     Macular pucker, right eye     Skin cancer of forehead     Syncope     hx recent falls    Tension headache     Wears dentures     lower bridge plate,1 tooth upper    Wears glasses      Past Surgical History:   Procedure Laterality Date    APPENDECTOMY      BACK SURGERY      CATARACT REMOVAL WITH IMPLANT Bilateral     CHOLECYSTECTOMY      COLONOSCOPY N/A 2016    ESOPHAGOGASTRIC FUNDOPLICATION      EYE SURGERY      FINGER SURGERY Left     4th finger    HIATAL HERNIA REPAIR  1999    x2    REVISION TOTAL KNEE ARTHROPLASTY Right     UPPER GASTROINTESTINAL ENDOSCOPY      UPPER GASTROINTESTINAL ENDOSCOPY N/A 10/26/2018    EGD BIOPSY AND DILITATION WITH BRUNO performed by Fletcher Mckeon MD at [facility] VITRECTOMY Right 03/10/2020    VITRECTOMY 23 GAUGE, MEMBRANE PEELING, GAS FLUID EXCHANGE, ICG     VITRECTOMY Right 3/10/2020    VITRECTOMY 23 GAUGE, MEMBRANE PEELING, AIR FLUID EXCHANGE, ICG performed by David Figueredo MD at [facility] History     Tobacco Use    Smoking status: Former Smoker     Packs/day: 1.00     Years: 40.00     Pack years: 40.00     Types: Cigarettes     Quit date: 4/17/2018     Years since quitting: 3.8    Smokeless tobacco: Never Used    Tobacco comment: spoke with Pt to update the most she has smoked   Substance Use Topics    Alcohol use: No      Patient Active Problem List   Diagnosis    Gastro-esophageal reflux disease without esophagitis    Epigastric abdominal pain    Allergic to bees    Anxiety    Depression    Esophageal spasm    Gas bloat syndrome    Hyperlipidemia    Hypertension    Irritable bowel syndrome    Osteoarthritis of knee    Osteopenia    Paraesophageal hernia    Primary hypertriglyceridemia    Tubular adenoma of colon    Macular pucker, right    Macular edema, cystoid, right    Centrilobular emphysema ([address]    Chronic bilateral low back pain with right-sided sciatica         Prior to Visit Medications    Medication Sig Taking? Authorizing Provider   umeclidinium-vilanterol (ANORO ELLIPTA) 62.5-25 MCG/INH AEPB inhaler Inhale 1 puff into the lungs daily Yes Rosalia Elkins MD   celecoxib (CELEBREX) 100 MG capsule Take 1 capsule by mouth daily Yes Rosalia Elkins MD   gabapentin (NEURONTIN) 100 MG capsule Take 1 capsule by mouth 2 times daily for 30 days.  Yes Rosalia Elkins MD   alendronate (FOSAMAX) 70 MG tablet Take 1 tablet by mouth every 7 days Yes Rosalia Elkins MD   amLODIPine (NORVASC) 10 MG tablet TAKE ONE TABLET BY MOUTH ONCE NIGHTLY Yes Eloy Nash MD omeprazole (PRILOSEC) 40 MG delayed release capsule TAKE ONE CAPSULE BY MOUTH TWICE A DAY Yes Rosalia Flores MD   pravastatin (PRAVACHOL) 20 MG tablet TAKE ONE TABLET BY MOUTH DAILY Yes Rosalia Flores MD   ipratropium (ATROVENT) 0.02 % nebulizer solution TAKE 1 VIAL (2.5 MILLILITERS) BY NEBULIZER FOUR TIMES A DAY Yes ERICA Bishop - NP   albuterol sulfate HFA (VENTOLIN HFA) 108 (90 Base) MCG/ACT inhaler Inhale 2 puffs into the lungs 4 times daily as needed for Wheezing Yes Tracey Garibay MD   azelastine (OPTIVAR) 0.05 % ophthalmic solution as needed Yes Myron Mejia MD   EPINEPHrine (EPIPEN 2-JONATHAN) 0.3 MG/0.3ML SOAJ injection Inject 0.3 mLs into the muscle as needed (PRN) Use as directed for allergic reaction Yes Rosalia Flores MD   traZODone (DESYREL) 50 MG tablet Take 1 tablet by mouth daily Yes ERICA Guan - CNP   Probiotic Product (PROBIOTIC-10 PO) Take 1 capsule by mouth daily Yes Historical Provider, MD   aspirin 81 MG tablet Take 81 mg by mouth daily Yes Historical Provider, MD   OMEGA-3 KRILL OIL PO Take by mouth daily Yes Historical Provider, MD   calcium carbonate 600 MG TABS tablet Take 1 tablet by mouth 2 times daily Yes Historical Provider, MD   vitamin D 1000 units CAPS Take by mouth daily Yes Historical Provider, MD   sertraline (ZOLOFT) 100 MG tablet Take 200 mg by mouth nightly  Yes Historical Provider, MD   vitamin E 1000 units capsule Take 1,000 Units by mouth daily Yes Historical Provider, MD   fluticasone (FLONASE) 50 MCG/ACT nasal spray 1 spray by Nasal route daily  Rosalia Flores MD     Review of Systems  Review of Systems   Constitutional: Negative for fatigue, fever and unexpected weight change. Respiratory: Negative for cough, choking, chest tightness, shortness of breath and wheezing. Cardiovascular: Negative for chest pain, palpitations and leg swelling.    Gastrointestinal: Negative for abdominal pain, anal bleeding, blood in stool, constipation, diarrhea, nausea and vomiting. Endocrine: Negative. Musculoskeletal: Positive for arthralgias and back pain. Negative for joint swelling and myalgias. Skin: Negative. Neurological: Positive for tremors. Negative for dizziness. Psychiatric/Behavioral: Negative for sleep disturbance. All other systems reviewed and are negative. Objective:       Physical Exam:  /72   Pulse 78   Temp 98.1 °F (36.7 °C) (Temporal)   Wt 171 lb 3.2 oz (77.7 kg)   SpO2 98%   BMI 29.39 kg/m²   Physical Exam  Vitals and nursing note reviewed. Constitutional:       General: She is not in acute distress. Appearance: She is well-developed. She is not ill-appearing. Eyes:      General: Lids are normal. Vision grossly intact. Cardiovascular:      Rate and Rhythm: Normal rate and regular rhythm. Heart sounds: Normal heart sounds, S1 normal and S2 normal. No murmur heard. No friction rub. No gallop. Pulmonary:      Effort: Pulmonary effort is normal. No respiratory distress. Breath sounds: Normal breath sounds. No wheezing. Abdominal:      General: Bowel sounds are normal.      Palpations: Abdomen is soft. There is no mass. Tenderness: There is no abdominal tenderness. There is no guarding. Musculoskeletal:         General: Normal range of motion. Skin:     General: Skin is warm and dry. Capillary Refill: Capillary refill takes less than 2 seconds. Neurological:      General: No focal deficit present. Mental Status: She is alert and oriented to person, place, and time. Motor: Tremor present. No weakness, atrophy, abnormal muscle tone or pronator drift. Data Review  not applicable       Assessment/Plan:      1. Chronic bilateral low back pain with right-sided sciatica  - gabapentin (NEURONTIN) 100 MG capsule; Take 1 capsule by mouth 3 times daily for 60 days. Dispense: 90 capsule; Refill: 1    2.  Benign essential tremor  - primidone (MYSOLINE) 50 MG tablet; Take 1 tablet by mouth 3 times daily  Dispense: 90 tablet; Refill: 3    3.  Burning with urination  - POCT Urinalysis No Micro (Auto)           Health Maintenance Due   Topic Date Due    Shingles Vaccine (2 of 3) 11/20/2016    Pneumococcal 65+ years Vaccine (2 of 2 - PPSV23) 07/26/2021       Electronically signed by Costa Holt MD on 2/23/2022 at 3:59 PM

## 2022-02-25 DIAGNOSIS — E78.2 MIXED HYPERLIPIDEMIA: ICD-10-CM

## 2022-02-25 NOTE — TELEPHONE ENCOUNTER
Last visit: 02/23/2022  Last Med refill: 11/23/2021  Does patient have enough medication for 72 hours: No:     Next Visit Date:  Future Appointments   Date Time Provider Bekah Pedersoni   5/24/2022  4:00 PM Rosalia Rdz MD [facility]   10/26/2022  3:30 PM Rosalia Rdz MD [facility]   12/5/2022  2:45 PM Elida Min MD [facility] [address] Maintenance   Topic Date Due    Shingles Vaccine (2 of 3) 11/20/2016    Pneumococcal 65+ years Vaccine (2 of 2 - PPSV23) 07/26/2021    DTaP/Tdap/Td vaccine (1 - Tdap) 09/09/2030 (Originally 9/10/2020)    Depression Monitoring  10/25/2022    Lipid screen  10/26/2022    Annual Wellness Visit (AWV)  10/26/2022    Low dose CT lung screening  11/09/2022    Breast cancer screen  07/19/2023    Colorectal Cancer Screen  09/06/2027    DEXA (modify frequency per FRAX score)  Completed    Flu vaccine  Completed    COVID-19 Vaccine  Completed    Hepatitis C screen  Completed    Hepatitis A vaccine  Aged Out    Hepatitis B vaccine  Aged Out    Hib vaccine  Aged Out    Meningococcal (ACWY) vaccine  Aged Out       No results found for: LABA1C          ( goal A1C is < 7)   No results found for: LABMICR  LDL Cholesterol (mg/dL)   Date Value   10/26/2021 128   07/17/2020 159 (H)       (goal LDL is <100)   AST (U/L)   Date Value   10/26/2021 25     ALT (U/L)   Date Value   10/26/2021 22     BUN (mg/dL)   Date Value   10/26/2021 19     BP Readings from Last 3 Encounters:   02/23/22 120/72   01/25/22 134/78   12/01/21 125/74          (goal 120/80)    All Future Testing planned in [facility]  Lab Frequency Next Occurrence   VL DUP CAROTID BILATERAL Once 12/01/2022               Patient Active Problem List:     Gastro-esophageal reflux disease without esophagitis     Epigastric abdominal pain     Allergic to bees     Anxiety     Depression     Esophageal spasm     Gas bloat syndrome     Hyperlipidemia     Hypertension     Irritable bowel syndrome     Osteoarthritis of knee     Osteopenia     Paraesophageal hernia     Primary hypertriglyceridemia     Tubular adenoma of colon     Macular pucker, right     Macular edema, cystoid, right     Centrilobular emphysema (HCC)     Chronic bilateral low back pain with right-sided sciatica

## 2022-02-27 RX ORDER — PRAVASTATIN SODIUM 20 MG
TABLET ORAL
Qty: 90 TABLET | Refills: 1 | Status: SHIPPED | OUTPATIENT
Start: 2022-02-27 | End: 2022-05-17 | Stop reason: SDUPTHER

## 2022-03-06 ASSESSMENT — ENCOUNTER SYMPTOMS
VOMITING: 0
NAUSEA: 0
DIARRHEA: 0
CONSTIPATION: 0
SHORTNESS OF BREATH: 0
COUGH: 0
CHOKING: 0
CHEST TIGHTNESS: 0
BACK PAIN: 1
ANAL BLEEDING: 0
ABDOMINAL PAIN: 0
BLOOD IN STOOL: 0
WHEEZING: 0

## 2022-03-06 ASSESSMENT — VISUAL ACUITY: OU: 1

## 2022-03-23 DIAGNOSIS — M54.41 CHRONIC BILATERAL LOW BACK PAIN WITH RIGHT-SIDED SCIATICA: ICD-10-CM

## 2022-03-23 DIAGNOSIS — G89.29 CHRONIC BILATERAL LOW BACK PAIN WITH RIGHT-SIDED SCIATICA: ICD-10-CM

## 2022-03-23 RX ORDER — GABAPENTIN 100 MG/1
CAPSULE ORAL
Qty: 60 CAPSULE | OUTPATIENT
Start: 2022-03-23

## 2022-04-07 DIAGNOSIS — M85.89 OSTEOPENIA OF MULTIPLE SITES: ICD-10-CM

## 2022-04-07 RX ORDER — ALENDRONATE SODIUM 70 MG/1
70 TABLET ORAL
Qty: 12 TABLET | Refills: 1 | Status: SHIPPED | OUTPATIENT
Start: 2022-04-07 | End: 2022-08-17

## 2022-04-07 NOTE — TELEPHONE ENCOUNTER
Last visit: 02/23/2022  Last Med refill: 03/19/2022  Does patient have enough medication for 72 hours: Yes    Insurance wants filled for 90 day supply    Next Visit Date:  Future Appointments   Date Time Provider Bekah Daxa   5/24/2022  4:00 PM MD DEMARCO Ponce FP [facility]   10/26/2022  3:30 PM MD DEMARCO Ponce FP Jeff Huger   12/5/2022  2:45 PM Elida Francis MD [facility] [address] Maintenance   Topic Date Due    Shingles Vaccine (2 of 3) 11/20/2016    Pneumococcal 65+ years Vaccine (2 of 2 - PPSV23) 07/26/2021    DTaP/Tdap/Td vaccine (1 - Tdap) 09/09/2030 (Originally 9/10/2020)    Depression Monitoring  10/25/2022    Lipid screen  10/26/2022    Annual Wellness Visit (AWV)  10/26/2022    Low dose CT lung screening  11/09/2022    Breast cancer screen  07/19/2023    Colorectal Cancer Screen  09/06/2027    DEXA (modify frequency per FRAX score)  Completed    Flu vaccine  Completed    COVID-19 Vaccine  Completed    Hepatitis C screen  Completed    Hepatitis A vaccine  Aged Out    Hepatitis B vaccine  Aged Out    Hib vaccine  Aged Out    Meningococcal (ACWY) vaccine  Aged Out       No results found for: LABA1C          ( goal A1C is < 7)   No results found for: LABMICR  LDL Cholesterol (mg/dL)   Date Value   10/26/2021 128   07/17/2020 159 (H)       (goal LDL is <100)   AST (U/L)   Date Value   10/26/2021 25     ALT (U/L)   Date Value   10/26/2021 22     BUN (mg/dL)   Date Value   10/26/2021 19     BP Readings from Last 3 Encounters:   02/23/22 120/72   01/25/22 134/78   12/01/21 125/74          (goal 120/80)    All Future Testing planned in [facility]  Lab Frequency Next Occurrence   VL DUP CAROTID BILATERAL Once 12/01/2022               Patient Active Problem List:     Gastro-esophageal reflux disease without esophagitis     Epigastric abdominal pain     Allergic to bees     Anxiety     Depression     Esophageal spasm     Gas bloat syndrome Hyperlipidemia     Hypertension     Irritable bowel syndrome     Osteoarthritis of knee     Osteopenia     Paraesophageal hernia     Primary hypertriglyceridemia     Tubular adenoma of colon     Macular pucker, right     Macular edema, cystoid, right     Centrilobular emphysema (HCC)     Chronic bilateral low back pain with right-sided sciatica

## 2022-04-17 DIAGNOSIS — K21.9 GASTRO-ESOPHAGEAL REFLUX DISEASE WITHOUT ESOPHAGITIS: ICD-10-CM

## 2022-04-18 RX ORDER — OMEPRAZOLE 40 MG/1
CAPSULE, DELAYED RELEASE ORAL
Qty: 180 CAPSULE | Refills: 1 | Status: SHIPPED | OUTPATIENT
Start: 2022-04-18 | End: 2022-09-20 | Stop reason: SDUPTHER

## 2022-04-18 NOTE — TELEPHONE ENCOUNTER
Last visit: 2/23/22  Last Med refill: 10/21/21  Does patient have enough medication for 72 hours: No:     Next Visit Date:  Future Appointments   Date Time Provider Bekah Daxa   5/24/2022  4:00 PM MD ZEENAT BarnettVALE  [facility]   10/26/2022  3:30 PM [facility]   12/5/2022  2:45 PM Elida Omer MD [facility] [address] Maintenance   Topic Date Due    Shingles Vaccine (2 of 3) 11/20/2016    Pneumococcal 65+ years Vaccine (2 - PPSV23 or PCV20) 07/26/2021    DTaP/Tdap/Td vaccine (1 - Tdap) 09/09/2030 (Originally 9/10/2020)    Depression Monitoring  10/25/2022    Lipid screen  10/26/2022    Annual Wellness Visit (AWV)  10/26/2022    Low dose CT lung screening  11/09/2022    Breast cancer screen  07/19/2023    Colorectal Cancer Screen  09/06/2027    DEXA (modify frequency per FRAX score)  Completed    Flu vaccine  Completed    COVID-19 Vaccine  Completed    Hepatitis C screen  Completed    Hepatitis A vaccine  Aged Out    Hepatitis B vaccine  Aged Out    Hib vaccine  Aged Out    Meningococcal (ACWY) vaccine  Aged Out       No results found for: LABA1C          ( goal A1C is < 7)   No results found for: LABMICR  LDL Cholesterol (mg/dL)   Date Value   10/26/2021 128   07/17/2020 159 (H)       (goal LDL is <100)   AST (U/L)   Date Value   10/26/2021 25     ALT (U/L)   Date Value   10/26/2021 22     BUN (mg/dL)   Date Value   10/26/2021 19     BP Readings from Last 3 Encounters:   02/23/22 120/72   01/25/22 134/78   12/01/21 125/74          (goal 120/80)    All Future Testing planned in [facility]  Lab Frequency Next Occurrence   VL DUP CAROTID BILATERAL Once 12/01/2022               Patient Active Problem List:     Gastro-esophageal reflux disease without esophagitis     Epigastric abdominal pain     Allergic to bees     Anxiety     Depression     Esophageal spasm     Gas bloat syndrome     Hyperlipidemia     Hypertension     Irritable bowel syndrome     Osteoarthritis of knee     Osteopenia     Paraesophageal hernia     Primary hypertriglyceridemia     Tubular adenoma of colon     Macular pucker, right     Macular edema, cystoid, right     Centrilobular emphysema (HCC)     Chronic bilateral low back pain with right-sided sciatica

## 2022-04-22 DIAGNOSIS — I10 ESSENTIAL HYPERTENSION: ICD-10-CM

## 2022-04-22 RX ORDER — AMLODIPINE BESYLATE 10 MG/1
TABLET ORAL
Qty: 90 TABLET | Refills: 1 | Status: SHIPPED | OUTPATIENT
Start: 2022-04-22 | End: 2022-09-20 | Stop reason: SDUPTHER

## 2022-04-22 NOTE — TELEPHONE ENCOUNTER
Last visit: 02/23/2022  Last Med refill: 10/25/2021  Does patient have enough medication for 72 hours: No:     Next Visit Date:  Future Appointments   Date Time Provider Bekah Daxa   5/24/2022  4:00 PM Rosalia Stone MD SHANNONVALE  [facility]   10/26/2022  3:30 PM [facility]   12/5/2022  2:45 PM Elida Escobedo MD [facility] [address] Maintenance   Topic Date Due    Shingles Vaccine (2 of 3) 11/20/2016    Pneumococcal 65+ years Vaccine (2 - PPSV23 or PCV20) 07/26/2021    DTaP/Tdap/Td vaccine (1 - Tdap) 09/09/2030 (Originally 9/10/2020)    Depression Monitoring  10/25/2022    Lipids  10/26/2022    Annual Wellness Visit (AWV)  10/26/2022    Low dose CT lung screening  11/09/2022    Breast cancer screen  07/19/2023    Colorectal Cancer Screen  09/06/2027    DEXA (modify frequency per FRAX score)  Completed    Flu vaccine  Completed    COVID-19 Vaccine  Completed    Hepatitis C screen  Completed    Hepatitis A vaccine  Aged Out    Hepatitis B vaccine  Aged Out    Hib vaccine  Aged Out    Meningococcal (ACWY) vaccine  Aged Out       No results found for: LABA1C          ( goal A1C is < 7)   No results found for: LABMICR  LDL Cholesterol (mg/dL)   Date Value   10/26/2021 128   07/17/2020 159 (H)       (goal LDL is <100)   AST (U/L)   Date Value   10/26/2021 25     ALT (U/L)   Date Value   10/26/2021 22     BUN (mg/dL)   Date Value   10/26/2021 19     BP Readings from Last 3 Encounters:   02/23/22 120/72   01/25/22 134/78   12/01/21 125/74          (goal 120/80)    All Future Testing planned in [facility]  Lab Frequency Next Occurrence   VL DUP CAROTID BILATERAL Once 12/01/2022               Patient Active Problem List:     Gastro-esophageal reflux disease without esophagitis     Epigastric abdominal pain     Allergic to bees     Anxiety     Depression     Esophageal spasm     Gas bloat syndrome     Hyperlipidemia     Hypertension     Irritable bowel syndrome     Osteoarthritis of knee     Osteopenia     Paraesophageal hernia     Primary hypertriglyceridemia     Tubular adenoma of colon     Macular pucker, right     Macular edema, cystoid, right     Centrilobular emphysema (HCC)     Chronic bilateral low back pain with right-sided sciatica

## 2022-05-17 ENCOUNTER — OFFICE VISIT (OUTPATIENT)
Dept: FAMILY MEDICINE CLINIC | Age: 67
End: 2022-05-17
Payer: MEDICARE

## 2022-05-17 VITALS
TEMPERATURE: 98.4 F | SYSTOLIC BLOOD PRESSURE: 118 MMHG | WEIGHT: 165.8 LBS | HEART RATE: 91 BPM | BODY MASS INDEX: 28.46 KG/M2 | DIASTOLIC BLOOD PRESSURE: 70 MMHG | OXYGEN SATURATION: 98 %

## 2022-05-17 DIAGNOSIS — E78.2 MIXED HYPERLIPIDEMIA: ICD-10-CM

## 2022-05-17 DIAGNOSIS — T75.3XXD MOTION SICKNESS, SUBSEQUENT ENCOUNTER: ICD-10-CM

## 2022-05-17 DIAGNOSIS — Z91.030 ALLERGY TO HONEY BEE VENOM: ICD-10-CM

## 2022-05-17 DIAGNOSIS — M54.41 CHRONIC BILATERAL LOW BACK PAIN WITH RIGHT-SIDED SCIATICA: ICD-10-CM

## 2022-05-17 DIAGNOSIS — J43.2 CENTRILOBULAR EMPHYSEMA (HCC): Primary | ICD-10-CM

## 2022-05-17 DIAGNOSIS — G89.29 CHRONIC BILATERAL LOW BACK PAIN WITH RIGHT-SIDED SCIATICA: ICD-10-CM

## 2022-05-17 DIAGNOSIS — G25.0 BENIGN ESSENTIAL TREMOR: ICD-10-CM

## 2022-05-17 DIAGNOSIS — R26.81 GAIT INSTABILITY: ICD-10-CM

## 2022-05-17 PROCEDURE — G8427 DOCREV CUR MEDS BY ELIG CLIN: HCPCS | Performed by: INTERNAL MEDICINE

## 2022-05-17 PROCEDURE — 1123F ACP DISCUSS/DSCN MKR DOCD: CPT | Performed by: INTERNAL MEDICINE

## 2022-05-17 PROCEDURE — 3023F SPIROM DOC REV: CPT | Performed by: INTERNAL MEDICINE

## 2022-05-17 PROCEDURE — G8399 PT W/DXA RESULTS DOCUMENT: HCPCS | Performed by: INTERNAL MEDICINE

## 2022-05-17 PROCEDURE — 3017F COLORECTAL CA SCREEN DOC REV: CPT | Performed by: INTERNAL MEDICINE

## 2022-05-17 PROCEDURE — 1090F PRES/ABSN URINE INCON ASSESS: CPT | Performed by: INTERNAL MEDICINE

## 2022-05-17 PROCEDURE — 99214 OFFICE O/P EST MOD 30 MIN: CPT | Performed by: INTERNAL MEDICINE

## 2022-05-17 PROCEDURE — G8417 CALC BMI ABV UP PARAM F/U: HCPCS | Performed by: INTERNAL MEDICINE

## 2022-05-17 PROCEDURE — 1036F TOBACCO NON-USER: CPT | Performed by: INTERNAL MEDICINE

## 2022-05-17 RX ORDER — UMECLIDINIUM BROMIDE AND VILANTEROL TRIFENATATE 62.5; 25 UG/1; UG/1
1 POWDER RESPIRATORY (INHALATION) DAILY
Qty: 1 EACH | Refills: 3 | Status: SHIPPED | OUTPATIENT
Start: 2022-05-17 | End: 2022-09-20 | Stop reason: SDUPTHER

## 2022-05-17 RX ORDER — PRIMIDONE 50 MG/1
50 TABLET ORAL 3 TIMES DAILY
Qty: 90 TABLET | Refills: 3 | Status: SHIPPED | OUTPATIENT
Start: 2022-05-17 | End: 2022-08-17

## 2022-05-17 RX ORDER — CELECOXIB 100 MG/1
100 CAPSULE ORAL DAILY
Qty: 30 CAPSULE | Refills: 3 | Status: SHIPPED | OUTPATIENT
Start: 2022-05-17 | End: 2022-09-20

## 2022-05-17 RX ORDER — PREGABALIN 25 MG/1
25 CAPSULE ORAL EVERY EVENING
Qty: 15 CAPSULE | Refills: 0 | Status: SHIPPED | OUTPATIENT
Start: 2022-05-17 | End: 2022-05-24 | Stop reason: SDUPTHER

## 2022-05-17 RX ORDER — GABAPENTIN 100 MG/1
100 CAPSULE ORAL 3 TIMES DAILY
Qty: 90 CAPSULE | Refills: 1 | Status: CANCELLED | OUTPATIENT
Start: 2022-05-17 | End: 2022-07-16

## 2022-05-17 RX ORDER — PRAVASTATIN SODIUM 20 MG
TABLET ORAL
Qty: 90 TABLET | Refills: 1 | Status: SHIPPED | OUTPATIENT
Start: 2022-05-17

## 2022-05-17 RX ORDER — EPINEPHRINE 0.3 MG/.3ML
0.3 INJECTION SUBCUTANEOUS PRN
Qty: 2 EACH | Refills: 1 | Status: SHIPPED | OUTPATIENT
Start: 2022-05-17 | End: 2022-08-17

## 2022-05-17 RX ORDER — SCOLOPAMINE TRANSDERMAL SYSTEM 1 MG/1
1 PATCH, EXTENDED RELEASE TRANSDERMAL
Qty: 10 PATCH | Refills: 11 | Status: SHIPPED | OUTPATIENT
Start: 2022-05-17 | End: 2022-08-17

## 2022-05-17 NOTE — PROGRESS NOTES
Subjective:       Patient ID:     Consuelo Jacques is a 74 y.o. female who presents for   Chief Complaint   Patient presents with    Numbness     in her toes    Medication Request     motion sickness patch       HPI:  Nursing note reviewed and discussed with patient. HPI  Back pain continues to remain bad   She cannot take the gabapentin more than once a day at bedtime because it leaves her sleepy all day   She does better when she leans on the grocery cart at the store, but does not have a walker. Has been afraid to try getting a walker because afraid to get dependent on it-but she is unable to walk for more than [phone] steps a day and then spends 2 days recovering from that - except when using something to lean on. [facility] for second opinion about her spine, she is interested in exploring that option before deciding against any further surgical intervention. Not taking the gabapentin more than once at bedtime because it leaves her feeling really sleepy the following morning. Willing to try Lyrica to see if she tolerates that better instead. Depression - doing well on current regimen per psychiatry. Denies anhedonia, nervousness, sleep difficulty, racing thoughts, auditory or visual hallucination, thoughts of self harm, suicidal or homicidal ideation. Hypertension-tolerating current regimen without chest pain, palpitations, dizziness, peripheral edema, dyspnea on exertion, orthopnea, paroxysmal nocturnal dyspnea. Hyperlipidemia-tolerating current regimen without myalgias, dyspepsia, jaundice. Mostly compliant with diet recommendations for low salt diet, tries to limit greasy/cheesy/fried foods, not very compliant with exercise recommendations.     Cardiovascular risk factors: advanced age (older than 54 for men, 72 for women), dyslipidemia and hypertension      Patient's medications, allergies, past medical, surgical, social and family histories were reviewed and updated as appropriate.     Past Medical History:   Diagnosis Date    ADHD     mild, no RX    Arthritis     Blurred vision, right eye     Carotid artery disease ([address]     dr Carrie Orellana vascular last appt     GERD (gastroesophageal reflux disease)     Hearing loss     High cholesterol     History of closed shoulder dislocation     rt from recent fall    [place] (hard of hearing)     beto hearing aides    Hx of gastric ulcer     Hyperlipidemia     Hypertension     IBS (irritable bowel syndrome)     Macular pucker, right eye     Skin cancer of forehead     Syncope     hx recent falls    Tension headache     Wears dentures     lower bridge plate,1 tooth upper    Wears glasses      Past Surgical History:   Procedure Laterality Date    APPENDECTOMY      BACK SURGERY      CATARACT REMOVAL WITH IMPLANT Bilateral     CHOLECYSTECTOMY      COLONOSCOPY N/A 2016    ESOPHAGOGASTRIC FUNDOPLICATION      EYE SURGERY      FINGER SURGERY Left     4th finger    HIATAL HERNIA REPAIR  1999    x2    REVISION TOTAL KNEE ARTHROPLASTY Right     UPPER GASTROINTESTINAL ENDOSCOPY      UPPER GASTROINTESTINAL ENDOSCOPY N/A 10/26/2018    EGD BIOPSY AND DILITATION WITH Verna Christine performed by Deidre Hurst MD at [address] [place] [address] Drive VITRECTOMY Right 03/10/2020    VITRECTOMY 23 GAUGE, MEMBRANE PEELING, GAS FLUID EXCHANGE, ICG     VITRECTOMY Right 3/10/2020    VITRECTOMY 23 GAUGE, MEMBRANE PEELING, AIR FLUID EXCHANGE, ICG performed by Mickie Jama MD at [facility] History     Tobacco Use    Smoking status: Former Smoker     Packs/day: 1.00     Years: 40.00     Pack years: 40.00     Types: Cigarettes     Quit date: 2018     Years since quittin.0    Smokeless tobacco: Never Used    Tobacco comment: spoke with Pt to update the most she has smoked   Substance Use Topics    Alcohol use: No      Patient Active Problem List   Diagnosis    Gastro-esophageal reflux disease without esophagitis    Epigastric abdominal pain    Allergic to bees    Anxiety    Depression    Esophageal spasm    Gas bloat syndrome    Hyperlipidemia    Hypertension    Irritable bowel syndrome    Osteoarthritis of knee    Osteopenia    Paraesophageal hernia    Primary hypertriglyceridemia    Tubular adenoma of colon    Macular pucker, right    Macular edema, cystoid, right    Centrilobular emphysema (HCC)    Chronic bilateral low back pain with right-sided sciatica         Prior to Visit Medications    Medication Sig Taking? Authorizing Provider   amLODIPine (NORVASC) 10 MG tablet TAKE ONE TABLET BY MOUTH ONCE NIGHTLY Yes ERICA Hager NP   omeprazole (PRILOSEC) 40 MG delayed release capsule TAKE ONE CAPSULE BY MOUTH TWICE A DAY Yes Rosalia Falcon MD   alendronate (FOSAMAX) 70 MG tablet Take 1 tablet by mouth every 7 days Yes Mckenzie Larkin MD   pravastatin (PRAVACHOL) 20 MG tablet TAKE ONE TABLET BY MOUTH DAILY Yes Rosalia Falcon MD   primidone (MYSOLINE) 50 MG tablet Take 1 tablet by mouth 3 times daily Yes Mckenzie Larkin MD   gabapentin (NEURONTIN) 100 MG capsule Take 1 capsule by mouth 3 times daily for 60 days.  Yes Mckenzie Larkin MD   umeclidinium-vilanterol [facility] ELLIPTA) 62.5-25 MCG/INH AEPB inhaler Inhale 1 puff into the lungs daily Yes Rosalia Falcon MD   celecoxib (CELEBREX) 100 MG capsule Take 1 capsule by mouth daily Yes Rosalia Falcon MD   ipratropium (ATROVENT) 0.02 % nebulizer solution TAKE 1 VIAL (2.5 MILLILITERS) BY NEBULIZER FOUR TIMES A DAY Yes ERICA Hager NP   albuterol sulfate HFA (VENTOLIN HFA) 108 (90 Base) MCG/ACT inhaler Inhale 2 puffs into the lungs 4 times daily as needed for Wheezing Yes Rosalia Falcon MD   azelastine (OPTIVAR) 0.05 % ophthalmic solution as needed Yes Asim Montalvo MD   EPINEPHrine (EPIPEN 2-JONATHAN) 0.3 MG/0.3ML SOAJ injection Inject 0.3 mLs into the muscle as needed (PRN) Use as directed for allergic reaction Yes Rosalia Falcon MD   traZODone (DESYREL) 50 MG tablet Take 1 tablet by mouth daily Yes Remedios Nail, APRN - CNP   Probiotic Product (PROBIOTIC-10 PO) Take 1 capsule by mouth daily Yes Historical Provider, MD   aspirin 81 MG tablet Take 81 mg by mouth daily Yes Historical Provider, MD   OMEGA-3 KRILL OIL PO Take by mouth daily Yes Historical Provider, MD   calcium carbonate 600 MG TABS tablet Take 1 tablet by mouth 2 times daily Yes Historical Provider, MD   vitamin D 1000 units CAPS Take by mouth daily Yes Historical Provider, MD   sertraline (ZOLOFT) 100 MG tablet Take 200 mg by mouth nightly  Yes Historical Provider, MD   vitamin E 1000 units capsule Take 1,000 Units by mouth daily Yes Historical Provider, MD     Review of Systems  Review of Systems       Objective:       Physical Exam:  /70   Pulse 91   Temp 98.4 °F (36.9 °C) (Temporal)   Wt 165 lb 12.8 oz (75.2 kg)   SpO2 98%   BMI 28.46 kg/m²   Wt Readings from Last 3 Encounters:   05/17/22 165 lb 12.8 oz (75.2 kg)   02/23/22 171 lb 3.2 oz (77.7 kg)   01/25/22 169 lb 12.8 oz (77 kg)         Physical Exam    Data Review  No visits with results within 2 Month(s) from this visit. Latest known visit with results is:   Office Visit on 02/23/2022   Component Date Value    Color, UA 02/23/2022 lt yellow     Clarity, UA 02/23/2022 clear     Glucose, UA POC 02/23/2022 NEG     Bilirubin, UA 02/23/2022 NEG     Ketones, UA 02/23/2022 NEG     Spec Grav, UA 02/23/2022 1.020     Blood, UA POC 02/23/2022 NEG     pH, UA 02/23/2022 5.0     Protein, UA POC 02/23/2022 NEG     Urobilinogen, UA 02/23/2022 0.2     Leukocytes, UA 02/23/2022 NEG     Nitrite, UA 02/23/2022 NEG      Lab Results   Component Value Date    CHOL 264 07/17/2020    TRIG 242 07/17/2020    HDL 67 10/26/2021          Assessment/Plan:      1. Allergy to honey bee venom  - EPINEPHrine (EPIPEN 2-JONATHAN) 0.3 MG/0.3ML SOAJ injection;  Inject 0.3 mLs into the muscle as needed (PRN) Use as directed for allergic reaction  Dispense: 2 each; Refill: 1    2. Chronic bilateral low back pain with right-sided sciatica  - celecoxib (CELEBREX) 100 MG capsule; Take 1 capsule by mouth daily  Dispense: 30 capsule; Refill: 3  - pregabalin (LYRICA) 25 MG capsule; Take 1 capsule by mouth every evening for 15 days. Dispense: 15 capsule; Refill: 0  - DME Order for Andrew Su as OP    3. Benign essential tremor  - primidone (MYSOLINE) 50 MG tablet; Take 1 tablet by mouth 3 times daily  Dispense: 90 tablet; Refill: 3    4. Mixed hyperlipidemia  - pravastatin (PRAVACHOL) 20 MG tablet; TAKE ONE TABLET BY MOUTH DAILY  Dispense: 90 tablet; Refill: 1    5. Centrilobular emphysema (HCC)  - umeclidinium-vilanterol (ANORO ELLIPTA) 62.5-25 MCG/INH AEPB inhaler; Inhale 1 puff into the lungs daily  Dispense: 1 each; Refill: 3    6. Gait instability  - DME Order for Walker as OP    7. Motion sickness, subsequent encounter  - scopolamine (TRANSDERM-SCOP, 1.5 MG,) transdermal patch; Place 1 patch onto the skin every 72 hours  Dispense: 10 patch; Refill: 11           There are no preventive care reminders to display for this patient.     Electronically signed by Gayathri Lipscomb MD on 5/17/2022 at 5:46 PM

## 2022-05-17 NOTE — PROGRESS NOTES
Pt is here due to having some numbness in her toes.   She is going on a cruise and would like something for motion and nausea

## 2022-05-17 NOTE — PATIENT INSTRUCTIONS
Try the Lyrica instead of the gabapentin and we can put you on it long term if it helps without side effects   Call once you find out the name of the muscle relaxer

## 2022-05-24 DIAGNOSIS — M54.41 CHRONIC BILATERAL LOW BACK PAIN WITH RIGHT-SIDED SCIATICA: ICD-10-CM

## 2022-05-24 DIAGNOSIS — G89.29 CHRONIC BILATERAL LOW BACK PAIN WITH RIGHT-SIDED SCIATICA: ICD-10-CM

## 2022-05-24 RX ORDER — BACLOFEN 5 MG/1
5 TABLET ORAL 3 TIMES DAILY PRN
Qty: 90 TABLET | Refills: 0 | Status: SHIPPED | OUTPATIENT
Start: 2022-05-24 | End: 2022-06-23

## 2022-05-24 RX ORDER — PREGABALIN 25 MG/1
25 CAPSULE ORAL EVERY EVENING
Qty: 90 CAPSULE | Refills: 0 | Status: SHIPPED
Start: 2022-05-24 | End: 2022-08-17 | Stop reason: ALTCHOICE

## 2022-05-24 NOTE — TELEPHONE ENCOUNTER
Low-dose baclofen called in, start taking at bedtime and then can take during the day if he does not get dizzy or sleepy with that. Lyrica called in. Let me found something that helps, we can discuss increasing the dose if its not helping enough in about a month or so.

## 2022-05-24 NOTE — TELEPHONE ENCOUNTER
Patient is calling to update on medication that was prescribed at last visit, Lyrica. She states the [facility] did help and did not cause her to be drowsy. She states was discussed to prescribe a low dose muscle relaxer.  She does not remember the name of med she took in the past.

## 2022-05-29 DIAGNOSIS — G89.29 CHRONIC BILATERAL LOW BACK PAIN WITH RIGHT-SIDED SCIATICA: ICD-10-CM

## 2022-05-29 DIAGNOSIS — M54.41 CHRONIC BILATERAL LOW BACK PAIN WITH RIGHT-SIDED SCIATICA: ICD-10-CM

## 2022-05-31 RX ORDER — PREGABALIN 25 MG/1
CAPSULE ORAL
Qty: 15 CAPSULE | OUTPATIENT
Start: 2022-05-31

## 2022-06-12 ENCOUNTER — APPOINTMENT (OUTPATIENT)
Dept: GENERAL RADIOLOGY | Age: 67
End: 2022-06-12
Payer: MEDICARE

## 2022-06-12 ENCOUNTER — HOSPITAL ENCOUNTER (EMERGENCY)
Age: 67
Discharge: HOME | End: 2022-06-12
Attending: EMERGENCY MEDICINE
Payer: MEDICARE

## 2022-06-12 VITALS
SYSTOLIC BLOOD PRESSURE: 129 MMHG | WEIGHT: 165 LBS | DIASTOLIC BLOOD PRESSURE: 67 MMHG | BODY MASS INDEX: 28.17 KG/M2 | TEMPERATURE: 98.1 F | OXYGEN SATURATION: 97 % | HEART RATE: 62 BPM | HEIGHT: 64 IN | RESPIRATION RATE: 18 BRPM

## 2022-06-12 DIAGNOSIS — S92.352A CLOSED NON-PHYSEAL FRACTURE OF FIFTH METATARSAL BONE OF LEFT FOOT, INITIAL ENCOUNTER: Primary | ICD-10-CM

## 2022-06-12 LAB — D-DIMER QUANTITATIVE: 0.72 MG/L FEU (ref 0–0.59)

## 2022-06-12 PROCEDURE — 99284 EMERGENCY DEPT VISIT MOD MDM: CPT

## 2022-06-12 PROCEDURE — 93971 EXTREMITY STUDY: CPT

## 2022-06-12 PROCEDURE — 73630 X-RAY EXAM OF FOOT: CPT

## 2022-06-12 PROCEDURE — 36415 COLL VENOUS BLD VENIPUNCTURE: CPT

## 2022-06-12 PROCEDURE — 85379 FIBRIN DEGRADATION QUANT: CPT

## 2022-06-12 RX ORDER — HYDROCODONE BITARTRATE AND ACETAMINOPHEN 5; 325 MG/1; MG/1
1 TABLET ORAL EVERY 6 HOURS PRN
Qty: 5 TABLET | Refills: 0 | Status: SHIPPED | OUTPATIENT
Start: 2022-06-12 | End: 2022-06-15

## 2022-06-12 ASSESSMENT — PAIN - FUNCTIONAL ASSESSMENT: PAIN_FUNCTIONAL_ASSESSMENT: 0-10

## 2022-06-12 ASSESSMENT — PAIN SCALES - GENERAL: PAINLEVEL_OUTOF10: 5

## 2022-06-12 ASSESSMENT — PAIN DESCRIPTION - PAIN TYPE: TYPE: ACUTE PAIN

## 2022-06-12 NOTE — ED TRIAGE NOTES
Mode of arrival (squad #, walk in, police, etc) : private vehicle        Chief complaint(s): L foot fracture       Arrival Note (brief scenario, treatment PTA, etc). : c/o increased L foot pain post-fracture during cruise approx 3 days ago. States pain awoke patient from sleep, described as burning. Instructed to call Ortho on Monday for f/u. Partial weight bearing. Utilizing RICE and Advil with some relief. Griffin dressing in place on arrival.       [facility] you ever felt that you should Cut down on your drinking? \"  No  A= \"Have people Annoyed you by criticizing your drinking? \"  No  G= \"Have you ever felt bad or Guilty about your drinking? \"  No  E= \"Have you ever had a drink as an Eye-opener first thing in the morning to steady your nerves or to help a hangover? \"  No      Deferred []      Reason for deferring: N/A    *If yes to two or more: probable alcohol abuse. *

## 2022-06-12 NOTE — ED PROVIDER NOTES
Alden Recio    Pt Name: Dmitry Marcos  MRN: [id number]  [place] 1955  Date of evaluation: 6/12/22  CHIEF COMPLAINT       Chief Complaint   Patient presents with    Foot Pain     L     HISTORY OF PRESENT ILLNESS     Leg Injury  Location:  Leg and foot  Injury: yes    Mechanism of injury: fall    Fall:     Fall occurred:  Tripped    Impact surface:  Water    Entrapped after fall: no    Pain details:     Quality:  Aching    Progression:  Unchanged  Chronicity:  New  Dislocation: no    Foreign body present:  No foreign bodies  Prior injury to area:  Yes  Relieved by:  NSAIDs  Worsened by:  Nothing  Patient recently diagnosed with 5th metatarsal fracture back from [place] today      REVIEW OF SYSTEMS     Review of Systems   All other systems reviewed and are negative. PASTMEDICAL HISTORY     Past Medical History:   Diagnosis Date    ADHD     mild, no RX    Arthritis     Blurred vision, right eye     Carotid artery disease ([facility] [address]     dr Hair Stone vascular last appt 1/20    GERD (gastroesophageal reflux disease)     Hearing loss     High cholesterol     History of closed shoulder dislocation     rt from recent fall    [place] (hard of hearing)     beto hearing aides    Hx of gastric ulcer     Hyperlipidemia     Hypertension 2007    IBS (irritable bowel syndrome)     Macular pucker, right eye     Skin cancer of forehead     Syncope     hx recent falls    Tension headache     Wears dentures     lower bridge plate,1 tooth upper    Wears glasses      Past Problem List  Patient Active Problem List   Diagnosis Code    Gastro-esophageal reflux disease without esophagitis K21.9    Epigastric abdominal pain R10.13    Allergic to bees Z91.030    Anxiety F41.9    Depression F32. A    Esophageal spasm K22.4    Gas bloat syndrome K92.89    Hyperlipidemia E78.5    Hypertension I10    Irritable bowel syndrome K58.9    Osteoarthritis of knee M17.10    Osteopenia M85.80    Paraesophageal hernia K44.9    Primary hypertriglyceridemia E78.1    Tubular adenoma of colon D12.6    Macular pucker, right H35.371    Macular edema, cystoid, right H35.351    Centrilobular emphysema (HCC) J43.2    Chronic bilateral low back pain with right-sided sciatica M54.41, G89.29     SURGICAL HISTORY       Past Surgical History:   Procedure Laterality Date    APPENDECTOMY      BACK SURGERY      CATARACT EXTRACTION W/  INTRAOCULAR LENS IMPLANT Bilateral     CHOLECYSTECTOMY      COLONOSCOPY N/A 2016    ESOPHAGOGASTRIC FUNDOPLICATION      EYE SURGERY      FINGER SURGERY Left     4th finger    HIATAL HERNIA REPAIR  1999    x2    REVISION TOTAL KNEE ARTHROPLASTY Right     UPPER GASTROINTESTINAL ENDOSCOPY      UPPER GASTROINTESTINAL ENDOSCOPY N/A 10/26/2018    EGD BIOPSY AND DILITATION WITH BRUNO performed by Sam Paredes MD at [address] [place] [address] VITRECTOMY Right 03/10/2020    VITRECTOMY 23 GAUGE, MEMBRANE PEELING, GAS FLUID EXCHANGE, ICG     VITRECTOMY Right 3/10/2020    VITRECTOMY 23 GAUGE, MEMBRANE PEELING, AIR FLUID EXCHANGE, ICG performed by Dread Khan MD at [place] [address]       Previous Medications    ALBUTEROL SULFATE HFA (VENTOLIN HFA) 108 (90 BASE) MCG/ACT INHALER    Inhale 2 puffs into the lungs 4 times daily as needed for Wheezing    ALENDRONATE (FOSAMAX) 70 MG TABLET    Take 1 tablet by mouth every 7 days    AMLODIPINE (NORVASC) 10 MG TABLET    TAKE ONE TABLET BY MOUTH ONCE NIGHTLY    ASPIRIN 81 MG TABLET    Take 81 mg by mouth daily    AZELASTINE (OPTIVAR) 0.05 % OPHTHALMIC SOLUTION    as needed    BACLOFEN (LIORESAL) 5 MG TABLET    Take 1 tablet by mouth 3 times daily as needed (back pain)    CALCIUM CARBONATE 600 MG TABS TABLET    Take 1 tablet by mouth 2 times daily    CELECOXIB (CELEBREX) 100 MG CAPSULE    Take 1 capsule by mouth daily    EPINEPHRINE (EPIPEN 2-JONATHAN) 0.3 MG/0.3ML SOAJ INJECTION    Inject 0.3 mLs into the muscle as needed (PRN) Use as directed for allergic reaction    IPRATROPIUM (ATROVENT) 0.02 % NEBULIZER SOLUTION    TAKE 1 VIAL (2.5 MILLILITERS) BY NEBULIZER FOUR TIMES A DAY    OMEGA-3 KRILL OIL PO    Take by mouth daily    OMEPRAZOLE (PRILOSEC) 40 MG DELAYED RELEASE CAPSULE    TAKE ONE CAPSULE BY MOUTH TWICE A DAY    PRAVASTATIN (PRAVACHOL) 20 MG TABLET    TAKE ONE TABLET BY MOUTH DAILY    PREGABALIN (LYRICA) 25 MG CAPSULE    Take 1 capsule by mouth every evening for 90 days. PRIMIDONE (MYSOLINE) 50 MG TABLET    Take 1 tablet by mouth 3 times daily    PROBIOTIC PRODUCT (PROBIOTIC-10 PO)    Take 1 capsule by mouth daily    SCOPOLAMINE (TRANSDERM-SCOP, 1.5 MG,) TRANSDERMAL PATCH    Place 1 patch onto the skin every 72 hours    SERTRALINE (ZOLOFT) 100 MG TABLET    Take 200 mg by mouth nightly     TRAZODONE (DESYREL) 50 MG TABLET    Take 1 tablet by mouth daily    UMECLIDINIUM-VILANTEROL (ANORO ELLIPTA) 62.5-25 MCG/INH AEPB INHALER    Inhale 1 puff into the lungs daily    VITAMIN D 1000 UNITS CAPS    Take by mouth daily    VITAMIN E 1000 UNITS CAPSULE    Take 1,000 Units by mouth daily     ALLERGIES     is allergic to morphine and bee venom. FAMILY HISTORY     She indicated that the status of her father is unknown. SOCIAL HISTORY       Social History     Tobacco Use    Smoking status: Former Smoker     Packs/day: 1.00     Years: 40.00     Pack years: 40.00     Types: Cigarettes     Quit date: 2018     Years since quittin.1    Smokeless tobacco: Never Used    Tobacco comment: spoke with Pt to update the most she has smoked   Vaping Use    Vaping Use: Never used   Substance Use Topics    Alcohol use: No    Drug use: No     PHYSICAL EXAM     INITIAL VITALS: /67   Pulse 62   Temp 98.1 °F (36.7 °C) (Oral)   Resp 18   Ht 5' 4\" (1.626 m)   Wt 165 lb (74.8 kg)   SpO2 97%   BMI 28.32 kg/m²    Physical Exam  Constitutional:       General: She is not in acute distress. Appearance: Normal appearance.  She is well-developed. She is not diaphoretic. HENT:      Head: Normocephalic and atraumatic. Right Ear: External ear normal.      Left Ear: External ear normal.      Nose: Nose normal. No congestion. Mouth/Throat:      Mouth: Mucous membranes are moist.      Pharynx: Oropharynx is clear. Eyes:      General:         Right eye: No discharge. Left eye: No discharge. Conjunctiva/sclera: Conjunctivae normal.      Pupils: Pupils are equal, round, and reactive to light. Neck:      Trachea: No tracheal deviation. Cardiovascular:      Rate and Rhythm: Normal rate and regular rhythm. Pulses: Normal pulses. Heart sounds: Normal heart sounds. Pulmonary:      Effort: Pulmonary effort is normal. No respiratory distress. Breath sounds: Normal breath sounds. No stridor. No wheezing or rales. Abdominal:      Palpations: Abdomen is soft. Tenderness: There is no abdominal tenderness. There is no guarding or rebound. Musculoskeletal:         General: No tenderness or deformity. Normal range of motion. Cervical back: Normal range of motion and neck supple. Comments: Bruising to the left foot   Skin:     General: Skin is warm and dry. Capillary Refill: Capillary refill takes less than 2 seconds. Findings: No erythema or rash. Neurological:      General: No focal deficit present. Mental Status: She is alert and oriented to person, place, and time. Coordination: Coordination normal.   Psychiatric:         Mood and Affect: Mood normal.         Behavior: Behavior normal.         Thought Content: Thought content normal.         Judgment: Judgment normal.         MEDICAL DECISION MAKING:     Patient was seen and examined at bedside soon after arrival to the emergency department. Chart was reviewed include history and physical examination was performed.   Presentation is consistent with foot fracture so patient had x-ray performed here as she had initial x-rays done showed. The patient also was concerned regarding DVT so D-dimer was ordered and mildly elevated. Patient is pending DVT ultrasound       CRITICAL CARE:       PROCEDURES:    Procedures    DIAGNOSTIC RESULTS   EKG:All EKG's are interpreted by the Emergency Department Physician who either signs or Co-signs this chart in the absence of a cardiologist.        RADIOLOGY:All plain film, CT, MRI, and formal ultrasound images (except ED bedside ultrasound) are read by the radiologist, see reports below, unless otherwisenoted in MDM or here. XR FOOT LEFT (MIN 3 VIEWS)   Final Result   Nondisplaced fracture at the base of the 5th metatarsal.  Could represent an   avulsion type injury but there is not distraction of the fragment. Deformity of the distal head of the 2nd metatarsal is noted but the x-ray   features suggest this is chronic. VL Lower Extremity Venous Left    (Results Pending)     LABS: All lab results were reviewed by myself, and all abnormals are listed below. Labs Reviewed   D-DIMER, QUANTITATIVE - Abnormal; Notable for the following components:       Result Value    D-Dimer, Quant 0.72 (*)     All other components within normal limits       EMERGENCY DEPARTMENTCOURSE:         Vitals:    Vitals:    06/12/22 0218 06/12/22 0651   BP: 138/78 129/67   Pulse: 65 62   Resp: 16 18   Temp: 98.1 °F (36.7 °C)    TempSrc: Oral    SpO2: 95% 97%   Weight: 165 lb (74.8 kg)    Height: 5' 4\" (1.626 m)        The patient was given the following medications while in the emergency department:  Orders Placed This Encounter   Medications    HYDROcodone-acetaminophen (NORCO) 5-325 MG per tablet     Sig: Take 1 tablet by mouth every 6 hours as needed for Pain for up to 3 days. Intended supply: 3 days. Take lowest dose possible to manage pain     Dispense:  5 tablet     Refill:  0     CONSULTS:  None    FINAL IMPRESSION      1.  Closed non-physeal fracture of fifth metatarsal bone of left foot, initial encounter DISPOSITION/PLAN   DISPOSITION        PATIENT REFERRED TO:  No follow-up provider specified. DISCHARGE MEDICATIONS:  New Prescriptions    HYDROCODONE-ACETAMINOPHEN (NORCO) 5-325 MG PER TABLET    Take 1 tablet by mouth every 6 hours as needed for Pain for up to 3 days. Intended supply: 3 days.  Take lowest dose possible to manage pain     The care is provided during an unprecedented national emergency due to the novel coronavirus, COVID [address], DO  06/12/22 5468

## 2022-06-12 NOTE — ED PROVIDER NOTES
ADDENDUM:        Care of this patient was assumed from Dr. Jas Parks    at  0700    . The patient was seen for Foot Pain (L)  . The patient's initial evaluation and plan have been discussed with the prior provider who initially evaluated the patient. Nursing Notes, Past Medical Hx, Past Surgical Hx, Social Hx, Allergies, and Family Hx were all reviewed. I performed a repeat evaluation of the patient and reviewed tests completed so far. ED Course       Patient presented for left foot pain and concern for DVT, patient with known left foot fracture, patient was signed out to me pending completion of DVT statin    Doppler was negative for DVT    X-ray was reviewed showing nondisplaced fracture of the base of the fifth metatarsal      Discussed results with patient, will place in a walking boot and will provide information for orthopedic and podiatry follow-up    Patient/Guardian was informed of their diagnosis and told to follow up with PCP & orthopedic surgery in 1-3 days. Patient demonstrates understanding and agreement with the plan. They were given the opportunity to ask questions and those questions were answered to the best of our ability with the available information. Patient/Guardian told to return to the ED for any new, worsening, changing or persistent symptoms. This dictation was prepared using [employer] voice recognition software. The patient was given the following medications:  Orders Placed This Encounter   Medications    HYDROcodone-acetaminophen (NORCO) 5-325 MG per tablet     Sig: Take 1 tablet by mouth every 6 hours as needed for Pain for up to 3 days. Intended supply: 3 days.  Take lowest dose possible to manage pain     Dispense:  5 tablet     Refill:  0       RECENT VITALS:  BP: 129/67, Temp: 98.1 °F (36.7 °C), Heart Rate: 62, Resp: 18     RADIOLOGY:All plain film, CT, MRI, and formal ultrasound images (except ED bedside ultrasound) are read by the radiologist and the images and interpretations are directly viewed by the emergency physician. VL Lower Extremity Venous Left   Final Result      XR FOOT LEFT (MIN 3 VIEWS)   Final Result   Nondisplaced fracture at the base of the 5th metatarsal.  Could represent an   avulsion type injury but there is not distraction of the fragment. Deformity of the distal head of the 2nd metatarsal is noted but the x-ray   features suggest this is chronic. LABS: All lab results were reviewed by myself, and all abnormals are listed below. Labs Reviewed   D-DIMER, QUANTITATIVE - Abnormal; Notable for the following components:       Result Value    D-Dimer, Quant 0.72 (*)     All other components within normal limits           Disposition   DISPOSITION:    DISPOSITION Decision To Discharge 06/12/2022 08:59:12 AM      CLINICAL IMPRESSION:  1. Closed non-physeal fracture of fifth metatarsal bone of left foot, initial encounter        PATIENT REFERRED TO:  Rosalia Barnhart, [address] [facility] [address]  [phone]    Schedule an appointment as soon as possible for a visit       [facility] ED  [address]  [address] [facility] [id number] [phone]    As needed, If symptoms worsen    Anita Garcia DPM  [address]  [phone]    Schedule an appointment as soon as possible for a visit       Joey New  [address] [facility]  [address] [facility] [address]  [phone]    Schedule an appointment as soon as possible for a visit         DISCHARGE MEDICATIONS:  Discharge Medication List as of 6/12/2022  9:25 AM      START taking these medications    Details   HYDROcodone-acetaminophen (NORCO) 5-325 MG per tablet Take 1 tablet by mouth every 6 hours as needed for Pain for up to 3 days. Intended supply: 3 days.  Take lowest dose possible to manage pain, Disp-5 tablet, R-0Normal           The care is provided during an unprecedented national emergency due to the novel coronavirus, COVID 19.   Mace Serum, DO  Attending Emergency Physician            [facility] [place], DO  06/12/22 153

## 2022-06-14 ENCOUNTER — HOSPITAL ENCOUNTER (OUTPATIENT)
Dept: CT IMAGING | Age: 67
Discharge: HOME | End: 2022-06-16
Payer: MEDICARE

## 2022-06-14 DIAGNOSIS — H51.8 SKEW DEVIATION: ICD-10-CM

## 2022-06-14 LAB
GFR NON-AFRICAN AMERICAN: 47 ML/MIN
GFR SERPL CREATININE-BSD FRML MDRD: 57 ML/MIN
GFR SERPL CREATININE-BSD FRML MDRD: ABNORMAL ML/MIN/{1.73_M2}
POC CREATININE: 1.15 MG/DL (ref 0.51–1.19)

## 2022-06-14 PROCEDURE — 82565 ASSAY OF CREATININE: CPT

## 2022-06-14 PROCEDURE — 6360000004 HC RX CONTRAST MEDICATION: Performed by: OPHTHALMOLOGY

## 2022-06-14 PROCEDURE — 70470 CT HEAD/BRAIN W/O & W/DYE: CPT

## 2022-06-14 PROCEDURE — 2580000003 HC RX 258: Performed by: OPHTHALMOLOGY

## 2022-06-14 RX ORDER — SODIUM CHLORIDE 0.9 % (FLUSH) 0.9 %
10 SYRINGE (ML) INJECTION PRN
Status: DISCONTINUED | OUTPATIENT
Start: 2022-06-14 | End: 2022-06-17 | Stop reason: HOSPADM

## 2022-06-14 RX ORDER — 0.9 % SODIUM CHLORIDE 0.9 %
80 INTRAVENOUS SOLUTION INTRAVENOUS ONCE
Status: COMPLETED | OUTPATIENT
Start: 2022-06-14 | End: 2022-06-14

## 2022-06-14 RX ADMIN — SODIUM CHLORIDE, PRESERVATIVE FREE 10 ML: 5 INJECTION INTRAVENOUS at 08:48

## 2022-06-14 RX ADMIN — IOPAMIDOL 75 ML: 755 INJECTION, SOLUTION INTRAVENOUS at 08:48

## 2022-06-14 RX ADMIN — SODIUM CHLORIDE 80 ML: 9 INJECTION, SOLUTION INTRAVENOUS at 08:49

## 2022-06-20 ENCOUNTER — TELEPHONE (OUTPATIENT)
Dept: ORTHOPEDIC SURGERY | Age: 67
End: 2022-06-20

## 2022-06-20 NOTE — TELEPHONE ENCOUNTER
----- Message from Riya Lin MD sent at 6/20/2022  8:49 AM EDT -----  Hi,    I have reviewed this patient's cc'd chart and would like this patient to be scheduled for my clinic. Please let me know if there are any issues with this or any barriers to accommodating this request. Thanks in advance!      Sincerely,    Gael Fuller MD  Orthopedic Surgery    ----- Message -----  From: Bienvenido Gandara DO  Sent: 6/12/2022  10:07 AM EDT  To: Riya Lin MD

## 2022-06-23 RX ORDER — BACLOFEN 5 MG/1
TABLET ORAL
Qty: 90 TABLET | Refills: 0 | Status: SHIPPED | OUTPATIENT
Start: 2022-06-23 | End: 2022-07-28 | Stop reason: SDUPTHER

## 2022-06-23 NOTE — TELEPHONE ENCOUNTER
Last visit: 5/17/22  Last Med refill: 5/24/22  Does patient have enough medication for 72 hours: No:     Next Visit Date:  Future Appointments   Date Time Provider Bekah Pedersoni   8/17/2022  4:15 PM MD DEMARCO Quezada  [facility]   10/26/2022  3:30 PM [facility]   12/5/2022  2:45 PM Elida Rizvi MD [facility] [address] Maintenance   Topic Date Due    Shingles vaccine (2 of 3) 05/17/2025 (Originally 11/20/2016)    DTaP/Tdap/Td vaccine (1 - Tdap) 09/09/2030 (Originally 9/10/2020)    Depression Monitoring  10/25/2022    Lipids  10/26/2022    Annual Wellness Visit (AWV)  10/26/2022    Low dose CT lung screening  11/09/2022    Breast cancer screen  07/19/2023    Colorectal Cancer Screen  09/06/2027    DEXA (modify frequency per FRAX score)  Completed    Flu vaccine  Completed    Pneumococcal 65+ years Vaccine  Completed    COVID-19 Vaccine  Completed    Hepatitis C screen  Completed    Hepatitis A vaccine  Aged Out    Hepatitis B vaccine  Aged Out    Hib vaccine  Aged Out    Meningococcal (ACWY) vaccine  Aged Out       No results found for: LABA1C          ( goal A1C is < 7)   No results found for: LABMICR  LDL Cholesterol (mg/dL)   Date Value   10/26/2021 128   07/17/2020 159 (H)       (goal LDL is <100)   AST (U/L)   Date Value   10/26/2021 25     ALT (U/L)   Date Value   10/26/2021 22     BUN (mg/dL)   Date Value   10/26/2021 19     BP Readings from Last 3 Encounters:   06/12/22 129/67   05/17/22 118/70   02/23/22 120/72          (goal 120/80)    All Future Testing planned in [facility]  Lab Frequency Next Occurrence   VL DUP CAROTID BILATERAL Once 12/01/2022               Patient Active Problem List:     Gastro-esophageal reflux disease without esophagitis     Epigastric abdominal pain     Allergic to bees     Anxiety     Depression     Esophageal spasm     Gas bloat syndrome     Hyperlipidemia     Hypertension     Irritable bowel syndrome     Osteoarthritis of knee     Osteopenia     Paraesophageal hernia     Primary hypertriglyceridemia     Tubular adenoma of colon     Macular pucker, right     Macular edema, cystoid, right     Centrilobular emphysema (HCC)     Chronic bilateral low back pain with right-sided sciatica

## 2022-07-24 DIAGNOSIS — J43.2 CENTRILOBULAR EMPHYSEMA (HCC): ICD-10-CM

## 2022-07-25 NOTE — TELEPHONE ENCOUNTER
Last visit: 05/17/2022  Last Med refill: 05/04/2021  Does patient have enough medication for 72 hours: No:     Next Visit Date:  Future Appointments   Date Time Provider Bekah Pedersoni   8/17/2022  4:15 PM Rosalia Durán MD [facility]   10/26/2022  3:30 PM Rosalia Durán MD [facility]   12/5/2022  2:45 PM Elida Pierce MD [facility]asc [address] Maintenance   Topic Date Due    Shingles vaccine (2 of 3) 05/17/2025 (Originally 11/20/2016)    DTaP/Tdap/Td vaccine (1 - Tdap) 09/09/2030 (Originally 9/10/2020)    Flu vaccine (1) 09/01/2022    Depression Monitoring  10/25/2022    Lipids  10/26/2022    Annual Wellness Visit (AWV)  10/26/2022    Low dose CT lung screening  11/09/2022    Breast cancer screen  07/19/2023    Colorectal Cancer Screen  09/06/2027    DEXA (modify frequency per FRAX score)  Completed    Pneumococcal 65+ years Vaccine  Completed    COVID-19 Vaccine  Completed    Hepatitis C screen  Completed    Hepatitis A vaccine  Aged Out    Hepatitis B vaccine  Aged Out    Hib vaccine  Aged Out    Meningococcal (ACWY) vaccine  Aged Out       No results found for: LABA1C          ( goal A1C is < 7)   No results found for: LABMICR  LDL Cholesterol (mg/dL)   Date Value   10/26/2021 128   07/17/2020 159 (H)       (goal LDL is <100)   AST (U/L)   Date Value   10/26/2021 25     ALT (U/L)   Date Value   10/26/2021 22     BUN (mg/dL)   Date Value   10/26/2021 19     BP Readings from Last 3 Encounters:   06/12/22 129/67   05/17/22 118/70   02/23/22 120/72          (goal 120/80)    All Future Testing planned in [facility]  Lab Frequency Next Occurrence   VL DUP CAROTID BILATERAL Once 12/01/2022               Patient Active Problem List:     Gastro-esophageal reflux disease without esophagitis     Epigastric abdominal pain     Allergic to bees     Anxiety     Depression     Esophageal spasm     Gas bloat syndrome     Hyperlipidemia     Hypertension     Irritable bowel syndrome Osteoarthritis of knee     Osteopenia     Paraesophageal hernia     Primary hypertriglyceridemia     Tubular adenoma of colon     Macular pucker, right     Macular edema, cystoid, right     Centrilobular emphysema (HCC)     Chronic bilateral low back pain with right-sided sciatica

## 2022-07-26 RX ORDER — ALBUTEROL SULFATE 90 UG/1
2 AEROSOL, METERED RESPIRATORY (INHALATION) 4 TIMES DAILY PRN
Qty: 1 EACH | Refills: 3 | Status: SHIPPED | OUTPATIENT
Start: 2022-07-26

## 2022-07-27 NOTE — TELEPHONE ENCOUNTER
Last visit: 05/17/2022  Last Med refill: 06/23/2022  Does patient have enough medication for 72 hours: No:     Next Visit Date:  Future Appointments   Date Time Provider Bekah Pedersoni   8/17/2022  4:15 PM Arti Phylliss Dancer, MD [facility]  [facility]   10/26/2022  3:30 PM [facility]   12/5/2022  2:45 PM Elida Dhaliwal MD [facility] [address] Maintenance   Topic Date Due    Shingles vaccine (2 of 3) 05/17/2025 (Originally 11/20/2016)    DTaP/Tdap/Td vaccine (1 - Tdap) 09/09/2030 (Originally 9/10/2020)    Flu vaccine (1) 09/01/2022    Depression Monitoring  10/25/2022    Lipids  10/26/2022    Annual Wellness Visit (AWV)  10/26/2022    Low dose CT lung screening  11/09/2022    Breast cancer screen  07/19/2023    Colorectal Cancer Screen  09/06/2027    DEXA (modify frequency per FRAX score)  Completed    Pneumococcal 65+ years Vaccine  Completed    COVID-19 Vaccine  Completed    Hepatitis C screen  Completed    Hepatitis A vaccine  Aged Out    Hepatitis B vaccine  Aged Out    Hib vaccine  Aged Out    Meningococcal (ACWY) vaccine  Aged Out       No results found for: LABA1C          ( goal A1C is < 7)   No results found for: LABMICR  LDL Cholesterol (mg/dL)   Date Value   10/26/2021 128   07/17/2020 159 (H)       (goal LDL is <100)   AST (U/L)   Date Value   10/26/2021 25     ALT (U/L)   Date Value   10/26/2021 22     BUN (mg/dL)   Date Value   10/26/2021 19     BP Readings from Last 3 Encounters:   06/12/22 129/67   05/17/22 118/70   02/23/22 120/72          (goal 120/80)    All Future Testing planned in [facility]  Lab Frequency Next Occurrence   VL DUP CAROTID BILATERAL Once 12/01/2022               Patient Active Problem List:     Gastro-esophageal reflux disease without esophagitis     Epigastric abdominal pain     Allergic to bees     Anxiety     Depression     Esophageal spasm     Gas bloat syndrome     Hyperlipidemia     Hypertension     Irritable bowel syndrome

## 2022-07-28 RX ORDER — BACLOFEN 5 MG/1
5 TABLET ORAL 3 TIMES DAILY PRN
Qty: 90 TABLET | Refills: 5 | Status: SHIPPED
Start: 2022-07-28 | End: 2022-08-11 | Stop reason: CLARIF

## 2022-08-08 ENCOUNTER — TELEPHONE (OUTPATIENT)
Dept: FAMILY MEDICINE CLINIC | Age: 67
End: 2022-08-08

## 2022-08-08 DIAGNOSIS — U07.1 COVID-19: Primary | ICD-10-CM

## 2022-08-08 RX ORDER — PREDNISONE 10 MG/1
TABLET ORAL
Qty: 30 TABLET | Refills: 0 | Status: SHIPPED | OUTPATIENT
Start: 2022-08-08 | End: 2022-08-17 | Stop reason: ALTCHOICE

## 2022-08-08 NOTE — TELEPHONE ENCOUNTER
Pts  called stating pt tested positive for COVID on a home test, symptoms started 08/06/2022, pt has sore throat, fever, body aches, can hardly get out of bed, pts  asked if Plaxovid could be sent in

## 2022-08-08 NOTE — TELEPHONE ENCOUNTER
Paxlovid and prednisone taper called into pharmacy, if she is not improving within 24 to 48 hours recommend ER-please notify

## 2022-08-11 ENCOUNTER — TELEPHONE (OUTPATIENT)
Dept: FAMILY MEDICINE CLINIC | Age: 67
End: 2022-08-11

## 2022-08-11 DIAGNOSIS — M54.41 CHRONIC BILATERAL LOW BACK PAIN WITH RIGHT-SIDED SCIATICA: Primary | ICD-10-CM

## 2022-08-11 DIAGNOSIS — G89.29 CHRONIC BILATERAL LOW BACK PAIN WITH RIGHT-SIDED SCIATICA: Primary | ICD-10-CM

## 2022-08-11 RX ORDER — BACLOFEN 10 MG/1
10 TABLET ORAL 3 TIMES DAILY PRN
Qty: 30 TABLET | Refills: 1 | Status: SHIPPED | OUTPATIENT
Start: 2022-08-11 | End: 2022-09-06

## 2022-08-11 NOTE — TELEPHONE ENCOUNTER
Insurance has denied PA for the Baclofen 5 mg it is recommended she try Baclofen 20 mg, Flexeril 10 mg Tizanidine 2 or 4 mg

## 2022-08-17 ENCOUNTER — OFFICE VISIT (OUTPATIENT)
Dept: FAMILY MEDICINE CLINIC | Age: 67
End: 2022-08-17
Payer: MEDICARE

## 2022-08-17 VITALS
HEART RATE: 85 BPM | SYSTOLIC BLOOD PRESSURE: 136 MMHG | OXYGEN SATURATION: 96 % | BODY MASS INDEX: 27.91 KG/M2 | WEIGHT: 162.6 LBS | TEMPERATURE: 98.4 F | DIASTOLIC BLOOD PRESSURE: 68 MMHG

## 2022-08-17 DIAGNOSIS — N18.31 STAGE 3A CHRONIC KIDNEY DISEASE (HCC): ICD-10-CM

## 2022-08-17 DIAGNOSIS — J01.90 ACUTE BACTERIAL SINUSITIS: Primary | ICD-10-CM

## 2022-08-17 DIAGNOSIS — R42 VERTIGO: ICD-10-CM

## 2022-08-17 DIAGNOSIS — I10 PRIMARY HYPERTENSION: ICD-10-CM

## 2022-08-17 DIAGNOSIS — K22.4 ESOPHAGEAL SPASM: ICD-10-CM

## 2022-08-17 DIAGNOSIS — B96.89 ACUTE BACTERIAL SINUSITIS: Primary | ICD-10-CM

## 2022-08-17 DIAGNOSIS — Z13.29 SCREENING FOR THYROID DISORDER: ICD-10-CM

## 2022-08-17 DIAGNOSIS — G89.29 CHRONIC BILATERAL LOW BACK PAIN WITH RIGHT-SIDED SCIATICA: ICD-10-CM

## 2022-08-17 DIAGNOSIS — E78.2 MIXED HYPERLIPIDEMIA: ICD-10-CM

## 2022-08-17 DIAGNOSIS — M54.41 CHRONIC BILATERAL LOW BACK PAIN WITH RIGHT-SIDED SCIATICA: ICD-10-CM

## 2022-08-17 DIAGNOSIS — E78.1 PRIMARY HYPERTRIGLYCERIDEMIA: ICD-10-CM

## 2022-08-17 PROBLEM — N18.30 CHRONIC RENAL DISEASE, STAGE III (HCC): Status: ACTIVE | Noted: 2022-08-17

## 2022-08-17 PROCEDURE — G8399 PT W/DXA RESULTS DOCUMENT: HCPCS | Performed by: INTERNAL MEDICINE

## 2022-08-17 PROCEDURE — G8417 CALC BMI ABV UP PARAM F/U: HCPCS | Performed by: INTERNAL MEDICINE

## 2022-08-17 PROCEDURE — 3017F COLORECTAL CA SCREEN DOC REV: CPT | Performed by: INTERNAL MEDICINE

## 2022-08-17 PROCEDURE — 99214 OFFICE O/P EST MOD 30 MIN: CPT | Performed by: INTERNAL MEDICINE

## 2022-08-17 PROCEDURE — 1090F PRES/ABSN URINE INCON ASSESS: CPT | Performed by: INTERNAL MEDICINE

## 2022-08-17 PROCEDURE — 1123F ACP DISCUSS/DSCN MKR DOCD: CPT | Performed by: INTERNAL MEDICINE

## 2022-08-17 PROCEDURE — G8427 DOCREV CUR MEDS BY ELIG CLIN: HCPCS | Performed by: INTERNAL MEDICINE

## 2022-08-17 PROCEDURE — 1036F TOBACCO NON-USER: CPT | Performed by: INTERNAL MEDICINE

## 2022-08-17 RX ORDER — AMOXICILLIN 875 MG/1
875 TABLET, COATED ORAL 2 TIMES DAILY
Qty: 14 TABLET | Refills: 0 | Status: SHIPPED | OUTPATIENT
Start: 2022-08-17 | End: 2022-08-24

## 2022-08-17 RX ORDER — MECLIZINE HYDROCHLORIDE 25 MG/1
25 TABLET ORAL 3 TIMES DAILY PRN
Qty: 90 TABLET | Refills: 3 | Status: SHIPPED | OUTPATIENT
Start: 2022-08-17

## 2022-08-17 ASSESSMENT — PATIENT HEALTH QUESTIONNAIRE - PHQ9
8. MOVING OR SPEAKING SO SLOWLY THAT OTHER PEOPLE COULD HAVE NOTICED. OR THE OPPOSITE, BEING SO FIGETY OR RESTLESS THAT YOU HAVE BEEN MOVING AROUND A LOT MORE THAN USUAL: 0
SUM OF ALL RESPONSES TO PHQ QUESTIONS 1-9: 1
5. POOR APPETITE OR OVEREATING: 0
3. TROUBLE FALLING OR STAYING ASLEEP: 0
2. FEELING DOWN, DEPRESSED OR HOPELESS: 0
SUM OF ALL RESPONSES TO PHQ QUESTIONS 1-9: 1
1. LITTLE INTEREST OR PLEASURE IN DOING THINGS: 0
4. FEELING TIRED OR HAVING LITTLE ENERGY: 1
7. TROUBLE CONCENTRATING ON THINGS, SUCH AS READING THE NEWSPAPER OR WATCHING TELEVISION: 0
10. IF YOU CHECKED OFF ANY PROBLEMS, HOW DIFFICULT HAVE THESE PROBLEMS MADE IT FOR YOU TO DO YOUR WORK, TAKE CARE OF THINGS AT HOME, OR GET ALONG WITH OTHER PEOPLE: 0
SUM OF ALL RESPONSES TO PHQ9 QUESTIONS 1 & 2: 0
SUM OF ALL RESPONSES TO PHQ QUESTIONS 1-9: 1
9. THOUGHTS THAT YOU WOULD BE BETTER OFF DEAD, OR OF HURTING YOURSELF: 0
6. FEELING BAD ABOUT YOURSELF - OR THAT YOU ARE A FAILURE OR HAVE LET YOURSELF OR YOUR FAMILY DOWN: 0
SUM OF ALL RESPONSES TO PHQ QUESTIONS 1-9: 1

## 2022-08-17 ASSESSMENT — ENCOUNTER SYMPTOMS
DIARRHEA: 0
CONSTIPATION: 0
COUGH: 0
CHOKING: 0
VOMITING: 0
WHEEZING: 0
SHORTNESS OF BREATH: 0
NAUSEA: 0
ABDOMINAL PAIN: 0
BLOOD IN STOOL: 0
CHEST TIGHTNESS: 0
ANAL BLEEDING: 0

## 2022-08-17 ASSESSMENT — VISUAL ACUITY: OU: 1

## 2022-08-17 NOTE — PROGRESS NOTES
[facility] PHYSICIANS  [facility] [address]  [facility] [place] [address]  Dept: [phone]  Dept Fax: [phone]      Lavell Gastelum is a 74 y.o. female who presents today for hermedical conditions/complaints as noted below. Lavell Gastelum is c/o of Hyperlipidemia and Post-COVID Symptoms (Congestion, sinus drainage and headache)        Assessment/Plan:     1. Acute bacterial sinusitis  -     amoxicillin (AMOXIL) 875 MG tablet; Take 1 tablet by mouth 2 times daily for 7 days, Disp-14 tablet, R-0Normal  2. Chronic bilateral low back pain with right-sided sciatica  -     External Referral To Neurosurgery  3. Esophageal spasm  4. Primary hypertension  -     Comprehensive Metabolic Panel; Future  5. Mixed hyperlipidemia  6. Stage 3a chronic kidney disease ([facility] [address]  7. Primary hypertriglyceridemia  8. Screening for thyroid disorder  -     TSH With Reflex Ft4; Future  9. Vertigo  -     meclizine (ANTIVERT) 25 MG tablet; Take 1 tablet by mouth 3 times daily as needed for Dizziness, Disp-90 tablet, R-3Normal        No follow-ups on file. HPI     Had COVID a couple weeks ago, was very dizzy and sick then. Completed paxlovid and prednisone taper, breathing feels OK but coughing up green mucus. Has postnasal drip and headaches since then, feels like has a sinus infection. Did not make it to [facility] yet - has sent records to her niece who works there but has not heard back. Used scopolamine patches for dizziness on a cruise, would like to see if she could use the patches for her dizziness. She fell on the cruise and had a foot fracture due to double vision after they turned off the lights. Seeing Dr Chente Burrell who ordered a CT of brain, has labs pending. Thinking about going back to [place] for her balance training - helped quite a bit when she was going, and had to stop due to Jim.      Hypertension-tolerating current regimen without chest pain, palpitations, dizziness, peripheral edema, dyspnea on exertion, orthopnea, paroxysmal nocturnal dyspnea. Hyperlipidemia-tolerating current regimen without myalgias, dyspepsia, jaundice. Mostly compliant with diet recommendations for low salt diet, tries to limit greasy/cheesy/fried foods, not very compliant with exercise recommendations.     Cardiovascular risk factors: advanced age (older than 54 for men, 72 for women), dyslipidemia, hypertension, and sedentary lifestyle          BP Readings from Last 3 Encounters:   08/17/22 136/68   06/12/22 129/67   05/17/22 118/70              Past Medical History:   Diagnosis Date    ADHD     mild, no RX    Arthritis     Blurred vision, right eye     Carotid artery disease ([address]     dr Brian Cordova vascular last appt 1/20    GERD (gastroesophageal reflux disease)     Hearing loss     High cholesterol     History of closed shoulder dislocation     rt from recent fall    [place] (hard of hearing)     [facility] hearing aides    Hx of gastric ulcer     Hyperlipidemia     Hypertension 2007    IBS (irritable bowel syndrome)     Macular pucker, right eye     Skin cancer of forehead     Syncope     hx recent falls    Tension headache     Wears dentures     lower bridge plate,1 tooth upper    Wears glasses       Past Surgical History:   Procedure Laterality Date    APPENDECTOMY      BACK SURGERY      CATARACT REMOVAL WITH IMPLANT Bilateral     CHOLECYSTECTOMY      COLONOSCOPY N/A 2016    ESOPHAGOGASTRIC FUNDOPLICATION      EYE SURGERY      FINGER SURGERY Left     4th finger    HIATAL HERNIA REPAIR  1999    x2    REVISION TOTAL KNEE ARTHROPLASTY Right     UPPER GASTROINTESTINAL ENDOSCOPY      UPPER GASTROINTESTINAL ENDOSCOPY N/A 10/26/2018    EGD BIOPSY AND DILITATION WITH DAMION performed by Frank Mcmanus MD at [address] Right 03/10/2020    VITRECTOMY 23 GAUGE, MEMBRANE PEELING, GAS FLUID EXCHANGE, ICG     VITRECTOMY Right 3/10/2020    VITRECTOMY 23 GAUGE, MEMBRANE PEELING, AIR FLUID EXCHANGE, ICG performed by González Stringer MD at [facility] OR       Family History   Problem Relation Age of Onset    Cancer Father        Social History     Tobacco Use    Smoking status: Former     Packs/day: 1.00     Years: 40.00     Pack years: 40.00     Types: Cigarettes     Quit date: 2018     Years since quittin.3    Smokeless tobacco: Never    Tobacco comments:     spoke with Pt to update the most she has smoked   Substance Use Topics    Alcohol use: No        Allergies   Allergen Reactions    Morphine     Bee Venom      Prior to Visit Medications    Medication Sig Taking?  Authorizing Provider   baclofen (LIORESAL) 10 MG tablet Take 1 tablet by mouth 3 times daily as needed (pain) Yes Rosalia Antonio MD   albuterol sulfate HFA (VENTOLIN HFA) 108 (90 Base) MCG/ACT inhaler Inhale 2 puffs into the lungs 4 times daily as needed for Wheezing Yes Rosalia Antonio MD   celecoxib (CELEBREX) 100 MG capsule Take 1 capsule by mouth daily Yes Rosalia Antonio MD   umeclidinium-vilanterol (ANORO ELLIPTA) 62.5-25 MCG/INH AEPB inhaler Inhale 1 puff into the lungs daily Yes Rosalia Antonio MD   pravastatin (PRAVACHOL) 20 MG tablet TAKE ONE TABLET BY MOUTH DAILY Yes Rosalia Antonio MD   amLODIPine (NORVASC) 10 MG tablet TAKE ONE TABLET BY MOUTH ONCE NIGHTLY Yes ERICA Waggoner NP   omeprazole (PRILOSEC) 40 MG delayed release capsule TAKE ONE CAPSULE BY MOUTH TWICE A DAY Yes Rosalia Antonio MD   ipratropium (ATROVENT) 0.02 % nebulizer solution TAKE 1 VIAL (2.5 MILLILITERS) BY NEBULIZER FOUR TIMES A DAY Yes ERICA Waggoner NP   traZODone (DESYREL) 50 MG tablet Take 1 tablet by mouth daily Yes ERICA Farrell CNP   Probiotic Product (PROBIOTIC-10 PO) Take 1 capsule by mouth daily Yes Historical Provider, MD   aspirin 81 MG tablet Take 81 mg by mouth daily Yes Historical Provider, MD   OMEGA-3 KRILL OIL PO Take by mouth daily Yes Historical Provider, MD   calcium carbonate 600 MG TABS tablet Take 1 tablet by mouth 2 times daily Yes Historical Provider, MD   vitamin D 1000 units CAPS Take by mouth daily Yes Historical Provider, MD   sertraline (ZOLOFT) 100 MG tablet Take 200 mg by mouth nightly  Yes Historical Provider, MD   vitamin E 1000 units capsule Take 1,000 Units by mouth daily Yes Historical Provider, MD       Review of Systems     Review of Systems   Constitutional:  Negative for fatigue, fever and unexpected weight change. Eyes:  Positive for visual disturbance (following with neuroophthalmology). Respiratory:  Negative for cough, choking, chest tightness, shortness of breath and wheezing. Cardiovascular:  Negative for chest pain, palpitations and leg swelling. Gastrointestinal:  Negative for abdominal pain, anal bleeding, blood in stool, constipation, diarrhea, nausea and vomiting. Endocrine: Negative. Musculoskeletal:  Negative for joint swelling and myalgias. Skin: Negative. Neurological:  Negative for dizziness. Psychiatric/Behavioral:  Negative for sleep disturbance. All other systems reviewed and are negative. Objective     /68   Pulse 85   Temp 98.4 °F (36.9 °C) (Temporal)   Wt 162 lb 9.6 oz (73.8 kg)   SpO2 96%   BMI 27.91 kg/m²   Wt Readings from Last 3 Encounters:   08/17/22 162 lb 9.6 oz (73.8 kg)   06/12/22 165 lb (74.8 kg)   05/17/22 165 lb 12.8 oz (75.2 kg)       Physical Exam  Vitals and nursing note reviewed. Constitutional:       General: She is not in acute distress. Appearance: She is well-developed. She is not ill-appearing. HENT:      Nose: Mucosal edema and rhinorrhea present. Right Turbinates: Enlarged. Left Turbinates: Enlarged. Right Sinus: Maxillary sinus tenderness and frontal sinus tenderness present. Left Sinus: Maxillary sinus tenderness and frontal sinus tenderness present. Eyes:      General: Lids are normal. Vision grossly intact. Cardiovascular:      Rate and Rhythm: Normal rate and regular rhythm.       Heart sounds: Normal heart sounds, S1 normal and S2 normal. No murmur heard. No friction rub. No gallop. Pulmonary:      Effort: Pulmonary effort is normal. No respiratory distress. Breath sounds: Normal breath sounds. No wheezing. Abdominal:      General: Bowel sounds are normal.      Palpations: Abdomen is soft. There is no mass. Tenderness: There is no abdominal tenderness. There is no guarding. Musculoskeletal:         General: Normal range of motion. Skin:     General: Skin is warm and dry. Capillary Refill: Capillary refill takes less than 2 seconds. Neurological:      General: No focal deficit present. Mental Status: She is alert and oriented to person, place, and time. Data Review       There are no preventive care reminders to display for this patient. Patient given educational materials- see patient instructions. Discussed use, benefit, and side effects of prescribedmedications. All patient questions answered. Pt voiced understanding. Reviewedhealth maintenance. Instructed to continue current medications, diet and exercise. Patient agreed with treatment plan. Follow up as directed.      Electronically signedby Joselyn Garces MD on 8/17/2022

## 2022-08-17 NOTE — PROGRESS NOTES
Pt is here today due to having some post COVID issues.  She is having some congestion, headache and feels possible sinus infection

## 2022-08-23 ENCOUNTER — HOSPITAL ENCOUNTER (OUTPATIENT)
Age: 67
Setting detail: SPECIMEN
Discharge: HOME | End: 2022-08-23

## 2022-08-23 LAB
ABSOLUTE EOS #: 0.12 K/UL (ref 0–0.44)
ABSOLUTE IMMATURE GRANULOCYTE: <0.03 K/UL (ref 0–0.3)
ABSOLUTE LYMPH #: 1.7 K/UL (ref 1.1–3.7)
ABSOLUTE MONO #: 0.65 K/UL (ref 0.1–1.2)
BASOPHILS # BLD: 1 % (ref 0–2)
BASOPHILS ABSOLUTE: 0.04 K/UL (ref 0–0.2)
C-REACTIVE PROTEIN: 13.7 MG/L (ref 0–5)
CHOLESTEROL/HDL RATIO: 4.2
CHOLESTEROL: 229 MG/DL
EOSINOPHILS RELATIVE PERCENT: 2 % (ref 1–4)
ESTIMATED AVERAGE GLUCOSE: 120 MG/DL
HBA1C MFR BLD: 5.8 % (ref 4–6)
HCT VFR BLD CALC: 39 % (ref 36.3–47.1)
HDLC SERPL-MCNC: 54 MG/DL
HEMOGLOBIN: 12 G/DL (ref 11.9–15.1)
IMMATURE GRANULOCYTES: 0 %
LDL CHOLESTEROL: 130 MG/DL (ref 0–130)
LYMPHOCYTES # BLD: 24 % (ref 24–43)
MCH RBC QN AUTO: 26.9 PG (ref 25.2–33.5)
MCHC RBC AUTO-ENTMCNC: 30.8 G/DL (ref 28.4–34.8)
MCV RBC AUTO: 87.4 FL (ref 82.6–102.9)
MONOCYTES # BLD: 9 % (ref 3–12)
NRBC AUTOMATED: 0 PER 100 WBC
PDW BLD-RTO: 14.2 % (ref 11.8–14.4)
PLATELET # BLD: 228 K/UL (ref 138–453)
PMV BLD AUTO: 10.7 FL (ref 8.1–13.5)
RBC # BLD: 4.46 M/UL (ref 3.95–5.11)
SEDIMENTATION RATE, ERYTHROCYTE: 21 MM/HR (ref 0–30)
SEG NEUTROPHILS: 64 % (ref 36–65)
SEGMENTED NEUTROPHILS ABSOLUTE COUNT: 4.53 K/UL (ref 1.5–8.1)
TRIGL SERPL-MCNC: 224 MG/DL
WBC # BLD: 7.1 K/UL (ref 3.5–11.3)

## 2022-08-27 DIAGNOSIS — M54.41 CHRONIC BILATERAL LOW BACK PAIN WITH RIGHT-SIDED SCIATICA: ICD-10-CM

## 2022-08-27 DIAGNOSIS — G89.29 CHRONIC BILATERAL LOW BACK PAIN WITH RIGHT-SIDED SCIATICA: ICD-10-CM

## 2022-08-29 RX ORDER — PREGABALIN 25 MG/1
CAPSULE ORAL
Qty: 90 CAPSULE | Refills: 0 | Status: SHIPPED | OUTPATIENT
Start: 2022-08-29 | End: 2022-11-25

## 2022-08-29 NOTE — TELEPHONE ENCOUNTER
Last visit: 08/17/2022  Last Med refill: 06/01/2022  Does patient have enough medication for 72 hours: Yes    Next Visit Date:  Future Appointments   Date Time Provider Bekah Pedersoni   10/26/2022  3:30 PM Rosalia Tinajero MD [facility]   12/5/2022  2:45 PM Elida Rain MD [facility] [address] Maintenance   Topic Date Due    Shingles vaccine (2 of 3) 05/17/2025 (Originally 11/20/2016)    DTaP/Tdap/Td vaccine (1 - Tdap) 09/09/2030 (Originally 9/10/2020)    Flu vaccine (1) 09/01/2022    Annual Wellness Visit (AWV)  10/26/2022    Low dose CT lung screening  11/09/2022    Breast cancer screen  07/19/2023    Depression Monitoring  08/17/2023    A1C test (Diabetic or Prediabetic)  08/23/2023    Lipids  08/23/2023    Colorectal Cancer Screen  09/06/2027    DEXA (modify frequency per FRAX score)  Completed    Pneumococcal 65+ years Vaccine  Completed    COVID-19 Vaccine  Completed    Hepatitis C screen  Completed    Hepatitis A vaccine  Aged Out    Hepatitis B vaccine  Aged Out    Hib vaccine  Aged Out    Meningococcal (ACWY) vaccine  Aged Out       Hemoglobin A1C (%)   Date Value   08/23/2022 5.8             ( goal A1C is < 7)   No results found for: LABMICR  LDL Cholesterol (mg/dL)   Date Value   08/23/2022 130   10/26/2021 128       (goal LDL is <100)   AST (U/L)   Date Value   10/26/2021 25     ALT (U/L)   Date Value   10/26/2021 22     BUN (mg/dL)   Date Value   10/26/2021 19     BP Readings from Last 3 Encounters:   08/17/22 136/68   06/12/22 129/67   05/17/22 118/70          (goal 120/80)    All Future Testing planned in [facility]  Lab Frequency Next Occurrence   VL DUP CAROTID BILATERAL Once 12/01/2022   TSH With Reflex Ft4 Once 10/26/2022   Comprehensive Metabolic Panel Once 98/45/0524               Patient Active Problem List:     Gastro-esophageal reflux disease without esophagitis     Epigastric abdominal pain     Allergic to bees     Anxiety     Depression     Esophageal spasm     Gas bloat syndrome     Hyperlipidemia     Hypertension     Irritable bowel syndrome     Osteoarthritis of knee     Osteopenia     Paraesophageal hernia     Primary hypertriglyceridemia     Tubular adenoma of colon     Macular pucker, right     Macular edema, cystoid, right     Centrilobular emphysema (HCC)     Chronic bilateral low back pain with right-sided sciatica     Chronic renal disease, stage III ([address] [[id number]]

## 2022-09-02 LAB
ACETYLCHOL BLOCK AB: 18 % (ref 0–26)
ACETYLCHOLINE BINDING ANTIBODY: 0 NMOL/L (ref 0–0.4)
STRIATED MUSCLE AB, IGG SCREEN: NORMAL
TITIN ANTIBODY: <0.09 IV (ref 0–0.45)

## 2022-09-06 DIAGNOSIS — M54.41 CHRONIC BILATERAL LOW BACK PAIN WITH RIGHT-SIDED SCIATICA: ICD-10-CM

## 2022-09-06 DIAGNOSIS — G89.29 CHRONIC BILATERAL LOW BACK PAIN WITH RIGHT-SIDED SCIATICA: ICD-10-CM

## 2022-09-06 RX ORDER — BACLOFEN 10 MG/1
TABLET ORAL
Qty: 30 TABLET | Refills: 1 | Status: SHIPPED | OUTPATIENT
Start: 2022-09-06

## 2022-09-06 NOTE — TELEPHONE ENCOUNTER
Last visit: 08/17/2022  Last Med refill: 08/11/2022  Does patient have enough medication for 72 hours: Yes    Next Visit Date:  Future Appointments   Date Time Provider Bekah Pedersoni   10/26/2022  3:30 PM Rosalia Del Castillo MD [facility]   12/5/2022  2:45 PM Elida Mendez MD [facility] [address] Maintenance   Topic Date Due    Flu vaccine (1) 09/01/2022    Shingles vaccine (2 of 3) 05/17/2025 (Originally 11/20/2016)    DTaP/Tdap/Td vaccine (1 - Tdap) 09/09/2030 (Originally 9/10/2020)    Annual Wellness Visit (AWV)  10/26/2022    Low dose CT lung screening  11/09/2022    Breast cancer screen  07/19/2023    Depression Monitoring  08/17/2023    A1C test (Diabetic or Prediabetic)  08/23/2023    Lipids  08/23/2023    Colorectal Cancer Screen  09/06/2027    DEXA (modify frequency per FRAX score)  Completed    Pneumococcal 65+ years Vaccine  Completed    COVID-19 Vaccine  Completed    Hepatitis C screen  Completed    Hepatitis A vaccine  Aged Out    Hepatitis B vaccine  Aged Out    Hib vaccine  Aged Out    Meningococcal (ACWY) vaccine  Aged Out       Hemoglobin A1C (%)   Date Value   08/23/2022 5.8             ( goal A1C is < 7)   No results found for: LABMICR  LDL Cholesterol (mg/dL)   Date Value   08/23/2022 130   10/26/2021 128       (goal LDL is <100)   AST (U/L)   Date Value   10/26/2021 25     ALT (U/L)   Date Value   10/26/2021 22     BUN (mg/dL)   Date Value   10/26/2021 19     BP Readings from Last 3 Encounters:   08/17/22 136/68   06/12/22 129/67   05/17/22 118/70          (goal 120/80)    All Future Testing planned in [facility]  Lab Frequency Next Occurrence   VL DUP CAROTID BILATERAL Once 12/01/2022   TSH With Reflex Ft4 Once 10/26/2022   Comprehensive Metabolic Panel Once 41/17/8552               Patient Active Problem List:     Gastro-esophageal reflux disease without esophagitis     Epigastric abdominal pain     Allergic to bees     Anxiety     Depression     Esophageal spasm     Gas bloat syndrome     Hyperlipidemia     Hypertension     Irritable bowel syndrome     Osteoarthritis of knee     Osteopenia     Paraesophageal hernia     Primary hypertriglyceridemia     Tubular adenoma of colon     Macular pucker, right     Macular edema, cystoid, right     Centrilobular emphysema (HCC)     Chronic bilateral low back pain with right-sided sciatica     Chronic renal disease, stage III ([address] [[id number]]

## 2022-09-20 DIAGNOSIS — G89.29 CHRONIC BILATERAL LOW BACK PAIN WITH RIGHT-SIDED SCIATICA: ICD-10-CM

## 2022-09-20 DIAGNOSIS — K21.9 GASTRO-ESOPHAGEAL REFLUX DISEASE WITHOUT ESOPHAGITIS: ICD-10-CM

## 2022-09-20 DIAGNOSIS — I10 ESSENTIAL HYPERTENSION: ICD-10-CM

## 2022-09-20 DIAGNOSIS — J43.2 CENTRILOBULAR EMPHYSEMA (HCC): ICD-10-CM

## 2022-09-20 DIAGNOSIS — M54.41 CHRONIC BILATERAL LOW BACK PAIN WITH RIGHT-SIDED SCIATICA: ICD-10-CM

## 2022-09-20 RX ORDER — AMLODIPINE BESYLATE 10 MG/1
TABLET ORAL
Qty: 90 TABLET | Refills: 1 | Status: SHIPPED | OUTPATIENT
Start: 2022-09-20

## 2022-09-20 RX ORDER — CELECOXIB 100 MG/1
CAPSULE ORAL
Qty: 90 CAPSULE | Refills: 1 | Status: SHIPPED | OUTPATIENT
Start: 2022-09-20

## 2022-09-20 RX ORDER — OMEPRAZOLE 40 MG/1
CAPSULE, DELAYED RELEASE ORAL
Qty: 180 CAPSULE | Refills: 1 | Status: SHIPPED | OUTPATIENT
Start: 2022-09-20

## 2022-09-20 NOTE — TELEPHONE ENCOUNTER
Last visit: 8/17/2022  Last Med refill: 8/18/2022 and unknown on some   Does patient have enough medication for 72 hours: Yes    Next Visit Date:  Future Appointments   Date Time Provider Bekah Pedersoni   10/26/2022  3:30 PM Rosalia Davila MD [facility]   12/5/2022  2:45 PM Elida Rodriguez MD [facility] [address] Maintenance   Topic Date Due    Flu vaccine (1) 09/01/2022    Shingles vaccine (2 of 3) 05/17/2025 (Originally 11/20/2016)    DTaP/Tdap/Td vaccine (1 - Tdap) 09/09/2030 (Originally 9/10/2020)    Annual Wellness Visit (AWV)  10/26/2022    Low dose CT lung screening  11/09/2022    Breast cancer screen  07/19/2023    Depression Monitoring  08/17/2023    A1C test (Diabetic or Prediabetic)  08/23/2023    Lipids  08/23/2023    Colorectal Cancer Screen  09/06/2027    DEXA (modify frequency per FRAX score)  Completed    Pneumococcal 65+ years Vaccine  Completed    COVID-19 Vaccine  Completed    Hepatitis C screen  Completed    Hepatitis A vaccine  Aged Out    Hepatitis B vaccine  Aged Out    Hib vaccine  Aged Out    Meningococcal (ACWY) vaccine  Aged Out       Hemoglobin A1C (%)   Date Value   08/23/2022 5.8             ( goal A1C is < 7)   No results found for: LABMICR  LDL Cholesterol (mg/dL)   Date Value   08/23/2022 130   10/26/2021 128       (goal LDL is <100)   AST (U/L)   Date Value   10/26/2021 25     ALT (U/L)   Date Value   10/26/2021 22     BUN (mg/dL)   Date Value   10/26/2021 19     BP Readings from Last 3 Encounters:   08/17/22 136/68   06/12/22 129/67   05/17/22 118/70          (goal 120/80)    All Future Testing planned in [facility]  Lab Frequency Next Occurrence   VL DUP CAROTID BILATERAL Once 12/01/2022   TSH With Reflex Ft4 Once 10/26/2022   Comprehensive Metabolic Panel Once 35/61/4308               Patient Active Problem List:     Gastro-esophageal reflux disease without esophagitis     Epigastric abdominal pain     Allergic to bees     Anxiety     Depression Esophageal spasm     Gas bloat syndrome     Hyperlipidemia     Hypertension     Irritable bowel syndrome     Osteoarthritis of knee     Osteopenia     Paraesophageal hernia     Primary hypertriglyceridemia     Tubular adenoma of colon     Macular pucker, right     Macular edema, cystoid, right     Centrilobular emphysema (HCC)     Chronic bilateral low back pain with right-sided sciatica     Chronic renal disease, stage III ([address] [[id number]]

## 2022-10-03 DIAGNOSIS — M85.89 OSTEOPENIA OF MULTIPLE SITES: ICD-10-CM

## 2022-10-03 DIAGNOSIS — M54.41 CHRONIC BILATERAL LOW BACK PAIN WITH RIGHT-SIDED SCIATICA: ICD-10-CM

## 2022-10-03 DIAGNOSIS — G89.29 CHRONIC BILATERAL LOW BACK PAIN WITH RIGHT-SIDED SCIATICA: ICD-10-CM

## 2022-10-03 RX ORDER — ALENDRONATE SODIUM 70 MG/1
TABLET ORAL
Qty: 12 TABLET | Refills: 1 | OUTPATIENT
Start: 2022-10-03

## 2022-10-03 RX ORDER — BACLOFEN 10 MG/1
TABLET ORAL
Qty: 30 TABLET | Refills: 1 | OUTPATIENT
Start: 2022-10-03

## 2022-10-11 ENCOUNTER — TELEPHONE (OUTPATIENT)
Dept: ONCOLOGY | Age: 67
End: 2022-10-11

## 2022-10-11 DIAGNOSIS — Z87.891 PERSONAL HISTORY OF NICOTINE DEPENDENCE: Primary | ICD-10-CM

## 2022-10-11 NOTE — TELEPHONE ENCOUNTER
Our records indicate that your patient is coming due for their annual lung cancer screening follow up testing. For your convenience, we have pended the order for the scan for you. If you do not agree with the need for the test, please cancel the order and let us know. Sincerely,    [address] [place] [address] Screening Program    Auto printed reminder letter sent to patient.

## 2022-10-24 ENCOUNTER — HOSPITAL ENCOUNTER (OUTPATIENT)
Dept: CT IMAGING | Age: 67
Discharge: HOME | End: 2022-10-26
Payer: MEDICARE

## 2022-10-24 ENCOUNTER — HOSPITAL ENCOUNTER (OUTPATIENT)
Dept: INTERVENTIONAL RADIOLOGY/VASCULAR | Age: 67
Discharge: HOME | End: 2022-10-26
Payer: MEDICARE

## 2022-10-24 VITALS
BODY MASS INDEX: 29.02 KG/M2 | OXYGEN SATURATION: 96 % | WEIGHT: 170 LBS | SYSTOLIC BLOOD PRESSURE: 119 MMHG | HEIGHT: 64 IN | RESPIRATION RATE: 16 BRPM | TEMPERATURE: 97.5 F | HEART RATE: 69 BPM | DIASTOLIC BLOOD PRESSURE: 67 MMHG

## 2022-10-24 DIAGNOSIS — R93.7 ABNORMAL FINDINGS ON DIAGNOSTIC IMAGING OF MUSCULOSKELETAL SYSTEM: ICD-10-CM

## 2022-10-24 DIAGNOSIS — R93.7 ABNORMAL FINDINGS ON DIAGNOSTIC IMAGING OF OTHER PARTS OF MUSCULOSKELETAL SYSTEM: ICD-10-CM

## 2022-10-24 LAB
INR BLD: 1
PARTIAL THROMBOPLASTIN TIME: 26.8 SEC (ref 24–36)
PLATELET # BLD: 269 K/UL (ref 150–450)
PROTHROMBIN TIME: 12.6 SEC (ref 11.8–14.6)

## 2022-10-24 PROCEDURE — 85049 AUTOMATED PLATELET COUNT: CPT

## 2022-10-24 PROCEDURE — 7100000011 HC PHASE II RECOVERY - ADDTL 15 MIN

## 2022-10-24 PROCEDURE — 62303 MYELOGRAPHY LUMBAR INJECTION: CPT

## 2022-10-24 PROCEDURE — 36415 COLL VENOUS BLD VENIPUNCTURE: CPT

## 2022-10-24 PROCEDURE — 6360000004 HC RX CONTRAST MEDICATION: Performed by: NEUROLOGICAL SURGERY

## 2022-10-24 PROCEDURE — 2709999900 IR MYELOGRAM THORACIC

## 2022-10-24 PROCEDURE — 7100000010 HC PHASE II RECOVERY - FIRST 15 MIN

## 2022-10-24 PROCEDURE — 72129 CT CHEST SPINE W/DYE: CPT

## 2022-10-24 PROCEDURE — 85730 THROMBOPLASTIN TIME PARTIAL: CPT

## 2022-10-24 PROCEDURE — 85610 PROTHROMBIN TIME: CPT

## 2022-10-24 RX ORDER — ACETAMINOPHEN 325 MG/1
650 TABLET ORAL EVERY 4 HOURS PRN
Status: DISCONTINUED | OUTPATIENT
Start: 2022-10-24 | End: 2022-10-27 | Stop reason: HOSPADM

## 2022-10-24 RX ADMIN — IOPAMIDOL 15 ML: 408 INJECTION, SOLUTION INTRATHECAL at 12:29

## 2022-10-24 ASSESSMENT — PAIN - FUNCTIONAL ASSESSMENT: PAIN_FUNCTIONAL_ASSESSMENT: NONE - DENIES PAIN

## 2022-10-24 NOTE — DISCHARGE INSTRUCTIONS
POST MYELOGRAM INSTRUCTIONS    1. Do not lie flat until 12 hours after the myelogram.      2. Sleep with 2 pillows on the first night after the myelogram.    3. Do not drive on the day of the myelogram.    4. Drink at least 1 quart of fluids (milk, juice, or water) on the day of the myelogram.    5. If nausea or vomiting occurs, notify the physician who performed the myelogram.    6. Rest as much as possible until 24 hours after the myelogram.    7. If a headache occurs, use your usual headache remedies. 8. If the headache cannot be controlled by rest and available medications, notify the       Physician who performed the myelogram.    9.  Keep your follow up appointment.       [place] [address] [facility] [place], GO TO THE NEAREST EMERGENCY FACILITY    Phone:  Interventional Radiology  [phone] ( Dr Meenakshi Loja )  After hours Radiology  [phone]

## 2022-10-24 NOTE — H&P
HISTORY and [id number]       NAME:  Karen Foster  MRN: [id number]   YOB: 1955   Date: 10/24/2022   Age: 69 y.o. Gender: female       COMPLAINT AND PRESENT HISTORY:   Karen Foster is a 69 y.o.,  female, here today for IR CT Myelogram Thoracic. Patient has history of Radiculopathy of lumbar region. See progress notes from Dr. Malena Mcneil MD, orthopedic [facility] [place] [facility]  Ms. Shruthi Hunt is a 74year old female presenting today with complaints of low back pain with associated radiculopathy and numbness in her feet. Symptoms radiates into the anterolateral thighs. Pain is worse activity partially with rest. Is overall debilitating patient. She denies any weakness bowel bladder incontinence. She previously underwent a L4-5 microdiscectomy on 9/29/2011 with subsequent L3-5 laminectomy  She has been seen by Dr. Prieto Alatorre at [place] in [place] who recommended conservative treatment. T-score in 2019 was -2.0. She is taking Vitamin D3 and calcium. Pain Location: Back-Lower   Description: Aching   Duration Amount of Time: 4   Duration Units: Years   last 2 years is started getting worse     Above reviewed with patient and concurs     Patient reports history of chronic pain in the back. Neck pain has been as of 2 weeks ago. Pain has been present for the past 3 yrs ago. .      CT myelogram of the cervical lumbar spine in June 2021 showed mild degenerative changes cervical spine with no significant stenosis. Mild to moderate stenosis at the L2-3 and early rotational subluxation at L4-5  Pain is described as intermittent (9/10), with  radiation. Patient denies any weakness/numbness in the upper or lower extremities. Pain is worse with bending over more than 10 sec. \"No yard work \"Pain is improved with heat. She is taking celebrex and Lyrica. Significant medical history:   Hyperlipidemia, hypertension, carotid artery disease.      Patient voices feeling well today. Denies any recent fever/chills, chest pain, or shortness of breath. Activity level:  Functional Capacity per patient:              1. Patient is able to walk 2 city blocks on level ground without SOB. 2. Patient is able to climb 2 flights of stairs without SOB. Patient has been NPO since midnight. Had coffee this am No blood thinners in the past 5-7 (aspirin, krill oil ) days.  Patient took Prilosec, calicum, vitD this am    PAST MEDICAL HISTORY     Past Medical History:   Diagnosis Date    ADHD     mild, no RX    Arthritis     Blurred vision, right eye     Carotid artery disease ([facility] [address]     dr Robbin Fay vascular last appt 1/20    GERD (gastroesophageal reflux disease)     Hearing loss     High cholesterol     History of closed shoulder dislocation     rt from recent fall    [place] (hard of hearing)     [facility] hearing aides    Hx of gastric ulcer     Hyperlipidemia     Hypertension 2007    IBS (irritable bowel syndrome)     Macular pucker, right eye     Skin cancer of forehead     Syncope     hx recent falls    Tension headache     Wears dentures     lower bridge plate,1 tooth upper    Wears glasses        SURGICAL HISTORY       Past Surgical History:   Procedure Laterality Date    APPENDECTOMY      BACK SURGERY      CATARACT REMOVAL WITH IMPLANT Bilateral     CHOLECYSTECTOMY      COLONOSCOPY N/A 2016    ESOPHAGOGASTRIC FUNDOPLICATION      EYE SURGERY      FINGER SURGERY Left     4th finger    HIATAL HERNIA REPAIR  1999    x2    REVISION TOTAL KNEE ARTHROPLASTY Right     UPPER GASTROINTESTINAL ENDOSCOPY      UPPER GASTROINTESTINAL ENDOSCOPY N/A 10/26/2018    EGD BIOPSY AND DILITATION WITH BRUNO performed by Cedric Fu MD at [address] Right 03/10/2020    VITRECTOMY 23 GAUGE, MEMBRANE PEELING, GAS FLUID EXCHANGE, ICG     VITRECTOMY Right 3/10/2020    VITRECTOMY 23 GAUGE, MEMBRANE PEELING, AIR FLUID EXCHANGE, ICG performed by Kathy Arellano MD at [place] [address] HISTORY       Family History   Problem Relation Age of Onset    Cancer Father        SOCIAL HISTORY       Social History     Socioeconomic History    Marital status:      Spouse name: None    Number of children: None    Years of education: None    Highest education level: None   Tobacco Use    Smoking status: Former     Packs/day: 1.00     Years: 40.00     Pack years: 40.00     Types: Cigarettes     Quit date: 2018     Years since quittin.5    Smokeless tobacco: Never    Tobacco comments:     spoke with Pt to update the most she has smoked   Vaping Use    Vaping Use: Never used   Substance and Sexual Activity    Alcohol use: No    Drug use: No    Sexual activity: Not Currently     Social Determinants of Health     Financial Resource Strain: Low Risk     Difficulty of Paying Living Expenses: Not hard at all   Food Insecurity: No Food Insecurity    Worried About Running Out of Food in the Last Year: Never true    Ran Out of Food in the Last Year: Never true           REVIEW OF SYSTEMS      Allergies   Allergen Reactions    Morphine     Bee Venom        Current Outpatient Medications on File Prior to Encounter   Medication Sig Dispense Refill    celecoxib (CELEBREX) 100 MG capsule TAKE ONE CAPSULE BY MOUTH DAILY 90 capsule 1    omeprazole (PRILOSEC) 40 MG delayed release capsule TAKE ONE CAPSULE BY MOUTH TWICE A  capsule 1    amLODIPine (NORVASC) 10 MG tablet Take one tablet by mouth once nightly 90 tablet 1    umeclidinium-vilanterol (ANORO ELLIPTA) 62.5-25 MCG/INH AEPB inhaler Inhale 1 puff into the lungs daily 1 each 3    baclofen (LIORESAL) 10 MG tablet TAKE ONE TABLET BY MOUTH THREE TIMES A DAY AS NEEDED FOR PAIN 30 tablet 1    pregabalin (LYRICA) 25 MG capsule TAKE ONE CAPSULE BY MOUTH EVERY EVENING 90 capsule 0    meclizine (ANTIVERT) 25 MG tablet Take 1 tablet by mouth 3 times daily as needed for Dizziness 90 tablet 3    albuterol sulfate HFA (VENTOLIN HFA) 108 (90 Base) MCG/ACT inhaler Inhale 2 puffs into the lungs 4 times daily as needed for Wheezing 1 each 3    pravastatin (PRAVACHOL) 20 MG tablet TAKE ONE TABLET BY MOUTH DAILY 90 tablet 1    ipratropium (ATROVENT) 0.02 % nebulizer solution TAKE 1 VIAL (2.5 MILLILITERS) BY NEBULIZER FOUR TIMES A  mL 5    traZODone (DESYREL) 50 MG tablet Take 1 tablet by mouth daily (Patient not taking: Reported on 10/24/2022)      Probiotic Product (PROBIOTIC-10 PO) Take 1 capsule by mouth daily      aspirin 81 MG tablet Take 81 mg by mouth daily      OMEGA-3 KRILL OIL PO Take by mouth daily      calcium carbonate 600 MG TABS tablet Take 1 tablet by mouth 2 times daily      vitamin D 1000 units CAPS Take by mouth daily      sertraline (ZOLOFT) 100 MG tablet Take 200 mg by mouth nightly       vitamin E 1000 units capsule Take 1,000 Units by mouth daily       No current facility-administered medications on file prior to encounter. Negative except for what is mentioned in the HPI. GENERAL PHYSICAL EXAM     Vitals: /80   Pulse 75   Temp 97.1 °F (36.2 °C) (Infrared)   Resp 18   Ht 5' 4\" (1.626 m)   Wt 170 lb (77.1 kg)   SpO2 97%   BMI 29.18 kg/m²  Body mass index is 29.18 kg/m². GENERAL APPEARANCE:   Jovani Schaeffer is 69 y.o., female, moderately obese, nourished, conscious, alert. Does not appear to be distress or pain at this time. SKIN:  Warm, dry, no cyanosis or jaundice. HEAD:  Normocephalic, atraumatic, no swelling or tenderness. EYES:  Pupils equal, reactive to light. EARS:  No discharge, no marked hearing loss. NOSE:  No rhinorrhea, epistaxis or septal deformity. THROAT:  Not congested. No ulceration bleeding or discharge. NECK:  No stiffness, trachea central.  No palpable masses or L.N.                 CHEST:  Symmetrical and equal on expansion. HEART:  RRR . No audible murmurs or gallops. LUNGS:  Equal on expansion, normal breath sounds. No adventitious sounds. ABDOMEN:  Obese. Soft on palpation. No dysphagia, No localized tenderness. No guarding or rigidity. LYMPHATICS:  No palpable cervical lymphadenopathy. LOCOMOTOR, BACK AND SPINE:  No tenderness or deformities. EXTREMITIES:  Symmetrical, no pretibial edema. No discoloration or ulcerations. NEUROLOGIC:  The patient is conscious, alert, oriented,Cranial nerve II-XII intact, taste and smell were not examined. No apparent focal sensory or motor deficits.              PROVISIONAL DIAGNOSES / SURGERY:      IR thoracic myelogram     Radiculopathy of lumbar region    Patient Active Problem List    Diagnosis Date Noted    Chronic renal disease, stage III ([address] [627199] 08/17/2022    Centrilobular emphysema ([facility] [address] 05/04/2021    Chronic bilateral low back pain with right-sided sciatica 05/04/2021    Macular pucker, right 03/10/2020    Macular edema, cystoid, right 03/10/2020    Tubular adenoma of colon 03/12/2019    Esophageal spasm 03/08/2019    Gas bloat syndrome 10/30/2018    Paraesophageal hernia 10/30/2018    Epigastric abdominal pain 10/17/2018    Osteoarthritis of knee 06/21/2017    Gastro-esophageal reflux disease without esophagitis 07/17/2016    Allergic to bees 07/17/2016    Anxiety 07/17/2016    Depression 07/17/2016    Hyperlipidemia 07/17/2016    Hypertension 07/17/2016    Irritable bowel syndrome 07/17/2016    Osteopenia 07/17/2016    Primary hypertriglyceridemia 07/17/2016           ERICA Coley - CNP on 10/24/2022 at 11:13 AM

## 2022-10-24 NOTE — OP NOTE
Brief Postoperative Note    Eldon Enciso  YOB: 1955  [id number]    Pre-operative Diagnosis: back pain    Post-operative Diagnosis: Same    Procedure: thoracic myelogram    Anesthesia: Local     Surgeons/Assistants: Gurwinder Mujica MD     Estimated Blood Loss: minimal    Complications: none immediate    Specimens: were not obtained      Electronically signed by Gurwinder Mujica MD on 10/24/2022 at 12:22 PM

## 2022-10-25 SDOH — HEALTH STABILITY: PHYSICAL HEALTH: ON AVERAGE, HOW MANY DAYS PER WEEK DO YOU ENGAGE IN MODERATE TO STRENUOUS EXERCISE (LIKE A BRISK WALK)?: 0 DAYS

## 2022-10-25 ASSESSMENT — PATIENT HEALTH QUESTIONNAIRE - PHQ9
SUM OF ALL RESPONSES TO PHQ QUESTIONS 1-9: 5
9. THOUGHTS THAT YOU WOULD BE BETTER OFF DEAD, OR OF HURTING YOURSELF: 0
10. IF YOU CHECKED OFF ANY PROBLEMS, HOW DIFFICULT HAVE THESE PROBLEMS MADE IT FOR YOU TO DO YOUR WORK, TAKE CARE OF THINGS AT HOME, OR GET ALONG WITH OTHER PEOPLE: 0
2. FEELING DOWN, DEPRESSED OR HOPELESS: 1
SUM OF ALL RESPONSES TO PHQ QUESTIONS 1-9: 5
5. POOR APPETITE OR OVEREATING: 1
3. TROUBLE FALLING OR STAYING ASLEEP: 0
SUM OF ALL RESPONSES TO PHQ QUESTIONS 1-9: 5
SUM OF ALL RESPONSES TO PHQ9 QUESTIONS 1 & 2: 1
4. FEELING TIRED OR HAVING LITTLE ENERGY: 1
SUM OF ALL RESPONSES TO PHQ QUESTIONS 1-9: 5
6. FEELING BAD ABOUT YOURSELF - OR THAT YOU ARE A FAILURE OR HAVE LET YOURSELF OR YOUR FAMILY DOWN: 0
7. TROUBLE CONCENTRATING ON THINGS, SUCH AS READING THE NEWSPAPER OR WATCHING TELEVISION: 2
8. MOVING OR SPEAKING SO SLOWLY THAT OTHER PEOPLE COULD HAVE NOTICED. OR THE OPPOSITE, BEING SO FIGETY OR RESTLESS THAT YOU HAVE BEEN MOVING AROUND A LOT MORE THAN USUAL: 0
1. LITTLE INTEREST OR PLEASURE IN DOING THINGS: 0

## 2022-10-25 ASSESSMENT — LIFESTYLE VARIABLES
HOW OFTEN DO YOU HAVE SIX OR MORE DRINKS ON ONE OCCASION: 1
HOW OFTEN DO YOU HAVE A DRINK CONTAINING ALCOHOL: NEVER
HOW OFTEN DO YOU HAVE A DRINK CONTAINING ALCOHOL: 1
HOW MANY STANDARD DRINKS CONTAINING ALCOHOL DO YOU HAVE ON A TYPICAL DAY: PATIENT DOES NOT DRINK
HOW MANY STANDARD DRINKS CONTAINING ALCOHOL DO YOU HAVE ON A TYPICAL DAY: 0

## 2022-10-26 ENCOUNTER — OFFICE VISIT (OUTPATIENT)
Dept: FAMILY MEDICINE CLINIC | Age: 67
End: 2022-10-26
Payer: MEDICARE

## 2022-10-26 VITALS
WEIGHT: 162 LBS | SYSTOLIC BLOOD PRESSURE: 122 MMHG | OXYGEN SATURATION: 97 % | HEART RATE: 76 BPM | DIASTOLIC BLOOD PRESSURE: 70 MMHG | TEMPERATURE: 98.8 F | BODY MASS INDEX: 27.66 KG/M2 | HEIGHT: 64 IN

## 2022-10-26 DIAGNOSIS — F40.240 CLAUSTROPHOBIA: ICD-10-CM

## 2022-10-26 DIAGNOSIS — E78.1 PRIMARY HYPERTRIGLYCERIDEMIA: ICD-10-CM

## 2022-10-26 DIAGNOSIS — K21.9 GASTRO-ESOPHAGEAL REFLUX DISEASE WITHOUT ESOPHAGITIS: ICD-10-CM

## 2022-10-26 DIAGNOSIS — J43.2 CENTRILOBULAR EMPHYSEMA (HCC): ICD-10-CM

## 2022-10-26 DIAGNOSIS — M85.89 OSTEOPENIA OF MULTIPLE SITES: ICD-10-CM

## 2022-10-26 DIAGNOSIS — Z00.00 MEDICARE ANNUAL WELLNESS VISIT, SUBSEQUENT: Primary | ICD-10-CM

## 2022-10-26 DIAGNOSIS — M17.0 PRIMARY OSTEOARTHRITIS OF BOTH KNEES: ICD-10-CM

## 2022-10-26 PROCEDURE — 3074F SYST BP LT 130 MM HG: CPT | Performed by: INTERNAL MEDICINE

## 2022-10-26 PROCEDURE — 1123F ACP DISCUSS/DSCN MKR DOCD: CPT | Performed by: INTERNAL MEDICINE

## 2022-10-26 PROCEDURE — G0439 PPPS, SUBSEQ VISIT: HCPCS | Performed by: INTERNAL MEDICINE

## 2022-10-26 PROCEDURE — 3078F DIAST BP <80 MM HG: CPT | Performed by: INTERNAL MEDICINE

## 2022-10-26 PROCEDURE — 3017F COLORECTAL CA SCREEN DOC REV: CPT | Performed by: INTERNAL MEDICINE

## 2022-10-26 PROCEDURE — G8484 FLU IMMUNIZE NO ADMIN: HCPCS | Performed by: INTERNAL MEDICINE

## 2022-10-26 RX ORDER — ALENDRONATE SODIUM 70 MG/1
70 TABLET ORAL
Qty: 12 TABLET | Refills: 1 | Status: SHIPPED | OUTPATIENT
Start: 2022-10-26

## 2022-10-26 RX ORDER — DIAZEPAM 2 MG/1
TABLET ORAL
Qty: 2 TABLET | Refills: 1 | Status: SHIPPED | OUTPATIENT
Start: 2022-10-26 | End: 2022-11-02

## 2022-10-26 NOTE — PATIENT INSTRUCTIONS
Personalized Preventive Plan for Casandra Castro - 10/26/2022  Medicare offers a range of preventive health benefits. Some of the tests and screenings are paid in full while other may be subject to a deductible, co-insurance, and/or copay. Some of these benefits include a comprehensive review of your medical history including lifestyle, illnesses that may run in your family, and various assessments and screenings as appropriate. After reviewing your medical record and screening and assessments performed today your provider may have ordered immunizations, labs, imaging, and/or referrals for you. A list of these orders (if applicable) as well as your Preventive Care list are included within your After Visit Summary for your review. Other Preventive Recommendations:    A preventive eye exam performed by an eye specialist is recommended every 1-2 years to screen for glaucoma; cataracts, macular degeneration, and other eye disorders. A preventive dental visit is recommended every 6 months. Try to get at least 150 minutes of exercise per week or 10,000 steps per day on a pedometer . Order or download the FREE \"Exercise & Physical Activity: Your Everyday Guide\" from [employer]. Call 6-745.430.9740 or search [employer] online. You need 4454-4098 mg of calcium and 7529-5711 IU of vitamin D per day. It is possible to meet your calcium requirement with diet alone, but a vitamin D supplement is usually necessary to meet this goal.  When exposed to the sun, use a sunscreen that protects against both UVA and UVB radiation with an SPF of 30 or greater. Reapply every 2 to 3 hours or after sweating, drying off with a towel, or swimming. Always wear a seat belt when traveling in a car. Always wear a helmet when riding a bicycle or motorcycle.

## 2022-10-26 NOTE — PROGRESS NOTES
Medicare Annual Wellness Visit    Mario Alberto Kiran is here for Medicare AWV    Assessment & Plan   Medicare annual wellness visit, subsequent  Claustrophobia  -     diazePAM (VALIUM) 2 MG tablet; 1 tablet 2 hours before procedure. May repeat 1 dose after 90 minutes if not desired affect. No driving when taking this medication. , Disp-2 tablet, R-1Normal  Centrilobular emphysema (HCC)  Gastro-esophageal reflux disease without esophagitis  Primary hypertriglyceridemia  Primary osteoarthritis of both knees  Osteopenia of multiple sites  -     alendronate (FOSAMAX) 70 MG tablet; Take 1 tablet by mouth every 7 days, Disp-12 tablet, R-1Normal    Recommendations for Preventive Services Due: see orders and patient instructions/AVS.  Recommended screening schedule for the next 5-10 years is provided to the patient in written form: see Patient Instructions/AVS.     No follow-ups on file. Subjective   The following acute and/or chronic problems were also addressed today:  Back pain - she had the bladder stimulator removed. Will be getting two MRIs over the weekend, requesting pre-procedural valium to help her get through 45 min+ MRIs. [facility]. Hypertension-tolerating current regimen without chest pain, palpitations, dizziness, peripheral edema, dyspnea on exertion, orthopnea, paroxysmal nocturnal dyspnea. Hyperlipidemia-tolerating current regimen without myalgias, dyspepsia, jaundice. COPD:  Current treatment includes short-acting beta agonist inhaler, combined beta agonist/antichoinergic inhaler. Residual symptoms: chronic dyspnea. Denies any other symptoms. Requires rescue inhaler 2 time(s) per week. Mostly compliant with diet recommendations for low salt diet, tries to limit greasy/cheesy/fried foods, not very compliant with exercise recommendations.     Cardiovascular risk factors: advanced age (older than 54 for men, 72 for women), dyslipidemia, hypertension, and sedentary lifestyle      Patient's complete Health Risk Assessment and screening values have been reviewed and are found in Flowsheets. The following problems were reviewed today and where indicated follow up appointments were made and/or referrals ordered. Positive Risk Factor Screenings with Interventions:    Fall Risk:  Do you feel unsteady or are you worried about falling? : (!) yes  2 or more falls in past year?: no  Fall with injury in past year?: (!) yes   Fall Risk Interventions: Following with NSGY    Cognitive:   Words recalled: 2 Words Recalled  Total Score Interpretation: Abnormal Mini-Cog  Did the patient refuse to take the cognition test?: No  Cognitive Impairment Interventions:  Patient declines any further evaluation/treatment for cognitive impairment    Depression:  PHQ-2 Score: 1  PHQ-9 Total Score: 5    Severity:1-4 = minimal depression, 5-9 = mild depression, 10-14 = moderate depression, 15-19 = moderately severe depression, 20-27 = severe depression  Depression Interventions:  Patient declines any further evaluation/treatment for this issue          General Health and ACP:  General  In general, how would you say your health is?: Good  In the past 7 days, have you experienced any of the following: New or Increased Pain, New or Increased Fatigue, Loneliness, Social Isolation, Stress or Anger?: No  Do you get the social and emotional support that you need?: (!) No  Do you have a Living Will?: (!) No    Advance Directives       Power of  Living Will ACP-Advance Directive ACP-Power of Festus Esquivel on 09/21/21 Filed on 09/21/21 Filed [address] Risk Interventions:  Social isolation: due to pain, gets frustrated     Hearing/Vision:  Do you or your family notice any trouble with your hearing that hasn't been managed with hearing aids?: No  Do you have difficulty driving, watching TV, or doing any of your daily activities because of your eyesight?: (!) Yes  Have you had an eye exam within the past year?: Yes  No results found. Hearing/Vision Interventions:  Vision concerns:  patient encouraged to make appointment with his/her eye specialist            Objective   Vitals:    10/26/22 1532   BP: 122/70   Pulse: 76   Temp: 98.8 °F (37.1 °C)   TempSrc: Temporal   SpO2: 97%   Weight: 162 lb (73.5 kg)   Height: 5' 3.78\" (1.62 m)      Body mass index is 28 kg/m². General Appearance: alert and oriented to person, place and time, well developed and well- nourished, in no acute distress  Skin: warm and dry, no rash or erythema  Head: normocephalic and atraumatic  Eyes: pupils equal, round, and reactive to light, extraocular eye movements intact, conjunctivae normal  ENT: tympanic membrane, external ear and ear canal normal bilaterally, nose without deformity, nasal mucosa and turbinates normal without polyps  Neck: supple and non-tender without mass, no thyromegaly or thyroid nodules, no cervical lymphadenopathy  Pulmonary/Chest: clear to auscultation bilaterally- no wheezes, rales or rhonchi, normal air movement, no respiratory distress  Cardiovascular: normal rate, regular rhythm, normal S1 and S2, no murmurs, rubs, clicks, or gallops, distal pulses intact, no carotid bruits  Abdomen: soft, non-tender, non-distended, normal bowel sounds, no masses or organomegaly  Extremities: no cyanosis, clubbing or edema  Musculoskeletal: normal range of motion, no joint swelling, deformity or tenderness  Neurologic: reflexes normal and symmetric, no cranial nerve deficit, gait, coordination and speech normal       Allergies   Allergen Reactions    Morphine     Bee Venom      Prior to Visit Medications    Medication Sig Taking?  Authorizing Provider   celecoxib (CELEBREX) 100 MG capsule TAKE ONE CAPSULE BY MOUTH DAILY Yes ERICA Alfaro CNP   omeprazole (PRILOSEC) 40 MG delayed release capsule TAKE ONE CAPSULE BY MOUTH TWICE A DAY Yes ERICA Alfaro CNP   amLODIPine ([address]) 10 MG tablet Take one tablet by mouth once nightly Yes ERICA Grace CNP   umeclidinium-vilanterol (ANORO ELLIPTA) 62.5-25 MCG/INH AEPB inhaler Inhale 1 puff into the lungs daily Yes ERICA Grace CNP   baclofen (LIORESAL) 10 MG tablet TAKE ONE TABLET BY MOUTH THREE TIMES A DAY AS NEEDED FOR PAIN Yes Brando Fleming MD   pregabalin (LYRICA) 25 MG capsule TAKE ONE CAPSULE BY MOUTH EVERY EVENING Yes Brando Fleming MD   meclizine (ANTIVERT) 25 MG tablet Take 1 tablet by mouth 3 times daily as needed for Dizziness Yes Rosalia Woody MD   albuterol sulfate HFA (VENTOLIN HFA) 108 (90 Base) MCG/ACT inhaler Inhale 2 puffs into the lungs 4 times daily as needed for Wheezing Yes Brando Fleming MD   pravastatin (PRAVACHOL) 20 MG tablet TAKE ONE TABLET BY MOUTH DAILY Yes Rosalia Woody MD   ipratropium (ATROVENT) 0.02 % nebulizer solution TAKE 1 VIAL (2.5 MILLILITERS) BY NEBULIZER FOUR TIMES A DAY Yes ERICA Hernandez - NP   Probiotic Product (PROBIOTIC-10 PO) Take 1 capsule by mouth daily Yes Historical Provider, MD   aspirin 81 MG tablet Take 81 mg by mouth daily Yes Historical Provider, MD   OMEGA-3 KRILL OIL PO Take by mouth daily Yes Historical Provider, MD   calcium carbonate 600 MG TABS tablet Take 1 tablet by mouth 2 times daily Yes Historical Provider, MD   vitamin D 1000 units CAPS Take by mouth daily Yes Historical Provider, MD   sertraline (ZOLOFT) 100 MG tablet Take 200 mg by mouth nightly  Yes Historical Provider, MD   vitamin E 1000 units capsule Take 1,000 Units by mouth daily Yes Historical Provider, MD Nevarez (Including outside providers/suppliers regularly involved in providing care):   Patient Care Team:  Brando Fleming MD as PCP - General (Internal Medicine)  Brando Fleming MD as PCP - [facility] Empaneled Provider  Roque Rios MD as Consulting Physician (Gastroenterology)  Mary Beth Rojas as Consulting Physician (Obstetrics & Gynecology)  Jorge Busch DPM as Consulting Physician (Podiatry)  Jack Sarkar MD as Consulting Physician (Orthopedic Surgery)     Reviewed and updated this visit:  Tobacco  Allergies  Meds  Med Hx  Surg Hx  Soc Hx  Fam Hx

## 2022-10-28 ENCOUNTER — APPOINTMENT (OUTPATIENT)
Dept: MRI IMAGING | Age: 67
End: 2022-10-28
Payer: MEDICARE

## 2022-10-28 ENCOUNTER — HOSPITAL ENCOUNTER (OUTPATIENT)
Dept: MRI IMAGING | Age: 67
Discharge: HOME | End: 2022-10-30
Payer: MEDICARE

## 2022-10-28 DIAGNOSIS — M54.16 LUMBAR RADICULOPATHY: ICD-10-CM

## 2022-10-28 DIAGNOSIS — M54.16 RADICULOPATHY OF LUMBAR REGION: ICD-10-CM

## 2022-10-28 PROCEDURE — 72146 MRI CHEST SPINE W/O DYE: CPT

## 2022-10-28 PROCEDURE — 72148 MRI LUMBAR SPINE W/O DYE: CPT

## 2022-10-29 ENCOUNTER — HOSPITAL ENCOUNTER (OUTPATIENT)
Age: 67
Setting detail: SPECIMEN
Discharge: HOME | End: 2022-10-29

## 2022-10-29 ENCOUNTER — HOSPITAL ENCOUNTER (OUTPATIENT)
Dept: MRI IMAGING | Age: 67
Discharge: HOME | End: 2022-10-31
Payer: MEDICARE

## 2022-10-29 ENCOUNTER — OFFICE VISIT (OUTPATIENT)
Dept: FAMILY MEDICINE CLINIC | Age: 67
End: 2022-10-29
Payer: MEDICARE

## 2022-10-29 VITALS
BODY MASS INDEX: 28.68 KG/M2 | OXYGEN SATURATION: 97 % | WEIGHT: 168 LBS | HEART RATE: 77 BPM | SYSTOLIC BLOOD PRESSURE: 138 MMHG | DIASTOLIC BLOOD PRESSURE: 83 MMHG | HEIGHT: 64 IN

## 2022-10-29 DIAGNOSIS — M48.02 SPINAL STENOSIS IN CERVICAL REGION: ICD-10-CM

## 2022-10-29 DIAGNOSIS — N30.90 CYSTITIS: ICD-10-CM

## 2022-10-29 DIAGNOSIS — G45.8 OTHER TRANSIENT CEREBRAL ISCHEMIC ATTACKS AND RELATED SYNDROMES: ICD-10-CM

## 2022-10-29 DIAGNOSIS — N30.90 CYSTITIS: Primary | ICD-10-CM

## 2022-10-29 LAB
BILIRUBIN, POC: NEGATIVE
BLOOD URINE, POC: ABNORMAL
CLARITY, POC: ABNORMAL
COLOR, POC: ABNORMAL
GLUCOSE URINE, POC: ABNORMAL
KETONES, POC: NEGATIVE
LEUKOCYTE EST, POC: ABNORMAL
NITRITE, POC: POSITIVE
PH, POC: 5.5
PROTEIN, POC: ABNORMAL
SPECIFIC GRAVITY, POC: 1.02
UROBILINOGEN, POC: ABNORMAL

## 2022-10-29 PROCEDURE — G8484 FLU IMMUNIZE NO ADMIN: HCPCS | Performed by: FAMILY MEDICINE

## 2022-10-29 PROCEDURE — 72141 MRI NECK SPINE W/O DYE: CPT

## 2022-10-29 PROCEDURE — 70551 MRI BRAIN STEM W/O DYE: CPT

## 2022-10-29 PROCEDURE — G8417 CALC BMI ABV UP PARAM F/U: HCPCS | Performed by: FAMILY MEDICINE

## 2022-10-29 PROCEDURE — G8399 PT W/DXA RESULTS DOCUMENT: HCPCS | Performed by: FAMILY MEDICINE

## 2022-10-29 PROCEDURE — 3078F DIAST BP <80 MM HG: CPT | Performed by: FAMILY MEDICINE

## 2022-10-29 PROCEDURE — 3074F SYST BP LT 130 MM HG: CPT | Performed by: FAMILY MEDICINE

## 2022-10-29 PROCEDURE — 3017F COLORECTAL CA SCREEN DOC REV: CPT | Performed by: FAMILY MEDICINE

## 2022-10-29 PROCEDURE — G8427 DOCREV CUR MEDS BY ELIG CLIN: HCPCS | Performed by: FAMILY MEDICINE

## 2022-10-29 PROCEDURE — 1036F TOBACCO NON-USER: CPT | Performed by: FAMILY MEDICINE

## 2022-10-29 PROCEDURE — 1090F PRES/ABSN URINE INCON ASSESS: CPT | Performed by: FAMILY MEDICINE

## 2022-10-29 PROCEDURE — 1123F ACP DISCUSS/DSCN MKR DOCD: CPT | Performed by: FAMILY MEDICINE

## 2022-10-29 PROCEDURE — 99213 OFFICE O/P EST LOW 20 MIN: CPT | Performed by: FAMILY MEDICINE

## 2022-10-29 PROCEDURE — 81002 URINALYSIS NONAUTO W/O SCOPE: CPT | Performed by: FAMILY MEDICINE

## 2022-10-29 RX ORDER — NITROFURANTOIN 25; 75 MG/1; MG/1
100 CAPSULE ORAL 2 TIMES DAILY
Qty: 10 CAPSULE | Refills: 0 | Status: SHIPPED | OUTPATIENT
Start: 2022-10-29 | End: 2022-10-29

## 2022-10-29 RX ORDER — NITROFURANTOIN 25; 75 MG/1; MG/1
100 CAPSULE ORAL 2 TIMES DAILY
Qty: 14 CAPSULE | Refills: 0 | Status: SHIPPED | OUTPATIENT
Start: 2022-10-29 | End: 2022-11-05

## 2022-10-29 ASSESSMENT — ENCOUNTER SYMPTOMS: ABDOMINAL PAIN: 0

## 2022-10-29 NOTE — PROGRESS NOTES
MD Bart[facility] Mose[facility]  [address] [address]  Dept: [phone]    Jeff Torres is a 69 y.o. female who presents today for hermedical conditions/complaints as noted below.   Jeff Torres is here today c/o Urinary Tract Infection (Pt has been having burning pressure and has been having frequency X 3 days  )       HPI:     HPI    Patient presents to the walk-in clinic for evaluation of UTI symptoms  Endorses dysuria, urinary frequency, suprapubic discomfort that began 3 days ago  Today has been having some mild right flank discomfort  Denies nausea, vomiting, fever, visible hematuria, vaginal discharge      Patient Active Problem List   Diagnosis    Gastro-esophageal reflux disease without esophagitis    Epigastric abdominal pain    Allergic to bees    Anxiety    Depression    Esophageal spasm    Gas bloat syndrome    Hyperlipidemia    Hypertension    Irritable bowel syndrome    Osteoarthritis of knee    Osteopenia    Paraesophageal hernia    Primary hypertriglyceridemia    Tubular adenoma of colon    Macular pucker, right    Macular edema, cystoid, right    Centrilobular emphysema ([address]    Chronic bilateral low back pain with right-sided sciatica       Past Medical History:   Diagnosis Date    ADHD     mild, no RX    Arthritis     Blurred vision, right eye     Carotid artery disease ([address]     dr Gale Collins vascular last appt 1/20    GERD (gastroesophageal reflux disease)     Hearing loss     High cholesterol     History of closed shoulder dislocation     rt from recent fall    [place] (hard of hearing)     beto hearing aides    Hx of gastric ulcer     Hyperlipidemia     Hypertension 2007    IBS (irritable bowel syndrome)     Macular pucker, right eye     Skin cancer of forehead     Syncope     hx recent falls    Tension headache     Wears dentures     lower bridge plate,1 tooth upper    Wears amLODIPine (NORVASC) 10 MG tablet, Take one tablet by mouth once nightly, Disp: 90 tablet, Rfl: 1    umeclidinium-vilanterol (ANORO ELLIPTA) 62.5-25 MCG/INH AEPB inhaler, Inhale 1 puff into the lungs daily, Disp: 1 each, Rfl: 3    baclofen (LIORESAL) 10 MG tablet, TAKE ONE TABLET BY MOUTH THREE TIMES A DAY AS NEEDED FOR PAIN, Disp: 30 tablet, Rfl: 1    pregabalin (LYRICA) 25 MG capsule, TAKE ONE CAPSULE BY MOUTH EVERY EVENING, Disp: 90 capsule, Rfl: 0    meclizine (ANTIVERT) 25 MG tablet, Take 1 tablet by mouth 3 times daily as needed for Dizziness, Disp: 90 tablet, Rfl: 3    albuterol sulfate HFA (VENTOLIN HFA) 108 (90 Base) MCG/ACT inhaler, Inhale 2 puffs into the lungs 4 times daily as needed for Wheezing, Disp: 1 each, Rfl: 3    pravastatin (PRAVACHOL) 20 MG tablet, TAKE ONE TABLET BY MOUTH DAILY, Disp: 90 tablet, Rfl: 1    ipratropium (ATROVENT) 0.02 % nebulizer solution, TAKE 1 VIAL (2.5 MILLILITERS) BY NEBULIZER FOUR TIMES A DAY, Disp: 300 mL, Rfl: 5    Probiotic Product (PROBIOTIC-10 PO), Take 1 capsule by mouth daily, Disp: , Rfl:     aspirin 81 MG tablet, Take 81 mg by mouth daily, Disp: , Rfl:     OMEGA-3 KRILL OIL PO, Take by mouth daily, Disp: , Rfl:     calcium carbonate 600 MG TABS tablet, Take 1 tablet by mouth 2 times daily, Disp: , Rfl:     vitamin D 1000 units CAPS, Take by mouth daily, Disp: , Rfl:     sertraline (ZOLOFT) 100 MG tablet, Take 200 mg by mouth nightly , Disp: , Rfl:     vitamin E 1000 units capsule, Take 1,000 Units by mouth daily, Disp: , Rfl:     Subjective:     Review of Systems   Gastrointestinal:  Negative for abdominal pain. Genitourinary:  Positive for dysuria, flank pain and frequency. Negative for genital sores. Objective:     /83 (Site: Left Upper Arm, Position: Sitting, Cuff Size: Large Adult)   Pulse 77   Ht 5' 4\" (1.626 m)   Wt 168 lb (76.2 kg)   SpO2 97%   BMI 28.84 kg/m²     Physical Exam  Constitutional:       Appearance: Normal appearance.  She is well-developed. She is not ill-appearing, toxic-appearing or diaphoretic. HENT:      Head: Normocephalic. Eyes:      General:         Right eye: No discharge. Left eye: No discharge. Conjunctiva/sclera: Conjunctivae normal.   Cardiovascular:      Rate and Rhythm: Normal rate and regular rhythm. Heart sounds: Normal heart sounds. No murmur heard. Pulmonary:      Effort: Pulmonary effort is normal. No respiratory distress. Breath sounds: Normal breath sounds. No wheezing. Abdominal:      Palpations: Abdomen is soft. Tenderness: There is no abdominal tenderness. There is no right CVA tenderness, left CVA tenderness or guarding. Neurological:      Mental Status: She is alert. Psychiatric:         Behavior: Behavior normal.         Thought Content: Thought content normal.         Judgment: Judgment normal.       Assessment & Plan:      1. Cystitis  Urine sent for culture  Recommended starting antibiotics as prescribed  Recommended increasing fluid intake  Recommended repeat urinalysis with PCP to ensure resolution of hematuria seen on urine dip today  Educated on red flags  Call with concerns or worsening symptoms  - Culture, Urine; Future  - POCT Urinalysis no Micro  - nitrofurantoin, macrocrystal-monohydrate, (MACROBID) 100 MG capsule; Take 1 capsule by mouth 2 times daily for 7 days  Dispense: 14 capsule; Refill: 0        Call or return to clinic prn if these symptoms worsen or fail to improve as anticipated. I have reviewed the instructions with the patient, answering all questions to their satisfaction.     Electronically signed by Diana Reddy MD on 10/29/2022 at 3:48 PM

## 2022-10-31 LAB
CULTURE: ABNORMAL
SPECIMEN DESCRIPTION: ABNORMAL

## 2022-11-11 ENCOUNTER — HOSPITAL ENCOUNTER (OUTPATIENT)
Dept: CT IMAGING | Age: 67
Discharge: HOME | End: 2022-11-13
Payer: MEDICARE

## 2022-11-11 VITALS — WEIGHT: 167.44 LBS | BODY MASS INDEX: 27.9 KG/M2 | HEIGHT: 65 IN

## 2022-11-11 DIAGNOSIS — Z87.891 PERSONAL HISTORY OF NICOTINE DEPENDENCE: ICD-10-CM

## 2022-11-11 PROCEDURE — 71271 CT THORAX LUNG CANCER SCR C-: CPT

## 2022-11-16 NOTE — RESULT ENCOUNTER NOTE
Pt notified via [employer]-  CT lung screen did not show any masses or nodules, repeat in 12 months. Perisistent right posterior lung ground glass opacity that may reflect atelectasis given it was seen a year ago. Please call the office with any questions.      - AS

## 2022-11-25 DIAGNOSIS — M54.41 CHRONIC BILATERAL LOW BACK PAIN WITH RIGHT-SIDED SCIATICA: ICD-10-CM

## 2022-11-25 DIAGNOSIS — G89.29 CHRONIC BILATERAL LOW BACK PAIN WITH RIGHT-SIDED SCIATICA: ICD-10-CM

## 2022-11-25 RX ORDER — PREGABALIN 25 MG/1
CAPSULE ORAL
Qty: 90 CAPSULE | Refills: 0 | Status: SHIPPED | OUTPATIENT
Start: 2022-11-25 | End: 2023-02-23

## 2022-11-25 NOTE — TELEPHONE ENCOUNTER
Last visit: 10/26/2022  Last Med refill: 08/29/2022  Does patient have enough medication for 72 hours: Yes    Next Visit Date:  Future Appointments   Date Time Provider Bekah Daxa   12/5/2022  2:45 PM Elida Liao MD [facility] [address] Maintenance   Topic Date Due    Shingles vaccine (2 of 3) 05/17/2025 (Originally 11/20/2016)    DTaP/Tdap/Td vaccine (1 - Tdap) 09/09/2030 (Originally 9/10/2020)    Breast cancer screen  07/19/2023    A1C test (Diabetic or Prediabetic)  08/23/2023    Lipids  08/23/2023    Depression Monitoring  10/25/2023    Annual Wellness Visit (AWV)  10/27/2023    Low dose CT lung screening  11/11/2023    Colorectal Cancer Screen  09/06/2027    DEXA (modify frequency per FRAX score)  Completed    Flu vaccine  Completed    Pneumococcal 65+ years Vaccine  Completed    COVID-19 Vaccine  Completed    Hepatitis C screen  Completed    Hepatitis A vaccine  Aged Out    Hib vaccine  Aged Out    Meningococcal (ACWY) vaccine  Aged Out       Hemoglobin A1C (%)   Date Value   08/23/2022 5.8             ( goal A1C is < 7)   No results found for: LABMICR  LDL Cholesterol (mg/dL)   Date Value   08/23/2022 130   10/26/2021 128       (goal LDL is <100)   AST (U/L)   Date Value   10/26/2021 25     ALT (U/L)   Date Value   10/26/2021 22     BUN (mg/dL)   Date Value   10/26/2021 19     BP Readings from Last 3 Encounters:   10/29/22 138/83   10/24/22 119/67   10/26/22 122/70          (goal 120/80)    All Future Testing planned in [facility]  Lab Frequency Next Occurrence   VL DUP CAROTID BILATERAL Once 12/01/2022   TSH With Reflex Ft4 Once 10/26/2022   Comprehensive Metabolic Panel Once 31/70/8034               Patient Active Problem List:     Gastro-esophageal reflux disease without esophagitis     Epigastric abdominal pain     Allergic to bees     Anxiety     Depression     Esophageal spasm     Gas bloat syndrome     Hyperlipidemia     Hypertension     Irritable bowel syndrome Osteoarthritis of knee     Osteopenia     Paraesophageal hernia     Primary hypertriglyceridemia     Tubular adenoma of colon     Macular pucker, right     Macular edema, cystoid, right     Centrilobular emphysema ([facility])     Chronic bilateral low back pain with right-sided sciatica

## 2022-11-29 DIAGNOSIS — I65.23 BILATERAL CAROTID ARTERY STENOSIS: Primary | ICD-10-CM

## 2022-12-02 ENCOUNTER — TELEPHONE (OUTPATIENT)
Dept: VASCULAR SURGERY | Age: 67
End: 2022-12-02

## 2022-12-02 NOTE — TELEPHONE ENCOUNTER
LVM for the patient letting her know that Doctor Bebe Haines is no longer in office on mondays and to call the office to tung

## 2022-12-05 DIAGNOSIS — I65.23 BILATERAL CAROTID ARTERY STENOSIS: Primary | ICD-10-CM

## 2022-12-09 ENCOUNTER — HOSPITAL ENCOUNTER (OUTPATIENT)
Dept: VASCULAR LAB | Age: 67
Discharge: HOME | End: 2022-12-09
Payer: MEDICARE

## 2022-12-09 DIAGNOSIS — I65.23 BILATERAL CAROTID ARTERY STENOSIS: ICD-10-CM

## 2022-12-09 PROCEDURE — 93880 EXTRACRANIAL BILAT STUDY: CPT

## 2023-01-05 ENCOUNTER — OFFICE VISIT (OUTPATIENT)
Dept: VASCULAR SURGERY | Age: 68
End: 2023-01-05

## 2023-01-05 VITALS
HEIGHT: 64 IN | TEMPERATURE: 98 F | SYSTOLIC BLOOD PRESSURE: 135 MMHG | HEART RATE: 73 BPM | WEIGHT: 168 LBS | RESPIRATION RATE: 17 BRPM | BODY MASS INDEX: 28.68 KG/M2 | DIASTOLIC BLOOD PRESSURE: 81 MMHG | OXYGEN SATURATION: 95 %

## 2023-01-05 DIAGNOSIS — I65.23 CAROTID STENOSIS, ASYMPTOMATIC, BILATERAL: Primary | ICD-10-CM

## 2023-01-05 DIAGNOSIS — G62.9 NEUROPATHY: ICD-10-CM
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
## 2023-02-13 DIAGNOSIS — E78.2 MIXED HYPERLIPIDEMIA: ICD-10-CM

## 2023-02-13 RX ORDER — PRAVASTATIN SODIUM 20 MG
TABLET ORAL
Qty: 90 TABLET | Refills: 1 | Status: SHIPPED | OUTPATIENT
Start: 2023-02-13

## 2023-02-13 NOTE — TELEPHONE ENCOUNTER
Reggie Suazo is calling to request a refill on the following medication(s):    Medication Request:  Requested Prescriptions     Pending Prescriptions Disp Refills    pravastatin (PRAVACHOL) 20 MG tablet [Pharmacy Med Name: PRAVASTATIN SODIUM 20 MG TAB] 90 tablet 1     Sig: TAKE ONE TABLET BY MOUTH DAILY       Last Visit Date (If Applicable):  23/62/5254    Next Visit Date:    10/30/2023

## 2023-03-02 ENCOUNTER — OFFICE VISIT (OUTPATIENT)
Dept: FAMILY MEDICINE CLINIC | Age: 68
End: 2023-03-02

## 2023-03-02 ENCOUNTER — HOSPITAL ENCOUNTER (OUTPATIENT)
Age: 68
Setting detail: SPECIMEN
Discharge: HOME | End: 2023-03-02

## 2023-03-02 VITALS
OXYGEN SATURATION: 97 % | SYSTOLIC BLOOD PRESSURE: 122 MMHG | DIASTOLIC BLOOD PRESSURE: 69 MMHG | HEART RATE: 80 BPM | TEMPERATURE: 97.4 F

## 2023-03-02 DIAGNOSIS — N30.90 CYSTITIS: Primary | ICD-10-CM

## 2023-03-02 DIAGNOSIS — R35.0 URINARY FREQUENCY: ICD-10-CM

## 2023-03-02 DIAGNOSIS — R30.0 DYSURIA: ICD-10-CM

## 2023-03-02 DIAGNOSIS — R10.2 SUPRAPUBIC PRESSURE: ICD-10-CM

## 2023-03-02 RX ORDER — PHENAZOPYRIDINE HYDROCHLORIDE 200 MG/1
200 TABLET, FILM COATED ORAL 3 TIMES DAILY PRN
Qty: 6 TABLET | Refills: 0 | Status: SHIPPED | OUTPATIENT
Start: 2023-03-02 | End: 2023-03-04

## 2023-03-02 RX ORDER — CEPHALEXIN 500 MG/1
500 CAPSULE ORAL 3 TIMES DAILY
Qty: 21 CAPSULE | Refills: 0 | Status: SHIPPED | OUTPATIENT
Start: 2023-03-02 | End: 2023-03-09

## 2023-03-02 ASSESSMENT — ENCOUNTER SYMPTOMS
NAUSEA: 0
VOMITING: 0
RESPIRATORY NEGATIVE: 1
BACK PAIN: 1
ABDOMINAL PAIN: 0

## 2023-03-02 NOTE — PATIENT INSTRUCTIONS
Will call with results. Finish the entire course of antibiotic treatment. Push oral hydration and avoid bladder irritants. May use Pyridium as needed for pain/discomfort. Follow-up if worsening or no improvement.

## 2023-03-02 NOTE — PROGRESS NOTES
[facility] WALK-IN FAMILY MEDICINE  [address]  [facility] [address]    [facility]-IN FAMILY MEDICINE  [address]  [facility] [address]  Dept: [phone]    Angela Hutchinson is a 67 y.o. female Established patient, who presents to the walk-in clinic today with conditions/complaints as noted below:    Chief Complaint   Patient presents with    Urinary Frequency    Urinary Urgency    Lower Back Pain    Dysuria     Burning sensation onset 4 days,          HPI:     Patient is a 67-year-old female that presents today with concerns for UTI. Her symptoms started approximately four days ago and seemed to worsen last night. Reports burning with urination, urinary frequency, urgency, suprapubic pressure, and low back pain. PMH is significant for chronic bilateral low back pain. Denies any injury or trauma. She has been taking AZO with mild relief. Denies any fevers, chills, abdominal pain, nausea, vomiting, or hematuria.      Past Medical History:   Diagnosis Date    ADHD     mild, no RX    Arthritis     Blurred vision, right eye     Carotid artery disease (HCC)     dr miller vascular last appt 1/20    GERD (gastroesophageal reflux disease)     Hearing loss     High cholesterol     History of closed shoulder dislocation     rt from recent fall    Belk[place]ski (hard of hearing)     [facility] hearing aides    Hx of gastric ulcer     Hyperlipidemia     Hypertension 2007    IBS (irritable bowel syndrome)     Macular pucker, right eye     Skin cancer of forehead     Syncope     hx recent falls    Tension headache     Wears dentures     lower bridge plate,1 tooth upper    Wears glasses        Current Outpatient Medications   Medication Sig Dispense Refill    cephALEXin (KEFLEX) 500 MG capsule Take 1 capsule by mouth 3 times daily for 7 days 21 capsule 0    phenazopyridine (PYRIDIUM) 200 MG  tablet Take 1 tablet by mouth 3 times daily as needed for Pain 6 tablet 0    pravastatin (PRAVACHOL) 20 MG tablet TAKE ONE TABLET BY MOUTH DAILY 90 tablet 1    celecoxib (CELEBREX) 100 MG capsule TAKE ONE CAPSULE BY MOUTH DAILY 90 capsule 1    omeprazole (PRILOSEC) 40 MG delayed release capsule TAKE ONE CAPSULE BY MOUTH TWICE A  capsule 1    amLODIPine (NORVASC) 10 MG tablet Take one tablet by mouth once nightly 90 tablet 1    umeclidinium-vilanterol (ANORO ELLIPTA) 62.5-25 MCG/INH AEPB inhaler Inhale 1 puff into the lungs daily 1 each 3    baclofen (LIORESAL) 10 MG tablet TAKE ONE TABLET BY MOUTH THREE TIMES A DAY AS NEEDED FOR PAIN 30 tablet 1    meclizine (ANTIVERT) 25 MG tablet Take 1 tablet by mouth 3 times daily as needed for Dizziness 90 tablet 3    albuterol sulfate HFA (VENTOLIN HFA) 108 (90 Base) MCG/ACT inhaler Inhale 2 puffs into the lungs 4 times daily as needed for Wheezing 1 each 3    ipratropium (ATROVENT) 0.02 % nebulizer solution TAKE 1 VIAL (2.5 MILLILITERS) BY NEBULIZER FOUR TIMES A  mL 5    Probiotic Product (PROBIOTIC-10 PO) Take 1 capsule by mouth daily      aspirin 81 MG tablet Take 81 mg by mouth daily      OMEGA-3 KRILL OIL PO Take by mouth daily      calcium carbonate 600 MG TABS tablet Take 1 tablet by mouth 2 times daily      vitamin D 1000 units CAPS Take by mouth daily      sertraline (ZOLOFT) 100 MG tablet Take 200 mg by mouth nightly       vitamin E 1000 units capsule Take 1,000 Units by mouth daily      pregabalin (LYRICA) 25 MG capsule TAKE ONE CAPSULE BY MOUTH EVERY EVENING 90 capsule 0    alendronate (FOSAMAX) 70 MG tablet Take 1 tablet by mouth every 7 days (Patient not taking: Reported on 3/2/2023) 12 tablet 1     No current facility-administered medications for this visit. Allergies   Allergen Reactions    Morphine     Bee Venom        :     Review of Systems   Constitutional:  Negative for chills and fever. Respiratory: Negative.      Cardiovascular: Negative. Gastrointestinal:  Negative for abdominal pain, nausea and vomiting. Genitourinary:  Positive for dysuria, frequency and urgency. Negative for hematuria. Musculoskeletal:  Positive for back pain (lower). Skin:  Negative for rash. Neurological: Negative.      :     /69 (Site: Left Upper Arm, Position: Sitting, Cuff Size: Medium Adult)   Pulse 80   Temp 97.4 °F (36.3 °C) (Infrared)   SpO2 97%     Physical Exam  Vitals reviewed. Constitutional:       General: She is not in acute distress. Appearance: Normal appearance. HENT:      Head: Normocephalic and atraumatic. Cardiovascular:      Rate and Rhythm: Normal rate and regular rhythm. Heart sounds: Normal heart sounds. Pulmonary:      Effort: Pulmonary effort is normal.      Breath sounds: Normal breath sounds. Abdominal:      General: Bowel sounds are normal. There is no distension. Palpations: Abdomen is soft. Tenderness: There is abdominal tenderness in the suprapubic area. There is no right CVA tenderness or left CVA tenderness. Musculoskeletal:         General: Normal range of motion. Cervical back: Neck supple. Lumbar back: No swelling, deformity or bony tenderness. Skin:     General: Skin is warm and dry. Neurological:      Mental Status: She is alert and oriented to person, place, and time. Psychiatric:         Mood and Affect: Mood normal.         Behavior: Behavior normal.         :          1. Cystitis  -     cephALEXin (KEFLEX) 500 MG capsule; Take 1 capsule by mouth 3 times daily for 7 days, Disp-21 capsule, R-0Normal  2. Dysuria  -     Urinalysis with Reflex to Culture; Future  -     phenazopyridine (PYRIDIUM) 200 MG tablet; Take 1 tablet by mouth 3 times daily as needed for Pain, Disp-6 tablet, R-0Normal  3. Urinary frequency  -     Urinalysis with Reflex to Culture; Future  4. Suprapubic pressure  -     Urinalysis with Reflex to Culture;  Future     :      Return if symptoms worsen or fail to improve. Orders Placed This Encounter   Medications    cephALEXin (KEFLEX) 500 MG capsule     Sig: Take 1 capsule by mouth 3 times daily for 7 days     Dispense:  21 capsule     Refill:  0    phenazopyridine (PYRIDIUM) 200 MG tablet     Sig: Take 1 tablet by mouth 3 times daily as needed for Pain     Dispense:  6 tablet     Refill:  0      Sending urine for UA w/ reflex to culture due to AZO use. Will call with results. Will treat based off of symptoms. Instructed to finish the entire course of antibiotic treatment. Push oral hydration and avoid bladder irritants. May use Pyridium as needed for burning/discomfort. Go to the ER immediately for fevers, abdominal pain, vomiting, flank pain, or other concerning symptoms. Follow-up if worsening or no improvement. Patient and/or parent given educational materials - see patient instructions. Discussed use, benefit, and side effects of prescribed medications. All patient questions answered. Patient and/or parent voiced understanding.       Electronically signed by ERICA Johnson 3/2/2023 at 7:31 PM

## 2023-03-03 DIAGNOSIS — R30.0 DYSURIA: ICD-10-CM

## 2023-03-03 DIAGNOSIS — R10.2 SUPRAPUBIC PRESSURE: ICD-10-CM

## 2023-03-03 DIAGNOSIS — R35.0 URINARY FREQUENCY: ICD-10-CM

## 2023-03-03 LAB
BACTERIA: ABNORMAL
BILIRUBIN URINE: ABNORMAL
CASTS UA: ABNORMAL /LPF (ref 0–2)
CASTS UA: ABNORMAL /LPF (ref 0–2)
COLOR: ABNORMAL
COMMENT UA: ABNORMAL
CRYSTALS, UA: ABNORMAL /HPF
CRYSTALS, UA: ABNORMAL /HPF
EPITHELIAL CELLS UA: ABNORMAL /HPF (ref 0–5)
GLUCOSE UR STRIP.AUTO-MCNC: NEGATIVE MG/DL
KETONES UR STRIP.AUTO-MCNC: ABNORMAL MG/DL
LEUKOCYTE ESTERASE UR QL STRIP.AUTO: ABNORMAL
NITRITE UR QL STRIP.AUTO: POSITIVE
PROT UR STRIP.AUTO-MCNC: 5.5 MG/DL (ref 5–8)
PROT UR STRIP.AUTO-MCNC: ABNORMAL MG/DL
RBC CLUMPS #/AREA URNS AUTO: ABNORMAL /HPF (ref 0–2)
SPECIFIC GRAVITY UA: 1.03 (ref 1–1.03)
TURBIDITY: ABNORMAL
URINE HGB: ABNORMAL
UROBILINOGEN, URINE: NORMAL
WBC UA: ABNORMAL /HPF (ref 0–5)

## 2023-03-04 LAB
MICROORGANISM SPEC CULT: ABNORMAL
SPECIMEN DESCRIPTION: ABNORMAL

## 2023-03-12 PROBLEM — I65.23 CAROTID STENOSIS, ASYMPTOMATIC, BILATERAL: Status: ACTIVE | Noted: 2023-03-12

## 2023-03-12 PROBLEM — G62.9 NEUROPATHY: Status: ACTIVE | Noted: 2023-03-12

## 2023-03-12 ASSESSMENT — ENCOUNTER SYMPTOMS
CHEST TIGHTNESS: 0
SHORTNESS OF BREATH: 0
ALLERGIC/IMMUNOLOGIC NEGATIVE: 1
BACK PAIN: 1
ABDOMINAL PAIN: 0
COLOR CHANGE: 0

## 2023-04-02 ENCOUNTER — OFFICE VISIT (OUTPATIENT)
Dept: FAMILY MEDICINE CLINIC | Age: 68
End: 2023-04-02

## 2023-04-02 ENCOUNTER — HOSPITAL ENCOUNTER (OUTPATIENT)
Age: 68
Setting detail: SPECIMEN
Discharge: HOME | End: 2023-04-02

## 2023-04-02 VITALS
SYSTOLIC BLOOD PRESSURE: 117 MMHG | TEMPERATURE: 98.2 F | DIASTOLIC BLOOD PRESSURE: 61 MMHG | HEART RATE: 88 BPM | OXYGEN SATURATION: 96 %

## 2023-04-02 DIAGNOSIS — N30.01 ACUTE CYSTITIS WITH HEMATURIA: Primary | ICD-10-CM

## 2023-04-02 DIAGNOSIS — R35.0 URINARY FREQUENCY: ICD-10-CM

## 2023-04-02 LAB
BILIRUBIN, POC: ABNORMAL
BLOOD URINE, POC: ABNORMAL
CLARITY, POC: ABNORMAL
COLOR, POC: ABNORMAL
GLUCOSE URINE, POC: ABNORMAL
KETONES, POC: ABNORMAL
LEUKOCYTE EST, POC: ABNORMAL
NITRITE, POC: POSITIVE
PH, POC: 6
PROTEIN, POC: 100
SPECIFIC GRAVITY, POC: 1.02
UROBILINOGEN, POC: 0.2

## 2023-04-02 RX ORDER — TRIAMCINOLONE ACETONIDE 0.25 MG/G
CREAM TOPICAL
COMMUNITY
Start: 2023-03-16

## 2023-04-02 RX ORDER — PHENAZOPYRIDINE HYDROCHLORIDE 200 MG/1
200 TABLET, FILM COATED ORAL 3 TIMES DAILY
Qty: 6 TABLET | Refills: 0 | Status: SHIPPED | OUTPATIENT
Start: 2023-04-02 | End: 2023-04-04

## 2023-04-02 RX ORDER — CIPROFLOXACIN 500 MG/1
500 TABLET, FILM COATED ORAL 2 TIMES DAILY
Qty: 20 TABLET | Refills: 0 | Status: SHIPPED | OUTPATIENT
Start: 2023-04-02 | End: 2023-04-12

## 2023-04-02 ASSESSMENT — ENCOUNTER SYMPTOMS
NAUSEA: 0
VOMITING: 0

## 2023-04-04 LAB
MICROORGANISM SPEC CULT: ABNORMAL
SPECIMEN DESCRIPTION: ABNORMAL

## 2023-04-05 ENCOUNTER — OFFICE VISIT (OUTPATIENT)
Dept: NEUROLOGY | Age: 68
End: 2023-04-05
Payer: MEDICARE

## 2023-04-05 VITALS
HEIGHT: 64 IN | WEIGHT: 170.8 LBS | HEART RATE: 81 BPM | BODY MASS INDEX: 29.16 KG/M2 | DIASTOLIC BLOOD PRESSURE: 76 MMHG | SYSTOLIC BLOOD PRESSURE: 120 MMHG

## 2023-04-05 DIAGNOSIS — M54.16 LUMBAR RADICULOPATHY: Primary | ICD-10-CM

## 2023-04-05 PROCEDURE — 1036F TOBACCO NON-USER: CPT | Performed by: PSYCHIATRY & NEUROLOGY

## 2023-04-05 PROCEDURE — 3017F COLORECTAL CA SCREEN DOC REV: CPT | Performed by: PSYCHIATRY & NEUROLOGY

## 2023-04-05 PROCEDURE — 3078F DIAST BP <80 MM HG: CPT | Performed by: PSYCHIATRY & NEUROLOGY

## 2023-04-05 PROCEDURE — 99214 OFFICE O/P EST MOD 30 MIN: CPT | Performed by: PSYCHIATRY & NEUROLOGY

## 2023-04-05 PROCEDURE — G8399 PT W/DXA RESULTS DOCUMENT: HCPCS | Performed by: PSYCHIATRY & NEUROLOGY

## 2023-04-05 PROCEDURE — 1123F ACP DISCUSS/DSCN MKR DOCD: CPT | Performed by: PSYCHIATRY & NEUROLOGY

## 2023-04-05 PROCEDURE — 3074F SYST BP LT 130 MM HG: CPT | Performed by: PSYCHIATRY & NEUROLOGY

## 2023-04-05 PROCEDURE — G8427 DOCREV CUR MEDS BY ELIG CLIN: HCPCS | Performed by: PSYCHIATRY & NEUROLOGY

## 2023-04-05 PROCEDURE — 1090F PRES/ABSN URINE INCON ASSESS: CPT | Performed by: PSYCHIATRY & NEUROLOGY

## 2023-04-05 PROCEDURE — G8417 CALC BMI ABV UP PARAM F/U: HCPCS | Performed by: PSYCHIATRY & NEUROLOGY

## 2023-04-05 RX ORDER — DULOXETIN HYDROCHLORIDE 30 MG/1
30 CAPSULE, DELAYED RELEASE ORAL DAILY
Qty: 30 CAPSULE | Refills: 3 | Status: SHIPPED | OUTPATIENT
Start: 2023-04-05

## 2023-04-05 NOTE — PROGRESS NOTES
experiences sharp, stabbing pains in her knees. She is unsure why she experiences these, as her X-Rays following a right knee replacement are normal. She does state that she has days where she does not experience weakness in her legs, however the pain is constant. We discussed re-initiating physical therapy, attempting pain management, and initiating Cymbalta. Patient is agreeable to this. She would prefer to maintain her current dose of Lyrica due to unwanted drowsiness. Patient additionally experiences tremors that interfere with her ADLs. She does have a family history of tremors, therefore it is not suggestive of parkinson's. We discussed medications such as primidone, and will keep this an option for the future. Neurological workup:  CT Thoracic Spine W Contrast 10/25/22: Mild thoracic spondylotic changes. Disc bulging T9-T10, with mild mass effect on the ventral aspect of the thecal sac. MRI Cervical Spine WO Contrast 10/31/22: Multilevel degenerative disc disease with uncovertebral and facet hypertrophy with canal stenosis at C3-4. Foraminal narrowing as described above  MRI Brain WO Contrast 10/29/22: No acute stroke, intracranial hemorrhage or mass effect. Mild chronic small vessel ischemic disease. MRI Lumbar spine WO Contrast 10/29/22: Postsurgical changes of L3-L4 and L4-L5 posterior decompression. Grade 1 anterolisthesis of L4 on L5. Moderate L2-L3 spinal canal stenosis. Multilevel neural foraminal narrowing is most pronounced and severe at L4-L5 on the left. Asymmetric to the right L2-L3 disc bulge may abut the exiting right L2 nerve roots, correlate for radicular symptoms in the right L2 distribution  MRI Thoracic Spine WO Contrast 10/29/22: Mild T9-T10 spinal canal stenosis secondary to a central disc protrusion. No severe thoracic spinal canal stenosis or cord compression. No evidence of myelopathy.   EMG 12/1/2020: Chronic L4-L5 radiculopathy  Past Medical History:   Diagnosis Date

## 2023-04-16 DIAGNOSIS — K21.9 GASTRO-ESOPHAGEAL REFLUX DISEASE WITHOUT ESOPHAGITIS: ICD-10-CM

## 2023-04-17 RX ORDER — OMEPRAZOLE 40 MG/1
CAPSULE, DELAYED RELEASE ORAL
Qty: 180 CAPSULE | Refills: 1 | Status: SHIPPED | OUTPATIENT
Start: 2023-04-17

## 2023-04-17 NOTE — TELEPHONE ENCOUNTER
Last visit: 10/26/2022  Last Med refill: 01/17/2023  Does patient have enough medication for 72 hours: No: \    Next Visit Date:  Future Appointments   Date Time Provider Bekah Mittal   6/27/2023 12:40 PM Libby Casey MD Neuro Spec Neurology -   10/30/2023  3:00 PM Rosalia Hairston  [address] [facility]   Topic Date Due    Shingles vaccine (2 of 3) 05/17/2025 (Originally 11/20/2016)    DTaP/Tdap/Td vaccine (1 - Tdap) 09/09/2030 (Originally 9/10/2020)    Breast cancer screen  07/19/2023    A1C test (Diabetic or Prediabetic)  08/23/2023    Lipids  08/23/2023    Depression Monitoring  10/25/2023    Annual Wellness Visit (AWV)  10/27/2023    Low dose CT lung screening  11/11/2023    Colorectal Cancer Screen  09/06/2027    DEXA (modify frequency per FRAX score)  Completed    Flu vaccine  Completed    Pneumococcal 65+ years Vaccine  Completed    COVID-19 Vaccine  Completed    Hepatitis C screen  Completed    Hepatitis A vaccine  Aged Out    Hib vaccine  Aged Out    Meningococcal (ACWY) vaccine  Aged Out       Hemoglobin A1C (%)   Date Value   08/23/2022 5.8             ( goal A1C is < 7)   No results found for: LABMICR  LDL Cholesterol (mg/dL)   Date Value   08/23/2022 130   10/26/2021 128       (goal LDL is <100)   AST (U/L)   Date Value   10/26/2021 25     ALT (U/L)   Date Value   10/26/2021 22     BUN (mg/dL)   Date Value   10/26/2021 19     BP Readings from Last 3 Encounters:   04/05/23 120/76   04/02/23 117/61   03/02/23 122/69          (goal 120/80)    All Future Testing planned in [facility]  Lab Frequency Next Occurrence   TSH With Reflex Ft4 Once 10/26/2022   Comprehensive Metabolic Panel Once 67/77/9210   VL DUP CAROTID BILATERAL Once 01/04/2024               Patient Active Problem List:     Gastro-esophageal reflux disease without esophagitis     Epigastric abdominal pain     Allergic to bees     Anxiety     Depression     Esophageal spasm     Gas bloat syndrome

## 2023-04-28 DIAGNOSIS — I10 ESSENTIAL HYPERTENSION: ICD-10-CM

## 2023-04-28 RX ORDER — AMLODIPINE BESYLATE 10 MG/1
TABLET ORAL
Qty: 90 TABLET | Refills: 1 | Status: SHIPPED | OUTPATIENT
Start: 2023-04-28

## 2023-04-28 NOTE — TELEPHONE ENCOUNTER
Last visit: 10/26/22  Last Med refill: 9/20/22  Does patient have enough medication for 72 hours: No    Patient needs appt-will send [employer] message    Next Visit Date:  Future Appointments   Date Time Provider Bekah Daxa   5/1/2023  1:30 PM Mark Christiansen, PT [facility]   5/2/2023  9:20 AM Manny Gastelum MD [facility]   6/27/2023 12:40 PM Corky Marinelli MD Neuro Spec Neurology -   10/30/2023  3:00 PM Rosalia Tinajero  [address] Maintenance   Topic Date Due    Shingles vaccine (2 of 3) 05/17/2025 (Originally 11/20/2016)    DTaP/Tdap/Td vaccine (1 - Tdap) 09/09/2030 (Originally 9/10/2020)    Breast cancer screen  07/19/2023    A1C test (Diabetic or Prediabetic)  08/23/2023    Lipids  08/23/2023    Depression Monitoring  10/25/2023    Annual Wellness Visit (AWV)  10/27/2023    Low dose CT lung screening  11/11/2023    Colorectal Cancer Screen  09/06/2027    DEXA (modify frequency per FRAX score)  Completed    Flu vaccine  Completed    Pneumococcal 65+ years Vaccine  Completed    COVID-19 Vaccine  Completed    Hepatitis C screen  Completed    Hepatitis A vaccine  Aged Out    Hib vaccine  Aged Out    Meningococcal (ACWY) vaccine  Aged Out       Hemoglobin A1C (%)   Date Value   08/23/2022 5.8             ( goal A1C is < 7)   No results found for: LABMICR  LDL Cholesterol (mg/dL)   Date Value   08/23/2022 130   10/26/2021 128       (goal LDL is <100)   AST (U/L)   Date Value   10/26/2021 25     ALT (U/L)   Date Value   10/26/2021 22     BUN (mg/dL)   Date Value   10/26/2021 19     BP Readings from Last 3 Encounters:   04/05/23 120/76   04/02/23 117/61   03/02/23 122/69          (goal 120/80)    All Future Testing planned in [facility]  Lab Frequency Next Occurrence   TSH With Reflex Ft4 Once 10/26/2022   Comprehensive Metabolic Panel Once 55/71/5188   VL DUP CAROTID BILATERAL Once 01/04/2024               Patient Active Problem List:     Gastro-esophageal reflux

## 2023-05-01 ENCOUNTER — HOSPITAL ENCOUNTER (OUTPATIENT)
Dept: PHYSICAL THERAPY | Age: 68
Setting detail: THERAPIES SERIES
Discharge: HOME | End: 2023-05-01
Payer: MEDICARE

## 2023-05-01 PROCEDURE — 97110 THERAPEUTIC EXERCISES: CPT

## 2023-05-01 PROCEDURE — 97161 PT EVAL LOW COMPLEX 20 MIN: CPT

## 2023-05-01 NOTE — CONSULTS
[x] Augustine Nath  Outpatient Physical Therapy  [address]  Phone: [phone]  Fax: [phone] [] [address] [facility]   Phone: [phone]  Fax: [phone]  [] [facility] at [address]  Phone: [phone]   Fax: [phone]     Physical Therapy Evaluation    Date:  2023  Patient: Abril Blanton  : 1955  MRN: [id number]  Physician: Alexy Dasilva MD       Insurance: Medicare/ Medical [place] NO PRIOR AUTH REQUIRED, BASED ON MEDICAL NECESSITY PER LLOYD YR, MEDICAL MUTUAL SUPPLEMENT COVERS PRIMARY DEDUCTIBLE AND 20% COINSURANCE, NO PATIENT LIABILITY  Medical Diagnosis:  Lumbar radiculopathy M54.16    Rehab Codes: M54.5, M54.15, M79.604, M79.605  Onset Date: 23                                 Next 's appt: Pain Mgmt 23    Subjective:   CC:Patient reports ongoing low back and B LE pain mainly in her thighs and knees. She has difficulty ambulating and standing for long durations. She feels like her legs give out on her and nearly cause her to fall. Patient needs husbands assistance intermittently with mobility and completing ADLs. He usually just provides stand by assistance. HPI: Patient has had lumbar issues for many years. She underwent decompression surgery about 15 years ago and had good relief until 3 years ago. Her pain is most significant in her bilateral thighs and knees. Patient also has Hx of R TKA. She has had extensive neurological and orthopedic work up. See tests below.      PMHx/Comorbidities:  [] refer to full medical chart in EPIC [x] Unremarkable      Past Medical History:   Diagnosis Date    ADHD     mild, no RX    Arthritis     Blurred vision, right eye     Carotid artery disease ([address]     dr Denny De Paz vascular last appt     GERD (gastroesophageal reflux disease)     Hearing loss     High cholesterol     History of closed

## 2023-05-02 ENCOUNTER — INITIAL CONSULT (OUTPATIENT)
Dept: PAIN MANAGEMENT | Age: 68
End: 2023-05-02

## 2023-05-02 VITALS
WEIGHT: 170 LBS | DIASTOLIC BLOOD PRESSURE: 78 MMHG | BODY MASS INDEX: 29.02 KG/M2 | OXYGEN SATURATION: 95 % | HEIGHT: 64 IN | SYSTOLIC BLOOD PRESSURE: 128 MMHG | HEART RATE: 81 BPM

## 2023-05-02 DIAGNOSIS — M54.16 CHRONIC LUMBAR RADICULOPATHY: ICD-10-CM

## 2023-05-02 DIAGNOSIS — M96.1 FAILED BACK SYNDROME: Primary | ICD-10-CM

## 2023-05-02 DIAGNOSIS — M47.817 LUMBOSACRAL SPONDYLOSIS WITHOUT MYELOPATHY: ICD-10-CM

## 2023-05-02 DIAGNOSIS — M54.51 VERTEBROGENIC LOW BACK PAIN: ICD-10-CM

## 2023-05-02 ASSESSMENT — ENCOUNTER SYMPTOMS
RESPIRATORY NEGATIVE: 1
GASTROINTESTINAL NEGATIVE: 1
BACK PAIN: 1

## 2023-05-02 NOTE — PROGRESS NOTES
Vital signs: (most recent): Blood pressure 128/78, pulse 81, height 5' 4\" (1.626 m), weight 170 lb (77.1 kg), SpO2 95 %, not currently breastfeeding. Vital signs are normal.  No fever. Output: Producing urine and producing stool. HEENT: Normal HEENT exam.    Lungs:  Normal effort and normal respiratory rate. She is not in respiratory distress. Heart: Normal rate. Extremities: Normal range of motion. There is no deformity. Neurological: Patient is alert and oriented to person, place and time. Patient has normal coordination. Pupils:  Pupils are equal, round, and reactive to light. Pupils are equal.   Skin:  Warm and dry. No rash or cyanosis. Assessment & Plan  -Back pain  Chronic for many years located in the lower lumbar area in midline  Aggravated lifting bending twisting turning  History of previous lumbar laminectomy  Tried NSAIDs and physical therapy  Was seen pain management at [facility] center and had different injection with limited short-term benefit  Reports intermittent pain in both legs over outer thigh to the knee  Describes it as burning sensation  Recalls EMG nerve testing several years ago    Pain is progressively been worsening affecting quality of life    Had recent diagnostic work-up with MRI brain, cervical spine, thoracic spine and lumbar spine  Evaluated by spine surgeon both. Your [place] and also treatment the neck  No further surgical pathology identified    EXAMINATION:  MRI OF THE BRAIN WITHOUT CONTRAST  10/29/2022 2:23 pm  IMPRESSION:  1. No acute stroke, intracranial hemorrhage or mass effect. 2.  Mild chronic small vessel ischemic disease. EXAMINATION:  MRI OF THE CERVICAL SPINE WITHOUT CONTRAST 10/29/2022 2:24 pm  IMPRESSION:  Multilevel degenerative disc disease with uncovertebral and facet hypertrophy  with canal stenosis at C3-4. EXAMINATION:  MRI OF THE THORACIC SPINE WITHOUT CONTRAST  10/28/2022 7:01 pm  IMPRESSION:  1.   Mild T9-T10

## 2023-05-08 ENCOUNTER — HOSPITAL ENCOUNTER (OUTPATIENT)
Dept: PHYSICAL THERAPY | Age: 68
Setting detail: THERAPIES SERIES
Discharge: HOME | End: 2023-05-08
Payer: MEDICARE

## 2023-05-08 PROCEDURE — 97110 THERAPEUTIC EXERCISES: CPT

## 2023-05-08 NOTE — FLOWSHEET NOTE
[x] [facility]) [facility] CENTER &  Therapy  [address]  [phone]  F: [phone] [] [address]  [address]   [address]  P: [phone]  F: [phone] [] [address]  [address]  P: [phone]  F: [phone] [] [address]  P: [phone]  F: [phone] [] [address]  [facility]   [address]   [place]: [phone]  F: [phone]      Physical Therapy Daily Treatment Note    Date:  2023  Patient Name:  Buck Diggs    :  1955  MRN: [id number]  Physician: Marilynn Miller MD                                 Insurance: Medicare/ Medical [place] NO PRIOR AUTH REQUIRED, BASED ON MEDICAL NECESSITY PER LLOYD YR, MEDICAL MUTUAL SUPPLEMENT COVERS PRIMARY DEDUCTIBLE AND 20% COINSURANCE, NO PATIENT LIABILITY  Medical Diagnosis:   Lumbar radiculopathy M54.16              Rehab Codes: M54.5, M54.15, M79.604, M79.605  Onset Date: 23                                 Next 's appt: Pain Mgmt 23  Visit# / total visits: 2/10;  Progress note for Medicare patient due at visit 10   Cancels/No Shows: 0/0    Subjective:    Pain:  [x] Yes  [] No Location: L LE in lateral thigh  N/A Pain Rating: (0-10 scale) 5/10  Pain altered Tx:  [] No  [] Yes  Action:  Comments: Pt states that she had a lot of difficulty getting dressed today due to the pain in her L lateral thigh. Noted soreness in LE's after doing exercises during evaluation.      Objective:  Modalities:   Precautions:  Exercises:  Exercise Reps/ Time Weight/ Level Comments   Nustep 5' L3          Standing      Calf Stretch 3x 30''     3 way hip 15x     Tandem Stance 3x30\"     Step Up's 10x 6 in Fwd/lat                Supine      Adduction 15x     SLR 15x

## 2023-05-11 ENCOUNTER — HOSPITAL ENCOUNTER (OUTPATIENT)
Dept: PHYSICAL THERAPY | Age: 68
Setting detail: THERAPIES SERIES
Discharge: HOME | End: 2023-05-11
Payer: MEDICARE

## 2023-05-11 PROCEDURE — 97110 THERAPEUTIC EXERCISES: CPT

## 2023-05-11 NOTE — FLOWSHEET NOTE
[x] [facility]) [facility] [place]  [facility]  [address]  [phone]  F: [phone] [] [address]  [address]   [address]  P: [phone]  F: [phone] [] [address]  [address]  P: [phone]  F: [phone] [] [address]  P: [phone]  F: [phone] [] [address]  [facility]   [address]   [place]: [phone]  F: [phone]      Physical Therapy Daily Treatment Note    Date:  2023  Patient Name:  Margarito Blanco    :  1955  MRN: [id number]  Physician: Nayely Esparza MD                                 Insurance: Medicare/ Medical [place] NO PRIOR AUTH REQUIRED, BASED ON MEDICAL NECESSITY PER LLOYD YR, MEDICAL MUTUAL SUPPLEMENT COVERS PRIMARY DEDUCTIBLE AND 20% COINSURANCE, NO PATIENT LIABILITY  Medical Diagnosis:   Lumbar radiculopathy M54.16              Rehab Codes: M54.5, M54.15, M79.604, M79.605  Onset Date: 23                                 Next 's appt: Pain Mgmt 23  Visit# / total visits: 3/10;  Progress note for Medicare patient due at visit 10   Cancels/No Shows: 0/0    Subjective:    Pain:  [x] Yes  [] No Location: L LE in thigh  Pain Rating: (0-10 scale) 5/10  Pain altered Tx:  [] No  [] Yes  Action:  Comments: Pt reports left thigh soreness today from a 15 minute cramp she had last night. She also reports lateral left ankle pain.       Objective:  Modalities:   Precautions:  Exercises:  Exercise Reps/ Time Weight/ Level Comments   Nustep 5' L3          Standing      Calf Stretch 3x30''     3 way hip 15x     Marching 15x     HS curls 15x     Calf raise 15x     Tandem Stance 3x30\"     Step Up's 10x 6 in Fwd/lat                Supine      Adduction 15x     SLR 15x

## 2023-05-15 ENCOUNTER — HOSPITAL ENCOUNTER (OUTPATIENT)
Dept: PHYSICAL THERAPY | Age: 68
Setting detail: THERAPIES SERIES
Discharge: HOME | End: 2023-05-15
Payer: MEDICARE

## 2023-05-15 PROCEDURE — 97110 THERAPEUTIC EXERCISES: CPT

## 2023-05-15 RX ORDER — DULOXETIN HYDROCHLORIDE 30 MG/1
CAPSULE, DELAYED RELEASE ORAL
Qty: 90 CAPSULE | OUTPATIENT
Start: 2023-05-15

## 2023-05-15 NOTE — FLOWSHEET NOTE
[x] [facility]) [facility] &  Therapy  [address]  [phone]  F: [phone] [] [address]  [address]   [address]  P: [phone]  F: [phone] [] [address]  [address]  P: [phone]  F: [phone] [] [address]  P: [phone]  F: [phone] [] [address]  [facility]   [address]   [place]: [phone]  F: [phone]      Physical Therapy Daily Treatment Note    Date:  5/15/2023  Patient Name:  Sukhjinder Hester    :  1955  MRN: [id number]  Physician: Koby Hart MD                                 Insurance: Medicare/ Medical [place] NO PRIOR AUTH REQUIRED, BASED ON MEDICAL NECESSITY PER LLOYD YR, MEDICAL MUTUAL SUPPLEMENT COVERS PRIMARY DEDUCTIBLE AND 20% COINSURANCE, NO PATIENT LIABILITY  Medical Diagnosis:   Lumbar radiculopathy M54.16              Rehab Codes: M54.5, M54.15, M79.604, M79.605  Onset Date: 23                                 Next 's appt: Pain Mgmt 23  Visit# / total visits: 4/10;  Progress note for Medicare patient due at visit 10   Cancels/No Shows: 0/0    Subjective:    Pain:  [x] Yes  [] No Location: LE in thigh  Pain Rating: (0-10 scale) 5/10 R LB  Pain altered Tx:  [] No  [] Yes  Action:  Comments:  14 mins late.   Reports R LB pain and states she has different LB problems and pain changes     Objective:  Modalities:   Precautions:  Exercises:  Exercise Reps/ Time Weight/ Level Comments   Nustep 5' L3 HELD due to late arrival 5/15         Standing      Calf Stretch 3x30''     Heel raises  20x     3 way hip 15x  Vc for tech and pacing, iso abdom w/ abd   Marching 15x     HS curls 15x     Tandem Stance 3x30\"  HED 5/15   Step Up's 12x 6 in Fwd/lat    Step downs

## 2023-05-18 ENCOUNTER — HOSPITAL ENCOUNTER (OUTPATIENT)
Dept: PHYSICAL THERAPY | Age: 68
Setting detail: THERAPIES SERIES
Discharge: HOME | End: 2023-05-18
Payer: MEDICARE

## 2023-05-18 NOTE — FLOWSHEET NOTE
[x] [facility]) [facility] &  Therapy  [address]    [phone]  F: [phone]   [] [address]  [facility] [address]   [address]  P: [phone]  F: [phone]  [] [address]  [address]  P: [phone]  F: [phone] [] [address]  P: [phone]  F: [phone]  [] [address]  [address] [facility] [address]   [address]   [place]: [phone]  F: [phone]   [] [place] [address] [facility] [address]  [place]: [phone]   F: [phone]     Physical Therapy Cancel/No Show note    Date: 2023  Patient: Jas Mars  : 1955  MRN: [id number]    Cancels/No Shows to date:     For today's appointment patient:    [x]  Cancelled    [] Rescheduled appointment    [] No-show     Reason given by patient:    []  Patient ill    []  Conflicting appointment    [] No transportation      [] Conflict with work    [] No reason given    [] Weather related    [] [id number]    [x] Other:      Comments:  23 CX due to gardening and hurting unable to make it      [] Next appointment was confirmed    Electronically signed by: Kenny Beckman, PT

## 2023-05-22 ENCOUNTER — HOSPITAL ENCOUNTER (OUTPATIENT)
Dept: PHYSICAL THERAPY | Age: 68
Setting detail: THERAPIES SERIES
Discharge: HOME | End: 2023-05-22
Payer: MEDICARE

## 2023-05-22 PROCEDURE — 97110 THERAPEUTIC EXERCISES: CPT

## 2023-05-22 NOTE — FLOWSHEET NOTE
and pacing, iso abdom w/ abd   Marching 20x     HS curls 15x           Tandem Stance 3x30\"     Step Ups 20x 8 in Fwd         Bands      Rows 20x blue    Lat Ext 20x blue    Pallof press 10x ea blue Added 5/15         Total gym squats  2x10x L35          Seated      Knee extension             Supine      Adduction 15x     SLR 15x     Clamshell   15x lime    LTR  15x      Bridges 15x                       Other:      Treatment Charges: Mins Units   []  Modalities     [x]  Ther Exercise  55 4   []  Manual Therapy     []  Ther Activities     []  Aquatics     []  Vasocompression     []  Other     Total Treatment time 55 4       Assessment: [x] Progressing toward goals. Progressed repetitions and added new exercises as charted above to further improve core and LE strength and endurance. [] No change. [] Other:   [] Patient would continue to benefit from skilled physical therapy services in order to: improve core and B LE strength, increase gait tolerance, and improve mobility and independence. STG/LTG  STG: (to be met in 10 treatments)  ? Pain: 5/10 low back and bilateral thigh pain with ADLs. ? ROM: Patient is able to flex trunk and reach toes to improve bending and reaching. ? Strength:4+/5 B hip flexion to improve core strength to increase standing tolerance. ? Function:Oswestry score to 36% impaired or better to improve ADLs. Independent with Home Exercise Programs    Pt. Education:  [x] Yes  [] No  [x] Reviewed Prior HEP/Ed  Method of Education: [x] Verbal  [x] Demo  [] Written  Comprehension of Education:  [x] Verbalizes understanding. [x] Demonstrates understanding. [] Needs review. [x] Demonstrates/verbalizes HEP/Ed previously given. Plan: [x] Continue current frequency toward long and short term goals.     [x] Specific Instructions for subsequent treatments: B LE and core strengthening, standing and walking endurance      Time In:1500            Time Out:  1600    Electronically signed

## 2023-05-25 ENCOUNTER — HOSPITAL ENCOUNTER (OUTPATIENT)
Dept: PHYSICAL THERAPY | Age: 68
Setting detail: THERAPIES SERIES
Discharge: HOME | End: 2023-05-25
Payer: MEDICARE

## 2023-05-25 PROCEDURE — 97110 THERAPEUTIC EXERCISES: CPT

## 2023-05-25 NOTE — FLOWSHEET NOTE
understanding. [] Needs review. [x] Demonstrates/verbalizes HEP/Ed previously given. Updated 5/25/2023  Access Code: [email or web address]  URL: [email or web address]  Date: 05/25/2023  Prepared by: Parish Braxton    Exercises  - Standing Hip Extension with Counter Support  - 1 x daily - 4 x weekly - 3 sets - 10 reps  - Standing Hip Flexion with Counter Support  - 1 x daily - 4 x weekly - 3 sets - 10 reps  - Standing Hip Abduction with Counter Support  - 1 x daily - 4 x weekly - 3 sets - 10 reps  - Standing March with Counter Support  - 1 x daily - 4 x weekly - 3 sets - 10 reps  - Seated Long Arc Quad  - 1 x daily - 4 x weekly - 3 sets - 10 reps  - Supine Figure 4 Piriformis Stretch  - 1 x daily - 7 x weekly - 3 sets - 10 reps  - Supine Piriformis Stretch with Leg Straight  - 1 x daily - 7 x weekly - 3 sets - 10 reps     Plan: [x] Continue current frequency toward long and short term goals. [x] Specific Instructions for subsequent treatments: B LE and core strengthening, standing and walking endurance      Time In: 1500            Time Out:  1555    Electronically signed by:  Brittany River, SPT  Evaluation/treatment performed by Student PT under the supervision of co-signing PT who agrees with all evaluation/treatment and documentation.

## 2023-05-30 ENCOUNTER — HOSPITAL ENCOUNTER (OUTPATIENT)
Dept: PHYSICAL THERAPY | Age: 68
Setting detail: THERAPIES SERIES
Discharge: HOME | End: 2023-05-30
Payer: MEDICARE

## 2023-05-30 DIAGNOSIS — M54.51 VERTEBROGENIC LOW BACK PAIN: Primary | ICD-10-CM

## 2023-05-30 PROCEDURE — 97110 THERAPEUTIC EXERCISES: CPT

## 2023-05-30 NOTE — FLOWSHEET NOTE
[x] [facility] CENTER &  Therapy  [address]  [phone]  F: [phone] [] [address]  [address]   [address]  P: [phone]  F: [phone] [] [address]  [address]  P: [phone]  F: [phone] [] [address]  P: [phone]  F: [phone] [] [address]  [facility]   [address]   [place]: [phone]  F: [phone]      Physical Therapy Daily Treatment Note    Date:  2023  Patient Name:  Angie Roach    :  1955  MRN: [id number]  Physician: Lori Garcia MD                                 Insurance: Medicare/ Medical [place] NO PRIOR AUTH REQUIRED, BASED ON MEDICAL NECESSITY PER LLOYD YR, MEDICAL MUTUAL SUPPLEMENT COVERS PRIMARY DEDUCTIBLE AND 20% COINSURANCE, NO PATIENT LIABILITY  Medical Diagnosis:   Lumbar radiculopathy M54.16              Rehab Codes: M54.5, M54.15, M79.604, M79.605  Onset Date: 23                                 Next 's appt: RFA scheduled   Visit# / total visits: 7/10;  Progress note for Medicare patient due at visit 10   Cancels/No Shows: 1/0    Subjective:    Pain:  [x] Yes  [] No Location: R LB, R adductors and medial knee Pain Rating: (0-10 scale) 6/10  Pain altered Tx:  [] No  [] Yes  Action:  Comments:  States that she has been having \"really bad pain\" in her R LE. Reports that it has inhibited her ability to perform any ADL's today. Compares the pain in her feet to stepping on glass or getting stung by a bee. RFA scheduled 23.      Objective:  Modalities:   Precautions:  Exercises:  Exercise Reps/ Time Weight/ Level Completed 2023 Comments   Nustep 5' L3 x LE endurance and collected subjective          Standing       Calf Stretch

## 2023-06-01 ENCOUNTER — HOSPITAL ENCOUNTER (OUTPATIENT)
Dept: PHYSICAL THERAPY | Age: 68
Setting detail: THERAPIES SERIES
Discharge: HOME | End: 2023-06-01
Payer: MEDICARE

## 2023-06-01 PROCEDURE — 97110 THERAPEUTIC EXERCISES: CPT

## 2023-06-01 NOTE — FLOWSHEET NOTE
[x] [facility]  [address]  [phone]  F: [phone] [] [address]  [address]   [address]  P: [phone]  F: [phone] [] [address]  [address]  P: [phone]  F: [phone] [] [address]  P: [phone]  F: [phone] [] [address]  [facility]   [address]   [place]: [phone]  F: [phone]      Physical Therapy Daily Treatment Note    Date:  2023  Patient Name:  Citlalli Ramirez    :  1955  MRN: [id number]  Physician: Libby Casey MD                                 Insurance: Medicare/ Medical [place] NO PRIOR AUTH REQUIRED, BASED ON MEDICAL NECESSITY PER LLOYD YR, MEDICAL MUTUAL SUPPLEMENT COVERS PRIMARY DEDUCTIBLE AND 20% COINSURANCE, NO PATIENT LIABILITY  Medical Diagnosis:   Lumbar radiculopathy M54.16              Rehab Codes: M54.5, M54.15, M79.604, M79.605  Onset Date: 23                                 Next 's appt: RFA scheduled   Visit# / total visits: 8/10;  Progress note for Medicare patient due at visit 10   Cancels/No Shows: 1/0    Subjective:    Pain:  [x] Yes  [] No Location: R LB, R adductors and medial knee Pain Rating: (0-10 scale) 2/10  Pain altered Tx:  [] No  [] Yes  Action:  Comments:  Pt reports that she has been having a good day today and her pain has been very low.      Objective:  Modalities:   Precautions:  Exercises:  Exercise Reps/ Time Weight/ Level Completed 2023 Comments   Nustep 5' L3 x LE endurance and collected subjective          Standing       Calf Stretch 3x30''  x    Heel raises  20x 1 lb x    3 way hip 2x10x 1 lb x Vc for tech and pacing, iso abdom w/ abd   Marching 20x 1 lb x    HS curls 20x 1 lb x

## 2023-06-06 ENCOUNTER — HOSPITAL ENCOUNTER (OUTPATIENT)
Dept: PHYSICAL THERAPY | Age: 68
Setting detail: THERAPIES SERIES
Discharge: HOME | End: 2023-06-06
Payer: MEDICARE

## 2023-06-06 PROCEDURE — 97110 THERAPEUTIC EXERCISES: CPT

## 2023-06-06 NOTE — FLOWSHEET NOTE
[x] [facility]) - [facility] CENTER &  Therapy  [address]  [phone]  F: [phone] [] [address]  [address]   [address]  P: [phone]  F: [phone] [] [address]  [address]  P: [phone]  F: [phone] [] [address]  P: [phone]  F: [phone] [] [address]  [facility]   [address]   [place]: [phone]  F: [phone]      Physical Therapy Daily Treatment Note    Date:  2023  Patient Name:  Sukhjinder Hester    :  1955  MRN: [id number]  Physician: Koby Hart MD                                 Insurance: Medicare/ Medical [place] NO PRIOR AUTH REQUIRED, BASED ON MEDICAL NECESSITY PER LLOYD YR, MEDICAL MUTUAL SUPPLEMENT COVERS PRIMARY DEDUCTIBLE AND 20% COINSURANCE, NO PATIENT LIABILITY  Medical Diagnosis:   Lumbar radiculopathy M54.16              Rehab Codes: M54.5, M54.15, M79.604, M79.605  Onset Date: 23                                 Next 's appt: RFA scheduled   Visit# / total visits: 9/10;  Progress note for Medicare patient due at visit 10   Cancels/No Shows: 1/0    Subjective:    Pain:  [x] Yes  [] No Location: R LB, R adductors and medial knee Pain Rating: (0-10 scale) 2/10  Pain altered Tx:  [] No  [] Yes  Action:  Comments:  Pt reports low pain this morning but had increased leg pain last night.      Objective:  Modalities:   Precautions:  Exercises:  Exercise Reps/ Time Weight/ Level Completed 2023 Comments   Nustep 5' L3 x LE endurance and collected subjective          Standing       Calf Stretch 3x30''  x    Heel raises  20x 1 lb x    3 way hip 2x10x 1 lb x Vc for tech and pacing, iso abdom w/ abd   Marching 20x 1 lb x    HS curls 20x 1 lb x           Tandem

## 2023-06-08 ENCOUNTER — HOSPITAL ENCOUNTER (OUTPATIENT)
Dept: PAIN MANAGEMENT | Age: 68
Discharge: HOME | End: 2023-06-08
Payer: MEDICARE

## 2023-06-08 VITALS — BODY MASS INDEX: 28.32 KG/M2 | HEIGHT: 65 IN | WEIGHT: 170 LBS | TEMPERATURE: 97.3 F

## 2023-06-08 DIAGNOSIS — M47.817 LUMBOSACRAL SPONDYLOSIS WITHOUT MYELOPATHY: ICD-10-CM

## 2023-06-08 DIAGNOSIS — M54.51 VERTEBROGENIC LOW BACK PAIN: ICD-10-CM

## 2023-06-08 DIAGNOSIS — M54.16 CHRONIC LUMBAR RADICULOPATHY: ICD-10-CM

## 2023-06-08 DIAGNOSIS — M96.1 FAILED BACK SYNDROME: Primary | ICD-10-CM

## 2023-06-08 PROCEDURE — 99213 OFFICE O/P EST LOW 20 MIN: CPT

## 2023-06-08 ASSESSMENT — PAIN SCALES - GENERAL: PAINLEVEL_OUTOF10: 3

## 2023-06-08 ASSESSMENT — ENCOUNTER SYMPTOMS
RESPIRATORY NEGATIVE: 1
BACK PAIN: 1
EYES NEGATIVE: 1

## 2023-06-08 NOTE — PROGRESS NOTES
The patient is a 69 y. o. Non- / non  female.     Chief Complaint   Patient presents with    Back Pain        HPI    Chronic predominantly axial lower back pain going on for many years  History of previous lumbar laminectomy surgery  Was seen pain management at [facility] center and had lumbar epidural injection and facet intervention with limited benefit  Recent imaging showed severe degenerative disc changes with endplate degeneration and Modic changes  She is approved for Intracept procedure  We will schedule for June 19 at [facility]    For ongoing bilateral lower extremity pain patient completed EMG bilateral lower extremities  Report reviewed today  Finding discussed  Chronic lateral L4-L5 lumbar radiculopathy with mixed motor or sensory neuropathy  Discussed with patient that bilateral leg pain numbness and foot numbness and tingling in the explained by the EMG findings  May benefit from neuromodulation trial for radiculopathy and peripheral neuropathy  We will discuss further in the future on next visit    Patient is here today for: back pain  Pain level: 2  Character: aching  Radiating: yes  Weakness or numbness: no  Aggravating Factors:  pain is just there  Alleviating Factors: Lyrica  Constant or intermitting: intermitting  Bladder/bowel loss:  no      Past Medical History:   Diagnosis Date    ADHD     mild, no RX    Arthritis     Blurred vision, right eye     Carotid artery disease ([facility] [address]     dr Suzie Castrejon vascular last appt 1/20    GERD (gastroesophageal reflux disease)     Hearing loss     High cholesterol     History of closed shoulder dislocation     rt from recent fall    [place] (hard of hearing)     beto hearing aides    Hx of gastric ulcer     Hyperlipidemia     Hypertension 2007    IBS (irritable bowel syndrome)     Macular pucker, right eye     Skin cancer of forehead     Syncope     hx recent falls    Tension headache     Tremor 2020    UTI (urinary tract infection)     Wears

## 2023-06-15 ENCOUNTER — APPOINTMENT (OUTPATIENT)
Dept: PHYSICAL THERAPY | Age: 68
End: 2023-06-15
Payer: MEDICARE

## 2023-06-19 ENCOUNTER — ANESTHESIA EVENT (OUTPATIENT)
Dept: OPERATING ROOM | Age: 68
End: 2023-06-19
Payer: MEDICARE

## 2023-06-19 ENCOUNTER — ANESTHESIA (OUTPATIENT)
Dept: OPERATING ROOM | Age: 68
End: 2023-06-19
Payer: MEDICARE

## 2023-06-19 ENCOUNTER — APPOINTMENT (OUTPATIENT)
Dept: GENERAL RADIOLOGY | Age: 68
End: 2023-06-19
Attending: ANESTHESIOLOGY
Payer: MEDICARE

## 2023-06-19 ENCOUNTER — HOSPITAL ENCOUNTER (OUTPATIENT)
Age: 68
Setting detail: OUTPATIENT SURGERY
Discharge: HOME | End: 2023-06-19
Attending: ANESTHESIOLOGY | Admitting: ANESTHESIOLOGY
Payer: MEDICARE

## 2023-06-19 VITALS
HEIGHT: 65 IN | OXYGEN SATURATION: 93 % | WEIGHT: 170 LBS | SYSTOLIC BLOOD PRESSURE: 138 MMHG | DIASTOLIC BLOOD PRESSURE: 72 MMHG | TEMPERATURE: 97.3 F | BODY MASS INDEX: 28.32 KG/M2 | HEART RATE: 76 BPM | RESPIRATION RATE: 15 BRPM

## 2023-06-19 DIAGNOSIS — M54.41 CHRONIC BILATERAL LOW BACK PAIN WITH RIGHT-SIDED SCIATICA: ICD-10-CM

## 2023-06-19 DIAGNOSIS — G89.18 POSTOPERATIVE PAIN: ICD-10-CM

## 2023-06-19 DIAGNOSIS — M54.51 VERTEBROGENIC LOW BACK PAIN: Primary | ICD-10-CM

## 2023-06-19 DIAGNOSIS — G89.29 CHRONIC BILATERAL LOW BACK PAIN WITH RIGHT-SIDED SCIATICA: ICD-10-CM

## 2023-06-19 PROCEDURE — 3600000055 HC PAIN LEVEL 3 ADDL 15 MIN: Performed by: ANESTHESIOLOGY

## 2023-06-19 PROCEDURE — 2709999900 HC NON-CHARGEABLE SUPPLY: Performed by: ANESTHESIOLOGY

## 2023-06-19 PROCEDURE — 6360000002 HC RX W HCPCS: Performed by: ANESTHESIOLOGY

## 2023-06-19 PROCEDURE — C1889 IMPLANT/INSERT DEVICE, NOC: HCPCS | Performed by: ANESTHESIOLOGY

## 2023-06-19 PROCEDURE — 3209999900 FLUORO FOR SURGICAL PROCEDURES

## 2023-06-19 PROCEDURE — 2500000003 HC RX 250 WO HCPCS: Performed by: ANESTHESIOLOGY

## 2023-06-19 PROCEDURE — 7100000011 HC PHASE II RECOVERY - ADDTL 15 MIN: Performed by: ANESTHESIOLOGY

## 2023-06-19 PROCEDURE — 3600000054 HC PAIN LEVEL 3 BASE: Performed by: ANESTHESIOLOGY

## 2023-06-19 PROCEDURE — 2580000003 HC RX 258: Performed by: ANESTHESIOLOGY

## 2023-06-19 PROCEDURE — 2720000010 HC SURG SUPPLY STERILE: Performed by: ANESTHESIOLOGY

## 2023-06-19 PROCEDURE — 2500000003 HC RX 250 WO HCPCS: Performed by: NURSE ANESTHETIST, CERTIFIED REGISTERED

## 2023-06-19 PROCEDURE — 3700000000 HC ANESTHESIA ATTENDED CARE: Performed by: ANESTHESIOLOGY

## 2023-06-19 PROCEDURE — 7100000010 HC PHASE II RECOVERY - FIRST 15 MIN: Performed by: ANESTHESIOLOGY

## 2023-06-19 PROCEDURE — 6370000000 HC RX 637 (ALT 250 FOR IP): Performed by: ANESTHESIOLOGY

## 2023-06-19 PROCEDURE — 6360000002 HC RX W HCPCS: Performed by: NURSE ANESTHETIST, CERTIFIED REGISTERED

## 2023-06-19 PROCEDURE — 3700000001 HC ADD 15 MINUTES (ANESTHESIA): Performed by: ANESTHESIOLOGY

## 2023-06-19 RX ORDER — SODIUM CHLORIDE, SODIUM LACTATE, POTASSIUM CHLORIDE, CALCIUM CHLORIDE 600; 310; 30; 20 MG/100ML; MG/100ML; MG/100ML; MG/100ML
INJECTION, SOLUTION INTRAVENOUS CONTINUOUS
Status: DISCONTINUED | OUTPATIENT
Start: 2023-06-19 | End: 2023-06-19 | Stop reason: HOSPADM

## 2023-06-19 RX ORDER — SODIUM CHLORIDE 0.9 % (FLUSH) 0.9 %
5-40 SYRINGE (ML) INJECTION PRN
Status: DISCONTINUED | OUTPATIENT
Start: 2023-06-19 | End: 2023-06-19 | Stop reason: HOSPADM

## 2023-06-19 RX ORDER — LIDOCAINE HYDROCHLORIDE 10 MG/ML
1 INJECTION, SOLUTION EPIDURAL; INFILTRATION; INTRACAUDAL; PERINEURAL
Status: DISCONTINUED | OUTPATIENT
Start: 2023-06-20 | End: 2023-06-19 | Stop reason: HOSPADM

## 2023-06-19 RX ORDER — HYDROCODONE BITARTRATE AND ACETAMINOPHEN 5; 325 MG/1; MG/1
1 TABLET ORAL EVERY 8 HOURS PRN
Qty: 15 TABLET | Refills: 0 | Status: SHIPPED | OUTPATIENT
Start: 2023-06-19 | End: 2023-06-24

## 2023-06-19 RX ORDER — MIDAZOLAM HYDROCHLORIDE 1 MG/ML
INJECTION INTRAMUSCULAR; INTRAVENOUS PRN
Status: DISCONTINUED | OUTPATIENT
Start: 2023-06-19 | End: 2023-06-19 | Stop reason: SDUPTHER

## 2023-06-19 RX ORDER — SODIUM CHLORIDE 0.9 % (FLUSH) 0.9 %
5-40 SYRINGE (ML) INJECTION EVERY 12 HOURS SCHEDULED
Status: DISCONTINUED | OUTPATIENT
Start: 2023-06-19 | End: 2023-06-19

## 2023-06-19 RX ORDER — FENTANYL CITRATE 50 UG/ML
25 INJECTION, SOLUTION INTRAMUSCULAR; INTRAVENOUS EVERY 5 MIN PRN
Status: DISCONTINUED | OUTPATIENT
Start: 2023-06-19 | End: 2023-06-19 | Stop reason: HOSPADM

## 2023-06-19 RX ORDER — SODIUM CHLORIDE 0.9 % (FLUSH) 0.9 %
5-40 SYRINGE (ML) INJECTION PRN
Status: DISCONTINUED | OUTPATIENT
Start: 2023-06-19 | End: 2023-06-19

## 2023-06-19 RX ORDER — ONDANSETRON 2 MG/ML
4 INJECTION INTRAMUSCULAR; INTRAVENOUS
Status: DISCONTINUED | OUTPATIENT
Start: 2023-06-19 | End: 2023-06-19 | Stop reason: HOSPADM

## 2023-06-19 RX ORDER — KETAMINE HCL IN NACL, ISO-OSM 100MG/10ML
SYRINGE (ML) INJECTION
Status: DISCONTINUED
Start: 2023-06-19 | End: 2023-06-19 | Stop reason: HOSPADM

## 2023-06-19 RX ORDER — FENTANYL CITRATE 50 UG/ML
50 INJECTION, SOLUTION INTRAMUSCULAR; INTRAVENOUS EVERY 5 MIN PRN
Status: DISCONTINUED | OUTPATIENT
Start: 2023-06-19 | End: 2023-06-19 | Stop reason: HOSPADM

## 2023-06-19 RX ORDER — DIPHENHYDRAMINE HYDROCHLORIDE 50 MG/ML
12.5 INJECTION INTRAMUSCULAR; INTRAVENOUS
Status: DISCONTINUED | OUTPATIENT
Start: 2023-06-19 | End: 2023-06-19 | Stop reason: HOSPADM

## 2023-06-19 RX ORDER — SODIUM CHLORIDE, SODIUM LACTATE, POTASSIUM CHLORIDE, CALCIUM CHLORIDE 600; 310; 30; 20 MG/100ML; MG/100ML; MG/100ML; MG/100ML
INJECTION, SOLUTION INTRAVENOUS CONTINUOUS
Status: DISCONTINUED | OUTPATIENT
Start: 2023-06-19 | End: 2023-06-19

## 2023-06-19 RX ORDER — LIDOCAINE HYDROCHLORIDE 20 MG/ML
INJECTION, SOLUTION EPIDURAL; INFILTRATION; INTRACAUDAL; PERINEURAL PRN
Status: DISCONTINUED | OUTPATIENT
Start: 2023-06-19 | End: 2023-06-19 | Stop reason: SDUPTHER

## 2023-06-19 RX ORDER — SODIUM CHLORIDE 0.9 % (FLUSH) 0.9 %
5-40 SYRINGE (ML) INJECTION EVERY 12 HOURS SCHEDULED
Status: DISCONTINUED | OUTPATIENT
Start: 2023-06-19 | End: 2023-06-19 | Stop reason: HOSPADM

## 2023-06-19 RX ORDER — DEXAMETHASONE SODIUM PHOSPHATE 10 MG/ML
INJECTION, SOLUTION INTRAMUSCULAR; INTRAVENOUS PRN
Status: DISCONTINUED | OUTPATIENT
Start: 2023-06-19 | End: 2023-06-19 | Stop reason: SDUPTHER

## 2023-06-19 RX ORDER — BUPIVACAINE HYDROCHLORIDE AND EPINEPHRINE 5; 5 MG/ML; UG/ML
INJECTION, SOLUTION EPIDURAL; INTRACAUDAL; PERINEURAL PRN
Status: DISCONTINUED | OUTPATIENT
Start: 2023-06-19 | End: 2023-06-19 | Stop reason: ALTCHOICE

## 2023-06-19 RX ORDER — SODIUM CHLORIDE 9 MG/ML
INJECTION, SOLUTION INTRAVENOUS CONTINUOUS
Status: DISCONTINUED | OUTPATIENT
Start: 2023-06-19 | End: 2023-06-19

## 2023-06-19 RX ORDER — OXYCODONE HYDROCHLORIDE 5 MG/1
5 TABLET ORAL
Status: COMPLETED | OUTPATIENT
Start: 2023-06-19 | End: 2023-06-19

## 2023-06-19 RX ORDER — KETAMINE HYDROCHLORIDE 100 MG/ML
INJECTION INTRAMUSCULAR; INTRAVENOUS PRN
Status: DISCONTINUED | OUTPATIENT
Start: 2023-06-19 | End: 2023-06-19 | Stop reason: SDUPTHER

## 2023-06-19 RX ORDER — GLYCOPYRROLATE 0.2 MG/ML
INJECTION INTRAMUSCULAR; INTRAVENOUS PRN
Status: DISCONTINUED | OUTPATIENT
Start: 2023-06-19 | End: 2023-06-19 | Stop reason: SDUPTHER

## 2023-06-19 RX ORDER — PROPOFOL 10 MG/ML
INJECTION, EMULSION INTRAVENOUS CONTINUOUS PRN
Status: DISCONTINUED | OUTPATIENT
Start: 2023-06-19 | End: 2023-06-19 | Stop reason: SDUPTHER

## 2023-06-19 RX ORDER — FENTANYL CITRATE 50 UG/ML
INJECTION, SOLUTION INTRAMUSCULAR; INTRAVENOUS PRN
Status: DISCONTINUED | OUTPATIENT
Start: 2023-06-19 | End: 2023-06-19 | Stop reason: SDUPTHER

## 2023-06-19 RX ORDER — SODIUM CHLORIDE 9 MG/ML
INJECTION, SOLUTION INTRAVENOUS PRN
Status: DISCONTINUED | OUTPATIENT
Start: 2023-06-19 | End: 2023-06-19

## 2023-06-19 RX ORDER — BUPIVACAINE HYDROCHLORIDE AND EPINEPHRINE 5; 5 MG/ML; UG/ML
INJECTION, SOLUTION EPIDURAL; INTRACAUDAL; PERINEURAL
Status: DISCONTINUED
Start: 2023-06-19 | End: 2023-06-19 | Stop reason: HOSPADM

## 2023-06-19 RX ORDER — SODIUM CHLORIDE 9 MG/ML
INJECTION, SOLUTION INTRAVENOUS PRN
Status: DISCONTINUED | OUTPATIENT
Start: 2023-06-19 | End: 2023-06-19 | Stop reason: HOSPADM

## 2023-06-19 RX ADMIN — FENTANYL CITRATE 25 MCG: 50 INJECTION INTRAMUSCULAR; INTRAVENOUS at 07:47

## 2023-06-19 RX ADMIN — LIDOCAINE HYDROCHLORIDE 60 MG: 20 INJECTION, SOLUTION EPIDURAL; INFILTRATION; INTRACAUDAL; PERINEURAL at 07:39

## 2023-06-19 RX ADMIN — GLYCOPYRROLATE 0.1 MG: 0.2 INJECTION INTRAMUSCULAR; INTRAVENOUS at 07:35

## 2023-06-19 RX ADMIN — DEXAMETHASONE SODIUM PHOSPHATE 10 MG: 10 INJECTION, SOLUTION INTRAMUSCULAR; INTRAVENOUS at 07:46

## 2023-06-19 RX ADMIN — MIDAZOLAM 2 MG: 1 INJECTION INTRAMUSCULAR; INTRAVENOUS at 07:35

## 2023-06-19 RX ADMIN — KETAMINE HYDROCHLORIDE 25 MG: 100 INJECTION, SOLUTION, CONCENTRATE INTRAMUSCULAR; INTRAVENOUS at 07:43

## 2023-06-19 RX ADMIN — KETAMINE HYDROCHLORIDE 25 MG: 100 INJECTION, SOLUTION, CONCENTRATE INTRAMUSCULAR; INTRAVENOUS at 08:03

## 2023-06-19 RX ADMIN — FENTANYL CITRATE 50 MCG: 50 INJECTION INTRAMUSCULAR; INTRAVENOUS at 07:39

## 2023-06-19 RX ADMIN — SODIUM CHLORIDE, POTASSIUM CHLORIDE, SODIUM LACTATE AND CALCIUM CHLORIDE: 600; 310; 30; 20 INJECTION, SOLUTION INTRAVENOUS at 06:34

## 2023-06-19 RX ADMIN — FENTANYL CITRATE 25 MCG: 50 INJECTION INTRAMUSCULAR; INTRAVENOUS at 08:07

## 2023-06-19 RX ADMIN — OXYCODONE HYDROCHLORIDE 5 MG: 5 TABLET ORAL at 09:49

## 2023-06-19 RX ADMIN — Medication 2000 MG: at 07:41

## 2023-06-19 RX ADMIN — FENTANYL CITRATE 25 MCG: 50 INJECTION, SOLUTION INTRAMUSCULAR; INTRAVENOUS at 09:32

## 2023-06-19 RX ADMIN — PROPOFOL 60 MCG/KG/MIN: 10 INJECTION, EMULSION INTRAVENOUS at 07:39

## 2023-06-19 ASSESSMENT — LIFESTYLE VARIABLES: SMOKING_STATUS: 0

## 2023-06-19 ASSESSMENT — PAIN DESCRIPTION - LOCATION
LOCATION: BACK

## 2023-06-19 ASSESSMENT — PAIN DESCRIPTION - DESCRIPTORS
DESCRIPTORS: ACHING
DESCRIPTORS: CRUSHING
DESCRIPTORS: SHARP

## 2023-06-19 ASSESSMENT — PAIN SCALES - GENERAL
PAINLEVEL_OUTOF10: 4
PAINLEVEL_OUTOF10: 2
PAINLEVEL_OUTOF10: 4
PAINLEVEL_OUTOF10: 6

## 2023-06-19 ASSESSMENT — PAIN - FUNCTIONAL ASSESSMENT: PAIN_FUNCTIONAL_ASSESSMENT: 0-10

## 2023-06-19 NOTE — PROGRESS NOTES
L 5 ABLATION START TIME 8:15 FINISH 8:22 central line located in the superior vena cava/no pneumothorax/post-procedure radiography performed

## 2023-06-19 NOTE — TELEPHONE ENCOUNTER
Last visit: 10/26/22  Last Med refill: 9/6/22  Does patient have enough medication for 72 hours: No:     Patient needs appt-will send oNoiset message    Next Visit Date:  Future Appointments   Date Time Provider Bekah Mittal   6/27/2023 12:40 PM Lennox Daniels MD Neuro Spec Neurology -   7/5/2023  4:00 PM Yanick Chaparro MD [facility]   7/17/2023  4:00 PM ERICA Lombardo - CNP [address]   10/30/2023  3:00 PM Arti Pura Sandifer,  [address] Maintenance   Topic Date Due    Breast cancer screen  07/19/2023    Shingles vaccine (2 of 3) 05/17/2025 (Originally 11/20/2016)    DTaP/Tdap/Td vaccine (1 - Tdap) 09/09/2030 (Originally 9/10/2020)    A1C test (Diabetic or Prediabetic)  08/23/2023    Lipids  08/23/2023    Depression Monitoring  10/25/2023    Annual Wellness Visit (AWV)  10/27/2023    Low dose CT lung screening &/or counseling  11/11/2023    Colorectal Cancer Screen  09/06/2027    DEXA (modify frequency per FRAX score)  Completed    Flu vaccine  Completed    Pneumococcal 65+ years Vaccine  Completed    COVID-19 Vaccine  Completed    Hepatitis C screen  Completed    Hepatitis A vaccine  Aged Out    Hib vaccine  Aged Out    Meningococcal (ACWY) vaccine  Aged Out       Hemoglobin A1C (%)   Date Value   08/23/2022 5.8             ( goal A1C is < 7)   No results found for: LABMICR  LDL Cholesterol (mg/dL)   Date Value   08/23/2022 130   10/26/2021 128       (goal LDL is <100)   AST (U/L)   Date Value   10/26/2021 25     ALT (U/L)   Date Value   10/26/2021 22     BUN (mg/dL)   Date Value   10/26/2021 19     BP Readings from Last 3 Encounters:   06/19/23 138/72   05/02/23 128/78   04/05/23 120/76          (goal 120/80)    All Future Testing planned in [facility]  Lab Frequency Next Occurrence   TSH With Reflex Ft4 Once 10/26/2022   Comprehensive Metabolic Panel Once 28/48/2459   VL DUP CAROTID BILATERAL Once 01/04/2024   EMG Once 05/02/2023

## 2023-06-19 NOTE — OP NOTE
Operative Note      Patient: Mikayla Pyle  YOB: 1955  MRN: [id number]    Date of Procedure: 6/19/2023    Pre-Op Diagnosis Codes: * Vertebrogenic low back pain [M54.51]    Post-Op Diagnosis: Same       Procedure(s):  BASIVERTEBRAL NERVE NERVE RADIOFREQUENCY ABLATION INTRACEPT PROCEDURE at L4/5/S1    Surgeon(s):  Michail Kocher, MD    Assistant:   * No surgical staff found *    Anesthesia: Monitor Anesthesia Care    Estimated Blood Loss (mL): Minimal    Complications: None    Specimens:   * No specimens in log *    Implants:  * No implants in log *      Drains: * No LDAs found *    Findings: n/a        Detailed Description of Procedure:     Pre-op Diagnosis:   Vertebrogenic low back pain,   Modic changes L4/5/S1    Post-op Diagnosis:   Vertebrogenic low back pain,   Modic changes L4/5/S1    Procedure: Basivertebral nerve (BVN) ablation- Intracept Procedure :  L4/5/S1    Physician/Surgeon: Ramón Vega MD  Anesthesia: MAC    ANTIBIOTICS: IV 2 GM ANCEF    Indications for Procedure:   Chronic axial lumbar spinal pain onset several years ago progressively worsened affecting quality of life, refractory to different modalities including therapy medication management and different spine injections  MRI imaging showed Modic changes involving L4/5/S1 lumbar vertebrae  Clinical examination and imaging concordant with vertebrogenic pain    Procedure Time Out: Patient ID confirmed, correct procedure to be performed, correct site and/or side for procedure as per marked location and correct medication(s), including antibiotic to be used for the procedure    Description of Procedure:     After receiving anesthesia in the supine position, the patient was placed prone on the operating room table and all pressure points were appropriately padded. The back was sterilely prepped and draped.      L4 LEVEL:  The C-arm was moved to visualize the target at the superolateral aspect of the L4 vertebral body using the

## 2023-06-19 NOTE — ANESTHESIA PRE PROCEDURE
Department of Anesthesiology  Preprocedure Note       Name:  Wanda Gaytan   Age:  69 y.o.  :  1955                                          MRN:  [id number]         Date:  2023      Surgeon: Bruce Paredes):  Moises Wick MD    Procedure: Procedure(s):  BASIVERTEBRAL NERVE NERVE RADIOFREQUENCY ABLATION INTRACEPT PROCEDURE at L4/5/S1    Medications prior to admission:   Prior to Admission medications    Medication Sig Start Date End Date Taking?  Authorizing Provider   amLODIPine (NORVASC) 10 MG tablet TAKE ONE TABLET BY MOUTH ONCE NIGHTLY 23   Rosalia Gomez MD   omeprazole (PRILOSEC) 40 MG delayed release capsule TAKE ONE CAPSULE BY MOUTH TWICE A DAY 23   Rosalia Gomez MD   alendronate (FOSAMAX) 70 MG tablet TAKE ONE TABLET BY MOUTH ONCE WEEKLY 4/10/23   Rosalia Gomez MD   DULoxetine (CYMBALTA) 30 MG extended release capsule Take 1 capsule by mouth daily 23   Breanna Barnett MD   tiotropium (SPIRIVA RESPIMAT) 2.5 MCG/ACT AERS inhaler Spiriva Respimat 2.5 mcg/actuation solution for inhalation    Historical Provider, MD   triamcinolone (KENALOG) 0.025 % cream  3/16/23   Historical Provider, MD   pravastatin (PRAVACHOL) 20 MG tablet TAKE ONE TABLET BY MOUTH DAILY 23   Rosalia Gomez MD   pregabalin (LYRICA) 25 MG capsule TAKE ONE CAPSULE BY MOUTH EVERY EVENING 22  Rosalia Gomez MD   celecoxib (CELEBREX) 100 MG capsule TAKE ONE CAPSULE BY MOUTH DAILY 22   ERICA Luo CNP   umeclidinium-vilanterol (ANORO ELLIPTA) 62.5-25 MCG/INH AEPB inhaler Inhale 1 puff into the lungs daily 22  ERICA Luo CNP   baclofen (LIORESAL) 10 MG tablet TAKE ONE TABLET BY MOUTH THREE TIMES A DAY AS NEEDED FOR PAIN 22   Rosalia Gomez MD   meclizine (ANTIVERT) 25 MG tablet Take 1 tablet by mouth 3 times daily as needed for Dizziness 22   Rosalia Gomez MD   albuterol sulfate HFA (VENTOLIN HFA) 108 (90 Base) MCG/ACT

## 2023-06-19 NOTE — ANESTHESIA POSTPROCEDURE EVALUATION
Department of Anesthesiology  Postprocedure Note    Patient: Marylu Stewart  MRN: [id number]  YOB: 1955  Date of evaluation: 6/19/2023      Procedure Summary     Date: 06/19/23 Room / Location: [address] [place] [address] / [facility] - INPATIENT    Anesthesia Start: 7653 Anesthesia Stop: 7621    Procedure: BASIVERTEBRAL NERVE NERVE RADIOFREQUENCY ABLATION INTRACEPT PROCEDURE at L4/5/S1 (Back) Diagnosis:       Vertebrogenic low back pain      (vertebrogneic low back pain)    Surgeons: Humberto Chowdary MD Responsible Provider: Samantha Canales MD    Anesthesia Type: General ASA Status: 3          Anesthesia Type: General    Kristel Phase I: Kristel Score: 8    Kristel Phase II: Kristel Score: 10      Anesthesia Post Evaluation    Complications: no

## 2023-06-19 NOTE — DISCHARGE INSTRUCTIONS
To achieve optimal recovery and results,   follow these instructions carefully. What to Expect After the Procedure      _    You should be discharged and able to walk on the same day of the procedure. _    As with any procedure, you may feel sore afterwards. If you feel extreme discomfort or pain, contact your physician immediately. Medication      Tell your physician about any prescribed or over-the-counter medications you are currently taking. Continue taking them as directed by your physician. If you feel soreness or minor discomfort, you may take the prescribe pain medication directed      Activity Restrictions & Resumption      Avoid strenuous exercise and heavy lifting for 7 days. Walking is the best exercise, and daily walking is strongly recommended. Gradually increase the length and distance of your walks. Start inside your home, then progress to out and around your home, as tolerated, and progress to your neighborhood or shopping center. Try to limit long car rides (greater than 1 hour) for a few weeks, or as tolerated. Do not drive while on pain medications. Start Home exercise program /physical therapy / Rehab as planned 1 week post op for physical conditioning and core strengthening. Incision Care      Remove the outer dressings 3 days after the procedure. Adhesive strips will cover the incision(s) and should begin to fall off within 7-10 days after the procedure. If they have not fallen off after 14 days, you may remove them. You may begin to shower 3 days after the procedure. Do not sit in the bath. Do not rub the incision. Do not apply lotion, medication, or cream to the incision. Post-Procedure Office Visits      __  If your follow-up appointments have not been scheduled, please contact STAFF at the phone number below within 24 hours after your procedure to schedule your appointments.   [address] PAIN [facility]  [facility]

## 2023-06-19 NOTE — H&P
Patient seen preop, chart reviewed,   No changes in medical history and health assessment since last evaluation. PE:  AAO x 3, in NAD, VSS,. Resp: breathing on RA, no respiratory distress  Cardiac: rate normal    Risk / Benefits explained to patient, patient agree to proceed with plan.   ASA 2  MP 2

## 2023-06-20 RX ORDER — BACLOFEN 10 MG/1
TABLET ORAL
Qty: 30 TABLET | Refills: 0 | Status: SHIPPED | OUTPATIENT
Start: 2023-06-20

## 2023-06-27 ENCOUNTER — TELEMEDICINE (OUTPATIENT)
Dept: NEUROLOGY | Age: 68
End: 2023-06-27

## 2023-06-27 DIAGNOSIS — G89.29 CHRONIC BILATERAL LOW BACK PAIN WITH RIGHT-SIDED SCIATICA: ICD-10-CM

## 2023-06-27 DIAGNOSIS — M54.41 CHRONIC BILATERAL LOW BACK PAIN WITH RIGHT-SIDED SCIATICA: ICD-10-CM

## 2023-06-27 RX ORDER — PREGABALIN 50 MG/1
50 CAPSULE ORAL
Qty: 90 CAPSULE | Refills: 0 | Status: SHIPPED | OUTPATIENT
Start: 2023-06-27 | End: 2023-09-25

## 2023-07-05 ENCOUNTER — HOSPITAL ENCOUNTER (OUTPATIENT)
Dept: PAIN MANAGEMENT | Facility: CLINIC | Age: 68
Discharge: HOME | End: 2023-07-05

## 2023-07-05 NOTE — DISCHARGE INSTRUCTIONS
Discharge Instructions following Sedation or Anesthesia:  You have  received  a sedative/anesthetic therefore, you should not consume any alcoholic beverages for minimum of 12 hours. Do not drive or operate machinery until after follow up with doctor. Do not sign legal documents for 24 hours. Dizziness, drowsiness, and unsteadiness may occur. Rest when need to. Increase diet as tolerated. Keep up on fluids if diet allows. General Instructions:  Avoid any twisting, lifting, bending or exaggerated spine movement  Keep the area of the procedure clean and dry  Do not shower or bathe, sponge baths only, until after follow up. Wear abdominal binder as much as possible to support your spine and limit the movement of your spine (if you were provided with one)  Do not use heat, heating pad, or any other heating device over the injection site for 3 days after the procedure. If you experience pain after your procedure, you may continue with your current pain medication as prescribed. (DO NOT INCREASE YOUR PAIN MEDICATION WITHOUT TALKING TO DOCTOR)  Soreness and pain at injection site is common, may use ice to reduce soreness. Call Kinga at [phone] if you experience:   Fever, chills or temperature over 100    Vomiting, Headache, persistent stiff neck, nausea, blurred vision   Difficulty in urinating or unable to urinate with 8 hours   Increase in weakness, numbness or loss of function   Increased redness, swelling or drainage at the injection site   Monitor for any signs of infection, such as, redness, swelling, green or yellow foul smelling drainage at injection site. Increased pain or fever.

## 2023-07-18 DIAGNOSIS — M47.817 LUMBOSACRAL SPONDYLOSIS WITHOUT MYELOPATHY: Primary | ICD-10-CM

## 2023-07-20 ENCOUNTER — OFFICE VISIT (OUTPATIENT)
Dept: FAMILY MEDICINE CLINIC | Age: 68
End: 2023-07-20
Payer: MEDICARE

## 2023-07-20 VITALS
WEIGHT: 162.8 LBS | OXYGEN SATURATION: 96 % | BODY MASS INDEX: 27.51 KG/M2 | TEMPERATURE: 98.2 F | HEART RATE: 78 BPM | DIASTOLIC BLOOD PRESSURE: 70 MMHG | SYSTOLIC BLOOD PRESSURE: 120 MMHG

## 2023-07-20 DIAGNOSIS — F32.4 MAJOR DEPRESSIVE DISORDER WITH SINGLE EPISODE, IN PARTIAL REMISSION (HCC): ICD-10-CM

## 2023-07-20 DIAGNOSIS — E78.2 MIXED HYPERLIPIDEMIA: ICD-10-CM

## 2023-07-20 DIAGNOSIS — Z12.31 ENCOUNTER FOR SCREENING MAMMOGRAM FOR MALIGNANT NEOPLASM OF BREAST: Primary | ICD-10-CM

## 2023-07-20 DIAGNOSIS — J32.9 CHRONIC RECURRENT SINUSITIS: ICD-10-CM

## 2023-07-20 DIAGNOSIS — G89.29 CHRONIC BILATERAL LOW BACK PAIN WITH RIGHT-SIDED SCIATICA: ICD-10-CM

## 2023-07-20 DIAGNOSIS — M54.41 CHRONIC BILATERAL LOW BACK PAIN WITH RIGHT-SIDED SCIATICA: ICD-10-CM

## 2023-07-20 DIAGNOSIS — J43.2 CENTRILOBULAR EMPHYSEMA (HCC): ICD-10-CM

## 2023-07-20 DIAGNOSIS — I10 PRIMARY HYPERTENSION: ICD-10-CM

## 2023-07-20 DIAGNOSIS — I10 ESSENTIAL HYPERTENSION: ICD-10-CM

## 2023-07-20 PROCEDURE — 3074F SYST BP LT 130 MM HG: CPT | Performed by: INTERNAL MEDICINE

## 2023-07-20 PROCEDURE — 1036F TOBACCO NON-USER: CPT | Performed by: INTERNAL MEDICINE

## 2023-07-20 PROCEDURE — 1123F ACP DISCUSS/DSCN MKR DOCD: CPT | Performed by: INTERNAL MEDICINE

## 2023-07-20 PROCEDURE — 99214 OFFICE O/P EST MOD 30 MIN: CPT | Performed by: INTERNAL MEDICINE

## 2023-07-20 PROCEDURE — G8417 CALC BMI ABV UP PARAM F/U: HCPCS | Performed by: INTERNAL MEDICINE

## 2023-07-20 PROCEDURE — G8399 PT W/DXA RESULTS DOCUMENT: HCPCS | Performed by: INTERNAL MEDICINE

## 2023-07-20 PROCEDURE — 1090F PRES/ABSN URINE INCON ASSESS: CPT | Performed by: INTERNAL MEDICINE

## 2023-07-20 PROCEDURE — 3017F COLORECTAL CA SCREEN DOC REV: CPT | Performed by: INTERNAL MEDICINE

## 2023-07-20 PROCEDURE — G8427 DOCREV CUR MEDS BY ELIG CLIN: HCPCS | Performed by: INTERNAL MEDICINE

## 2023-07-20 PROCEDURE — 3023F SPIROM DOC REV: CPT | Performed by: INTERNAL MEDICINE

## 2023-07-20 PROCEDURE — 3078F DIAST BP <80 MM HG: CPT | Performed by: INTERNAL MEDICINE

## 2023-07-20 RX ORDER — BACLOFEN 10 MG/1
TABLET ORAL
Qty: 30 TABLET | Refills: 0 | Status: SHIPPED | OUTPATIENT
Start: 2023-07-20

## 2023-07-20 NOTE — PROGRESS NOTES
Pt is here today for a regular f/u and med refills     Pt needs a refill on her Anoro will not let me pend     Pt would like to discuss a treatment they have came up with for her back,     Pt would like like to discuss the neuropathy in her feet, now in both feet, getting worse, some days can barely move her toes, some days can't feel the bottom of her feet

## 2023-07-20 NOTE — PATIENT INSTRUCTIONS
[facility], [address] [place] [address], [address] [place] [address], [address] [place] [address],[address]  [phone]

## 2023-07-20 NOTE — PROGRESS NOTES
[facility]  [facility] [address] [facility] [address]  Dept: [phone]  Dept Fax: [phone]      Lorin Webb is a 69 y.o. female who presents today for hermedical conditions/complaints as noted below. Lorin Webb is c/o of Hypertension (F/u) and Medication Refill        Assessment/Plan:     1. Encounter for screening mammogram for malignant neoplasm of breast  2. Chronic bilateral low back pain with right-sided sciatica  The following orders have not been finalized:  -     baclofen (LIORESAL) 10 MG tablet          No follow-ups on file. HPI     Had some work done on her back with Dr Tarik Syed, back pain is better   Has been recommended she consider a spinal stmulator but doesn't want one if it will not help the sciatica. Pain shooting down all the way to her right foot, right knee gives out.      Hypertension        BP Readings from Last 3 Encounters:   07/20/23 120/70   06/19/23 138/72   05/02/23 128/78              Past Medical History:   Diagnosis Date    ADHD     mild, no RX    Arthritis     Blurred vision, right eye     Carotid artery disease ([address])     dr Demetria Stafford vascular last appt 1/20    GERD (gastroesophageal reflux disease)     Hearing loss     High cholesterol     History of closed shoulder dislocation     rt from recent fall    [place] (hard of hearing)     beto hearing aides    Hx of gastric ulcer     Hyperlipidemia     Hypertension 2007    IBS (irritable bowel syndrome)     Macular pucker, right eye     Skin cancer of forehead     Syncope     hx recent falls    Tension headache     Tremor 2020    UTI (urinary tract infection)     Wears dentures     lower bridge plate,1 tooth upper    Wears glasses       Past Surgical History:   Procedure Laterality Date    APPENDECTOMY      BACK SURGERY      CATARACT REMOVAL WITH IMPLANT Bilateral     CHOLECYSTECTOMY      COLONOSCOPY N/A 2016    ESOPHAGOGASTRIC FUNDOPLICATION      EYE SURGERY      FINGER SURGERY Left

## 2023-07-23 ENCOUNTER — HOSPITAL ENCOUNTER (OUTPATIENT)
Age: 68
Setting detail: SPECIMEN
Discharge: HOME | End: 2023-07-23

## 2023-07-23 ENCOUNTER — OFFICE VISIT (OUTPATIENT)
Dept: FAMILY MEDICINE CLINIC | Age: 68
End: 2023-07-23

## 2023-07-23 VITALS
WEIGHT: 164.4 LBS | HEART RATE: 84 BPM | BODY MASS INDEX: 28.07 KG/M2 | HEIGHT: 64 IN | OXYGEN SATURATION: 96 % | TEMPERATURE: 97.6 F | SYSTOLIC BLOOD PRESSURE: 134 MMHG | DIASTOLIC BLOOD PRESSURE: 76 MMHG

## 2023-07-23 DIAGNOSIS — N30.01 ACUTE CYSTITIS WITH HEMATURIA: ICD-10-CM

## 2023-07-23 DIAGNOSIS — N30.01 ACUTE CYSTITIS WITH HEMATURIA: Primary | ICD-10-CM

## 2023-07-23 PROBLEM — M48.04 SPINAL STENOSIS OF THORACIC REGION: Status: ACTIVE | Noted: 2023-01-04

## 2023-07-23 PROBLEM — R29.898 WEAKNESS OF BOTH LOWER EXTREMITIES: Status: ACTIVE | Noted: 2023-03-23

## 2023-07-23 PROBLEM — T85.193D: Status: ACTIVE | Noted: 2022-09-23

## 2023-07-23 PROBLEM — N18.30 CHRONIC RENAL DISEASE, STAGE III (HCC): Status: ACTIVE | Noted: 2022-08-17

## 2023-07-23 PROBLEM — M19.079 ARTHRITIS OF FOOT: Status: ACTIVE | Noted: 2022-09-07

## 2023-07-23 PROBLEM — S92.353A CLOSED FRACTURE OF BASE OF FIFTH METATARSAL BONE: Status: ACTIVE | Noted: 2022-09-07

## 2023-07-23 PROBLEM — M25.50 ARTHRALGIA: Status: ACTIVE | Noted: 2023-04-03

## 2023-07-23 PROBLEM — R60.0 LOWER EXTREMITY EDEMA: Status: ACTIVE | Noted: 2023-03-23

## 2023-07-23 LAB
BILIRUBIN, POC: NEGATIVE
BLOOD URINE, POC: ABNORMAL
CLARITY, POC: CLEAR
COLOR, POC: ABNORMAL
GLUCOSE URINE, POC: NEGATIVE
KETONES, POC: NEGATIVE
LEUKOCYTE EST, POC: ABNORMAL
NITRITE, POC: POSITIVE
PH, POC: 6
PROTEIN, POC: NEGATIVE
SPECIFIC GRAVITY, POC: 1.02
UROBILINOGEN, POC: ABNORMAL

## 2023-07-23 RX ORDER — PHENAZOPYRIDINE HYDROCHLORIDE 200 MG/1
TABLET, FILM COATED ORAL
Qty: 6 TABLET | Refills: 0 | Status: SHIPPED | OUTPATIENT
Start: 2023-07-23 | End: 2023-07-25

## 2023-07-23 RX ORDER — NITROFURANTOIN 25; 75 MG/1; MG/1
100 CAPSULE ORAL 2 TIMES DAILY
Qty: 14 CAPSULE | Refills: 0 | Status: SHIPPED | OUTPATIENT
Start: 2023-07-23 | End: 2023-07-30

## 2023-07-25 LAB
MICROORGANISM SPEC CULT: ABNORMAL
SPECIMEN DESCRIPTION: ABNORMAL

## 2023-07-31 RX ORDER — DULOXETIN HYDROCHLORIDE 30 MG/1
CAPSULE, DELAYED RELEASE ORAL
Qty: 90 CAPSULE | Refills: 0 | Status: SHIPPED | OUTPATIENT
Start: 2023-07-31

## 2023-07-31 NOTE — TELEPHONE ENCOUNTER
Pharmacy requesting refill of duloxetine (Cymbalta)       Medication active on med list: yes      Date of last Rx: 04/05/2023  with 3 refills   verified on 07/31/2023   verified by SHANIQUE MENDOZA      Date of last appointment: 06/27/2023    Next Visit Date:  10/25/2023

## 2023-08-09 ENCOUNTER — HOSPITAL ENCOUNTER (OUTPATIENT)
Dept: WOMENS IMAGING | Age: 68
Discharge: HOME | End: 2023-08-11
Payer: MEDICARE

## 2023-08-09 DIAGNOSIS — E78.2 MIXED HYPERLIPIDEMIA: ICD-10-CM

## 2023-08-09 DIAGNOSIS — Z12.31 ENCOUNTER FOR SCREENING MAMMOGRAM FOR MALIGNANT NEOPLASM OF BREAST: ICD-10-CM

## 2023-08-09 PROCEDURE — 77063 BREAST TOMOSYNTHESIS BI: CPT

## 2023-08-09 RX ORDER — PRAVASTATIN SODIUM 20 MG
TABLET ORAL
Qty: 90 TABLET | Refills: 1 | Status: SHIPPED | OUTPATIENT
Start: 2023-08-09

## 2023-08-09 NOTE — TELEPHONE ENCOUNTER
Last visit: 7/20/23  Last Med refill: 2/13/23  Does patient have enough medication for 72 hours: Yes    Next Visit Date:  Future Appointments   Date Time Provider [address]   8/17/2023  1:30 PM Jaison Rico PT [facility]   10/25/2023  2:40 PM Maime Cruz MD Neuro Spec Neurology -   11/6/2023  2:45 PM Rosalia Yao MD [facility]   11/22/2023  2:00 PM Rosalia Yao MD [address] Maintenance   Topic Date Due    GFR test (Diabetes, CKD 3-4, OR last GFR 15-59)  06/14/2023    Flu vaccine (1) 08/01/2023    Breast cancer screen  07/19/2023    A1C test (Diabetic or Prediabetic)  08/23/2023    Lipids  08/23/2023    Shingles vaccine (2 of 3) 05/17/2025 (Originally 11/20/2016)    DTaP/Tdap/Td vaccine (1 - Tdap) 09/09/2030 (Originally 9/10/2020)    Annual Wellness Visit (AWV)  10/27/2023    Low dose CT lung screening &/or counseling  11/11/2023    Depression Monitoring  07/18/2024    Colorectal Cancer Screen  09/06/2027    DEXA (modify frequency per FRAX score)  Completed    Pneumococcal 65+ years Vaccine  Completed    COVID-19 Vaccine  Completed    Hepatitis C screen  Completed    Hepatitis A vaccine  Aged Out    Hib vaccine  Aged Out    Meningococcal (ACWY) vaccine  Aged Out       Hemoglobin A1C (%)   Date Value   08/23/2022 5.8             ( goal A1C is < 7)   No components found for: LABMICR  LDL Cholesterol (mg/dL)   Date Value   08/23/2022 130   10/26/2021 128       (goal LDL is <100)   AST (U/L)   Date Value   10/26/2021 25     ALT (U/L)   Date Value   10/26/2021 22     BUN (mg/dL)   Date Value   10/26/2021 19     BP Readings from Last 3 Encounters:   07/23/23 134/76   07/20/23 120/70   06/19/23 138/72          (goal 120/80)    All Future Testing planned in [facility]  Lab Frequency Next Occurrence   TSH With Reflex Ft4 Once 10/26/2022   Comprehensive Metabolic Panel Once 62/75/7666   VL DUP CAROTID BILATERAL Once 01/04/2024               Patient Active

## 2023-08-15 ENCOUNTER — HOSPITAL ENCOUNTER (OUTPATIENT)
Dept: WOMENS IMAGING | Age: 68
Discharge: HOME | End: 2023-08-17
Payer: MEDICARE

## 2023-08-15 ENCOUNTER — OFFICE VISIT (OUTPATIENT)
Dept: PAIN MANAGEMENT | Age: 68
End: 2023-08-15
Payer: MEDICARE

## 2023-08-15 VITALS — OXYGEN SATURATION: 96 % | HEART RATE: 76 BPM | WEIGHT: 164.4 LBS | BODY MASS INDEX: 28.07 KG/M2 | HEIGHT: 64 IN

## 2023-08-15 DIAGNOSIS — M54.16 CHRONIC LUMBAR RADICULOPATHY: Primary | ICD-10-CM

## 2023-08-15 DIAGNOSIS — G62.9 NEUROPATHY: ICD-10-CM

## 2023-08-15 DIAGNOSIS — R92.8 ABNORMAL MAMMOGRAM: ICD-10-CM

## 2023-08-15 DIAGNOSIS — M96.1 FAILED BACK SYNDROME: ICD-10-CM

## 2023-08-15 PROCEDURE — G8417 CALC BMI ABV UP PARAM F/U: HCPCS | Performed by: ANESTHESIOLOGY

## 2023-08-15 PROCEDURE — G0279 TOMOSYNTHESIS, MAMMO: HCPCS

## 2023-08-15 PROCEDURE — G8427 DOCREV CUR MEDS BY ELIG CLIN: HCPCS | Performed by: ANESTHESIOLOGY

## 2023-08-15 PROCEDURE — 99214 OFFICE O/P EST MOD 30 MIN: CPT | Performed by: ANESTHESIOLOGY

## 2023-08-15 PROCEDURE — 1090F PRES/ABSN URINE INCON ASSESS: CPT | Performed by: ANESTHESIOLOGY

## 2023-08-15 PROCEDURE — 3017F COLORECTAL CA SCREEN DOC REV: CPT | Performed by: ANESTHESIOLOGY

## 2023-08-15 PROCEDURE — 1123F ACP DISCUSS/DSCN MKR DOCD: CPT | Performed by: ANESTHESIOLOGY

## 2023-08-15 PROCEDURE — G8399 PT W/DXA RESULTS DOCUMENT: HCPCS | Performed by: ANESTHESIOLOGY

## 2023-08-15 PROCEDURE — 1036F TOBACCO NON-USER: CPT | Performed by: ANESTHESIOLOGY

## 2023-08-15 RX ORDER — TROSPIUM CHLORIDE 20 MG/1
TABLET, FILM COATED ORAL
COMMUNITY
Start: 2023-07-26

## 2023-08-15 ASSESSMENT — ENCOUNTER SYMPTOMS
DIARRHEA: 0
VOMITING: 0
SHORTNESS OF BREATH: 0
BACK PAIN: 1
CONSTIPATION: 0
CHEST TIGHTNESS: 0
NAUSEA: 0
WHEEZING: 0

## 2023-08-15 NOTE — PROGRESS NOTES
The patient is a 69 y. o. Non- / non  female. Chief Complaint   Patient presents with    Back Pain        Back Pain  Associated symptoms include numbness and weakness. Pertinent negatives include no fever.        Pain level: 4  Character: burning, feels like a pulled muscle  Radiating: yes  Weakness or numbness: yes   Aggravating Factors: standing  Alleviating Factors: medication  Constant or intermitting: intermitting  Bladder/bowel loss: no          Past Medical History:   Diagnosis Date    ADHD     mild, no RX    Arthritis     Blurred vision, right eye     Carotid artery disease ([address])     dr Potter Drop vascular last appt 1/20    GERD (gastroesophageal reflux disease)     Hearing loss     High cholesterol     History of closed shoulder dislocation     rt from recent fall    [place] (hard of hearing)     beto hearing aides    Hx of gastric ulcer     Hyperlipidemia     Hypertension 2007    IBS (irritable bowel syndrome)     Macular pucker, right eye     Skin cancer of forehead     Syncope     hx recent falls    Tension headache     Tremor 2020    UTI (urinary tract infection)     Wears dentures     lower bridge plate,1 tooth upper    Wears glasses         Past Surgical History:   Procedure Laterality Date    APPENDECTOMY      BACK SURGERY      CATARACT REMOVAL WITH IMPLANT Bilateral     CHOLECYSTECTOMY      COLONOSCOPY N/A 2016    ESOPHAGOGASTRIC FUNDOPLICATION      EYE SURGERY      FINGER SURGERY Left     4th finger    HIATAL HERNIA REPAIR  1999    x2    PAIN MANAGEMENT PROCEDURE N/A 6/19/2023    BASIVERTEBRAL NERVE NERVE RADIOFREQUENCY ABLATION INTRACEPT PROCEDURE at L4/5/S1 performed by Danielle Quintana MD at [facility] Right     UPPER GASTROINTESTINAL ENDOSCOPY      UPPER GASTROINTESTINAL ENDOSCOPY N/A 10/26/2018    EGD BIOPSY AND DILITATION WITH DAMION performed by Emanuel Bills MD at [address] [place] [address]    VITRECTOMY Right 03/10/2020    VITRECTOMY 23 GAUGE, MEMBRANE PEELING,

## 2023-08-15 NOTE — H&P (VIEW-ONLY)
GAS FLUID EXCHANGE, ICG     VITRECTOMY Right 03/10/2020    VITRECTOMY 23 GAUGE, MEMBRANE PEELING, AIR FLUID EXCHANGE, ICG performed by Joan Khoury MD at [address] [facility] History     Socioeconomic History    Marital status:      Spouse name: None    Number of children: None    Years of education: None    Highest education level: None   Tobacco Use    Smoking status: Former     Packs/day: 1.00     Years: 40.00     Pack years: 40.00     Types: Cigarettes     Quit date: 2018     Years since quittin.3     Passive exposure: Never    Smokeless tobacco: Never    Tobacco comments:     Light smoker. Quit on and off several years. Vaping Use    Vaping Use: Never used   Substance and Sexual Activity    Alcohol use: No    Drug use: No    Sexual activity: Not Currently     Social Determinants of Health     Financial Resource Strain: Low Risk     Difficulty of Paying Living Expenses: Not hard at all   Food Insecurity: Food Insecurity Present    Worried About [place] in the Last Year: Often true    Ran Out of Food in the Last Year: Sometimes true   Transportation Needs: Unknown    Lack of Transportation (Non-Medical):  No   Physical Activity: Unknown    Days of Exercise per Week: 0 days   Housing Stability: Unknown    Unstable Housing in the Last Year: No       Family History   Problem Relation Age of Onset    Cancer Father        Allergies   Allergen Reactions    Morphine     Bee Venom     Wasp Venom Protein        Vitals:    08/15/23 1504   Pulse: 76   SpO2: 96%       Current Outpatient Medications   Medication Sig Dispense Refill    DULoxetine (CYMBALTA) 30 MG extended release capsule TAKE ONE CAPSULE BY MOUTH DAILY 90 capsule 0    trospium (SANCTURA) 20 MG tablet       pravastatin (PRAVACHOL) 20 MG tablet TAKE ONE TABLET BY MOUTH DAILY 90 tablet 1    baclofen (LIORESAL) 10 MG tablet TAKE ONE TABLET BY MOUTH THREE TIMES A DAY AS NEEDED FOR PAIN 30 tablet 0    umeclidinium-vilanterol (ANORO

## 2023-08-16 DIAGNOSIS — R92.8 ABNORMAL MAMMOGRAM OF RIGHT BREAST: Primary | ICD-10-CM

## 2023-08-17 ENCOUNTER — HOSPITAL ENCOUNTER (OUTPATIENT)
Dept: PHYSICAL THERAPY | Age: 68
Setting detail: THERAPIES SERIES
Discharge: HOME | End: 2023-08-17
Attending: ANESTHESIOLOGY
Payer: MEDICARE

## 2023-08-17 PROCEDURE — 97110 THERAPEUTIC EXERCISES: CPT

## 2023-08-17 PROCEDURE — 97161 PT EVAL LOW COMPLEX 20 MIN: CPT

## 2023-08-17 NOTE — CONSULTS
[x] Deacon Padilla  Outpatient Physical Therapy  [address] [facility].  Phone: [phone]  Fax: [phone] [] [address] [facility]. [address]   Phone: [phone]  Fax: [phone]  [] [facility] at [address]  Phone: [phone]   Fax: [phone]     Physical Therapy Evaluation    Date:  2023  Patient: Paolo Bocanegra  : 1955  MRN: [id number]  Physician: Baron Margarito MD  Insurance: Medicare/ Medical [place]   Medical Diagnosis:  Lumbosacral spondylosis without myelopathy M47.817    Rehab Codes: M54.5, M79.604, M79.605, M79.675  Onset Date: 23                                 Next 's appt:  Spinal core stimulator trial     Subjective:   CC:Patient reports chronic low back and B LE pain. Patient notes her midline lumbar pain has improved but she still has significant pain in bilateral lower extremities. She note pain in her posterior hips with prolonged walking. She has bilateral knee pain when standing from a seated position. She states her left leg is weak and will occasionally buckle during single limbs stance activity. She reports intense pain in her ankles and toes especially on her left side. She notes shoes can be irritable to her left big toe and that she is more comfortable in sandals. HPI:  Patient has had lumbar issues for many years. She underwent decompression surgery about 15 years ago and had good relief until 3 years ago. Her pain is most significant in her bilateral thighs and knees. Patient also has Hx of R TKA. . She underwent RFA on 23. She has spinal stimulator trial 23.      PMHx/Comorbidities:  Past Medical History:   Diagnosis Date    ADHD     mild, no RX    Arthritis     Blurred vision, right eye     Carotid artery disease ([address])     dr Nya Penn vascular last appt     GERD (gastroesophageal reflux disease)     Hearing loss

## 2023-08-23 ENCOUNTER — HOSPITAL ENCOUNTER (OUTPATIENT)
Dept: PHYSICAL THERAPY | Age: 68
Setting detail: THERAPIES SERIES
Discharge: HOME | End: 2023-08-23
Attending: ANESTHESIOLOGY
Payer: MEDICARE

## 2023-08-23 PROCEDURE — 97110 THERAPEUTIC EXERCISES: CPT

## 2023-08-23 NOTE — FLOWSHEET NOTE
[x] [address]  [address] [facility].  [phone]  F: [phone] [] [address] [facility]  [address]   [address]  P: [phone]  F: [phone] [] [address]  [address] [facility]  P: [phone]  F: [phone] [] [place]: [phone]  F: [phone] [] [address] [facility]  [address] [facility]   [address]   P: [phone]  F: [phone]  [] [address] [facility].   P: [phone]  F: [phone] [] [address] [facility]  [address] [place]    [address]  P: [phone]  F: [phone] [] [address] [facility]  [address] [facility]  [place]: [phone]  F: [phone] [] [address] [facility] [address]  [place]: [phone]  F: [phone]      Physical Therapy Daily Treatment Note    Date:  2023  Patient Name:  Kal Snyder    :  1955  MRN: [id number]  Physician: Irish Reid MD          Insurance: Medicare/ Medical [place]   Medical Diagnosis:   Lumbosacral spondylosis without myelopathy M47.817          Rehab Codes: M54.5, M79.604, M79.605, M79.675  Onset Date: 23                                 Next 's appt:  Spinal core stimulator trial     Visit# / total visits: 2/10; Progress note for Medicare patient due at visit 10     Cancels/No Shows: 0/0    Subjective:    Pain:  [x] Yes  [] No Location: Low back, hips, left knee Pain Rating: (0-10 scale) 3/10  Pain altered Tx:  [x] No  [] Yes  Action:  Comments:    Objective:  Modalities:

## 2023-08-25 ENCOUNTER — HOSPITAL ENCOUNTER (OUTPATIENT)
Dept: PHYSICAL THERAPY | Age: 68
Setting detail: THERAPIES SERIES
Discharge: HOME | End: 2023-08-25
Attending: ANESTHESIOLOGY
Payer: MEDICARE

## 2023-08-25 PROCEDURE — 97110 THERAPEUTIC EXERCISES: CPT

## 2023-08-25 NOTE — FLOWSHEET NOTE
[x] [address]  [address] [facility].  [phone]  F: [phone] [] [address] [employer]  [address]   [address]  P: [phone]  F: [phone] [] [address]  [address] [facility]  P: [phone]  F: [phone] [] [place]: [phone]  F: [phone] [] [address] [facility]  [address] [facility] [address]   P: [phone]  F: [phone]  [] [address] [facility].   P: [phone]  F: [phone] [] [address] [facility]  [address] [facility] [address]  P: [phone]  F: [phone] [] [address] [facility]  [address] [place] [address]  [place]: [phone]  F: [phone] [] [address] [facility] [address]  [place]: [phone]  F: [phone]      Physical Therapy Daily Treatment Note    Date:  2023  Patient Name:  Mike Carranza    :  1955  MRN: [id number]  Physician: Melissa Webb MD          Insurance: Medicare/ Medical [place]   Medical Diagnosis:   Lumbosacral spondylosis without myelopathy M47.817          Rehab Codes: M54.5, M79.604, M79.605, M79.675  Onset Date: 23                                 Next 's appt:  Spinal core stimulator trial     Visit# / total visits: 3/10; Progress note for Medicare patient due at visit 10th     Cancels/No Shows: 0/0    Subjective:    Pain:  [x] Yes  [] No Location: Low back, B LE Pain Rating: (0-10 scale) 3/10  Pain altered Tx:  [] No  [] Yes  Action:  Comments: Normally 3/10 but anterior and left lateral thigh pain is 6/10.

## 2023-08-28 ENCOUNTER — HOSPITAL ENCOUNTER (OUTPATIENT)
Dept: PHYSICAL THERAPY | Age: 68
Setting detail: THERAPIES SERIES
Discharge: HOME | End: 2023-08-28
Attending: ANESTHESIOLOGY
Payer: MEDICARE

## 2023-08-28 PROCEDURE — 97110 THERAPEUTIC EXERCISES: CPT

## 2023-08-28 NOTE — FLOWSHEET NOTE
Objective:  Modalities:   Precautions:  Exercises:  Exercise Reps/ Time Weight/ Level Comments   Nustep 5 mins L3          Standing      Heel raises  15x     3 way hip   15x ea     Marching   15x     Hamstring curls  15x     Calf stretch 3x 30\" wedge   Mini squats   For HEP   Hip flexor stretch 3xea 20\" Added 8/28               Bands      Rows 15x blue    Lat extension 15x blue          Supine      LTR 10xea 5-10\"    Bridges 15x     Clamshells 15x L[place] Added resistance 8/28   SLR 15x L/ 15x R     Hip adduction      Figure 4 stretch 3x 20 sec    Piriformis stretch 3x 20 sec    Hip flexor stretch   HEP         Sidelying      Hip abduction   Not this date         Prone:      Quad stretch 3x 20\" Added 8/28         Other:      Treatment Charges: Mins Units   []  Modalities     [x]  Ther Exercise 49 3   []  Manual Therapy     []  Ther Activities     []  Neuro Re-ed     []  Vasocompression     [] Gait     []  Other     Total Treatment time 49 mins 3       Assessment: [x] Progressing toward goals. Added standing hip flexor stretch for improved tolerance in comparison to supine hip flexor stretch off the edge of the mat. Demonstration and verbal cues for proper technique. Added resistance to hooklying clamshell. Pt able to complete 15 reps of SLR's on the left this date. Lastly, added prone quad stretch with good tolerance. Pt reports a reduction of symptoms after exercises this date stating that she feels the stretches were very helpful for the left thigh. [] No change. [] Other:  [x] Patient would continue to benefit from skilled physical therapy services in order to: improve trunk ROM, core and hip strength,    LTG: (to be met in 10 treatments)  ? Pain: 4/10 B LE pain with ambulation. ? ROM: Patient is able to flex trunk and reach item at floor level without pain.    ? Strength: 4/5 B hip extension and abduction to improve hip stability in standing  Endurance: Patient is able to complete 30 reps of hip

## 2023-08-29 ENCOUNTER — HOSPITAL ENCOUNTER (OUTPATIENT)
Dept: WOMENS IMAGING | Age: 68
Discharge: HOME | End: 2023-08-31
Payer: MEDICARE

## 2023-08-29 VITALS — DIASTOLIC BLOOD PRESSURE: 75 MMHG | HEART RATE: 85 BPM | SYSTOLIC BLOOD PRESSURE: 135 MMHG

## 2023-08-29 DIAGNOSIS — R92.8 ABNORMAL MAMMOGRAM OF RIGHT BREAST: ICD-10-CM

## 2023-08-29 PROCEDURE — 88341 IMHCHEM/IMCYTCHM EA ADD ANTB: CPT

## 2023-08-29 PROCEDURE — 77065 DX MAMMO INCL CAD UNI: CPT

## 2023-08-29 PROCEDURE — 88342 IMHCHEM/IMCYTCHM 1ST ANTB: CPT

## 2023-08-29 PROCEDURE — 88305 TISSUE EXAM BY PATHOLOGIST: CPT

## 2023-08-29 PROCEDURE — 88360 TUMOR IMMUNOHISTOCHEM/MANUAL: CPT

## 2023-08-29 PROCEDURE — 19081 BX BREAST 1ST LESION STRTCTC: CPT

## 2023-08-30 ENCOUNTER — TELEPHONE (OUTPATIENT)
Dept: WOMENS IMAGING | Age: 68
End: 2023-08-30

## 2023-08-30 NOTE — TELEPHONE ENCOUNTER
Called patient, she did not answer, left a voicemail with our number to call back if she has any questions or concerns.       Electronically signed by Keri Sanchez on 8/30/2023 at 2:44 PM

## 2023-09-01 ENCOUNTER — HOSPITAL ENCOUNTER (OUTPATIENT)
Dept: PHYSICAL THERAPY | Age: 68
Setting detail: THERAPIES SERIES
Discharge: HOME | End: 2023-09-01
Attending: ANESTHESIOLOGY
Payer: MEDICARE

## 2023-09-01 LAB — SURGICAL PATHOLOGY REPORT: NORMAL

## 2023-09-01 PROCEDURE — 97110 THERAPEUTIC EXERCISES: CPT

## 2023-09-01 NOTE — FLOWSHEET NOTE
[x] [address]  [address] [facility].  [phone]  F: [phone] [] [address] [facility]  [address]   [address]  P: [phone]  F: [phone] [] [address]  [address] [facility]  P: [phone]  F: [phone] [] [place]: [phone]  F: [phone] [] [address] [facility]  [address] [place] [facility]   [address]   P: [phone]  F: [phone]  [] [address] [facility].   P: [phone]  F: [phone] [] 205 [facility]  [address] [place]  [address]  P: [phone]  F: [phone] [] [address] [facility]  [address] [place] [address]  [place]: [phone]  F: [phone] [] [address] [place] [facility] [address]  [place]: [phone]  F: [phone]      Physical Therapy Daily Treatment Note    Date:  2023  Patient Name:  Cinda Snider    :  1955  MRN: [id number]  Physician: Tamara Vega MD          Insurance: Medicare/ Medical [place]   Medical Diagnosis:   Lumbosacral spondylosis without myelopathy M47.817          Rehab Codes: M54.5, M79.604, M79.605, M79.675  Onset Date: 23                                 Next Dr.'s appt:  Spinal core stimulator trial     Visit# / total visits: 5/10; Progress note for Medicare patient due at visit 10th     Cancels/No Shows: 0/0    Subjective:    Pain:  [x] Yes  [] No Location: Left thigh pain Pain Rating: (0-10 scale) 3/10  Pain altered Tx:  [x] No  [] Yes  Action:  Comments: Patient pain is 3/10 today.      Objective:  Modalities:

## 2023-09-05 ENCOUNTER — HOSPITAL ENCOUNTER (OUTPATIENT)
Dept: PHYSICAL THERAPY | Age: 68
Setting detail: THERAPIES SERIES
Discharge: HOME | End: 2023-09-05
Attending: ANESTHESIOLOGY
Payer: MEDICARE

## 2023-09-05 ENCOUNTER — OFFICE VISIT (OUTPATIENT)
Dept: FAMILY MEDICINE CLINIC | Age: 68
End: 2023-09-05

## 2023-09-05 VITALS
DIASTOLIC BLOOD PRESSURE: 80 MMHG | BODY MASS INDEX: 28.53 KG/M2 | SYSTOLIC BLOOD PRESSURE: 122 MMHG | OXYGEN SATURATION: 95 % | HEART RATE: 77 BPM | WEIGHT: 166.2 LBS

## 2023-09-05 DIAGNOSIS — Z17.0 MALIGNANT NEOPLASM OF LOWER-INNER QUADRANT OF RIGHT BREAST OF FEMALE, ESTROGEN RECEPTOR POSITIVE (HCC): Primary | ICD-10-CM

## 2023-09-05 DIAGNOSIS — N18.31 STAGE 3A CHRONIC KIDNEY DISEASE (HCC): ICD-10-CM

## 2023-09-05 DIAGNOSIS — C50.311 MALIGNANT NEOPLASM OF LOWER-INNER QUADRANT OF RIGHT BREAST OF FEMALE, ESTROGEN RECEPTOR POSITIVE (HCC): Primary | ICD-10-CM

## 2023-09-05 PROCEDURE — 97110 THERAPEUTIC EXERCISES: CPT

## 2023-09-05 NOTE — FLOWSHEET NOTE
[x] [address]  [address] [facility].  [phone]  F: [phone] [] 204 [facility]  [address] [facility] [address]   [address]  P: [phone]  F: [phone] [] [address]  [address] [facility]  P: [phone]  F: [phone] [] [place]: [phone]  F: [phone] [] [address]  One [facility]   [address]   P: [phone]  F: [phone]  [] [address] [facility].   P: [phone]  F: [phone] [] 205 [facility]  [address] [place]  [address]  P: [phone]  F: [phone] [] 224 [facility]  [address] [place] [address]  [place]: [phone]  F: [phone] [] [address] [place]  [address]  [place]: [phone]  F: [phone]      Physical Therapy Daily Treatment Note    Date:  2023  Patient Name:  Erica Tyler    :  1955  MRN: [id number]  Physician: Alejandra Rea MD          Insurance: Medicare/ Medical [place]   Medical Diagnosis:   Lumbosacral spondylosis without myelopathy M47.817          Rehab Codes: M54.5, M79.604, M79.605, M79.675  Onset Date: 23                                 Next 's appt:  Spinal core stimulator trial     Visit# / total visits: 6/10; Progress note for Medicare patient due at visit 10th     Cancels/No Shows: 0/0    Subjective:    Pain:  [x] Yes  [] No Location: Left thigh pain Pain Rating: (0-10 scale) 3/10  Pain altered Tx:  [x] No  [] Yes  Action:  Comments: Pain on left thigh and low back.       Objective:  Modalities:

## 2023-09-05 NOTE — PROGRESS NOTES
[facility]  [facility] Herman Nguyen [address]  [address] [facility] [address]  Dept: [phone]  Dept Fax: [phone]      Paolo Bocanegra is a 69 y.o. female who presents today for hermedical conditions/complaints as noted below. Paolo Bocanegra is c/o of Results (Breast biopsy)        Assessment/Plan:     1. Malignant neoplasm of lower-inner quadrant of right breast of female, estrogen receptor positive [facility])  -     [facility]  2. Stage 3a chronic kidney disease ([address])    25 minutes spent with patient and spouse discussing biopsy results, answering questions and further management plan. Will reach out to breast cancer navigator to provide additional support to patient and spouse     No follow-ups on file.       HPI     Here to discuss biopsy results   Breast biopsy came back positive for ER+ breast cancer   Her  reviewed results on her Mike Bullion has not seen results for herself, chose to wait till visit today   She had a normal mammogram two years ago   She currently denies any breast pain or discharge, but admits to anxiety about this diagnosis and what it means for her health         BP Readings from Last 3 Encounters:   09/05/23 122/80   08/29/23 135/75   07/23/23 134/76              Past Medical History:   Diagnosis Date    ADHD     mild, no RX    Arthritis     Blurred vision, right eye     Carotid artery disease ([address])     dr Nya Penn vascular last appt 1/20    GERD (gastroesophageal reflux disease)     Hearing loss     High cholesterol     History of closed shoulder dislocation     rt from recent fall    [place] (hard of hearing)     beto hearing aides    Hx of gastric ulcer     Hyperlipidemia     Hypertension 2007    IBS (irritable bowel syndrome)     Macular pucker, right eye     Skin cancer of forehead     Syncope     hx recent falls    Tension headache     Tremor 2020    UTI (urinary tract infection)     Wears dentures     lower bridge plate,1 tooth upper

## 2023-09-06 ENCOUNTER — HOSPITAL ENCOUNTER (OUTPATIENT)
Dept: PAIN MANAGEMENT | Facility: CLINIC | Age: 68
Discharge: HOME | End: 2023-09-06
Payer: MEDICARE

## 2023-09-06 VITALS
HEART RATE: 78 BPM | WEIGHT: 166 LBS | RESPIRATION RATE: 15 BRPM | HEIGHT: 64 IN | DIASTOLIC BLOOD PRESSURE: 79 MMHG | TEMPERATURE: 98.3 F | BODY MASS INDEX: 28.34 KG/M2 | SYSTOLIC BLOOD PRESSURE: 137 MMHG | OXYGEN SATURATION: 95 %

## 2023-09-06 DIAGNOSIS — R52 PAIN MANAGEMENT: ICD-10-CM

## 2023-09-06 DIAGNOSIS — M54.16 CHRONIC LUMBAR RADICULOPATHY: Primary | ICD-10-CM

## 2023-09-06 DIAGNOSIS — M96.1 FAILED BACK SYNDROME: ICD-10-CM

## 2023-09-06 PROCEDURE — 6360000002 HC RX W HCPCS: Performed by: ANESTHESIOLOGY

## 2023-09-06 PROCEDURE — C1897 LEAD, NEUROSTIM TEST KIT: HCPCS

## 2023-09-06 PROCEDURE — 2500000003 HC RX 250 WO HCPCS: Performed by: ANESTHESIOLOGY

## 2023-09-06 PROCEDURE — 2709999900 HC NON-CHARGEABLE SUPPLY

## 2023-09-06 PROCEDURE — 63650 IMPLANT NEUROELECTRODES: CPT

## 2023-09-06 PROCEDURE — 2720000010 HC SURG SUPPLY STERILE

## 2023-09-06 RX ORDER — MIDAZOLAM HYDROCHLORIDE 2 MG/2ML
INJECTION, SOLUTION INTRAMUSCULAR; INTRAVENOUS
Status: COMPLETED | OUTPATIENT
Start: 2023-09-06 | End: 2023-09-06

## 2023-09-06 RX ORDER — BUPIVACAINE HYDROCHLORIDE AND EPINEPHRINE 5; 5 MG/ML; UG/ML
INJECTION, SOLUTION EPIDURAL; INTRACAUDAL; PERINEURAL
Status: COMPLETED | OUTPATIENT
Start: 2023-09-06 | End: 2023-09-06

## 2023-09-06 RX ORDER — FENTANYL CITRATE 50 UG/ML
INJECTION, SOLUTION INTRAMUSCULAR; INTRAVENOUS
Status: COMPLETED | OUTPATIENT
Start: 2023-09-06 | End: 2023-09-06

## 2023-09-06 RX ADMIN — FENTANYL CITRATE 50 MCG: 50 INJECTION, SOLUTION INTRAMUSCULAR; INTRAVENOUS at 17:15

## 2023-09-06 RX ADMIN — FENTANYL CITRATE 50 MCG: 50 INJECTION, SOLUTION INTRAMUSCULAR; INTRAVENOUS at 17:02

## 2023-09-06 RX ADMIN — MIDAZOLAM HYDROCHLORIDE 2 MG: 1 INJECTION, SOLUTION INTRAMUSCULAR; INTRAVENOUS at 17:02

## 2023-09-06 RX ADMIN — Medication 2000 MG: at 17:03

## 2023-09-06 RX ADMIN — BUPIVACAINE HYDROCHLORIDE AND EPINEPHRINE BITARTRATE 5 ML: 5; .005 INJECTION, SOLUTION EPIDURAL; INTRACAUDAL; PERINEURAL at 17:14

## 2023-09-06 RX ADMIN — BUPIVACAINE HYDROCHLORIDE AND EPINEPHRINE BITARTRATE 10 ML: 5; .005 INJECTION, SOLUTION EPIDURAL; INTRACAUDAL; PERINEURAL at 17:04

## 2023-09-06 ASSESSMENT — PAIN - FUNCTIONAL ASSESSMENT
PAIN_FUNCTIONAL_ASSESSMENT: PREVENTS OR INTERFERES WITH ALL ACTIVE AND SOME PASSIVE ACTIVITIES
PAIN_FUNCTIONAL_ASSESSMENT: 0-10
PAIN_FUNCTIONAL_ASSESSMENT: 0-10

## 2023-09-06 ASSESSMENT — PAIN DESCRIPTION - DESCRIPTORS: DESCRIPTORS: ACHING;SHARP

## 2023-09-06 NOTE — DISCHARGE INSTRUCTIONS
Discharge Instructions following Sedation or Anesthesia:  You have  received  a sedative/anesthetic therefore, you should not consume any alcoholic beverages for minimum of 12 hours. Do not drive or operate machinery until after follow up with doctor. Do not sign legal documents for 24 hours. Dizziness, drowsiness, and unsteadiness may occur. Rest when need to. Increase diet as tolerated. Keep up on fluids if diet allows. General Instructions:  Avoid any twisting, lifting, bending or exaggerated spine movement  Keep the area of the procedure clean and dry  Do not shower or bathe, sponge baths only, until after follow up. Wear abdominal binder as much as possible to support your spine and limit the movement of your spine (if you were provided with one)  Do not use heat, heating pad, or any other heating device over the injection site for 3 days after the procedure. If you experience pain after your procedure, you may continue with your current pain medication as prescribed. (DO NOT INCREASE YOUR PAIN MEDICATION WITHOUT TALKING TO DOCTOR)  Soreness and pain at injection site is common, may use ice to reduce soreness. Call [facility]a at [phone] if you experience:   Fever, chills or temperature over 100    Vomiting, Headache, persistent stiff neck, nausea, blurred vision   Difficulty in urinating or unable to urinate with 8 hours   Increase in weakness, numbness or loss of function   Increased redness, swelling or drainage at the injection site   Monitor for any signs of infection, such as, redness, swelling, green or yellow foul smelling drainage at injection site. Increased pain or fever.

## 2023-09-06 NOTE — INTERVAL H&P NOTE
Update History & Physical    The patient's History and Physical of August 15, 2023 was reviewed with the patient and I examined the patient. There was no change. The surgical site was confirmed by the patient and me. Plan: The risks, benefits, expected outcome, and alternative to the recommended procedure have been discussed with the patient. Patient understands and wants to proceed with the procedure.      ASA 3  MP 2  Electronically signed by Denise Patel MD on 9/6/2023 at 4:53 PM

## 2023-09-06 NOTE — OP NOTE
PREOPERATIVE DIAGNOSES:   1. Chronic lumbar radiculopathy    2. Failed back syndrome      POSTOPERATIVE DIAGNOSES:   1. Chronic lumbar radiculopathy    2. Failed back syndrome          PROCEDURE PERFORMED:  1. Placement of Thoracic Dorsal epidural leads via percuatneous lumbar entry x 2    right side L1/2 space and left sided L2/3 space   2. Complex programming of the stimulator device with Tonic paresthesia and Non paresthesia parameters. ANESTHESIA:  IV sedation with versed and fentanyl    BLOOD LOSS:  Minimal    INTRAVENOUS FLUID:  Recorded in the anesthesia chart. ANTIBIOTICS:  ancef 2 gm iv    OPERATIVE NOTE:    The patient was seen in the preoperative area. Chart was reviewed. Informed  consent was obtained. The patient was taken to the procedure room and was placed in prone position. All pressure points were padded. The area over the thoracic and lumbar spine was prepped with Betadine and DuraPrep. Complete sterile technique for prep and drape was utilized. A timeout was completed prior to the start of the procedure. Antibiotic was administered prior to the start of the procedure. AP view was taken and L4 pedicles were marked on each side. Then, a 14-gauge Tuohy  epidural needle was advanced from right  pedicle in a paramedian fashion to cotact the lamina at L2/3 level. Epidural space entry was at L2/3 Interspace. Epidural space was identified with loss of resistance to air. Position was confirmed  in lateral view. Then, a 8 contact [address] advanced with live fluoroscopy in AP view to the superior endplate of T9 vertebral body. Position was confirmed in lateral view to be in the dorsal epidural space. Similarly, a 2nd lead was placed from the left side  into the epidural space  just to the left of the midline using the same technique. Again, position was confirmed in lateral view fluoroscopy to be in the dorsal epidural space.     The leads were then tested for

## 2023-09-08 ENCOUNTER — TELEPHONE (OUTPATIENT)
Dept: ONCOLOGY | Age: 68
End: 2023-09-08

## 2023-09-08 ENCOUNTER — INITIAL CONSULT (OUTPATIENT)
Dept: ONCOLOGY | Age: 68
End: 2023-09-08
Payer: MEDICARE

## 2023-09-08 VITALS
BODY MASS INDEX: 28.32 KG/M2 | DIASTOLIC BLOOD PRESSURE: 71 MMHG | TEMPERATURE: 97.9 F | WEIGHT: 165 LBS | HEART RATE: 85 BPM | SYSTOLIC BLOOD PRESSURE: 119 MMHG

## 2023-09-08 DIAGNOSIS — D05.11 DUCTAL CARCINOMA IN SITU (DCIS) OF RIGHT BREAST: ICD-10-CM

## 2023-09-08 DIAGNOSIS — Z87.81 H/O RECURRENT VERTEBRAL FRACTURES: ICD-10-CM

## 2023-09-08 DIAGNOSIS — M81.0 AGE-RELATED OSTEOPOROSIS WITHOUT CURRENT PATHOLOGICAL FRACTURE: ICD-10-CM

## 2023-09-08 DIAGNOSIS — M81.8 OSTEOPOROSIS DUE TO AROMATASE INHIBITOR: ICD-10-CM

## 2023-09-08 DIAGNOSIS — T38.6X5A OSTEOPOROSIS DUE TO AROMATASE INHIBITOR: ICD-10-CM

## 2023-09-08 PROCEDURE — 99205 OFFICE O/P NEW HI 60 MIN: CPT | Performed by: INTERNAL MEDICINE

## 2023-09-08 PROCEDURE — 3017F COLORECTAL CA SCREEN DOC REV: CPT | Performed by: INTERNAL MEDICINE

## 2023-09-08 PROCEDURE — G8427 DOCREV CUR MEDS BY ELIG CLIN: HCPCS | Performed by: INTERNAL MEDICINE

## 2023-09-08 PROCEDURE — G8399 PT W/DXA RESULTS DOCUMENT: HCPCS | Performed by: INTERNAL MEDICINE

## 2023-09-08 PROCEDURE — 1123F ACP DISCUSS/DSCN MKR DOCD: CPT | Performed by: INTERNAL MEDICINE

## 2023-09-08 PROCEDURE — 1090F PRES/ABSN URINE INCON ASSESS: CPT | Performed by: INTERNAL MEDICINE

## 2023-09-08 PROCEDURE — G8417 CALC BMI ABV UP PARAM F/U: HCPCS | Performed by: INTERNAL MEDICINE

## 2023-09-08 PROCEDURE — 3078F DIAST BP <80 MM HG: CPT | Performed by: INTERNAL MEDICINE

## 2023-09-08 PROCEDURE — 1036F TOBACCO NON-USER: CPT | Performed by: INTERNAL MEDICINE

## 2023-09-08 PROCEDURE — 3074F SYST BP LT 130 MM HG: CPT | Performed by: INTERNAL MEDICINE

## 2023-09-08 NOTE — TELEPHONE ENCOUNTER
Avs from 9/8/23    Instructions  from Dr. Leandro Pathak MD  Icon To Do Referral    Referrals made today    Skyler Chester MD, Breast Oncology, [place] (Dr. Sterling Abdul MD)  Where: [facility]  Address: [address] [place] [address]  Phone: [phone]  DEXA Bone Density 2 Sites  MRI Breast Bilateral With and Without Contrast      Rv will be scheduled once imaging is scheduled for week prior     Pt was given number to  office to set up an appointment     PT was given AVS and appt schedule    Electronically signed by Kuldeep Corbett on 9/8/2023 at 12:49 PM

## 2023-09-08 NOTE — FLOWSHEET NOTE
[x] [facility] ([facility]) - [facility]  Therapy  [address] [place] [address].    [phone]  F: [phone]   [] [address] [facility]  [address]   [address]  P: [phone]  F: [phone]  [] [address]  [address]  P: [phone]  F: [phone] [] [facility]  P: [phone]  F: [phone]  [] [facility]  [address]   [address]   [place]: [phone]  F: [phone]   [] [facility]  [address]  [place]: [phone]   F: [phone]     Physical Therapy Cancel/No Show note    Date: 2023  Patient: Refugio Manzo  : 1955  MRN: [id number]    Cancels/No Shows to date: 00    Cancelled all appointments for September-- trial low back stimulator and breast cancer appointments. For today's appointment patient:    [x]  Cancelled    [] Rescheduled appointment    [] No-show     Reason given by patient:    []  Patient ill    []  Conflicting appointment    [] No transportation      [] Conflict with work    [] No reason given    [] Weather related    [] [id number]    [x] Other: Called and spoke with patient about cancelling appointments. Will check back with patient  to see if PT can be rescheduled.       Comments:        [] Next appointment was confirmed    Electronically signed by: Tyree Hill PTA

## 2023-09-08 NOTE — PROGRESS NOTES
Patient ID: Bertha Orlando, [birth year], [id number], 69 y.o. Referred by :  Ray Servin MD   Reason for consultation: Right lower DCIS      HISTORY OF PRESENT ILLNESS:    Oncologic History:    Bertha Orlando is a very pleasant 69 y.o. female.   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% and negative for progesterone  Osteopenia with recurrent fracture    Age at menarche 13  Number of pregnancy 4  Menopause 37  Birth control for 5 years  No family history of cancer    Past Medical History:   Diagnosis Date    ADHD     mild, no RX    Arthritis     Blurred vision, right eye     Carotid artery disease ([address])     dr Reid Smith vascular last appt 1/20    GERD (gastroesophageal reflux disease)     Hearing loss     High cholesterol     History of closed shoulder dislocation     rt from recent fall    [place] (hard of hearing)     beto hearing aides    Hx of gastric ulcer     Hyperlipidemia     Hypertension 2007    IBS (irritable bowel syndrome)     Macular pucker, right eye     Skin cancer of forehead     Syncope     hx recent falls    Tension headache     Tremor 2020    UTI (urinary tract infection)     Wears dentures     lower bridge plate,1 tooth upper    Wears glasses        Past Surgical History:   Procedure Laterality Date    APPENDECTOMY      BACK SURGERY      CATARACT REMOVAL WITH IMPLANT Bilateral     CHOLECYSTECTOMY      COLONOSCOPY N/A 2016    ESOPHAGOGASTRIC FUNDOPLICATION      EYE SURGERY      FINGER SURGERY Left     4th finger    HIATAL HERNIA REPAIR  1999    x2    SPENCER STEROTACTIC LOC BREAST BIOPSY RIGHT Right 8/29/2023    SPENCER STEROTACTIC LOC BREAST BIOPSY RIGHT 8/29/2023 [facility]

## 2023-09-10 ENCOUNTER — PATIENT MESSAGE (OUTPATIENT)
Dept: FAMILY MEDICINE CLINIC | Age: 68
End: 2023-09-10

## 2023-09-10 DIAGNOSIS — Z91.030 BEE STING ALLERGY: Primary | ICD-10-CM

## 2023-09-10 DIAGNOSIS — G89.29 CHRONIC BILATERAL LOW BACK PAIN WITH RIGHT-SIDED SCIATICA: ICD-10-CM

## 2023-09-10 DIAGNOSIS — M54.41 CHRONIC BILATERAL LOW BACK PAIN WITH RIGHT-SIDED SCIATICA: ICD-10-CM

## 2023-09-11 ENCOUNTER — HOSPITAL ENCOUNTER (OUTPATIENT)
Dept: PHYSICAL THERAPY | Age: 68
Setting detail: THERAPIES SERIES
Discharge: HOME | End: 2023-09-11
Attending: ANESTHESIOLOGY
Payer: MEDICARE

## 2023-09-11 DIAGNOSIS — D05.11 DUCTAL CARCINOMA IN SITU (DCIS) OF RIGHT BREAST: Primary | ICD-10-CM

## 2023-09-11 RX ORDER — BACLOFEN 10 MG/1
TABLET ORAL
Qty: 30 TABLET | Refills: 0 | Status: SHIPPED | OUTPATIENT
Start: 2023-09-11

## 2023-09-11 RX ORDER — EPINEPHRINE 0.3 MG/.3ML
0.3 INJECTION SUBCUTANEOUS ONCE
Qty: 0.3 ML | Refills: 0 | Status: SHIPPED | OUTPATIENT
Start: 2023-09-11 | End: 2023-09-11

## 2023-09-11 ASSESSMENT — ENCOUNTER SYMPTOMS
DIARRHEA: 0
CONSTIPATION: 0
COUGH: 0
CHEST TIGHTNESS: 0
VOMITING: 0
WHEEZING: 0
NAUSEA: 0
ABDOMINAL PAIN: 0
ANAL BLEEDING: 0
BLOOD IN STOOL: 0
CHOKING: 0
SHORTNESS OF BREATH: 0

## 2023-09-11 ASSESSMENT — VISUAL ACUITY: OU: 1

## 2023-09-11 NOTE — TELEPHONE ENCOUNTER
Last visit: 09/05/2023  Last Med refill: 06/20/2023  Does patient have enough medication for 72 hours: No:     Next Visit Date:  Future Appointments   Date Time Provider [address]   9/12/2023  2:20 PM Lorena Fagan MD [facility]   9/14/2023  3:00 PM Cecilia Rogue [facility]   9/18/2023  1:30 PM Raul Benz MD [facility]   10/25/2023  2:40 PM Char Hart MD Neuro Spec Neurology -   11/6/2023  2:45 PM Rosalia Juan MD [address] Maintenance   Topic Date Due    GFR test (Diabetes, CKD 3-4, OR last GFR 15-59)  06/14/2023    Flu vaccine (1) 08/01/2023    A1C test (Diabetic or Prediabetic)  08/23/2023    Lipids  08/23/2023    Shingles vaccine (2 of 3) 05/17/2025 (Originally 11/20/2016)    DTaP/Tdap/Td vaccine (1 - Tdap) 09/09/2030 (Originally 9/10/2020)    Annual Wellness Visit (AWV)  10/27/2023    Low dose CT lung screening &/or counseling  11/11/2023    Depression Monitoring  07/18/2024    Breast cancer screen  08/29/2024    Colorectal Cancer Screen  09/06/2027    DEXA (modify frequency per FRAX score)  Completed    Pneumococcal 65+ years Vaccine  Completed    COVID-19 Vaccine  Completed    Hepatitis C screen  Completed    Hepatitis A vaccine  Aged Out    Hepatitis B vaccine  Aged Out    Hib vaccine  Aged Out    Meningococcal (ACWY) vaccine  Aged Out       Hemoglobin A1C (%)   Date Value   08/23/2022 5.8             ( goal A1C is < 7)   No components found for: \"LABMICR\"  LDL Cholesterol (mg/dL)   Date Value   08/23/2022 130   10/26/2021 128       (goal LDL is <100)   AST (U/L)   Date Value   10/26/2021 25     ALT (U/L)   Date Value   10/26/2021 22     BUN (mg/dL)   Date Value   10/26/2021 19     BP Readings from Last 3 Encounters:   09/08/23 119/71   09/06/23 137/79   09/05/23 122/80          (goal 120/80)    All Future Testing planned in [facility]  Lab Frequency Next Occurrence   VL DUP CAROTID BILATERAL Once 01/04/2024   MI PRQ IMPLTJ NSTIM ELECTRODE

## 2023-09-12 ENCOUNTER — OFFICE VISIT (OUTPATIENT)
Dept: PAIN MANAGEMENT | Age: 68
End: 2023-09-12

## 2023-09-12 VITALS — HEIGHT: 64 IN | HEART RATE: 82 BPM | WEIGHT: 165 LBS | OXYGEN SATURATION: 96 % | BODY MASS INDEX: 28.17 KG/M2

## 2023-09-12 DIAGNOSIS — M96.1 FAILED BACK SYNDROME: Primary | ICD-10-CM

## 2023-09-12 PROCEDURE — 99024 POSTOP FOLLOW-UP VISIT: CPT | Performed by: ANESTHESIOLOGY

## 2023-09-12 ASSESSMENT — ENCOUNTER SYMPTOMS
RESPIRATORY NEGATIVE: 1
GASTROINTESTINAL NEGATIVE: 1
BACK PAIN: 1

## 2023-09-12 NOTE — PROGRESS NOTES
The patient is a 69 y. o. Non- / non  female.     Chief Complaint   Patient presents with    Back Pain    Follow Up After Procedure     Lead pull [employer]      - Chronic predominantly axial lower back pain going on for many years  History of previous lumbar laminectomy surgery  Patient continues to suffer from back and leg pain after surgery  She tried physical interventional pain procedures and medications  EMG showed chronic lumbar radiculopathy  She was diagnosed with lumbar postlaminectomy syndrome  We did a spinal cord stimulation trial with [employer]  She completed 7-day trial  Today here for lead pull  Report 80% improvement in back and leg pain with improved activity tolerance quality of life and mood  Very happy and satisfied with the outcome  Denies any complication  No fever or chills    Procedure site inspected  No erythema discharge or swelling  Leads were pulled without any resistance  She is interested in proceeding with permanent implant but unfortunately this week she has been diagnosed with the breast cancer and is Following up with oncology  We will postpone any elective Procedure at this time    We will give her a follow-up appointment in 3 months    Pain level: 3/10  Character: aching  Radiating: yes bilateral legs  Weakness or numbness: no  Aggravating Factors: anything  Alleviating Factors: nothing  Constant or intermitting: constant  Bladder/bowel loss: no      Past Medical History:   Diagnosis Date    ADHD     mild, no RX    Arthritis     Blurred vision, right eye     Carotid artery disease ([address])     dr Claudette Jameson vascular last appt 1/20    GERD (gastroesophageal reflux disease)     Hearing loss     High cholesterol     History of closed shoulder dislocation     rt from recent fall    [place] (hard of hearing)     beto hearing aides    Hx of gastric ulcer     Hyperlipidemia     Hypertension 2007    IBS (irritable bowel syndrome)     Macular pucker, right eye     Skin cancer of forehead

## 2023-09-14 ENCOUNTER — APPOINTMENT (OUTPATIENT)
Dept: PHYSICAL THERAPY | Age: 68
End: 2023-09-14
Attending: ANESTHESIOLOGY
Payer: MEDICARE

## 2023-09-14 ENCOUNTER — INITIAL CONSULT (OUTPATIENT)
Dept: ONCOLOGY | Age: 68
End: 2023-09-14
Payer: MEDICARE

## 2023-09-14 DIAGNOSIS — D05.11 DUCTAL CARCINOMA IN SITU (DCIS) OF RIGHT BREAST: Primary | ICD-10-CM

## 2023-09-14 PROCEDURE — 99999 PR OFFICE/OUTPT VISIT,PROCEDURE ONLY: CPT | Performed by: GENETIC COUNSELOR, MS

## 2023-09-14 PROCEDURE — 96040 PR MEDICAL GENETICS COUNSELING EACH 30 MINUTES: CPT | Performed by: GENETIC COUNSELOR, MS

## 2023-09-14 NOTE — PROGRESS NOTES
a mutation, Ms. Rob Coy desires genetic testing to completely rule out a mutation for the purpose of providing this information to her children and grandchildren. 3) She is aware that because she does not meet NCCN guidelines for testing, it is unlikely to be covered by insurance and that the self pay option for testing is $250.     4) Ms. Hutchinson elected to proceed with the Stat BRCAplus panel with reflex to the [employer] Expanded + RNA Insight Hereditary Cancer Gene Panel. 5) Informed consent was obtained and a blood sample was sent to [facility]. We will call Ms. Hutchinson with results as soon as they are available. A follow up appointment may be recommended. A summary letter with results and final medical management recommendations will be sent once available. A total of 30 minutes were spent face to face with Ms. Hutchinson and 50% of the time was spent educating and counseling. The [address] [place] [address] Avenue [facility] would be glad to offer our assistance should you have any questions or concerns about this information. Please feel free to contact us at [phone]. Erin Curtis.  Tomas Benson MS, [facility]   Licensed Genetic Counselor

## 2023-09-18 ENCOUNTER — OFFICE VISIT (OUTPATIENT)
Dept: SURGERY | Age: 68
End: 2023-09-18
Payer: MEDICARE

## 2023-09-18 ENCOUNTER — HOSPITAL ENCOUNTER (OUTPATIENT)
Dept: RADIATION ONCOLOGY | Age: 68
Discharge: HOME | End: 2023-09-18
Payer: MEDICARE

## 2023-09-18 VITALS
DIASTOLIC BLOOD PRESSURE: 76 MMHG | BODY MASS INDEX: 28.34 KG/M2 | HEART RATE: 77 BPM | SYSTOLIC BLOOD PRESSURE: 128 MMHG | OXYGEN SATURATION: 96 % | HEIGHT: 64 IN | WEIGHT: 166 LBS

## 2023-09-18 VITALS
TEMPERATURE: 98.9 F | RESPIRATION RATE: 16 BRPM | HEART RATE: 82 BPM | WEIGHT: 166.6 LBS | BODY MASS INDEX: 28.6 KG/M2 | DIASTOLIC BLOOD PRESSURE: 80 MMHG | SYSTOLIC BLOOD PRESSURE: 153 MMHG

## 2023-09-18 DIAGNOSIS — D05.11 DUCTAL CARCINOMA IN SITU (DCIS) OF RIGHT BREAST: Primary | ICD-10-CM

## 2023-09-18 PROCEDURE — 99203 OFFICE O/P NEW LOW 30 MIN: CPT | Performed by: SURGERY

## 2023-09-18 PROCEDURE — 99205 OFFICE O/P NEW HI 60 MIN: CPT | Performed by: RADIOLOGY

## 2023-09-18 PROCEDURE — G8417 CALC BMI ABV UP PARAM F/U: HCPCS | Performed by: SURGERY

## 2023-09-18 PROCEDURE — 1036F TOBACCO NON-USER: CPT | Performed by: SURGERY

## 2023-09-18 PROCEDURE — 3078F DIAST BP <80 MM HG: CPT | Performed by: SURGERY

## 2023-09-18 PROCEDURE — G8427 DOCREV CUR MEDS BY ELIG CLIN: HCPCS | Performed by: SURGERY

## 2023-09-18 PROCEDURE — 1090F PRES/ABSN URINE INCON ASSESS: CPT | Performed by: SURGERY

## 2023-09-18 PROCEDURE — 1123F ACP DISCUSS/DSCN MKR DOCD: CPT | Performed by: SURGERY

## 2023-09-18 PROCEDURE — 3017F COLORECTAL CA SCREEN DOC REV: CPT | Performed by: SURGERY

## 2023-09-18 PROCEDURE — 3074F SYST BP LT 130 MM HG: CPT | Performed by: SURGERY

## 2023-09-18 PROCEDURE — 99213 OFFICE O/P EST LOW 20 MIN: CPT | Performed by: RADIOLOGY

## 2023-09-18 PROCEDURE — G8399 PT W/DXA RESULTS DOCUMENT: HCPCS | Performed by: SURGERY

## 2023-09-18 ASSESSMENT — PATIENT HEALTH QUESTIONNAIRE - PHQ9
SUM OF ALL RESPONSES TO PHQ QUESTIONS 1-9: 0
1. LITTLE INTEREST OR PLEASURE IN DOING THINGS: 0
SUM OF ALL RESPONSES TO PHQ QUESTIONS 1-9: 0
2. FEELING DOWN, DEPRESSED OR HOPELESS: 0
SUM OF ALL RESPONSES TO PHQ QUESTIONS 1-9: 0
SUM OF ALL RESPONSES TO PHQ QUESTIONS 1-9: 0
SUM OF ALL RESPONSES TO PHQ9 QUESTIONS 1 & 2: 0

## 2023-09-18 ASSESSMENT — PAIN DESCRIPTION - ORIENTATION: ORIENTATION: RIGHT;LEFT;LOWER

## 2023-09-18 ASSESSMENT — PAIN DESCRIPTION - LOCATION: LOCATION: BACK;LEG

## 2023-09-18 ASSESSMENT — PAIN SCALES - GENERAL: PAINLEVEL_OUTOF10: 4

## 2023-09-18 NOTE — ACP (ADVANCE CARE PLANNING)
Advance Care Planning     Hospice Services: Patient is not currently receiving hospice services/has not received hospice care within the performance year.

## 2023-09-18 NOTE — PATIENT INSTRUCTIONS
Surgery: RF seed localized right partial mastectomy, aiming for Oct 11. Will get the RF seed placed before surgery. Will obtain a medical clearance prior to surgery. Stop aspirin, krill oil, and vit E 7 days before.

## 2023-09-18 NOTE — PROGRESS NOTES
Patient's Name/Date of Birth: Ranulfo Luke / 1955 (89 y.o.)    Date: September 18, 2023     HPI: Pt is a 77 y.o. female who presents to [address] [place] [address] for evaluation of new diagnosis of right breast ductal carcinoma in situ. The patient was asymptomatic and she denies any previous history of any breast issues whatsoever. This diagnosis was made after she had an abnormal bilateral screening mammogram performed on August 10. She was found to have increasing calcifications in the lower inner right breast with additional magnification mammographic views recommended. The additional diagnostic imaging was performed on August 15 and it showed calcifications extending into the nipple in the cover area 5 x 2 cm. A stereotactic biopsy was recommended. The biopsy results are as below. PATHOLOGY:  8/29/23 1415    Surgical Pathology Report Path Number: [id number]     -- Diagnosis --     Right breast central, core biopsy:     -High-grade ductal carcinoma in situ with comedonecrosis and   calcifications.     -Estrogen receptor positive (40%).      -Progesterone receptor negative (0%)        Past Medical History:   Diagnosis Date    ADHD     mild, no RX    Arthritis     Blurred vision, right eye     Carotid artery disease ([address])     dr Zachary Valentin vascular last appt 1/20    GERD (gastroesophageal reflux disease)     Hearing loss     High cholesterol     History of closed shoulder dislocation     rt from recent fall    [place] (hard of hearing)     beto hearing aides    Hx of gastric ulcer     Hyperlipidemia     Hypertension 2007    IBS (irritable bowel syndrome)     Macular pucker, right eye     Skin cancer of forehead     Syncope     hx recent falls    Tension headache     Tremor 2020    UTI (urinary tract infection)     Wears dentures     lower bridge plate,1 tooth upper    Wears glasses        Past Surgical History:   Procedure Laterality Date    APPENDECTOMY      BACK SURGERY      CATARACT REMOVAL WITH

## 2023-09-19 NOTE — CONSULTS
[facility]            Radiation Oncology          [facility], [address] [place] [address]        Catia Morrisonini: [phone]        F: [phone]       As It Is             2023    Patient: Refugio Manzo   YOB: 1955       Dear Dr Chari Hough: Thank you for referring Refugio Manzo to me for evaluation. Below are the relevant portions of my assessment and plan of care. If you have questions, please do not hesitate to call me. I look forward to following this patient along with you.      Sincerely,    Electronically signed by Jocelynn Starks MD on 2023 at 6:31 PM    CC: Patient Care Team:  Maile Bangura MD as PCP - General (Internal Medicine)  Maile Bangura MD as PCP - Empaneled Provider  Andrzej Amado MD as Consulting Physician (Gastroenterology)  Shruthi Puri as Consulting Physician (Obstetrics & Gynecology)  Isaías Parker DPM as Consulting Physician (Podiatry)  Chris De Los Santos MD as Consulting Physician (Orthopedic Surgery)  Mar Constantino RN as Nurse Navigator (Oncology)  ------------------------------------------------------------------------------------------------------------------------------------------------------------------------------------------      RADIATION ONCOLOGY NEW PATIENT VISIT:    Date of Service: 2023    Location:  [facility],   [facility] [place], [address] [place] [address]   [phone]     Patient ID:   Refugio Manzo  : 1955   MRN: [id number]    CHIEF COMPLAINT: \"Right breast cancer\"    DIAGNOSIS:   Cancer Staging   Ductal carcinoma in situ (DCIS) of right breast  Staging form: Breast, AJCC 8th Edition  - Clinical stage from 2023: Stage 0 (cTis (DCIS), cN0, cM0, G3, ER+, KS-, HER2: Not Assessed) - Signed by Jocelynn Starks MD on 2023        HISTORY OF PRESENT ILLNESS:   Refugio Manzo is a 77 y.o. female with a history of an abnormal screening

## 2023-09-21 ENCOUNTER — HOSPITAL ENCOUNTER (OUTPATIENT)
Dept: WOMENS IMAGING | Age: 68
Discharge: HOME | End: 2023-09-23
Attending: INTERNAL MEDICINE
Payer: MEDICARE

## 2023-09-21 ENCOUNTER — HOSPITAL ENCOUNTER (OUTPATIENT)
Dept: MRI IMAGING | Age: 68
Discharge: HOME | End: 2023-09-23
Attending: INTERNAL MEDICINE
Payer: MEDICARE

## 2023-09-21 ENCOUNTER — TELEPHONE (OUTPATIENT)
Dept: ONCOLOGY | Age: 68
End: 2023-09-21

## 2023-09-21 ENCOUNTER — TELEPHONE (OUTPATIENT)
Dept: SURGERY | Age: 68
End: 2023-09-21

## 2023-09-21 DIAGNOSIS — T38.6X5A OSTEOPOROSIS DUE TO AROMATASE INHIBITOR: ICD-10-CM

## 2023-09-21 DIAGNOSIS — D05.11 DUCTAL CARCINOMA IN SITU (DCIS) OF RIGHT BREAST: ICD-10-CM

## 2023-09-21 DIAGNOSIS — Z87.81 H/O RECURRENT VERTEBRAL FRACTURES: ICD-10-CM

## 2023-09-21 DIAGNOSIS — M81.8 OSTEOPOROSIS DUE TO AROMATASE INHIBITOR: ICD-10-CM

## 2023-09-21 LAB
EGFR, POC: >60 ML/MIN/1.73M2
POC CREATININE: 0.7 MG/DL (ref 0.51–1.19)

## 2023-09-21 PROCEDURE — 77080 DXA BONE DENSITY AXIAL: CPT

## 2023-09-21 PROCEDURE — 2580000003 HC RX 258: Performed by: INTERNAL MEDICINE

## 2023-09-21 PROCEDURE — A9579 GAD-BASE MR CONTRAST NOS,1ML: HCPCS | Performed by: INTERNAL MEDICINE

## 2023-09-21 PROCEDURE — 82565 ASSAY OF CREATININE: CPT

## 2023-09-21 PROCEDURE — 6360000004 HC RX CONTRAST MEDICATION: Performed by: INTERNAL MEDICINE

## 2023-09-21 PROCEDURE — C8908 MRI W/O FOL W/CONT, BREAST,: HCPCS

## 2023-09-21 RX ORDER — SODIUM CHLORIDE 0.9 % (FLUSH) 0.9 %
10 SYRINGE (ML) INJECTION PRN
Status: DISCONTINUED | OUTPATIENT
Start: 2023-09-21 | End: 2023-09-24 | Stop reason: HOSPADM

## 2023-09-21 RX ORDER — 0.9 % SODIUM CHLORIDE 0.9 %
40 INTRAVENOUS SOLUTION INTRAVENOUS ONCE
Status: COMPLETED | OUTPATIENT
Start: 2023-09-21 | End: 2023-09-21

## 2023-09-21 RX ADMIN — SODIUM CHLORIDE, PRESERVATIVE FREE 10 ML: 5 INJECTION INTRAVENOUS at 14:20

## 2023-09-21 RX ADMIN — SODIUM CHLORIDE 40 ML: 9 INJECTION, SOLUTION INTRAVENOUS at 14:21

## 2023-09-21 RX ADMIN — GADOTERIDOL 15 ML: 279.3 INJECTION, SOLUTION INTRAVENOUS at 14:19

## 2023-09-21 NOTE — TELEPHONE ENCOUNTER
Name: Jorge White  : 1955  MRN: [id number]    Oncology Navigation- Initial Note: ( first attempt)    Intake-  Contact Type: Telephone      Notes: Attempted to contact pt, no answer. VM left, number was provided for pt to return call.      Electronically signed by Kodi Paul RN on 2023 at 2:06 PM

## 2023-09-21 NOTE — TELEPHONE ENCOUNTER
9/21/23-1st attempt, LVM, to schedule with Dr Naina Armas at [facility]

## 2023-09-22 ENCOUNTER — TELEPHONE (OUTPATIENT)
Dept: SURGERY | Age: 68
End: 2023-09-22

## 2023-09-22 DIAGNOSIS — G89.29 CHRONIC BILATERAL LOW BACK PAIN WITH RIGHT-SIDED SCIATICA: ICD-10-CM

## 2023-09-22 DIAGNOSIS — M54.41 CHRONIC BILATERAL LOW BACK PAIN WITH RIGHT-SIDED SCIATICA: ICD-10-CM

## 2023-09-22 NOTE — TELEPHONE ENCOUNTER
9/22/23-I spoke to pt about scheduling surgery at [facility] with dr roa. She agreed to 10/11 however cannot do the pre testing/localizer on 9/27. I called Kerri Last in scheduling and she is going to call me back to confirm a time on 10/2.  Info will be sent to pt thru annette

## 2023-09-22 NOTE — TELEPHONE ENCOUNTER
Pharmacy requesting refill of Lyrica 50mg.       Medication active on med list yes      Date of last Rx: 6/27/2023 with 0 refills          verified by Joya Bradley LPN      Date of last appointment 6/27/2023    Next Visit Date:  10/25/2023

## 2023-09-25 RX ORDER — PREGABALIN 50 MG/1
CAPSULE ORAL
Qty: 90 CAPSULE | Refills: 0 | Status: SHIPPED | OUTPATIENT
Start: 2023-09-25 | End: 2023-12-24

## 2023-09-26 ENCOUNTER — TELEPHONE (OUTPATIENT)
Dept: SURGERY | Age: 68
End: 2023-09-26

## 2023-09-26 NOTE — TELEPHONE ENCOUNTER
9/26/23-I spoke to [facility] at Dr Gerri Barr office to see if med clearance is avail yet. She checked but did not see that they received it.  Fax confirmation showed \"ok\" but I still re faxed per her instructions

## 2023-09-27 ENCOUNTER — TELEPHONE (OUTPATIENT)
Dept: ONCOLOGY | Age: 68
End: 2023-09-27

## 2023-09-27 NOTE — TELEPHONE ENCOUNTER
Contacted Anglea and informed her that the STAT BRCAplus panel (WINIFRED, BRCA1, BRCA2, CDH1, CHEK2, PALB2, PTEN and TP53) results are negative for any clinically significant gene mutations. The remainder of the multi-gene panel is still pending and we will call her again with those results when available.

## 2023-09-28 ENCOUNTER — TELEPHONE (OUTPATIENT)
Dept: ONCOLOGY | Age: 68
End: 2023-09-28

## 2023-09-28 NOTE — TELEPHONE ENCOUNTER
Name: Meryle Bayard  : 1955  MRN: [id number]    Oncology Navigation- Initial Note: (second attempt)     Intake-  Contact Type: Telephone    Notes: Attempted to contact pt, no answer. Unable to leave a VM as phone continued to ring with no option to leave a message. Note pt is scheduled for surgery on 10/11. She has Dr Paloma Michelle f/u scheduled as well. Will attempt to contact pt at later date.      Electronically signed by Felice Gutierrez RN on 2023 at 3:44 PM

## 2023-09-29 ENCOUNTER — TELEPHONE (OUTPATIENT)
Dept: SURGERY | Age: 68
End: 2023-09-29

## 2023-09-29 DIAGNOSIS — D05.11 DUCTAL CARCINOMA IN SITU (DCIS) OF RIGHT BREAST: Primary | ICD-10-CM

## 2023-09-29 NOTE — TELEPHONE ENCOUNTER
I reviewed Ms. Hutchinson's MRI with her after discussing localization options for a partial mastectomy. She has a 4.9 cm span of suspicious enhancement extending from nipple to the posterior breast.    As a result, I discussed a mastectomy because the amount of tissue to be removed would be extensive and has the potential to leave her disfigured. There was also a chance of needing a re-excision for margins or leaving some behind because of extensive nature of it. I also told her that we would need to take the nipple areolar complex due the extension to the nipple. I also reminded her of the possibility of invasive cancer as a result of high grade DCIS with comedonecrosis and that I would add a sentinel lymph node biopsy. She asked about effects of mastectomy on her range of motion and use of her arm and I told her we can get her into physical therapy if necessary. We also discussed that she should not need radiation unless she has positive lymph nodes. We discussed the possibility of reconstruction but she is not interested in a consult right now due to potential postoperative pain. She will discuss it with her family and let me know if she changes her mind about proceeding with the mastectomy. She meets with medical oncology, Dr. Corazon Cooper, on Tuesday to further discuss her care. I will update her surgical plan to right simple mastectomy with sentinel lymph node biopsy.

## 2023-10-02 ENCOUNTER — APPOINTMENT (OUTPATIENT)
Dept: ULTRASOUND IMAGING | Age: 68
End: 2023-10-02
Payer: MEDICARE

## 2023-10-02 ENCOUNTER — HOSPITAL ENCOUNTER (OUTPATIENT)
Dept: PREADMISSION TESTING | Age: 68
Discharge: HOME | End: 2023-10-06
Payer: MEDICARE

## 2023-10-02 VITALS
OXYGEN SATURATION: 97 % | RESPIRATION RATE: 20 BRPM | SYSTOLIC BLOOD PRESSURE: 134 MMHG | WEIGHT: 166.6 LBS | DIASTOLIC BLOOD PRESSURE: 71 MMHG | BODY MASS INDEX: 28.44 KG/M2 | TEMPERATURE: 97.1 F | HEART RATE: 78 BPM | HEIGHT: 64 IN

## 2023-10-02 DIAGNOSIS — D05.11 DUCTAL CARCINOMA IN SITU (DCIS) OF RIGHT BREAST: Primary | ICD-10-CM

## 2023-10-02 LAB
ANION GAP SERPL CALCULATED.3IONS-SCNC: 10 MMOL/L (ref 9–17)
BUN SERPL-MCNC: 16 MG/DL (ref 8–23)
BUN/CREAT SERPL: 18 (ref 9–20)
CALCIUM SERPL-MCNC: 8.9 MG/DL (ref 8.6–10.4)
CHLORIDE SERPL-SCNC: 109 MMOL/L (ref 98–107)
CO2 SERPL-SCNC: 23 MMOL/L (ref 20–31)
CREAT SERPL-MCNC: 0.9 MG/DL (ref 0.5–0.9)
ERYTHROCYTE [DISTWIDTH] IN BLOOD BY AUTOMATED COUNT: 15.4 % (ref 11.8–14.4)
GFR SERPL CREATININE-BSD FRML MDRD: >60 ML/MIN/1.73M2
GLUCOSE SERPL-MCNC: 101 MG/DL (ref 70–99)
HCT VFR BLD AUTO: 37.5 % (ref 36.3–47.1)
HGB BLD-MCNC: 11.5 G/DL (ref 11.9–15.1)
MCH RBC QN AUTO: 25.8 PG (ref 25.2–33.5)
MCHC RBC AUTO-ENTMCNC: 30.7 G/DL (ref 28.4–34.8)
MCV RBC AUTO: 84.3 FL (ref 82.6–102.9)
NRBC BLD-RTO: 0 PER 100 WBC
PLATELET # BLD AUTO: 214 K/UL (ref 138–453)
PMV BLD AUTO: 10.1 FL (ref 8.1–13.5)
POTASSIUM SERPL-SCNC: 4.5 MMOL/L (ref 3.7–5.3)
RBC # BLD AUTO: 4.45 M/UL (ref 3.95–5.11)
SODIUM SERPL-SCNC: 142 MMOL/L (ref 135–144)
WBC OTHER # BLD: 5.7 K/UL (ref 3.5–11.3)

## 2023-10-02 PROCEDURE — 93005 ELECTROCARDIOGRAM TRACING: CPT | Performed by: ANESTHESIOLOGY

## 2023-10-02 PROCEDURE — 85027 COMPLETE CBC AUTOMATED: CPT

## 2023-10-02 PROCEDURE — 36415 COLL VENOUS BLD VENIPUNCTURE: CPT

## 2023-10-02 PROCEDURE — 80048 BASIC METABOLIC PNL TOTAL CA: CPT

## 2023-10-02 ASSESSMENT — PAIN DESCRIPTION - LOCATION: LOCATION: BACK;LEG

## 2023-10-02 ASSESSMENT — PAIN DESCRIPTION - ORIENTATION: ORIENTATION: RIGHT;LEFT

## 2023-10-02 ASSESSMENT — PAIN SCALES - GENERAL: PAINLEVEL_OUTOF10: 3

## 2023-10-02 ASSESSMENT — PAIN DESCRIPTION - DESCRIPTORS: DESCRIPTORS: ACHING

## 2023-10-02 ASSESSMENT — PAIN DESCRIPTION - PAIN TYPE: TYPE: CHRONIC PAIN

## 2023-10-02 NOTE — PRE-PROCEDURE INSTRUCTIONS
On the Day of Your Surgery, Wednesday, 10/11/23, Please Arrive At 9:20 AM     Enter the hospital through the Main Entrance, take the lobby elevators to the second floor and check in at the Surgery Registration desk. Continue to take your home medications as you normally do up to and including the night before surgery with the exception of blood thinning medications. Blood Thinning Medications:  Please stop prescription blood thinning medications such as Apixaban (Eliquis); Clopidogrel (Plavix); Dabigatran (Pradaxa); Prasugrel (Effient); Rivaroxaban (Xarelto); Ticagrelor (Brilinta); Warfarin (Coumadin) only as directed by your surgeon and/or the prescribing physician    Some common examples of other medications that can thin your blood are: Aspirin, Ibuprofen (Advil, Motrin), Naproxen (Aleve), Meloxicam (Mobic), Celecoxib (Celebrex), Fish Oil, many Herbal Supplements. These medications should usually be stopped at least 7 days prior to surgery. Stop probiotic, vitamin D3 and Vitamin E     Tylenol is OK to take for pain the week prior to surgery. Failure to stop certain medications may interfere with your scheduled surgery. If you receive instructions from your surgeon regarding what medications to stop prior to surgery, please follow those specific instructions. If You Have Diabetes:  Do not take any of your diabetic medications, (injectables or by mouth) the morning of surgery unless otherwise instructed by the doctor who manages your diabetes. If you are taking insulin, contact the doctor the manages your diabetes for instructions about any changes to your insulin dosages the day before surgery. Please take the following medication(s) the day of surgery with small sips of water:              Use your nebulizer morning of if short of breath or lungs feel tight.  Take duloxetine and omeprazole and use your Anoro Ellipta inhaler    Please use your inhaler(s) if needed and bring your inhaler(s)

## 2023-10-02 NOTE — PROGRESS NOTES
In discussion with the patient on Friday, I told her that based on her imaging findings she would be better served with a mastectomy in order to have a good oncologic outcome. She was offered a plastics consult but declined at the time. After discussion with her family, she desires a plastic surgery consult. A consult to Dr. Brooklynn Miranda will be placed today.

## 2023-10-02 NOTE — PROGRESS NOTES
breast    Carotid artery disease ([address])     dr Bart Izquierdo vascular last appt 1/20. Has carotid stenosis, pt reports \"left side is worse than right\"    COVID-19 2019    \"happened right when COVID first came out\"    GERD (gastroesophageal reflux disease)     Hearing loss     has bilat hearing aids    Hiatal hernia     resolved per patient    High cholesterol     History of closed shoulder dislocation     rt from recent fall    [place] (hard of hearing)     beto hearing aides    Hx of gastric ulcer     Hyperlipidemia     Hypertension 2007    IBS (irritable bowel syndrome)     Macular pucker, right eye     Skin cancer of forehead     pt reports \"squamous cell\". Just recently had it remomved from left leg    Syncope     hx recent falls    Tension headache     Tremor 2020    Intermittent  Pt reports \"none in two months.  Pt thinks it is a nervous thing\"    UTI (urinary tract infection)     Wears dentures     lower bridge plate,1 tooth upper    Wears glasses         Past Surgical History:     Past Surgical History:   Procedure Laterality Date    APPENDECTOMY      BACK SURGERY      L4-5 had herniated disc    CATARACT REMOVAL WITH IMPLANT Bilateral     CHOLECYSTECTOMY      COLONOSCOPY N/A 2016    removed colon polyps    ESOPHAGOGASTRIC FUNDOPLICATION  2017    EYE SURGERY      FINGER SURGERY Left     4th finger    HIATAL HERNIA REPAIR  1999    x2    SPENCER STEROTACTIC LOC BREAST BIOPSY RIGHT Right 08/29/2023    SPENCER STEROTACTIC LOC BREAST BIOPSY RIGHT 8/29/2023 [facility] [address]    PAIN MANAGEMENT PROCEDURE N/A 06/19/2023    BASIVERTEBRAL NERVE NERVE RADIOFREQUENCY ABLATION INTRACEPT PROCEDURE at L4/5/S1 performed by Hannah Haque MD at [address]  05/2023    [address] Right     UPPER GASTROINTESTINAL ENDOSCOPY      UPPER GASTROINTESTINAL ENDOSCOPY N/A 10/26/2018    EGD BIOPSY AND DILITATION WITH DAMION performed by Javier Luciano MD at [address] [facility] [address] 03/10/2020

## 2023-10-03 ENCOUNTER — OFFICE VISIT (OUTPATIENT)
Dept: ONCOLOGY | Age: 68
End: 2023-10-03
Payer: MEDICARE

## 2023-10-03 ENCOUNTER — TELEPHONE (OUTPATIENT)
Dept: SURGERY | Age: 68
End: 2023-10-03

## 2023-10-03 ENCOUNTER — TELEPHONE (OUTPATIENT)
Dept: ONCOLOGY | Age: 68
End: 2023-10-03

## 2023-10-03 VITALS
BODY MASS INDEX: 28.49 KG/M2 | SYSTOLIC BLOOD PRESSURE: 160 MMHG | DIASTOLIC BLOOD PRESSURE: 77 MMHG | HEART RATE: 87 BPM | TEMPERATURE: 97.7 F | WEIGHT: 166 LBS

## 2023-10-03 DIAGNOSIS — M85.89 OSTEOPENIA OF MULTIPLE SITES: ICD-10-CM

## 2023-10-03 DIAGNOSIS — D05.11 DUCTAL CARCINOMA IN SITU (DCIS) OF RIGHT BREAST: Primary | ICD-10-CM

## 2023-10-03 LAB
EKG ATRIAL RATE: 73 BPM
EKG P AXIS: 53 DEGREES
EKG P-R INTERVAL: 158 MS
EKG Q-T INTERVAL: 396 MS
EKG QRS DURATION: 72 MS
EKG QTC CALCULATION (BAZETT): 436 MS
EKG R AXIS: 33 DEGREES
EKG T AXIS: 45 DEGREES
EKG VENTRICULAR RATE: 73 BPM

## 2023-10-03 PROCEDURE — G8484 FLU IMMUNIZE NO ADMIN: HCPCS | Performed by: INTERNAL MEDICINE

## 2023-10-03 PROCEDURE — 3074F SYST BP LT 130 MM HG: CPT | Performed by: INTERNAL MEDICINE

## 2023-10-03 PROCEDURE — G8399 PT W/DXA RESULTS DOCUMENT: HCPCS | Performed by: INTERNAL MEDICINE

## 2023-10-03 PROCEDURE — 1123F ACP DISCUSS/DSCN MKR DOCD: CPT | Performed by: INTERNAL MEDICINE

## 2023-10-03 PROCEDURE — 93010 ELECTROCARDIOGRAM REPORT: CPT | Performed by: INTERNAL MEDICINE

## 2023-10-03 PROCEDURE — 3017F COLORECTAL CA SCREEN DOC REV: CPT | Performed by: INTERNAL MEDICINE

## 2023-10-03 PROCEDURE — 1036F TOBACCO NON-USER: CPT | Performed by: INTERNAL MEDICINE

## 2023-10-03 PROCEDURE — G8427 DOCREV CUR MEDS BY ELIG CLIN: HCPCS | Performed by: INTERNAL MEDICINE

## 2023-10-03 PROCEDURE — 1090F PRES/ABSN URINE INCON ASSESS: CPT | Performed by: INTERNAL MEDICINE

## 2023-10-03 PROCEDURE — G8417 CALC BMI ABV UP PARAM F/U: HCPCS | Performed by: INTERNAL MEDICINE

## 2023-10-03 PROCEDURE — 3078F DIAST BP <80 MM HG: CPT | Performed by: INTERNAL MEDICINE

## 2023-10-03 PROCEDURE — 99215 OFFICE O/P EST HI 40 MIN: CPT | Performed by: INTERNAL MEDICINE

## 2023-10-03 NOTE — TELEPHONE ENCOUNTER
Avs from 10/3/23    Md verbal said rv 2 weeks after surgery     SURGERY ON 10/11/23    RV ON 10/27/23    PT was given AVS and appt schedule    Electronically signed by Dawit Vivas on 10/3/2023 at 10:15 AM

## 2023-10-03 NOTE — TELEPHONE ENCOUNTER
Can the patient get her COVID and flu shots before her surgery? If so, can she get them at the same time?

## 2023-10-03 NOTE — PROGRESS NOTES
COMPARISON:  19 July 2021; 20 March 2019     HISTORY:  Screening. Negative family history of breast cancer. 5 year history of oral  contraception. No hormonal replacement therapy or breast interventions. FINDINGS:  Breasts are composed of scattered fibroglandular density. No skin thickening  or nipple contour changes are noted. No areas of architectural distortion. Increasing calcifications within an area lower inner right breast anterior  depth is noted with additional magnification mammographic workup advised. Other stable benign-appearing calcifications are present in the breast.     IMPRESSION:  Increasing calcifications lower inner right breast with additional  magnification mammographic workup advised. BI-RADS 0     BIRADS:  BIRADS - CATEGORY 0  ASSESSMENT:       Diagnosis Orders   1. Ductal carcinoma in situ (DCIS) of right breast        2.  Osteopenia of multiple sites                   PLAN:     I reviewed labs, imaging studies, related electronic medical records including outside records and discussed the diagnosis, prognosis and treatment recommendations   I reviewed the surgical pathology results, discuss goals of care and NCCN guidelines with patient and family    68-year-old woman diagnosed with DCIS of the lower inner quadrant of the right breast ER 40% and [place] negative, discussed with the patient the natural history of DCIS as a precancer condition and required surgical resection and chemoprevention to decrease risk of recurrence and decrease risk of invasive cancer in same side and contralateral side and improve overall survival, discussed side effects of adjuvant hormonal treatment with aromatase inhibitors which include but not limited to osteoporosis, patient already had osteopenia with recurrent fracture, discussed option of alternative option like tamoxifen, will obtain DEXA scan and compare with last one back in 2020> patient decided to proceed with Arimidex and monitor bone

## 2023-10-04 ENCOUNTER — OFFICE VISIT (OUTPATIENT)
Dept: SURGERY | Age: 68
End: 2023-10-04
Payer: MEDICARE

## 2023-10-04 VITALS — WEIGHT: 166 LBS | RESPIRATION RATE: 16 BRPM | HEIGHT: 64 IN | BODY MASS INDEX: 28.34 KG/M2

## 2023-10-04 DIAGNOSIS — D05.11 DUCTAL CARCINOMA IN SITU (DCIS) OF RIGHT BREAST: Primary | ICD-10-CM

## 2023-10-04 PROCEDURE — 99203 OFFICE O/P NEW LOW 30 MIN: CPT | Performed by: PLASTIC SURGERY

## 2023-10-04 PROCEDURE — 1090F PRES/ABSN URINE INCON ASSESS: CPT | Performed by: PLASTIC SURGERY

## 2023-10-04 PROCEDURE — G8484 FLU IMMUNIZE NO ADMIN: HCPCS | Performed by: PLASTIC SURGERY

## 2023-10-04 PROCEDURE — 1123F ACP DISCUSS/DSCN MKR DOCD: CPT | Performed by: PLASTIC SURGERY

## 2023-10-04 PROCEDURE — G8399 PT W/DXA RESULTS DOCUMENT: HCPCS | Performed by: PLASTIC SURGERY

## 2023-10-04 PROCEDURE — 1036F TOBACCO NON-USER: CPT | Performed by: PLASTIC SURGERY

## 2023-10-04 PROCEDURE — G8417 CALC BMI ABV UP PARAM F/U: HCPCS | Performed by: PLASTIC SURGERY

## 2023-10-04 PROCEDURE — 3017F COLORECTAL CA SCREEN DOC REV: CPT | Performed by: PLASTIC SURGERY

## 2023-10-04 PROCEDURE — G8427 DOCREV CUR MEDS BY ELIG CLIN: HCPCS | Performed by: PLASTIC SURGERY

## 2023-10-05 ENCOUNTER — TELEPHONE (OUTPATIENT)
Dept: SURGERY | Age: 68
End: 2023-10-05

## 2023-10-05 NOTE — TELEPHONE ENCOUNTER
10/5/23- I [facility] for pt to call me and sched her post op.  Initially I said she could come to [facility], however, after reviewing, it would be too far out after surgery, if she waits until 10/31, so 10/23/23 will need to be her post op and it will have to be in [place]

## 2023-10-06 ENCOUNTER — TELEPHONE (OUTPATIENT)
Dept: ONCOLOGY | Age: 68
End: 2023-10-06

## 2023-10-06 NOTE — TELEPHONE ENCOUNTER
Name: Refugio Manzo  : 1955  MRN: [id number]    Oncology Navigation- Initial Note: ( third attempt)     Intake-  Contact Type: Telephone    Notes: Navigator attempted to contact pt, no answer. VM left introducing self/role. Encouraged pt to contact navigator, number was provided.       Electronically signed by Mar Constantino RN on 10/6/2023 at 8:57 AM

## 2023-10-10 ENCOUNTER — TELEPHONE (OUTPATIENT)
Dept: SURGERY | Age: 68
End: 2023-10-10

## 2023-10-10 RX ORDER — LIDOCAINE 40 MG/G
CREAM TOPICAL
Status: CANCELLED | OUTPATIENT
Start: 2023-10-11

## 2023-10-11 ENCOUNTER — HOSPITAL ENCOUNTER (OUTPATIENT)
Dept: NUCLEAR MEDICINE | Age: 68
Discharge: HOME | End: 2023-10-13
Payer: MEDICARE

## 2023-10-11 ENCOUNTER — ANESTHESIA EVENT (OUTPATIENT)
Dept: OPERATING ROOM | Age: 68
End: 2023-10-11
Payer: MEDICARE

## 2023-10-11 ENCOUNTER — ANESTHESIA (OUTPATIENT)
Dept: OPERATING ROOM | Age: 68
End: 2023-10-11
Payer: MEDICARE

## 2023-10-11 ENCOUNTER — HOSPITAL ENCOUNTER (OUTPATIENT)
Age: 68
Setting detail: OBSERVATION
Discharge: HOME | End: 2023-10-12
Attending: SURGERY | Admitting: SURGERY
Payer: MEDICARE

## 2023-10-11 ENCOUNTER — APPOINTMENT (OUTPATIENT)
Dept: NUCLEAR MEDICINE | Age: 68
End: 2023-10-11
Payer: MEDICARE

## 2023-10-11 DIAGNOSIS — Z90.11 STATUS POST RIGHT MASTECTOMY: Primary | ICD-10-CM

## 2023-10-11 DIAGNOSIS — D05.11 DUCTAL CARCINOMA IN SITU (DCIS) OF RIGHT BREAST: ICD-10-CM

## 2023-10-11 DIAGNOSIS — Z85.3 HISTORY OF RIGHT BREAST CANCER: ICD-10-CM

## 2023-10-11 PROCEDURE — 38900 IO MAP OF SENT LYMPH NODE: CPT | Performed by: SURGERY

## 2023-10-11 PROCEDURE — 78195 LYMPH SYSTEM IMAGING: CPT

## 2023-10-11 PROCEDURE — 94760 N-INVAS EAR/PLS OXIMETRY 1: CPT

## 2023-10-11 PROCEDURE — 88341 IMHCHEM/IMCYTCHM EA ADD ANTB: CPT

## 2023-10-11 PROCEDURE — 19361 BRST RCNSTJ LATSMS DRSI FLAP: CPT | Performed by: PLASTIC SURGERY

## 2023-10-11 PROCEDURE — C9290 INJ, BUPIVACAINE LIPOSOME: HCPCS | Performed by: ANESTHESIOLOGY

## 2023-10-11 PROCEDURE — 2700000000 HC OXYGEN THERAPY PER DAY

## 2023-10-11 PROCEDURE — 19303 MAST SIMPLE COMPLETE: CPT | Performed by: SURGERY

## 2023-10-11 PROCEDURE — 6360000002 HC RX W HCPCS: Performed by: SURGERY

## 2023-10-11 PROCEDURE — 6370000000 HC RX 637 (ALT 250 FOR IP): Performed by: SURGERY

## 2023-10-11 PROCEDURE — 2580000003 HC RX 258: Performed by: SURGERY

## 2023-10-11 PROCEDURE — 38525 BIOPSY/REMOVAL LYMPH NODES: CPT | Performed by: SURGERY

## 2023-10-11 PROCEDURE — 3700000000 HC ANESTHESIA ATTENDED CARE: Performed by: SURGERY

## 2023-10-11 PROCEDURE — 2720000010 HC SURG SUPPLY STERILE: Performed by: SURGERY

## 2023-10-11 PROCEDURE — A4217 STERILE WATER/SALINE, 500 ML: HCPCS | Performed by: SURGERY

## 2023-10-11 PROCEDURE — 3600000012 HC SURGERY LEVEL 2 ADDTL 15MIN: Performed by: SURGERY

## 2023-10-11 PROCEDURE — 19357 TISS XPNDR PLMT BRST RCNSTJ: CPT | Performed by: PLASTIC SURGERY

## 2023-10-11 PROCEDURE — G0378 HOSPITAL OBSERVATION PER HR: HCPCS

## 2023-10-11 PROCEDURE — A9520 TC99 TILMANOCEPT DIAG 0.5MCI: HCPCS | Performed by: SURGERY

## 2023-10-11 PROCEDURE — 2580000003 HC RX 258: Performed by: NURSE ANESTHETIST, CERTIFIED REGISTERED

## 2023-10-11 PROCEDURE — C1889 IMPLANT/INSERT DEVICE, NOC: HCPCS | Performed by: SURGERY

## 2023-10-11 PROCEDURE — 2580000003 HC RX 258: Performed by: ANESTHESIOLOGY

## 2023-10-11 PROCEDURE — 7100000000 HC PACU RECOVERY - FIRST 15 MIN: Performed by: SURGERY

## 2023-10-11 PROCEDURE — 76942 ECHO GUIDE FOR BIOPSY: CPT | Performed by: ANESTHESIOLOGY

## 2023-10-11 PROCEDURE — 3700000001 HC ADD 15 MINUTES (ANESTHESIA): Performed by: SURGERY

## 2023-10-11 PROCEDURE — 2500000003 HC RX 250 WO HCPCS: Performed by: SURGERY

## 2023-10-11 PROCEDURE — 3600000002 HC SURGERY LEVEL 2 BASE: Performed by: SURGERY

## 2023-10-11 PROCEDURE — 3430000000 HC RX DIAGNOSTIC RADIOPHARMACEUTICAL: Performed by: SURGERY

## 2023-10-11 PROCEDURE — 88307 TISSUE EXAM BY PATHOLOGIST: CPT

## 2023-10-11 PROCEDURE — 2500000003 HC RX 250 WO HCPCS: Performed by: NURSE ANESTHETIST, CERTIFIED REGISTERED

## 2023-10-11 PROCEDURE — 6360000002 HC RX W HCPCS: Performed by: NURSE ANESTHETIST, CERTIFIED REGISTERED

## 2023-10-11 PROCEDURE — 7100000001 HC PACU RECOVERY - ADDTL 15 MIN: Performed by: SURGERY

## 2023-10-11 PROCEDURE — 6360000002 HC RX W HCPCS: Performed by: ANESTHESIOLOGY

## 2023-10-11 PROCEDURE — 2709999900 HC NON-CHARGEABLE SUPPLY: Performed by: SURGERY

## 2023-10-11 PROCEDURE — 88342 IMHCHEM/IMCYTCHM 1ST ANTB: CPT

## 2023-10-11 DEVICE — BREAST TISSUE EXPANDER, SUTURE TABS, INTEGRAL INJECTION DOME, 350CC
Type: IMPLANTABLE DEVICE | Site: BREAST | Status: FUNCTIONAL
Brand: MENTOR CPX4 PLUS, SMOOTH, MEDIUM HEIGHT

## 2023-10-11 RX ORDER — ONDANSETRON 2 MG/ML
INJECTION INTRAMUSCULAR; INTRAVENOUS PRN
Status: DISCONTINUED | OUTPATIENT
Start: 2023-10-11 | End: 2023-10-11 | Stop reason: SDUPTHER

## 2023-10-11 RX ORDER — PROPOFOL 10 MG/ML
INJECTION, EMULSION INTRAVENOUS PRN
Status: DISCONTINUED | OUTPATIENT
Start: 2023-10-11 | End: 2023-10-11 | Stop reason: SDUPTHER

## 2023-10-11 RX ORDER — DEXAMETHASONE SODIUM PHOSPHATE 10 MG/ML
INJECTION, SOLUTION INTRAMUSCULAR; INTRAVENOUS PRN
Status: DISCONTINUED | OUTPATIENT
Start: 2023-10-11 | End: 2023-10-11 | Stop reason: SDUPTHER

## 2023-10-11 RX ORDER — SERTRALINE HYDROCHLORIDE 100 MG/1
100 TABLET, FILM COATED ORAL NIGHTLY
Status: DISCONTINUED | OUTPATIENT
Start: 2023-10-11 | End: 2023-10-11

## 2023-10-11 RX ORDER — TROSPIUM CHLORIDE 20 MG/1
20 TABLET, FILM COATED ORAL
Status: DISCONTINUED | OUTPATIENT
Start: 2023-10-12 | End: 2023-10-12 | Stop reason: HOSPADM

## 2023-10-11 RX ORDER — BACLOFEN 10 MG/1
10 TABLET ORAL 3 TIMES DAILY
Qty: 30 TABLET | Refills: 0 | Status: CANCELLED | OUTPATIENT
Start: 2023-10-11 | End: 2023-10-21

## 2023-10-11 RX ORDER — PHENYLEPHRINE HCL IN 0.9% NACL 1 MG/10 ML
SYRINGE (ML) INTRAVENOUS PRN
Status: DISCONTINUED | OUTPATIENT
Start: 2023-10-11 | End: 2023-10-11 | Stop reason: SDUPTHER

## 2023-10-11 RX ORDER — SODIUM CHLORIDE 0.9 % (FLUSH) 0.9 %
5-40 SYRINGE (ML) INJECTION EVERY 12 HOURS SCHEDULED
Status: DISCONTINUED | OUTPATIENT
Start: 2023-10-11 | End: 2023-10-11 | Stop reason: HOSPADM

## 2023-10-11 RX ORDER — OMEPRAZOLE 40 MG/1
40 CAPSULE, DELAYED RELEASE ORAL
Status: DISCONTINUED | OUTPATIENT
Start: 2023-10-12 | End: 2023-10-12 | Stop reason: HOSPADM

## 2023-10-11 RX ORDER — DEXTROSE MONOHYDRATE, SODIUM CHLORIDE, AND POTASSIUM CHLORIDE 50; 1.49; 4.5 G/1000ML; G/1000ML; G/1000ML
INJECTION, SOLUTION INTRAVENOUS CONTINUOUS
Status: DISCONTINUED | OUTPATIENT
Start: 2023-10-11 | End: 2023-10-11

## 2023-10-11 RX ORDER — LIDOCAINE HYDROCHLORIDE 20 MG/ML
INJECTION, SOLUTION EPIDURAL; INFILTRATION; INTRACAUDAL; PERINEURAL PRN
Status: DISCONTINUED | OUTPATIENT
Start: 2023-10-11 | End: 2023-10-11 | Stop reason: SDUPTHER

## 2023-10-11 RX ORDER — ONDANSETRON 2 MG/ML
4 INJECTION INTRAMUSCULAR; INTRAVENOUS
Status: DISCONTINUED | OUTPATIENT
Start: 2023-10-11 | End: 2023-10-11 | Stop reason: HOSPADM

## 2023-10-11 RX ORDER — SODIUM CHLORIDE 0.9 % (FLUSH) 0.9 %
5-40 SYRINGE (ML) INJECTION PRN
Status: DISCONTINUED | OUTPATIENT
Start: 2023-10-11 | End: 2023-10-12 | Stop reason: HOSPADM

## 2023-10-11 RX ORDER — SODIUM CHLORIDE 0.9 % (FLUSH) 0.9 %
5-40 SYRINGE (ML) INJECTION PRN
Status: DISCONTINUED | OUTPATIENT
Start: 2023-10-11 | End: 2023-10-11 | Stop reason: HOSPADM

## 2023-10-11 RX ORDER — SODIUM CHLORIDE 9 MG/ML
INJECTION, SOLUTION INTRAVENOUS PRN
Status: DISCONTINUED | OUTPATIENT
Start: 2023-10-11 | End: 2023-10-11 | Stop reason: HOSPADM

## 2023-10-11 RX ORDER — SERTRALINE HYDROCHLORIDE 100 MG/1
200 TABLET, FILM COATED ORAL NIGHTLY
Status: DISCONTINUED | OUTPATIENT
Start: 2023-10-11 | End: 2023-10-12 | Stop reason: HOSPADM

## 2023-10-11 RX ORDER — SODIUM CHLORIDE, SODIUM LACTATE, POTASSIUM CHLORIDE, CALCIUM CHLORIDE 600; 310; 30; 20 MG/100ML; MG/100ML; MG/100ML; MG/100ML
INJECTION, SOLUTION INTRAVENOUS CONTINUOUS
Status: DISCONTINUED | OUTPATIENT
Start: 2023-10-11 | End: 2023-10-11

## 2023-10-11 RX ORDER — ROCURONIUM BROMIDE 10 MG/ML
INJECTION, SOLUTION INTRAVENOUS PRN
Status: DISCONTINUED | OUTPATIENT
Start: 2023-10-11 | End: 2023-10-11 | Stop reason: SDUPTHER

## 2023-10-11 RX ORDER — SODIUM CHLORIDE 9 MG/ML
INJECTION, SOLUTION INTRAVENOUS CONTINUOUS
Status: DISCONTINUED | OUTPATIENT
Start: 2023-10-11 | End: 2023-10-11

## 2023-10-11 RX ORDER — OXYCODONE HYDROCHLORIDE 5 MG/1
5 TABLET ORAL EVERY 4 HOURS PRN
Status: DISCONTINUED | OUTPATIENT
Start: 2023-10-11 | End: 2023-10-12 | Stop reason: HOSPADM

## 2023-10-11 RX ORDER — SODIUM CHLORIDE 9 MG/ML
INJECTION, SOLUTION INTRAVENOUS PRN
Status: DISCONTINUED | OUTPATIENT
Start: 2023-10-11 | End: 2023-10-12 | Stop reason: HOSPADM

## 2023-10-11 RX ORDER — HYDROMORPHONE HYDROCHLORIDE 1 MG/ML
0.5 INJECTION, SOLUTION INTRAMUSCULAR; INTRAVENOUS; SUBCUTANEOUS EVERY 5 MIN PRN
Status: COMPLETED | OUTPATIENT
Start: 2023-10-11 | End: 2023-10-11

## 2023-10-11 RX ORDER — LIDOCAINE HYDROCHLORIDE 10 MG/ML
1 INJECTION, SOLUTION EPIDURAL; INFILTRATION; INTRACAUDAL; PERINEURAL
Status: DISCONTINUED | OUTPATIENT
Start: 2023-10-12 | End: 2023-10-11 | Stop reason: HOSPADM

## 2023-10-11 RX ORDER — AMLODIPINE BESYLATE 10 MG/1
10 TABLET ORAL NIGHTLY
Status: DISCONTINUED | OUTPATIENT
Start: 2023-10-11 | End: 2023-10-12 | Stop reason: HOSPADM

## 2023-10-11 RX ORDER — MIDAZOLAM HYDROCHLORIDE 1 MG/ML
INJECTION INTRAMUSCULAR; INTRAVENOUS PRN
Status: DISCONTINUED | OUTPATIENT
Start: 2023-10-11 | End: 2023-10-11 | Stop reason: SDUPTHER

## 2023-10-11 RX ORDER — SENNA AND DOCUSATE SODIUM 50; 8.6 MG/1; MG/1
1 TABLET, FILM COATED ORAL 2 TIMES DAILY
Status: DISCONTINUED | OUTPATIENT
Start: 2023-10-11 | End: 2023-10-12 | Stop reason: HOSPADM

## 2023-10-11 RX ORDER — ALBUTEROL SULFATE 90 UG/1
2 AEROSOL, METERED RESPIRATORY (INHALATION) 4 TIMES DAILY PRN
Status: DISCONTINUED | OUTPATIENT
Start: 2023-10-11 | End: 2023-10-12 | Stop reason: HOSPADM

## 2023-10-11 RX ORDER — SODIUM CHLORIDE 0.9 % (FLUSH) 0.9 %
5-40 SYRINGE (ML) INJECTION EVERY 12 HOURS SCHEDULED
Status: DISCONTINUED | OUTPATIENT
Start: 2023-10-11 | End: 2023-10-12 | Stop reason: HOSPADM

## 2023-10-11 RX ORDER — KETAMINE HCL IN NACL, ISO-OSM 100MG/10ML
SYRINGE (ML) INJECTION PRN
Status: DISCONTINUED | OUTPATIENT
Start: 2023-10-11 | End: 2023-10-11 | Stop reason: SDUPTHER

## 2023-10-11 RX ORDER — ACETAMINOPHEN 325 MG/1
650 TABLET ORAL EVERY 6 HOURS
Status: DISCONTINUED | OUTPATIENT
Start: 2023-10-11 | End: 2023-10-12 | Stop reason: HOSPADM

## 2023-10-11 RX ORDER — MAGNESIUM HYDROXIDE/ALUMINUM HYDROXICE/SIMETHICONE 120; 1200; 1200 MG/30ML; MG/30ML; MG/30ML
15 SUSPENSION ORAL EVERY 6 HOURS PRN
Status: DISCONTINUED | OUTPATIENT
Start: 2023-10-11 | End: 2023-10-12 | Stop reason: HOSPADM

## 2023-10-11 RX ORDER — LIDOCAINE 40 MG/G
CREAM TOPICAL
Status: COMPLETED | OUTPATIENT
Start: 2023-10-11 | End: 2023-10-11

## 2023-10-11 RX ORDER — BUPIVACAINE HYDROCHLORIDE 2.5 MG/ML
INJECTION, SOLUTION EPIDURAL; INFILTRATION; INTRACAUDAL
Status: DISCONTINUED | OUTPATIENT
Start: 2023-10-11 | End: 2023-10-11 | Stop reason: SDUPTHER

## 2023-10-11 RX ORDER — SODIUM CHLORIDE, SODIUM LACTATE, POTASSIUM CHLORIDE, CALCIUM CHLORIDE 600; 310; 30; 20 MG/100ML; MG/100ML; MG/100ML; MG/100ML
INJECTION, SOLUTION INTRAVENOUS CONTINUOUS PRN
Status: DISCONTINUED | OUTPATIENT
Start: 2023-10-11 | End: 2023-10-11 | Stop reason: SDUPTHER

## 2023-10-11 RX ORDER — FENTANYL CITRATE 50 UG/ML
INJECTION, SOLUTION INTRAMUSCULAR; INTRAVENOUS PRN
Status: DISCONTINUED | OUTPATIENT
Start: 2023-10-11 | End: 2023-10-11 | Stop reason: SDUPTHER

## 2023-10-11 RX ORDER — DULOXETIN HYDROCHLORIDE 30 MG/1
30 CAPSULE, DELAYED RELEASE ORAL DAILY
Status: DISCONTINUED | OUTPATIENT
Start: 2023-10-12 | End: 2023-10-12 | Stop reason: HOSPADM

## 2023-10-11 RX ORDER — BACLOFEN 10 MG/1
10 TABLET ORAL 3 TIMES DAILY
Status: DISCONTINUED | OUTPATIENT
Start: 2023-10-11 | End: 2023-10-12 | Stop reason: HOSPADM

## 2023-10-11 RX ORDER — PREGABALIN 50 MG/1
50 CAPSULE ORAL NIGHTLY
Status: DISCONTINUED | OUTPATIENT
Start: 2023-10-11 | End: 2023-10-12 | Stop reason: HOSPADM

## 2023-10-11 RX ORDER — FENTANYL CITRATE 50 UG/ML
25 INJECTION, SOLUTION INTRAMUSCULAR; INTRAVENOUS EVERY 5 MIN PRN
Status: DISCONTINUED | OUTPATIENT
Start: 2023-10-11 | End: 2023-10-11 | Stop reason: HOSPADM

## 2023-10-11 RX ORDER — PANTOPRAZOLE SODIUM 40 MG/1
40 TABLET, DELAYED RELEASE ORAL
Status: DISCONTINUED | OUTPATIENT
Start: 2023-10-12 | End: 2023-10-11

## 2023-10-11 RX ORDER — ONDANSETRON 4 MG/1
4 TABLET, ORALLY DISINTEGRATING ORAL EVERY 8 HOURS PRN
Status: DISCONTINUED | OUTPATIENT
Start: 2023-10-11 | End: 2023-10-12 | Stop reason: HOSPADM

## 2023-10-11 RX ORDER — KETOROLAC TROMETHAMINE 30 MG/ML
30 INJECTION, SOLUTION INTRAMUSCULAR; INTRAVENOUS ONCE
Status: COMPLETED | OUTPATIENT
Start: 2023-10-11 | End: 2023-10-11

## 2023-10-11 RX ADMIN — SODIUM CHLORIDE, POTASSIUM CHLORIDE, SODIUM LACTATE AND CALCIUM CHLORIDE: 600; 310; 30; 20 INJECTION, SOLUTION INTRAVENOUS at 08:57

## 2023-10-11 RX ADMIN — LIDOCAINE 4%: 4 CREAM TOPICAL at 08:38

## 2023-10-11 RX ADMIN — PROPOFOL 50 MCG/KG/MIN: 10 INJECTION, EMULSION INTRAVENOUS at 11:34

## 2023-10-11 RX ADMIN — MIDAZOLAM 2 MG: 1 INJECTION INTRAMUSCULAR; INTRAVENOUS at 11:20

## 2023-10-11 RX ADMIN — ROCURONIUM BROMIDE 50 MG: 10 INJECTION, SOLUTION INTRAVENOUS at 11:27

## 2023-10-11 RX ADMIN — ONDANSETRON 4 MG: 2 INJECTION INTRAMUSCULAR; INTRAVENOUS at 13:38

## 2023-10-11 RX ADMIN — ACETAMINOPHEN 650 MG: 325 TABLET ORAL at 16:16

## 2023-10-11 RX ADMIN — POTASSIUM CHLORIDE, DEXTROSE MONOHYDRATE AND SODIUM CHLORIDE: 150; 5; 450 INJECTION, SOLUTION INTRAVENOUS at 16:21

## 2023-10-11 RX ADMIN — BUPIVACAINE 10 ML: 13.3 INJECTION, SUSPENSION, LIPOSOMAL INFILTRATION at 11:37

## 2023-10-11 RX ADMIN — OXYCODONE HYDROCHLORIDE 5 MG: 5 TABLET ORAL at 20:43

## 2023-10-11 RX ADMIN — KETOROLAC TROMETHAMINE 30 MG: 30 INJECTION, SOLUTION INTRAMUSCULAR at 15:05

## 2023-10-11 RX ADMIN — SODIUM CHLORIDE, POTASSIUM CHLORIDE, SODIUM LACTATE AND CALCIUM CHLORIDE: 600; 310; 30; 20 INJECTION, SOLUTION INTRAVENOUS at 11:20

## 2023-10-11 RX ADMIN — BUPIVACAINE HYDROCHLORIDE 20 ML: 2.5 INJECTION, SOLUTION EPIDURAL; INFILTRATION; INTRACAUDAL; PERINEURAL at 11:37

## 2023-10-11 RX ADMIN — Medication 10 MG: at 12:07

## 2023-10-11 RX ADMIN — FENTANYL CITRATE 75 MCG: 50 INJECTION INTRAMUSCULAR; INTRAVENOUS at 11:51

## 2023-10-11 RX ADMIN — PROPOFOL 150 MG: 10 INJECTION, EMULSION INTRAVENOUS at 11:27

## 2023-10-11 RX ADMIN — HYDROMORPHONE HYDROCHLORIDE 0.5 MG: 1 INJECTION, SOLUTION INTRAMUSCULAR; INTRAVENOUS; SUBCUTANEOUS at 14:20

## 2023-10-11 RX ADMIN — ACETAMINOPHEN 650 MG: 325 TABLET ORAL at 20:43

## 2023-10-11 RX ADMIN — Medication 10 MG: at 11:27

## 2023-10-11 RX ADMIN — FENTANYL CITRATE 25 MCG: 50 INJECTION INTRAMUSCULAR; INTRAVENOUS at 11:27

## 2023-10-11 RX ADMIN — Medication 10 MG: at 13:33

## 2023-10-11 RX ADMIN — DEXAMETHASONE SODIUM PHOSPHATE 10 MG: 10 INJECTION, SOLUTION INTRAMUSCULAR; INTRAVENOUS at 11:37

## 2023-10-11 RX ADMIN — Medication 2000 MG: at 11:40

## 2023-10-11 RX ADMIN — Medication 10 MG: at 12:27

## 2023-10-11 RX ADMIN — LIDOCAINE HYDROCHLORIDE 100 MG: 20 INJECTION, SOLUTION EPIDURAL; INFILTRATION; INTRACAUDAL; PERINEURAL at 11:27

## 2023-10-11 RX ADMIN — HYDROMORPHONE HYDROCHLORIDE 0.5 MG: 1 INJECTION, SOLUTION INTRAMUSCULAR; INTRAVENOUS; SUBCUTANEOUS at 15:06

## 2023-10-11 RX ADMIN — Medication 100 MCG: at 12:33

## 2023-10-11 RX ADMIN — FENTANYL CITRATE 50 MCG: 50 INJECTION INTRAMUSCULAR; INTRAVENOUS at 12:24

## 2023-10-11 RX ADMIN — HYDROMORPHONE HYDROCHLORIDE 0.5 MG: 1 INJECTION, SOLUTION INTRAMUSCULAR; INTRAVENOUS; SUBCUTANEOUS at 14:42

## 2023-10-11 RX ADMIN — Medication 10 MG: at 12:49

## 2023-10-11 RX ADMIN — TILMANOCEPT 600 MICRO CURIE: KIT at 09:34

## 2023-10-11 RX ADMIN — SENNOSIDES AND DOCUSATE SODIUM 1 TABLET: 50; 8.6 TABLET ORAL at 20:43

## 2023-10-11 RX ADMIN — HYDROMORPHONE HYDROCHLORIDE 0.5 MG: 1 INJECTION, SOLUTION INTRAMUSCULAR; INTRAVENOUS; SUBCUTANEOUS at 14:30

## 2023-10-11 ASSESSMENT — PAIN DESCRIPTION - DESCRIPTORS
DESCRIPTORS: BURNING;ACHING
DESCRIPTORS: ACHING
DESCRIPTORS: THROBBING

## 2023-10-11 ASSESSMENT — PAIN DESCRIPTION - FREQUENCY: FREQUENCY: CONTINUOUS

## 2023-10-11 ASSESSMENT — PAIN - FUNCTIONAL ASSESSMENT
PAIN_FUNCTIONAL_ASSESSMENT: 0-10
PAIN_FUNCTIONAL_ASSESSMENT: PREVENTS OR INTERFERES SOME ACTIVE ACTIVITIES AND ADLS
PAIN_FUNCTIONAL_ASSESSMENT: PREVENTS OR INTERFERES WITH MANY ACTIVE NOT PASSIVE ACTIVITIES

## 2023-10-11 ASSESSMENT — PAIN DESCRIPTION - LOCATION
LOCATION: BREAST
LOCATION: CHEST

## 2023-10-11 ASSESSMENT — PAIN SCALES - GENERAL
PAINLEVEL_OUTOF10: 9
PAINLEVEL_OUTOF10: 9
PAINLEVEL_OUTOF10: 8
PAINLEVEL_OUTOF10: 9
PAINLEVEL_OUTOF10: 9
PAINLEVEL_OUTOF10: 7
PAINLEVEL_OUTOF10: 9

## 2023-10-11 ASSESSMENT — PAIN DESCRIPTION - ORIENTATION
ORIENTATION: RIGHT
ORIENTATION: RIGHT

## 2023-10-11 ASSESSMENT — PAIN DESCRIPTION - PAIN TYPE: TYPE: SURGICAL PAIN

## 2023-10-11 ASSESSMENT — LIFESTYLE VARIABLES: SMOKING_STATUS: 0

## 2023-10-11 ASSESSMENT — PAIN DESCRIPTION - ONSET: ONSET: AWAKENED FROM SLEEP

## 2023-10-11 NOTE — OP NOTE
Operative Note      Patient: Bertha Orlando  YOB: 1955  MRN: [id number]    Date of Procedure: 10/11/2023    Pre-Op Diagnosis Codes:     * Ductal carcinoma in situ (DCIS) of right breast [D05.11]    Post-Op Diagnosis: Same       Procedure(s):  RIGHT SIMPLE MASTECTOMY WITH SENTINEL NODE BIOPSY by Dr. Mesfin Finney  Right breast weighed 375 g  RIGHT BREAST TISSUE EXPANDER INSERTION 350 cc, 100 cc of injectable saline was placed. Right serratus muscle flap    BIOPATCH AROUND ROBIN    FANY WOUND VAC       Surgeon(s):  MD Padmini Chavez MD    Assistant:   First Assistant: Leyda Steele    Anesthesia: General    Estimated Blood Loss (mL): less than 50     Complications: None    Specimens:   ID Type Source Tests Collected by Time Destination   A :  Tissue Breast SURGICAL PATHOLOGY Padmini Miles MD 10/11/2023 1228    B : RIGHT SENTINEL LYMPH NODE  Tissue Lymph Node SURGICAL PATHOLOGY Padmini Miles MD 10/11/2023 1238        Implants:  Implant Name Type Inv. Item Serial No.  Lot No. LRB No. Used Action   EXPANDER TISS 350CC TH  CPX4 PLUS - [id number]  EXPANDER TISS 350CC SMTH MH CPX4 PLUS  MENTOR MICHELLE-WD [id number] Right 1 Implanted         Drains:   Urinary Catheter 10/11/23 Kiran (Active)           INDICATION FOR PROCEDURE:  The patient is a 77 y.o. female . with who is planning to have a right mastectomy    . All the risks, benefits, and alternative treatments were explained to the patient and an informed consent was obtained. The patient was marked in the holding area. The circumference of the expanders were marked. The inframammary folds were marked. Anterior axillary lines were marked. The chest meridian was marked. .     DESCRIPTION OF PROCEDURE:  The patient was brought into room and was  positioned and a double mastectomy was performed.  After the  First mastectomy was completed,  I came into the room and all new instruments  and gloves were used for my part of the procedure for
the patient that she would be better served having a mastectomy. She opted for reconstruction and had that consultation. Today she has provided informed consent for mastectomy with expander reconstruction. The patient was placed on the OR table in supine position with padding of all bony prominences. Anesthesia was induced. A pec block was performed by anesthesia. A barriga catheter was placed with the intention to remove it at the conclusion of the case. The patient was prepped and draped in a sterile fashion. A proper timeout was taken. Lymphazurin blue dye (5 cc) was injected in a subareolar fashion. Massage was carried out for short period of time. A skin sparing incision was created to include the nipple areolar complex. The mastectomy was performed by making an elliptical incision to include the nipple areolar complex. Using the electrocautery, flaps were raised superiorly to just inferior to the clavicle and including the axillary tail, medially to just lateral to the sternum's edge, inferiorly to the inframammary fold, and laterally to just anterior to the latissimus dorsi. The breast and its fascia was removed from the pectoralis major muscle using electrocautery. The breast was marked with a short stitch superior and long stitch lateral.  Any bleeding encountered was controlled with cautery, clips, or ties. Attention was then turned to the sentinel lymph node biopsy. The neoprobe was used to scan the axilla. Areas of intense signal were examined for any palpable lymph nodes and for any obvious blue dye. Dissection to isolate the identified lymph node was performed. Lymphatics and blood vessels were controlled using a combination of clips and cautery. All lymph nodes removed were checked for signal or blue dye. All significantly radioactive and/or blue lymph nodes were removed. Any large palpable lymph nodes were removed.      At this point, the mastectomy flaps and the axilla were checked

## 2023-10-11 NOTE — ANESTHESIA POSTPROCEDURE EVALUATION
Department of Anesthesiology  Postprocedure Note    Patient: Refugio Manzo  MRN: [id number]  YOB: 1955  Date of evaluation: 10/11/2023      Procedure Summary     Date: 10/11/23 Room / Location: [address] [place] [address] - INPATIENT    Anesthesia Start: 1120 Anesthesia Stop: 1402    Procedures:       RIGHT SIMPLE MASTECTOMY WITH SENTINEL NODE BIOPSY @ 9AM (Right: Breast)      RIGHT BREAST TISSUE EXPANDER INSERTION  WITH BIOPATCH AROUND ROBIN  FANY WOUND VAC   SERRATUS  FLAP    PEC BLOCK (Right: Breast) Diagnosis:       Ductal carcinoma in situ (DCIS) of right breast      (Ductal carcinoma in situ (DCIS) of right breast [D05.11])    Surgeons: Sourav Hicks MD; Micha Veliz MD Responsible Provider: Kelin Tijerina MD    Anesthesia Type: General ASA Status: 2          Anesthesia Type: General    Kristel Phase I: Kristel Score: 8    Kristel Phase II:        Anesthesia Post Evaluation    Patient location during evaluation: PACU  Patient participation: complete - patient participated  Level of consciousness: awake  Airway patency: patent  Nausea & Vomiting: no nausea  Complications: no  Cardiovascular status: blood pressure returned to baseline  Respiratory status: acceptable  Hydration status: euvolemic  Comments: Multimodal analgesia pain management as indicated by procedure  Multimodal analgesia pain management approach  Pain management: adequate

## 2023-10-11 NOTE — BRIEF OP NOTE
Brief Postoperative Note      Patient: Erica Tyler  YOB: 1955  MRN: [id number]    Date of Procedure: 10/11/2023    Pre-Op Diagnosis Codes:     * Ductal carcinoma in situ (DCIS) of right breast [D05.11]    Post-Op Diagnosis: Same       PROCEDURE:  Right skin sparing mastectomy with sentinel lymph node biopsy. Surgeon(s):  MD Junior Quinn Lauren MD    Assistant:  First Assistant: Sakina Myers    Anesthesia: General    Estimated Blood Loss (mL): less than 50     Complications: None    Specimens:   ID Type Source Tests Collected by Time Destination   A :  Tissue Breast SURGICAL PATHOLOGY Elis Johnson MD 10/11/2023 1228    B : RIGHT SENTINEL LYMPH NODE  Tissue Lymph Node SURGICAL PATHOLOGY Elis Johnson MD 10/11/2023 1238        Implants:  * No implants in log *      Drains:   Urinary Catheter 10/11/23 Kiran (Active)       Findings: no abnormal findings in the breast. There were both blue and hot lymph nodes found. [place] Node Biopsy for Breast Cancer - Right  Operation performed with curative intent. Yes   Tracer(s) used to identify sentinel nodes in the upfront surgery (non-neoadjuvant) setting (select all that apply). Dye and Radioactive tracer   Tracer(s) used to identify sentinel nodes in the neoadjuvant setting (select all that apply). N/A   All nodes (colored or non-colored) present at the end of a dye-filled lymphatic channel were removed. Yes   All significantly radioactive nodes were removed. Yes   All palpably suspicious nodes were removed. N/A   Biopsy-proven positive nodes marked with clips prior to chemotherapy were identified and removed.  N/A            Electronically signed by Elis Johnson MD on 10/11/2023 at 12:46 PM

## 2023-10-11 NOTE — ANESTHESIA PROCEDURE NOTES
Peripheral Block    Patient location during procedure: OR  Reason for block: procedure for pain, post-op pain management and at surgeon's request  Start time: 10/11/2023 11:37 AM  End time: 10/11/2023 11:47 AM  Staffing  Performed: anesthesiologist   Anesthesiologist: Deanna Najjar, MD  Performed by: Deanna Najjar, MD  Authorized by: Deanna Najjar, MD    Preanesthetic Checklist  Completed: patient identified, IV checked, site marked, risks and benefits discussed, surgical/procedural consents, equipment checked, pre-op evaluation, timeout performed, anesthesia consent given, oxygen available, monitors applied/VS acknowledged, fire risk safety assessment completed and verbalized and blood product R/B/A discussed and consented  Peripheral Block   Patient position: supine  Prep: ChloraPrep  Provider prep: sterile gloves and mask  Patient monitoring: cardiac monitor, continuous pulse ox, continuous capnometry, frequent blood pressure checks, IV access and oxygen  Block type: PECS I and PECS II  Laterality: right  Injection technique: single-shot  Guidance: ultrasound guided    Needle   Needle type: pencil-tip   Needle localization: ultrasound guidance  Assessment   Injection assessment: negative aspiration for heme, no paresthesia on injection, local visualized surrounding nerve on ultrasound and no intravascular symptoms  Paresthesia pain: none  Slow fractionated injection: yes  Hemodynamics: stable  Real-time US image taken/store: yes  Outcomes: uncomplicated and patient tolerated procedure well    Additional Notes  Intraop post induction block  Needle tip visualized throughtou with US  Medications Administered  bupivacaine (MARCAINE) PF injection 0.25% - Perineural   20 mL - 10/11/2023 11:37:00 AM  bupivacaine liposome (EXPAREL) injection 1.3% - Perineural   10 mL - 10/11/2023 11:37:00 AM

## 2023-10-11 NOTE — H&P
[address] [facility]  [address] [place] [id number]       History and Physical     No chief complaint on file. HPI:   Yonathan Lorenzo is a 77 y.o. female who presents with biopsy-proven right breast ductal cell carcinoma in situ. Patient had a screening mammogram on August 10. She was found to have increased calcification in the lower inner breast.  Additional magnification mammography views are recommended. Additional diagnostics were performed and was 15 and showed calcification extending to the nipple. This covered an area of 5 x 2 cm. The biopsy results indicated high-grade carcinoma in situ with comedonecrosis and calcification. .    Medications:     Current Facility-Administered Medications   Medication Dose Route Frequency Provider Last Rate Last Admin    [START ON 10/12/2023] lidocaine PF 1 % injection 1 mL  1 mL IntraDERmal Once PRN Linda Mancera MD        0.9 % sodium chloride infusion   IntraVENous Continuous Linda Mancera MD        lactated ringers IV soln infusion   IntraVENous Continuous Linda Mancera  mL/hr at 10/11/23 0857 New Bag at 10/11/23 0857    ceFAZolin (ANCEF) 2000 mg in 0.9% sodium chloride 50 mL IVPB  2,000 mg IntraVENous Once Jessi Vasquez MD          Allergies: Allergies   Allergen Reactions    Morphine     Bee Venom     Wasp Venom Protein      Review of Systems:   Constitutional: Negative for fever, chills, fatigue and unexpected weight change. HENT: Patient with history of hearing loss with hearing aids. Eyes: Patient with history of bilateral vision right eye  Respiratory: Negative for cough and shortness of breath. Cardiovascular: Patient with history of carotid artery disease, hyperlipidemia, hypertension  Gastrointestinal: Patient with history of GERD, hiatal hernia, irritable bowel syndrome, gastric ulcer  Skin: Negative for pallor and rash.    Neurological: Negative for

## 2023-10-11 NOTE — PROGRESS NOTES
Pt admitted to room 2014 per bed in drowsy condition from PACU  Oriented to room and surroundings  Bed in lowest position, wheels locked, 2/4 side rails up  Call light in reach, room free of clutter, adequate lighting provided  Denies any further questions at this time  Instructed to call out with any questions/concerns/new onset of pain and/or n/v   White board updated  Continue to monitor with hourly rounding  STAY WITH ME protocol initiated   Bed alarm on/Fall Risk signs in place/Fall risk sticker to wrist band  Non-skid socks on/at bedside

## 2023-10-11 NOTE — H&P
Jeneal Landau, MD   DULoxetine (CYMBALTA) 30 MG extended release capsule TAKE ONE CAPSULE BY MOUTH DAILY  Patient taking differently: Take 1 capsule by mouth daily 7/31/23   Monique Haynes MD   umeclidinium-vilanterol [facility] ELLIPTA) 62.5-25 MCG/ACT inhaler Inhale 1 puff into the lungs daily 7/20/23   Rosalia Rodriguez MD   amLODIPine (NORVASC) 10 MG tablet TAKE ONE TABLET BY MOUTH ONCE NIGHTLY  Patient taking differently: Take 1 tablet by mouth at bedtime Take one tablet by mouth once nightly 4/28/23   Rosalia Rodriguez MD   omeprazole (PRILOSEC) 40 MG delayed release capsule TAKE ONE CAPSULE BY MOUTH TWICE A DAY  Patient taking differently: Take 1 capsule by mouth in the morning and at bedtime TAKE ONE CAPSULE BY MOUTH TWICE A DAY 4/17/23   Rosalia Rodriguez MD   alendronate (FOSAMAX) 70 MG tablet TAKE ONE TABLET BY MOUTH ONCE WEEKLY  Patient taking differently: Take 1 tablet by mouth every 7 days Takes on Tues 4/10/23   Rosalia Rodriguez MD   triamcinolone (KENALOG) 0.025 % cream Apply topically daily as needed On face 3/16/23   Kristina Ferro MD   celecoxib (CELEBREX) 100 MG capsule TAKE ONE CAPSULE BY MOUTH DAILY  Patient not taking: Reported on 10/2/2023 9/20/22   ERICA Amaral - CNP   meclizine (ANTIVERT) 25 MG tablet Take 1 tablet by mouth 3 times daily as needed for Dizziness 8/17/22   Rosalia Rodriguez MD   albuterol sulfate HFA (VENTOLIN HFA) 108 (90 Base) MCG/ACT inhaler Inhale 2 puffs into the lungs 4 times daily as needed for Wheezing 7/26/22   Rosalia Rodriguez MD   ipratropium (ATROVENT) 0.02 % nebulizer solution TAKE 1 VIAL (2.5 MILLILITERS) BY NEBULIZER FOUR TIMES A DAY  Patient taking differently: Take 2.5 mLs by nebulization daily as needed 7/12/21   ERICA Hernandez NP   Probiotic Product (PROBIOTIC-10 PO) Take 1 capsule by mouth daily    Kristina Ferro MD   aspirin 81 MG tablet Take 1 tablet by mouth daily    Kristina Ferro MD   [address] [facility]                            This note is created with the assistance of a speech recognition program.  While intending to generate a document that actually reflects the content of the visit, the document can still have some errors including those of syntax and sound a like substitutions which may escape proof reading. It such instances, actual meaning can be extrapolated by contextual diversion.

## 2023-10-12 ENCOUNTER — TELEPHONE (OUTPATIENT)
Dept: ONCOLOGY | Age: 68
End: 2023-10-12

## 2023-10-12 VITALS
BODY MASS INDEX: 28.49 KG/M2 | WEIGHT: 166 LBS | OXYGEN SATURATION: 93 % | RESPIRATION RATE: 16 BRPM | HEART RATE: 84 BPM | DIASTOLIC BLOOD PRESSURE: 68 MMHG | SYSTOLIC BLOOD PRESSURE: 127 MMHG | TEMPERATURE: 98.1 F

## 2023-10-12 PROCEDURE — 99024 POSTOP FOLLOW-UP VISIT: CPT | Performed by: PLASTIC SURGERY

## 2023-10-12 PROCEDURE — G0378 HOSPITAL OBSERVATION PER HR: HCPCS

## 2023-10-12 PROCEDURE — 6370000000 HC RX 637 (ALT 250 FOR IP): Performed by: SURGERY

## 2023-10-12 PROCEDURE — 2580000003 HC RX 258: Performed by: SURGERY

## 2023-10-12 PROCEDURE — 94760 N-INVAS EAR/PLS OXIMETRY 1: CPT

## 2023-10-12 RX ORDER — HYDROCODONE BITARTRATE AND ACETAMINOPHEN 5; 325 MG/1; MG/1
1 TABLET ORAL EVERY 6 HOURS PRN
Qty: 28 TABLET | Refills: 0 | Status: SHIPPED | OUTPATIENT
Start: 2023-10-12 | End: 2023-10-19

## 2023-10-12 RX ORDER — GABAPENTIN 100 MG/1
100 CAPSULE ORAL 3 TIMES DAILY
Qty: 30 CAPSULE | Refills: 0 | Status: SHIPPED | OUTPATIENT
Start: 2023-10-12 | End: 2023-10-22

## 2023-10-12 RX ORDER — CEPHALEXIN 500 MG/1
500 CAPSULE ORAL 4 TIMES DAILY
Qty: 40 CAPSULE | Refills: 0 | Status: SHIPPED | OUTPATIENT
Start: 2023-10-12 | End: 2023-10-22

## 2023-10-12 RX ADMIN — ACETAMINOPHEN 650 MG: 325 TABLET ORAL at 05:41

## 2023-10-12 RX ADMIN — OXYCODONE HYDROCHLORIDE 5 MG: 5 TABLET ORAL at 09:42

## 2023-10-12 RX ADMIN — AMLODIPINE BESYLATE 10 MG: 10 TABLET ORAL at 00:31

## 2023-10-12 RX ADMIN — SODIUM CHLORIDE, PRESERVATIVE FREE 10 ML: 5 INJECTION INTRAVENOUS at 09:26

## 2023-10-12 RX ADMIN — DULOXETIN HYDROCHLORIDE 30 MG: 30 CAPSULE, DELAYED RELEASE ORAL at 09:25

## 2023-10-12 RX ADMIN — SERTRALINE HYDROCHLORIDE 200 MG: 100 TABLET, FILM COATED ORAL at 00:31

## 2023-10-12 RX ADMIN — PREGABALIN 50 MG: 50 CAPSULE ORAL at 00:30

## 2023-10-12 RX ADMIN — BACLOFEN 10 MG: 10 TABLET ORAL at 09:25

## 2023-10-12 RX ADMIN — ACETAMINOPHEN 650 MG: 325 TABLET ORAL at 09:25

## 2023-10-12 RX ADMIN — OXYCODONE HYDROCHLORIDE 5 MG: 5 TABLET ORAL at 00:49

## 2023-10-12 RX ADMIN — SENNOSIDES AND DOCUSATE SODIUM 1 TABLET: 50; 8.6 TABLET ORAL at 09:25

## 2023-10-12 RX ADMIN — SODIUM CHLORIDE, PRESERVATIVE FREE 10 ML: 5 INJECTION INTRAVENOUS at 00:31

## 2023-10-12 RX ADMIN — BACLOFEN 10 MG: 10 TABLET ORAL at 00:21

## 2023-10-12 RX ADMIN — OXYCODONE HYDROCHLORIDE 5 MG: 5 TABLET ORAL at 05:41

## 2023-10-12 RX ADMIN — TROSPIUM CHLORIDE 20 MG: 20 TABLET, FILM COATED ORAL at 05:41

## 2023-10-12 RX ADMIN — OMEPRAZOLE 40 MG: 40 CAPSULE, DELAYED RELEASE ORAL at 05:41

## 2023-10-12 ASSESSMENT — PAIN DESCRIPTION - LOCATION: LOCATION: BREAST

## 2023-10-12 ASSESSMENT — PAIN SCALES - GENERAL
PAINLEVEL_OUTOF10: 6
PAINLEVEL_OUTOF10: 6
PAINLEVEL_OUTOF10: 4

## 2023-10-12 ASSESSMENT — PAIN DESCRIPTION - ORIENTATION: ORIENTATION: RIGHT

## 2023-10-12 ASSESSMENT — PAIN DESCRIPTION - DESCRIPTORS: DESCRIPTORS: ACHING

## 2023-10-12 ASSESSMENT — PAIN DESCRIPTION - FREQUENCY: FREQUENCY: CONTINUOUS

## 2023-10-12 ASSESSMENT — PAIN DESCRIPTION - ONSET: ONSET: ON-GOING

## 2023-10-12 ASSESSMENT — PAIN - FUNCTIONAL ASSESSMENT: PAIN_FUNCTIONAL_ASSESSMENT: PREVENTS OR INTERFERES SOME ACTIVE ACTIVITIES AND ADLS

## 2023-10-12 ASSESSMENT — PAIN DESCRIPTION - PAIN TYPE: TYPE: SURGICAL PAIN

## 2023-10-12 NOTE — CARE COORDINATION
Case Management Assessment  Initial Evaluation    Date/Time of Evaluation: 10/12/2023 10:44 AM  Assessment Completed by: Amy Darby RN    If patient is discharged prior to next notation, then this note serves as note for discharge by case management. Patient Name: Braxton Brandon                   YOB: 1955  Diagnosis: Ductal carcinoma in situ (DCIS) of right breast [D05.11]                   Date / Time: 10/11/2023  8:12 AM    Patient Admission Status: Observation   Readmission Risk (Low < 19, Mod (19-27), High > 27): No data recorded  Current PCP: Ana Maria Agustin MD  PCP verified by CM? Yes    Chart Reviewed: Yes      History Provided by: Patient, Spouse  Patient Orientation: Alert and Oriented    Patient Cognition: Alert    Hospitalization in the last 30 days (Readmission):  No    If yes, Readmission Assessment in  Navigator will be completed. Advance Directives:      Code Status: Full Code   Patient's Primary Decision Maker is:  (self)    Primary Decision Maker: Nick Hutchinson - Spouse - [phone]    Discharge Planning:    Patient lives with: Spouse/Significant Other Type of Home: House  Primary Care Giver: Self  Patient Support Systems include: Spouse/Significant Other   Current Financial resources: Medicare  Current community resources:    Current services prior to admission: Durable Medical Equipment            Current DME: Home Aerosol            Type of Home Care services:  None    ADLS  Prior functional level: Independent in ADLs/IADLs  Current functional level: Assistance with the following:, Housework, Shopping, Mobility    PT AM-PAC:   /24  OT AM-PAC:   /24    Family can provide assistance at DC: Yes  Would you like Case Management to discuss the discharge plan with any other family members/significant others, and if so, who?  Yes (spouse present)  Plans to Return to Present Housing: Yes  Other Identified Issues/Barriers to RETURNING to current housing: na   Potential Assistance needed at discharge: N/A            Potential DME:    [address] for transportation at discharge: Family    Financial    Payor: [address] / Plan: MEDICARE PART A AND B / Product Type: *No Product type* /     Does insurance require precert for SNF: No    Potential assistance Purchasing Medications:    Meds-to-Beds [facility] [id number] [address], [address] [facility] [phone] - F [phone]  [facility] [address] [address]  Phone: [phone] Fax: [phone]      Notes:    Factors facilitating achievement of predicted outcomes: Family support, Motivated, Cooperative, and Pleasant    Barriers to discharge: Pain, Skin Care, and Wound Care    Additional Case Management Notes:     Pt is a+o, lives with spouse. Independent. Has nebulizer. Granddaughter coming to stay with pt for 5 days to help with needs post op. Pt does have a Preveena and ROBIN in place-needs instructed on use-educated on use of ROBIN. Discussed HHC-declines. The Plan for Transition of Care is related to the following treatment goals of Ductal carcinoma in situ (DCIS) of right breast [T01.97]    IF APPLICABLE: The Patient and/or patient representative Angela and her family were provided with a choice of provider and agrees with the discharge plan. Freedom of choice list with basic dialogue that supports the patient's individualized plan of care/goals and shares the quality data associated with the providers was provided to:     Patient Representative Name:       The Patient and/or Patient Representative Agree with the Discharge Plan?       Jah Brunson RN  Case Management Department  Ph:  Fax:

## 2023-10-12 NOTE — PROGRESS NOTES
CLINICAL PHARMACY NOTE: MEDS TO BEDS    Total # of Prescriptions Filled: 3   The following medications were delivered to the patient:  Norco 5-325  Cephalexin 500mg  Gabapentin 100mg    Additional Documentation:

## 2023-10-12 NOTE — PROGRESS NOTES
Pt discharged to home in good condition with belongings  Discharge instructions given & signed  \"Meds To Beds\" medication at bedside  Pt denies having any further questions at this time  Locked up home medication(s)/personal items given to patient at discharge  Patient/family state they have everything they were admitted with. Measuring cups, hibiclens sent home with patient  Surgical bra in place, pt educated verbally and with handouts on how to empty ROBIN drains.

## 2023-10-12 NOTE — PLAN OF CARE
Problem: Discharge Planning  Goal: Discharge to home or other facility with appropriate resources  10/12/2023 1330 by [place], RN  Outcome: Completed  Flowsheets (Taken 10/12/2023 1757)  Discharge to home or other facility with appropriate resources:   Arrange for needed discharge resources and transportation as appropriate   Identify barriers to discharge with patient and caregiver   Identify discharge learning needs (meds, wound care, etc)   Refer to discharge planning if patient needs post-hospital services based on physician order or complex needs related to functional status, cognitive ability or social support system  10/12/2023 0604 by Octavio Blackmon RN  Outcome: Progressing     Problem: Pain  Goal: Verbalizes/displays adequate comfort level or baseline comfort level  10/12/2023 1330 by [place], RN  Outcome: Completed  10/12/2023 0604 by Octavio Blackmon RN  Outcome: Progressing     Problem: Safety - Adult  Goal: Free from fall injury  10/12/2023 1330 by [place], RN  Outcome: Completed  10/12/2023 0604 by Octavio Blackmon RN  Outcome: Progressing

## 2023-10-12 NOTE — DISCHARGE SUMMARY
Discharge Summary    Angela Hutchinson Date/Time of Discharge: No discharge date for patient encounter. CSN: [id number] Attending Provider: Henrique Polk MD   Room/Bed:  : 1955 Age: 77 y.o. Date/Time of Admission: 10/11/2023        Admitting Physician:   Henrique Polk MD     Discharge Physician:   Sharyn Holter    Admission Diagnoses:   Ductal carcinoma in situ (DCIS) of right breast [D05.11]    Discharge Diagnoses:     same  Admission Condition:   fair     Discharged Condition:    fair    Indication for Admission:   Surgery    Hospital Course:   Patient was admitted postoperatively. Patient did well. Consults:   none    Significant Diagnostic Studies:   None     Treatments:    surgery: Patient is status post right mastectomy with expander placement. Discharge Exam:   Wound VAC is intact. Drain is putting out serosanguineous drainage. Patient is tolerating p.o. Patient is voiding. Disposition:   home  Current Discharge Medication List        START taking these medications    Details   HYDROcodone-acetaminophen (NORCO) 5-325 MG per tablet Take 1 tablet by mouth every 6 hours as needed for Pain for up to 7 days. Intended supply: 7 days. Take lowest dose possible to manage pain Max Daily Amount: 4 tablets  Qty: 28 tablet, Refills: 0    Comments: Reduce doses taken as pain becomes manageable  Associated Diagnoses: Status post right mastectomy      cephALEXin (KEFLEX) 500 MG capsule Take 1 capsule by mouth 4 times daily for 10 days  Qty: 40 capsule, Refills: 0      gabapentin (NEURONTIN) 100 MG capsule Take 1 capsule by mouth 3 times daily for 10 days.   Qty: 30 capsule, Refills: 0           CONTINUE these medications which have NOT CHANGED    Details   pregabalin (LYRICA) 50 MG capsule TAKE ONE CAPSULE BY MOUTH ONCE NIGHTLY **MAX DAILY AMOUNT 50MG**  Qty: 90 capsule, Refills: 0    Associated Diagnoses: Chronic bilateral low back pain with right-sided sciatica      baclofen (LIORESAL)

## 2023-10-13 ENCOUNTER — CARE COORDINATION (OUTPATIENT)
Dept: CASE MANAGEMENT | Age: 68
End: 2023-10-13

## 2023-10-13 DIAGNOSIS — Z90.11 STATUS POST RIGHT MASTECTOMY: Primary | ICD-10-CM

## 2023-10-13 PROCEDURE — 1111F DSCHRG MED/CURRENT MED MERGE: CPT | Performed by: INTERNAL MEDICINE

## 2023-10-13 NOTE — CARE COORDINATION
Care Transitions Initial Follow Up Call    Call within 2 business days of discharge: Yes    Patient Current Location:  Home: [address] [place] [address]    LPN Care Coordinator contacted the patient by telephone to perform post hospital discharge assessment. Verified name and  with patient as identifiers. Provided introduction to self, and explanation of the LPN Care Coordinator role. Patient: Zari James Patient : 1955   MRN: [id number]  Reason for Admission: status post right mastectomy   Discharge Date: 10/12/23 RARS: No data recorded    Last Discharge Facility       Date Complaint Diagnosis Description Type Department Provider    10/11/23  Status post right mastectomy . .. Admission (Discharged) Olive Mccoy MD            Was this an external facility discharge? No Discharge Facility: [facility]    Challenges to be reviewed by the provider   Additional needs identified to be addressed with provider: No  none               Method of communication with provider: none. Transitions of Care Initial Call:  Explained the role of Care Transition Nurse and the Transition program, patient is agreeable to follow up calls Post discharge from the [facility] to [facility] today she reports that she has some pain but has been staying on top of her pain medications. She has a ROBIN drain it is cared for by her  he drained 50 ml of blood out of drain . She has a preveena in place. She can not see if there is drainage in it at this time. while writer was on phone with patient. Dressings are intact and in place. Discussed care of drain s/s of infection when to call office. She denies fever or chills. She denies HA dizziness nausea sob she was encouraged to use Incentive Spirometer ambulate as tolerated reports she has more pain when moving around. She denies HA CP sob nausea vomiting diarrhea. Hydrate eat a bland diet. She has been eating small meals.  She is voiding okay has not had a

## 2023-10-13 NOTE — CARE COORDINATION
Care Transitions Outreach Attempt # 1     Call within 2 business days of discharge: Yes   Attempted to reach patient for transitions of care follow up. Unable to reach patient. Left detailed VM with reason for call & contact information. Requested a call back. Will attempt outreach another day. Patient: Bertha Cons Patient : 1955 MRN: [id number]    Last Discharge Facility       Date Complaint Diagnosis Description Type Department Provider    10/11/23  Status post right mastectomy . .. Admission (Discharged) Chela Carbone MD              Was this an external facility discharge?  No Discharge Facility Name: [facility]      Future Appointments   Date Time Provider [address]   10/18/2023 12:00 PM Patsy Salgado MD [facility] surg [facility]   10/25/2023  1:00 PM Patsy Salgado MD [facility]   10/25/2023  2:40 PM Robbie Mckinney MD Neuro Spec Neurology -   10/27/2023  9:45 AM Yair Rucker MD SC Cancer [facility]   10/31/2023 10:00 AM Padmini Miles MD SC SURG [facility] INPATIENT REHABILITATION [facility]   2023  2:45 PM Ray Servin MD [address]   2023  2:00 PM ERICA Guzman - CNP [facility]

## 2023-10-17 LAB — SURGICAL PATHOLOGY REPORT: NORMAL

## 2023-10-18 ENCOUNTER — OFFICE VISIT (OUTPATIENT)
Dept: SURGERY | Age: 68
End: 2023-10-18

## 2023-10-18 VITALS — DIASTOLIC BLOOD PRESSURE: 88 MMHG | HEART RATE: 86 BPM | SYSTOLIC BLOOD PRESSURE: 133 MMHG | OXYGEN SATURATION: 95 %

## 2023-10-18 DIAGNOSIS — Z98.890 POSTOPERATIVE STATE: Primary | ICD-10-CM

## 2023-10-18 PROCEDURE — 99024 POSTOP FOLLOW-UP VISIT: CPT | Performed by: PLASTIC SURGERY

## 2023-10-19 NOTE — PROGRESS NOTES
[address]  [address] ,[facility] [address] [address]  [phone]       OFFICE POST-OP NOTE    Patient Name:  Adele Mccoy    :  1955    MRN:  [id number]  STATUS POST  Chief Complaint   Patient presents with    Follow-up     1 week follow up right breast expander. SUBJECTIVE  Patient seen and examined. Patient is s/p right mastectomy. Breast weight 375 grams. Right breast expander insertion 350cc. It was expanded 100 cc. Patient has a right serratus flap. Her surgery was performed on 10/11/23    PHYSICAL EXAM  Vital Signs:  /88 (Site: Left Upper Arm, Position: Sitting, Cuff Size: Medium Adult)   Pulse 86   SpO2 95%     Incisions: The wound vac was removed. Suture line clean dry and intact. Skin:  No evidence of infection. Neurologic:  Alert & oriented x 3. Drain: serous sang. drainage  ASSESSMENT   Diagnosis Orders   1. Postoperative state            PLAN  1. Continue drain care  Follow up in one week. Plan discussed with patient.     Electronically signed by:  Yina Man M.D.   10/19/2023

## 2023-10-20 ENCOUNTER — CARE COORDINATION (OUTPATIENT)
Dept: CASE MANAGEMENT | Age: 68
End: 2023-10-20

## 2023-10-20 NOTE — CARE COORDINATION
Care Transitions Outreach Attempt #1       Attempted to reach patient for transitions of care follow up. Unable to reach patient. HIPAA compliant message left on  requesting a return call. Patient: Ranulfo Luke Patient : 1955 MRN: [id number]    Last Discharge Facility       Date Complaint Diagnosis Description Type Department Provider    10/11/23  Status post right mastectomy . .. Admission (Discharged) Miguel Angel Peoples MD              Was this an external facility discharge?  No Discharge Facility: [facility]    Noted following upcoming appointments from discharge chart review:   [facility] follow up appointment(s):   Future Appointments   Date Time Provider [address]  [facility]   10/25/2023  1:00 PM [facility]   10/25/2023  2:40 PM Roselyn Grimaldo MD Neuro Spec Neurology -   10/27/2023  9:45 AM [facility]   10/31/2023 10:00 AM Helene Lyons MD SC SURG [facility]IENT RE[facility]ATION [facility]   2023  2:45 PM [facility]   2023  2:00 PM ERICA Medina - CNP [facility] [address] [facility] LPN  Care Transition Nurse

## 2023-10-24 ENCOUNTER — TELEPHONE (OUTPATIENT)
Dept: SURGERY | Age: 68
End: 2023-10-24

## 2023-10-24 ENCOUNTER — CARE COORDINATION (OUTPATIENT)
Dept: CASE MANAGEMENT | Age: 68
End: 2023-10-24

## 2023-10-24 NOTE — CARE COORDINATION
Care Transitions Follow Up Call        Patient: Misty Bocanegra  Patient : 1955   MRN: [id number]  Reason for Admission: Status post right mastectomy   Discharge Date: 10/12/23 RARS: No data recorded    Needs to be reviewed by the provider   Additional needs identified to be addressed with provider: No  none             Method of communication with provider: none. Final attempt to reach patient for care transitions. LM requesting return call. Contact information provided. Episode resolved at this time.         Follow Up  Future Appointments   Date Time Provider [address]   10/25/2023  1:45 PM Maryagnes Osgood, MD [facility]   10/25/2023  2:40 PM Caitlyn Samaniego MD Neuro Spec Neurology -   10/27/2023  9:45 AM Abril Sales MD [facility] Cancer [facility]   10/31/2023 10:00 AM Avery Darling MD SC SURG SPEC [facility]   2023  1:00 PM Maryagnes Osgood, MD [facility]   2023  2:45 PM Elsi Cueva MD [facility]   2023 11:45 AM Maryagnes Osgood, MD [facility]   2023  2:00 PM ERICA Luis - CNP [facility] [address] Transitions Subsequent and Final Call    Subsequent and Final Calls  Care Transitions Interventions                          Other Interventions:               Nigel Singh RN

## 2023-10-24 NOTE — TELEPHONE ENCOUNTER
10/24/23-1st attempt, left vm, letting pt know her appt has been moved to 8:45 am 10/25 due to  being called into surgery in the afternoon when her appt was originally scheduled

## 2023-10-25 ENCOUNTER — OFFICE VISIT (OUTPATIENT)
Dept: SURGERY | Age: 68
End: 2023-10-25

## 2023-10-25 ENCOUNTER — TELEMEDICINE (OUTPATIENT)
Dept: NEUROLOGY | Age: 68
End: 2023-10-25
Payer: MEDICARE

## 2023-10-25 ENCOUNTER — TELEPHONE (OUTPATIENT)
Dept: SURGERY | Age: 68
End: 2023-10-25

## 2023-10-25 DIAGNOSIS — Z98.890 POSTOPERATIVE STATE: Primary | ICD-10-CM

## 2023-10-25 DIAGNOSIS — G89.29 CHRONIC BILATERAL LOW BACK PAIN WITH RIGHT-SIDED SCIATICA: Primary | ICD-10-CM

## 2023-10-25 DIAGNOSIS — M54.41 CHRONIC BILATERAL LOW BACK PAIN WITH RIGHT-SIDED SCIATICA: Primary | ICD-10-CM

## 2023-10-25 DIAGNOSIS — M54.16 LUMBAR RADICULOPATHY: ICD-10-CM

## 2023-10-25 PROCEDURE — 99024 POSTOP FOLLOW-UP VISIT: CPT | Performed by: PLASTIC SURGERY

## 2023-10-25 PROCEDURE — 1090F PRES/ABSN URINE INCON ASSESS: CPT | Performed by: PSYCHIATRY & NEUROLOGY

## 2023-10-25 PROCEDURE — G8417 CALC BMI ABV UP PARAM F/U: HCPCS | Performed by: PSYCHIATRY & NEUROLOGY

## 2023-10-25 PROCEDURE — 99214 OFFICE O/P EST MOD 30 MIN: CPT | Performed by: PSYCHIATRY & NEUROLOGY

## 2023-10-25 PROCEDURE — 1036F TOBACCO NON-USER: CPT | Performed by: PSYCHIATRY & NEUROLOGY

## 2023-10-25 PROCEDURE — G8484 FLU IMMUNIZE NO ADMIN: HCPCS | Performed by: PSYCHIATRY & NEUROLOGY

## 2023-10-25 PROCEDURE — 1123F ACP DISCUSS/DSCN MKR DOCD: CPT | Performed by: PSYCHIATRY & NEUROLOGY

## 2023-10-25 PROCEDURE — 3017F COLORECTAL CA SCREEN DOC REV: CPT | Performed by: PSYCHIATRY & NEUROLOGY

## 2023-10-25 PROCEDURE — G8427 DOCREV CUR MEDS BY ELIG CLIN: HCPCS | Performed by: PSYCHIATRY & NEUROLOGY

## 2023-10-25 PROCEDURE — G8399 PT W/DXA RESULTS DOCUMENT: HCPCS | Performed by: PSYCHIATRY & NEUROLOGY

## 2023-10-25 RX ORDER — CEPHALEXIN 500 MG/1
500 CAPSULE ORAL 4 TIMES DAILY
COMMUNITY

## 2023-10-25 NOTE — PROGRESS NOTES
it consistently. Previous Neurological workup:  CT Thoracic Spine W Contrast 10/25/22: Mild thoracic spondylotic changes. Disc bulging T9-T10, with mild mass effect on the ventral aspect of the thecal sac. MRI Cervical Spine WO Contrast 10/31/22: Multilevel degenerative disc disease with uncovertebral and facet hypertrophy with canal stenosis at C3-4. Foraminal narrowing as described above  MRI Brain WO Contrast 10/29/22: No acute stroke, intracranial hemorrhage or mass effect. Mild chronic small vessel ischemic disease. MRI Lumbar spine WO Contrast 10/29/22: Postsurgical changes of L3-L4 and L4-L5 posterior decompression. Grade 1 anterolisthesis of L4 on L5. Moderate L2-L3 spinal canal stenosis. Multilevel neural foraminal narrowing is most pronounced and severe at L4-L5 on the left. Asymmetric to the right L2-L3 disc bulge may abut the exiting right L2 nerve roots, correlate for radicular symptoms in the right L2 distribution  MRI Thoracic Spine WO Contrast 10/29/22: Mild T9-T10 spinal canal stenosis secondary to a central disc protrusion. No severe thoracic spinal canal stenosis or cord compression. No evidence of myelopathy.   EMG 12/1/2020: Chronic L4-L5 radiculopathy      Objective:   Patient-Reported Vitals  No data recorded     Physical Exam  [INSTRUCTIONS:  \"[x]\" Indicates a positive item  \"[]\" Indicates a negative item  -- DELETE ALL ITEMS NOT EXAMINED]    Constitutional: [x] Appears well-developed and well-nourished [x] No apparent distress      [] Abnormal -     Mental status: [x] Alert and awake  [x] Oriented to person/place/time [x] Able to follow commands    [] Abnormal -     Eyes:   EOM    [x]  Normal    [] Abnormal -   Sclera  [x]  Normal    [] Abnormal -          Discharge [x]  None visible   [] Abnormal -     HENT: [x] Normocephalic, atraumatic  [] Abnormal -   [x] Mouth/Throat: Mucous membranes are moist    External Ears [x] Normal  [] Abnormal -    Neck: [x] No visualized mass []

## 2023-10-25 NOTE — PROGRESS NOTES
[address] [facility] SURGICAL SPECIALISTS  [address] [place] [id number]       Chief Complaint:  Expansions: Amount injected in the operating room was 100 cc       S/P:  Tissue expander placement on 10/11/2023  Breast weight right side was 375 g                  HPI:  Patient is status post right mastectomy. Patient status post right 350 expander placement. Patient with a history of breast cancer. Patient now presents for further expansion. Patient seen and examined. Patient is s/p right mastectomy. Breast weight 375 grams. Right breast expander insertion 350cc. It was expanded 100 cc. Patient has a right serratus flap. Her surgery was performed on 10/11/23    All areas were prepped and draped in the usual customary sterile manner using:                     betadine                   chlorhexidine    The port was localized using Center scope. Amount injected today:    RIGHT: 100 cc          Total volume:                     RIGHT: 200 cc           Physical exam:    There were no vitals taken for this visit. HEENT: Head is atraumatic normocephalic extraocular muscles are grossly intact, external ears are normal limits neck is supple. Skin: There is no evidence of infection. Extremities: good range of motion, no lymph edema noted. Follow up:  Chemotherapy:    YES   NO         Radiation:  YES   NO    Oncologist:    Plan:  Continue expansion.   Followup in 1 week    Electronically Signed By:  Hernan Crawley M.D.  10/25/2023

## 2023-10-27 ENCOUNTER — TELEPHONE (OUTPATIENT)
Dept: ONCOLOGY | Age: 68
End: 2023-10-27

## 2023-10-27 ENCOUNTER — OFFICE VISIT (OUTPATIENT)
Dept: ONCOLOGY | Age: 68
End: 2023-10-27
Payer: MEDICARE

## 2023-10-27 DIAGNOSIS — M54.41 CHRONIC BILATERAL LOW BACK PAIN WITH RIGHT-SIDED SCIATICA: ICD-10-CM

## 2023-10-27 DIAGNOSIS — K21.9 GASTRO-ESOPHAGEAL REFLUX DISEASE WITHOUT ESOPHAGITIS: Primary | ICD-10-CM

## 2023-10-27 DIAGNOSIS — G89.29 CHRONIC BILATERAL LOW BACK PAIN WITH RIGHT-SIDED SCIATICA: ICD-10-CM

## 2023-10-27 DIAGNOSIS — D05.11 DUCTAL CARCINOMA IN SITU (DCIS) OF RIGHT BREAST: ICD-10-CM

## 2023-10-27 DIAGNOSIS — M85.89 OSTEOPENIA OF MULTIPLE SITES: ICD-10-CM

## 2023-10-27 PROCEDURE — G8427 DOCREV CUR MEDS BY ELIG CLIN: HCPCS | Performed by: INTERNAL MEDICINE

## 2023-10-27 PROCEDURE — G8399 PT W/DXA RESULTS DOCUMENT: HCPCS | Performed by: INTERNAL MEDICINE

## 2023-10-27 PROCEDURE — 3017F COLORECTAL CA SCREEN DOC REV: CPT | Performed by: INTERNAL MEDICINE

## 2023-10-27 PROCEDURE — G8417 CALC BMI ABV UP PARAM F/U: HCPCS | Performed by: INTERNAL MEDICINE

## 2023-10-27 PROCEDURE — 1036F TOBACCO NON-USER: CPT | Performed by: INTERNAL MEDICINE

## 2023-10-27 PROCEDURE — 99215 OFFICE O/P EST HI 40 MIN: CPT | Performed by: INTERNAL MEDICINE

## 2023-10-27 PROCEDURE — 1123F ACP DISCUSS/DSCN MKR DOCD: CPT | Performed by: INTERNAL MEDICINE

## 2023-10-27 PROCEDURE — G8484 FLU IMMUNIZE NO ADMIN: HCPCS | Performed by: INTERNAL MEDICINE

## 2023-10-27 PROCEDURE — 1090F PRES/ABSN URINE INCON ASSESS: CPT | Performed by: INTERNAL MEDICINE

## 2023-10-27 RX ORDER — GABAPENTIN 100 MG/1
100 CAPSULE ORAL 3 TIMES DAILY
Qty: 30 CAPSULE | Refills: 0 | Status: SHIPPED | OUTPATIENT
Start: 2023-10-27 | End: 2023-11-06

## 2023-10-27 RX ORDER — BACLOFEN 10 MG/1
10 TABLET ORAL 3 TIMES DAILY
Qty: 30 TABLET | Refills: 0 | Status: SHIPPED | OUTPATIENT
Start: 2023-10-27

## 2023-10-27 RX ORDER — TAMOXIFEN CITRATE 20 MG/1
20 TABLET ORAL DAILY
Qty: 90 TABLET | Refills: 1 | Status: SHIPPED | OUTPATIENT
Start: 2023-10-27 | End: 2024-01-25

## 2023-10-27 NOTE — TELEPHONE ENCOUNTER
Avs from 10/27/23    Rtc in one month       Rv on 11/28/23      To check up on medication     PT was given AVS and appt schedule    Electronically signed by Veronique Ramirez on 10/27/2023 at 1:52 PM

## 2023-10-27 NOTE — PROGRESS NOTES
Patient ID: Jorge White, 1955, [id number], 77 y.o. Referred by :  No ref. provider found   Reason for consultation: Right lower DCIS      HISTORY OF PRESENT ILLNESS:    Oncologic History:    Jorge White is a very pleasant 77 y.o. female. 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% and negative for progesterone  Osteopenia with recurrent fracture    Age at menarche 13  Number of pregnancy 4  Menopause 37  Birth control for 5 years  No family history of cancer    Problem list  Left DCIS  Osteopenia with bone density scan September 2023    Oncology treatment  Right mastectomy with a sentinel lymph node biopsy  Arimidex    Interval history  Patient today to discuss MRI finding and adjuvant hormonal treatment  MRI was done and did show calcification at 4.9 cm on the right side with no other area suspicious  Status post right mastectomy and sentinel lymph node biopsy  Bone density scan did show osteopenia    Past Medical History:   Diagnosis Date    ADHD     mild, no RX    Arthritis     Blurred vision, right eye     wrinkle in Rt eye- is now resolved    Breast cancer ([address]) 08/28/2023    Rt breast    Carotid artery disease ([address])     dr Tariq Escobedo vascular last appt 1/20.  Has carotid stenosis, pt reports \"left side is worse than right\"    COVID-19 2019    \"happened right when COVID first came out\"    GERD (gastroesophageal reflux disease)     Hearing loss     has bilat hearing aids    Hiatal hernia     resolved per patient    High cholesterol     History of closed shoulder dislocation     rt from recent fall    [place] (hard of hearing)     beto hearing aides    Hx of gastric ulcer

## 2023-10-30 RX ORDER — DULOXETIN HYDROCHLORIDE 30 MG/1
30 CAPSULE, DELAYED RELEASE ORAL DAILY
Qty: 90 CAPSULE | Refills: 1 | Status: SHIPPED | OUTPATIENT
Start: 2023-10-30

## 2023-10-30 NOTE — TELEPHONE ENCOUNTER
Pharmacy requesting refill of: Duloxetine (Cymbalta) 30 mg extended release capsule      Medication active on med list:  yes      Date of last Rx: 07/31/2023  with 0 refills   verified on 10/30/2023   verified by SHANIQUE MENDOZA      Date of last appointment:  10/25/2023    Next Visit Date:  Visit date not found

## 2023-10-31 ENCOUNTER — OFFICE VISIT (OUTPATIENT)
Age: 68
End: 2023-10-31

## 2023-10-31 VITALS
OXYGEN SATURATION: 94 % | RESPIRATION RATE: 18 BRPM | BODY MASS INDEX: 29.18 KG/M2 | TEMPERATURE: 98.3 F | DIASTOLIC BLOOD PRESSURE: 78 MMHG | WEIGHT: 170 LBS | HEART RATE: 90 BPM | SYSTOLIC BLOOD PRESSURE: 129 MMHG

## 2023-10-31 DIAGNOSIS — D05.11 DUCTAL CARCINOMA IN SITU (DCIS) OF RIGHT BREAST: Primary | ICD-10-CM

## 2023-10-31 PROCEDURE — 99024 POSTOP FOLLOW-UP VISIT: CPT | Performed by: SURGERY

## 2023-10-31 NOTE — PROGRESS NOTES
DAY (Patient taking differently: Take 2.5 mLs by nebulization daily as needed) 300 mL 5    Probiotic Product (PROBIOTIC-10 PO) Take 1 capsule by mouth daily      aspirin 81 MG tablet Take 1 tablet by mouth daily      OMEGA-3 KRILL OIL PO Take by mouth daily      calcium carbonate 600 MG TABS tablet Take 1 tablet by mouth 2 times daily      vitamin D 1000 units CAPS Take 1 capsule by mouth daily      sertraline (ZOLOFT) 100 MG tablet Take 1 tablet by mouth at bedtime      vitamin E 1000 units capsule Take 1 capsule by mouth daily      EPINEPHrine (EPIPEN 2-JONATHAN) 0.3 MG/0.3ML SOAJ injection Inject 0.3 mLs into the muscle once for 1 dose Use as directed for allergic reaction 0.3 mL 0     No current facility-administered medications for this visit. Allergies   Allergen Reactions    Morphine     Bee Venom     Wasp Venom Protein        Family History   Problem Relation Age of Onset    Cancer Father        Social History     Socioeconomic History    Marital status:      Spouse name: Not on file    Number of children: Not on file    Years of education: Not on file    Highest education level: Not on file   Occupational History    Not on file   Tobacco Use    Smoking status: Former     Packs/day: 1.00     Years: 40.00     Additional pack years: 0.00     Total pack years: 40.00     Types: Cigarettes     Quit date: 2018     Years since quittin.5     Passive exposure: Never    Smokeless tobacco: Never    Tobacco comments:     Light smoker. Quit on and off several years.    Vaping Use    Vaping Use: Never used   Substance and Sexual Activity    Alcohol use: No    Drug use: No    Sexual activity: Not Currently   Other Topics Concern    Not on file   Social History Narrative    Not on file     Social Determinants of Health     Financial Resource Strain: Low Risk  (2023)    Overall Financial Resource Strain (CARDIA)     Difficulty of Paying Living Expenses: Not hard at all   Food Insecurity: [address]

## 2023-10-31 NOTE — PATIENT INSTRUCTIONS
Nothing to do at this time. Return to clinic in 6 months.   You are not due for a mammogram until August 2024

## 2023-11-01 ENCOUNTER — OFFICE VISIT (OUTPATIENT)
Dept: SURGERY | Age: 68
End: 2023-11-01

## 2023-11-01 DIAGNOSIS — Z98.890 POSTOPERATIVE STATE: Primary | ICD-10-CM

## 2023-11-01 PROCEDURE — 99024 POSTOP FOLLOW-UP VISIT: CPT | Performed by: PLASTIC SURGERY

## 2023-11-01 NOTE — PROGRESS NOTES
[address] [facility] SURGICAL SPECIALISTS  [address] [place] [id number]       Chief Complaint:  Expansions: Amount injected in the operating room was 100 cc       S/P:  Tissue expander placement on 10/11/2023  Breast weight right side was 375 g                  HPI:  Patient is status post right mastectomy. Patient status post right 350 expander placement. Patient with a history of breast cancer. Patient now presents for further expansion. Patient seen and examined. Patient is s/p right mastectomy. Breast weight 375 grams. Right breast expander insertion 350cc. It was expanded 100 cc. Patient has a right serratus flap. Her surgery was performed on 10/11/23    All areas were prepped and draped in the usual customary sterile manner using:                     betadine                   chlorhexidine    The port was localized using Center scope. Amount injected today:    RIGHT: 100 cc          Total volume:                     RIGHT: 300 cc           Physical exam:    There were no vitals taken for this visit. HEENT: Head is atraumatic normocephalic extraocular muscles are grossly intact, external ears are normal limits neck is supple. Skin: There is no evidence of infection. Extremities: good range of motion, no lymph edema noted. Follow up:  Chemotherapy:    YES   NO         Radiation:  YES   NO    Oncologist:    Plan:  Continue expansion.   Followup in 1 week    Electronically Signed By:  Samira Rudolph M.D.  11/1/2023

## 2023-11-03 RX ORDER — GABAPENTIN 100 MG/1
CAPSULE ORAL
Qty: 30 CAPSULE | Refills: 0 | OUTPATIENT
Start: 2023-11-03

## 2023-11-06 ENCOUNTER — OFFICE VISIT (OUTPATIENT)
Dept: FAMILY MEDICINE CLINIC | Age: 68
End: 2023-11-06

## 2023-11-06 VITALS
DIASTOLIC BLOOD PRESSURE: 70 MMHG | HEART RATE: 83 BPM | BODY MASS INDEX: 28.79 KG/M2 | WEIGHT: 168.6 LBS | SYSTOLIC BLOOD PRESSURE: 122 MMHG | TEMPERATURE: 98.2 F | HEIGHT: 64 IN | OXYGEN SATURATION: 96 %

## 2023-11-06 DIAGNOSIS — R73.09 OTHER ABNORMAL GLUCOSE: ICD-10-CM

## 2023-11-06 DIAGNOSIS — M85.89 OSTEOPENIA OF MULTIPLE SITES: ICD-10-CM

## 2023-11-06 DIAGNOSIS — R35.0 URINARY FREQUENCY: ICD-10-CM

## 2023-11-06 DIAGNOSIS — Z87.891 PERSONAL HISTORY OF TOBACCO USE: ICD-10-CM

## 2023-11-06 DIAGNOSIS — Z00.00 MEDICARE ANNUAL WELLNESS VISIT, SUBSEQUENT: Primary | ICD-10-CM

## 2023-11-06 DIAGNOSIS — M54.16 CHRONIC LUMBAR RADICULOPATHY: ICD-10-CM

## 2023-11-06 DIAGNOSIS — E78.2 MIXED HYPERLIPIDEMIA: ICD-10-CM

## 2023-11-06 DIAGNOSIS — Z23 NEED FOR IMMUNIZATION AGAINST INFLUENZA: ICD-10-CM

## 2023-11-06 DIAGNOSIS — H92.03 EARACHE SYMPTOMS, BILATERAL: ICD-10-CM

## 2023-11-06 DIAGNOSIS — R42 DIZZINESS: ICD-10-CM

## 2023-11-06 DIAGNOSIS — Z87.898 HISTORY OF PREDIABETES: ICD-10-CM

## 2023-11-06 LAB
BILIRUBIN, POC: ABNORMAL
BLOOD URINE, POC: NEGATIVE
CLARITY, POC: ABNORMAL
COLOR, POC: ABNORMAL
GLUCOSE URINE, POC: 100
HBA1C MFR BLD: 5.3 %
KETONES, POC: NEGATIVE
LEUKOCYTE EST, POC: ABNORMAL
NITRITE, POC: POSITIVE
PH, POC: 5
PROTEIN, POC: 100
SPECIFIC GRAVITY, POC: 1.01
UROBILINOGEN, POC: 0.2

## 2023-11-06 RX ORDER — ALENDRONATE SODIUM 70 MG/1
70 TABLET ORAL
Qty: 12 TABLET | Refills: 1 | Status: SHIPPED | OUTPATIENT
Start: 2023-11-06

## 2023-11-06 RX ORDER — NITROFURANTOIN 25; 75 MG/1; MG/1
100 CAPSULE ORAL 2 TIMES DAILY
Qty: 14 CAPSULE | Refills: 0 | Status: SHIPPED | OUTPATIENT
Start: 2023-11-06 | End: 2023-11-13

## 2023-11-06 ASSESSMENT — PATIENT HEALTH QUESTIONNAIRE - PHQ9
SUM OF ALL RESPONSES TO PHQ QUESTIONS 1-9: 0
7. TROUBLE CONCENTRATING ON THINGS, SUCH AS READING THE NEWSPAPER OR WATCHING TELEVISION: 0
5. POOR APPETITE OR OVEREATING: 0
8. MOVING OR SPEAKING SO SLOWLY THAT OTHER PEOPLE COULD HAVE NOTICED. OR THE OPPOSITE, BEING SO FIGETY OR RESTLESS THAT YOU HAVE BEEN MOVING AROUND A LOT MORE THAN USUAL: 0
2. FEELING DOWN, DEPRESSED OR HOPELESS: 0
3. TROUBLE FALLING OR STAYING ASLEEP: 0
SUM OF ALL RESPONSES TO PHQ QUESTIONS 1-9: 0
SUM OF ALL RESPONSES TO PHQ QUESTIONS 1-9: 0
9. THOUGHTS THAT YOU WOULD BE BETTER OFF DEAD, OR OF HURTING YOURSELF: 0
SUM OF ALL RESPONSES TO PHQ QUESTIONS 1-9: 0
10. IF YOU CHECKED OFF ANY PROBLEMS, HOW DIFFICULT HAVE THESE PROBLEMS MADE IT FOR YOU TO DO YOUR WORK, TAKE CARE OF THINGS AT HOME, OR GET ALONG WITH OTHER PEOPLE: 0
6. FEELING BAD ABOUT YOURSELF - OR THAT YOU ARE A FAILURE OR HAVE LET YOURSELF OR YOUR FAMILY DOWN: 0
SUM OF ALL RESPONSES TO PHQ9 QUESTIONS 1 & 2: 0
4. FEELING TIRED OR HAVING LITTLE ENERGY: 0
1. LITTLE INTEREST OR PLEASURE IN DOING THINGS: 0

## 2023-11-06 ASSESSMENT — COLUMBIA-SUICIDE SEVERITY RATING SCALE - C-SSRS
4. HAVE YOU HAD THESE THOUGHTS AND HAD SOME INTENTION OF ACTING ON THEM?: NO
3. HAVE YOU BEEN THINKING ABOUT HOW YOU MIGHT KILL YOURSELF?: NO
7. DID THIS OCCUR IN THE LAST THREE MONTHS: NO
5. HAVE YOU STARTED TO WORK OUT OR WORKED OUT THE DETAILS OF HOW TO KILL YOURSELF? DO YOU INTEND TO CARRY OUT THIS PLAN?: NO

## 2023-11-06 ASSESSMENT — LIFESTYLE VARIABLES
HOW MANY STANDARD DRINKS CONTAINING ALCOHOL DO YOU HAVE ON A TYPICAL DAY: PATIENT DOES NOT DRINK
HOW OFTEN DO YOU HAVE A DRINK CONTAINING ALCOHOL: NEVER

## 2023-11-06 NOTE — PROGRESS NOTES
taking differently: Take 1 tablet by mouth at bedtime Take one tablet by mouth once nightly Yes Rosalia Robison MD   omeprazole (PRILOSEC) 40 MG delayed release capsule TAKE ONE CAPSULE BY MOUTH TWICE A DAY  Patient taking differently: Take 1 capsule by mouth in the morning and at bedtime TAKE ONE CAPSULE BY MOUTH TWICE A DAY Yes Rosalia Robison MD   alendronate (FOSAMAX) 70 MG tablet TAKE ONE TABLET BY MOUTH ONCE WEEKLY  Patient taking differently: Take 1 tablet by mouth every 7 days Takes on Tues Yes Rosalia Robison MD   triamcinolone (KENALOG) 0.025 % cream Apply topically daily as needed On face Yes Kristina Ferro MD   celecoxib (CELEBREX) 100 MG capsule TAKE ONE CAPSULE BY MOUTH DAILY Yes ERICA Dye CNP   meclizine (ANTIVERT) 25 MG tablet Take 1 tablet by mouth 3 times daily as needed for Dizziness Yes Rosalia Robison MD   albuterol sulfate HFA (VENTOLIN HFA) 108 (90 Base) MCG/ACT inhaler Inhale 2 puffs into the lungs 4 times daily as needed for Wheezing Yes Rosalia Robison MD   ipratropium (ATROVENT) 0.02 % nebulizer solution TAKE 1 VIAL (2.5 MILLILITERS) BY NEBULIZER FOUR TIMES A DAY  Patient taking differently: Take 2.5 mLs by nebulization daily as needed Yes ERICA Jonas NP   Probiotic Product (PROBIOTIC-10 PO) Take 1 capsule by mouth daily Yes Kristina Ferro MD   aspirin 81 MG tablet Take 1 tablet by mouth daily Yes ProviderKristina MD   OMEGA-3 KRILL OIL PO Take by mouth daily Yes Kristina Ferro MD   calcium carbonate 600 MG TABS tablet Take 1 tablet by mouth 2 times daily Yes Kristina Ferro MD   vitamin D 1000 units CAPS Take 1 capsule by mouth daily Yes Kristina Ferro MD   sertraline (ZOLOFT) 100 MG tablet Take 1 tablet by mouth at bedtime Yes Kristina Ferro MD   vitamin E 1000 units capsule Take 1 capsule by mouth daily Yes Kristina Ferro MD   gabapentin (NEURONTIN) 100 MG capsule Take 1 capsule by mouth 3

## 2023-11-08 ENCOUNTER — OFFICE VISIT (OUTPATIENT)
Dept: SURGERY | Age: 68
End: 2023-11-08

## 2023-11-08 DIAGNOSIS — Z98.890 POSTOPERATIVE STATE: Primary | ICD-10-CM

## 2023-11-08 PROCEDURE — 99024 POSTOP FOLLOW-UP VISIT: CPT | Performed by: PLASTIC SURGERY

## 2023-11-08 NOTE — PROGRESS NOTES
[address] [facility] SURGICAL SPECIALISTS  [address] [place] [phone]       Chief Complaint:  Expansions: Amount injected in the operating room was 100 cc       S/P:  Tissue expander placement on 10/11/2023  Breast weight right side was 375 g                  HPI:  Patient is status post right mastectomy. Patient status post right 350 expander placement. Patient with a history of breast cancer. Patient now presents for further expansion. Patient seen and examined. Patient is s/p right mastectomy. Breast weight 375 grams. Right breast expander insertion 350cc. It was expanded 100 cc. Patient has a right serratus flap. Her surgery was performed on 10/11/23    All areas were prepped and draped in the usual customary sterile manner using:                     betadine                   chlorhexidine    The port was localized using Center scope. Amount injected today:    RIGHT: 100 cc          Total volume:                     RIGHT 400 cc           Physical exam:    There were no vitals taken for this visit. HEENT: Head is atraumatic normocephalic extraocular muscles are grossly intact, external ears are normal limits neck is supple. Skin: There is no evidence of infection. Extremities: good range of motion, no lymph edema noted. Follow up:  Chemotherapy:    YES   NO         Radiation:  YES   NO    Oncologist:    Plan:  Continue expansion.   Followup in 1 week    Electronically Signed By:  Patsy Salgado M.D.  11/8/2023

## 2023-11-09 ENCOUNTER — TELEPHONE (OUTPATIENT)
Dept: PAIN MANAGEMENT | Age: 68
End: 2023-11-09

## 2023-11-09 DIAGNOSIS — M96.1 FAILED BACK SYNDROME: Primary | ICD-10-CM

## 2023-11-09 NOTE — TELEPHONE ENCOUNTER
John Iniguez from [place] stated that pt needs referral to [facility] for her SCS implant. Please advise?

## 2023-11-10 ENCOUNTER — TELEPHONE (OUTPATIENT)
Dept: PEDIATRICS CLINIC | Age: 68
End: 2023-11-10

## 2023-11-10 NOTE — TELEPHONE ENCOUNTER
Patient will like a call back in regards to diagnoses and coordinate an appointment for ENT and Audiology. Please contact patient to schedule. Thank you

## 2023-11-13 ENCOUNTER — TELEPHONE (OUTPATIENT)
Dept: ONCOLOGY | Age: 68
End: 2023-11-13

## 2023-11-13 DIAGNOSIS — I10 ESSENTIAL HYPERTENSION: ICD-10-CM

## 2023-11-13 DIAGNOSIS — J43.2 CENTRILOBULAR EMPHYSEMA (HCC): ICD-10-CM

## 2023-11-13 RX ORDER — AMLODIPINE BESYLATE 10 MG/1
10 TABLET ORAL NIGHTLY
Qty: 90 TABLET | Refills: 1 | Status: SHIPPED | OUTPATIENT
Start: 2023-11-13

## 2023-11-13 NOTE — TELEPHONE ENCOUNTER
Last visit: 11/06/23  Last Med refill: 10/14/23  Does patient have enough medication for 72 hours: Yes    Next Visit Date:  Future Appointments   Date Time Provider [address]   11/13/2023  2:00 PM ERICA Au - CNP [facility]   11/15/2023  1:00 PM Any Palomino MD [facility]   11/17/2023 11:45 AM [facility] CT RM 1 [facility]CZ CT SCAN [facility] Radiolog   11/28/2023  2:30 PM Carolee Romo MD [facility]   3/18/2024  2:15 PM Keiry Esquivel MD [facility] [address] [facility]   4/30/2024  3:00 PM Eddie Garcia MD [facility] SURG [facility] Maintenance   Topic Date Due    Lipids  08/23/2023    COVID-19 Vaccine (7 - 2023-24 season) 10/15/2023    Low dose CT lung screening &/or counseling  11/11/2023    Shingles vaccine (2 of 3) 05/17/2025 (Originally 11/20/2016)    DTaP/Tdap/Td vaccine (1 - Tdap) 09/09/2030 (Originally 9/10/2020)    Breast cancer screen  08/29/2024    GFR test (Diabetes, CKD 3-4, OR last GFR 15-59)  10/02/2024    Depression Monitoring  11/06/2024    Annual Wellness Visit (AWV)  11/06/2024    Diabetes screen  11/06/2026    Colorectal Cancer Screen  09/06/2027    DEXA (modify frequency per FRAX score)  Completed    Flu vaccine  Completed    Pneumococcal 65+ years Vaccine  Completed    Hepatitis C screen  Completed    Hepatitis A vaccine  Aged Out    Hepatitis B vaccine  Aged Out    Hib vaccine  Aged Out    Meningococcal (ACWY) vaccine  Aged Out    A1C test (Diabetic or Prediabetic)  Discontinued       Hemoglobin A1C (%)   Date Value   11/06/2023 5.3   08/23/2022 5.8             ( goal A1C is < 7)   No components found for: \"LABMICR\"  LDL Cholesterol (mg/dL)   Date Value   08/23/2022 130   10/26/2021 128       (goal LDL is <100)   AST (U/L)   Date Value   10/26/2021 25     ALT (U/L)   Date Value   10/26/2021 22     BUN (mg/dL)   Date Value   10/02/2023 16     BP Readings from Last 3 Encounters:   11/06/23 122/70   10/31/23 129/78   10/18/23 133/88          (goal 120/80)    All Future

## 2023-11-15 ENCOUNTER — OFFICE VISIT (OUTPATIENT)
Dept: SURGERY | Age: 68
End: 2023-11-15

## 2023-11-15 ENCOUNTER — TELEPHONE (OUTPATIENT)
Dept: SURGERY | Age: 68
End: 2023-11-15

## 2023-11-15 DIAGNOSIS — Z98.890 POSTOPERATIVE STATE: Primary | ICD-10-CM

## 2023-11-15 PROCEDURE — 99024 POSTOP FOLLOW-UP VISIT: CPT | Performed by: PLASTIC SURGERY

## 2023-11-15 NOTE — PROGRESS NOTES
[facility]  [address] [place] [id number]       Chief Complaint:  Expansions: Amount injected in the operating room was 100 cc       S/P:  Tissue expander placement on 10/11/2023  Breast weight right side was 375 g                  HPI:  Patient is status post right mastectomy. Patient status post right 350 expander placement. Patient with a history of breast cancer. Patient now presents for further expansion. Patient seen and examined. Patient is s/p right mastectomy. Breast weight 375 grams. Right breast expander insertion 350cc. It was expanded 100 cc. Patient has a right serratus flap. Her surgery was performed on 10/11/23          Total volume:                     RIGHT 400 cc           Physical exam:    There were no vitals taken for this visit. HEENT: Head is atraumatic normocephalic extraocular muscles are grossly intact, external ears are normal limits neck is supple. Skin: There is no evidence of infection. Extremities: good range of motion, no lymph edema noted. Follow up:  Chemotherapy:    YES   NO         Radiation:  YES   NO    Oncologist:    Plan:  Continue expansion. Patient all overall. Patient has some excess tissue on the lateral aspect. Patient is planning to have her knee surgery first.  Patient is to follow-up with me in 2 to 3 months. Then we will discuss plans regarding her final surgery.     Electronically Signed By:  Bee Britton M.D.  11/15/2023

## 2023-11-15 NOTE — TELEPHONE ENCOUNTER
PT was discussing next surgery with Dr Tere Loza today. She has an appt booked for January. She wants the surgery done by hopefully may because her daughter is getting . She would like a call about if she can schedule it already for next year, or if she has to wait for her next follow up appt, or if she's even booking that far out right now.

## 2023-11-17 ENCOUNTER — HOSPITAL ENCOUNTER (OUTPATIENT)
Dept: CT IMAGING | Age: 68
End: 2023-11-17
Payer: MEDICARE

## 2023-11-17 VITALS — HEIGHT: 64 IN | BODY MASS INDEX: 28.68 KG/M2 | WEIGHT: 168 LBS

## 2023-11-17 DIAGNOSIS — Z87.891 PERSONAL HISTORY OF TOBACCO USE: ICD-10-CM

## 2023-11-17 PROCEDURE — 71271 CT THORAX LUNG CANCER SCR C-: CPT

## 2023-11-17 NOTE — TELEPHONE ENCOUNTER
11/17/23- [facility] letting patient know we have a surgery date. Requested patient call back. no fever and no chills.

## 2023-11-21 DIAGNOSIS — G89.29 CHRONIC BILATERAL LOW BACK PAIN WITH RIGHT-SIDED SCIATICA: ICD-10-CM

## 2023-11-21 DIAGNOSIS — M54.41 CHRONIC BILATERAL LOW BACK PAIN WITH RIGHT-SIDED SCIATICA: ICD-10-CM

## 2023-11-22 RX ORDER — BACLOFEN 10 MG/1
TABLET ORAL
Qty: 30 TABLET | Refills: 0 | OUTPATIENT
Start: 2023-11-22

## 2023-11-28 ENCOUNTER — TELEPHONE (OUTPATIENT)
Dept: ONCOLOGY | Age: 68
End: 2023-11-28

## 2023-11-28 ENCOUNTER — OFFICE VISIT (OUTPATIENT)
Dept: ONCOLOGY | Age: 68
End: 2023-11-28
Payer: MEDICARE

## 2023-11-28 VITALS
DIASTOLIC BLOOD PRESSURE: 81 MMHG | HEART RATE: 87 BPM | BODY MASS INDEX: 29.35 KG/M2 | TEMPERATURE: 96.8 F | WEIGHT: 171 LBS | SYSTOLIC BLOOD PRESSURE: 136 MMHG

## 2023-11-28 DIAGNOSIS — M85.89 OSTEOPENIA OF MULTIPLE SITES: ICD-10-CM

## 2023-11-28 DIAGNOSIS — D05.11 DUCTAL CARCINOMA IN SITU (DCIS) OF RIGHT BREAST: Primary | ICD-10-CM

## 2023-11-28 PROCEDURE — 1036F TOBACCO NON-USER: CPT | Performed by: INTERNAL MEDICINE

## 2023-11-28 PROCEDURE — G8417 CALC BMI ABV UP PARAM F/U: HCPCS | Performed by: INTERNAL MEDICINE

## 2023-11-28 PROCEDURE — G8427 DOCREV CUR MEDS BY ELIG CLIN: HCPCS | Performed by: INTERNAL MEDICINE

## 2023-11-28 PROCEDURE — 99211 OFF/OP EST MAY X REQ PHY/QHP: CPT

## 2023-11-28 PROCEDURE — 1090F PRES/ABSN URINE INCON ASSESS: CPT | Performed by: INTERNAL MEDICINE

## 2023-11-28 PROCEDURE — 3079F DIAST BP 80-89 MM HG: CPT | Performed by: INTERNAL MEDICINE

## 2023-11-28 PROCEDURE — G8484 FLU IMMUNIZE NO ADMIN: HCPCS | Performed by: INTERNAL MEDICINE

## 2023-11-28 PROCEDURE — 1123F ACP DISCUSS/DSCN MKR DOCD: CPT | Performed by: INTERNAL MEDICINE

## 2023-11-28 PROCEDURE — 99214 OFFICE O/P EST MOD 30 MIN: CPT | Performed by: INTERNAL MEDICINE

## 2023-11-28 PROCEDURE — 3075F SYST BP GE 130 - 139MM HG: CPT | Performed by: INTERNAL MEDICINE

## 2023-11-28 PROCEDURE — 3017F COLORECTAL CA SCREEN DOC REV: CPT | Performed by: INTERNAL MEDICINE

## 2023-11-28 PROCEDURE — G8399 PT W/DXA RESULTS DOCUMENT: HCPCS | Performed by: INTERNAL MEDICINE

## 2023-11-28 NOTE — PROGRESS NOTES
Patient ID: Erica Tyler, 1955, [id number], 77 y.o. Referred by :  No ref. provider found   Reason for consultation: Right lower DCIS      HISTORY OF PRESENT ILLNESS:    Oncologic History:    Erica Tyler is a very pleasant 77 y.o. female. 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% and negative for progesterone  Osteopenia with recurrent fracture  Patient today to discuss MRI finding and adjuvant hormonal treatment  Bone density scan did show osteopeni    Age at menarche 13  Number of pregnancy 4  Menopause 37  Birth control for 5 years  No family history of cancer    Problem list  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  Has carotid stenosis, pt reports \"left side is worse than right\"    COVID-19 2019    \"happened right when COVID first came out\"    GERD (gastroesophageal reflux disease)     Hearing loss     has bilat hearing aids    Hiatal hernia     resolved per patient    High cholesterol     History of closed shoulder dislocation     rt from recent fall    Hughes (hard of hearing)     beto hearing aides    Hx of

## 2023-12-06 ENCOUNTER — HOSPITAL ENCOUNTER (OUTPATIENT)
Age: 68
Setting detail: SPECIMEN
Discharge: HOME | End: 2023-12-06

## 2023-12-06 DIAGNOSIS — E78.2 MIXED HYPERLIPIDEMIA: ICD-10-CM

## 2023-12-06 LAB
ALBUMIN SERPL-MCNC: 4.2 G/DL (ref 3.5–5.2)
ALBUMIN/GLOB SERPL: 1 {RATIO} (ref 1–2.5)
ALP SERPL-CCNC: 82 U/L (ref 35–104)
ALT SERPL-CCNC: 14 U/L (ref 10–35)
AST SERPL-CCNC: 23 U/L (ref 10–35)
BILIRUB DIRECT SERPL-MCNC: <0.2 MG/DL (ref 0–0.3)
BILIRUB INDIRECT SERPL-MCNC: 0.1 MG/DL (ref 0–1)
BILIRUB SERPL-MCNC: 0.2 MG/DL (ref 0–1.2)
CHOLEST SERPL-MCNC: 210 MG/DL (ref 0–199)
CHOLESTEROL/HDL RATIO: 3
GLOBULIN SER CALC-MCNC: 2.9 G/DL
HDLC SERPL-MCNC: 66 MG/DL
LDLC SERPL CALC-MCNC: 110 MG/DL (ref 0–100)
PROT SERPL-MCNC: 7.1 G/DL (ref 6.6–8.7)
TRIGL SERPL-MCNC: 170 MG/DL (ref 0–149)
VLDLC SERPL CALC-MCNC: 34 MG/DL

## 2023-12-14 DIAGNOSIS — M47.817 LUMBOSACRAL SPONDYLOSIS WITHOUT MYELOPATHY: Primary | ICD-10-CM

## 2023-12-15 DIAGNOSIS — M54.41 CHRONIC BILATERAL LOW BACK PAIN WITH RIGHT-SIDED SCIATICA: ICD-10-CM

## 2023-12-15 DIAGNOSIS — G89.29 CHRONIC BILATERAL LOW BACK PAIN WITH RIGHT-SIDED SCIATICA: ICD-10-CM

## 2023-12-15 RX ORDER — BACLOFEN 10 MG/1
10 TABLET ORAL 3 TIMES DAILY
Qty: 30 TABLET | Refills: 0 | OUTPATIENT
Start: 2023-12-15

## 2023-12-28 ENCOUNTER — TELEPHONE (OUTPATIENT)
Dept: SURGERY | Age: 68
End: 2023-12-28

## 2023-12-29 DIAGNOSIS — G89.29 CHRONIC BILATERAL LOW BACK PAIN WITH RIGHT-SIDED SCIATICA: ICD-10-CM

## 2023-12-29 DIAGNOSIS — M54.41 CHRONIC BILATERAL LOW BACK PAIN WITH RIGHT-SIDED SCIATICA: ICD-10-CM

## 2023-12-29 NOTE — TELEPHONE ENCOUNTER
Spoke with patient on regards her baclofen refill. Andrea Sol state her PCP was prescribe that Baclofen, but they are concern about possible duplicate script. Belen spoke with pt PCP explain that Osvaldo Houston will not be on the office.  PCP agree to refill ,patient notified

## 2023-12-29 NOTE — TELEPHONE ENCOUNTER
----- Message from Sharee Maynard sent at 12/29/2023  1:41 PM EST -----  Subject: Refill Request    QUESTIONS  Name of Medication? baclofen (LIORESAL) 10 MG tablet  Patient-reported dosage and instructions? 3 times per day   How many days do you have left? 0  Preferred Pharmacy? [facility] [id number]  Pharmacy phone number (if available)? [phone]  Additional Information for Provider? Doctor that refills this is OOO until   mid January   ---------------------------------------------------------------------------  --------------  CALL BACK INFO  What is the best way for the office to contact you? Do not leave any   message, patient will call back for answer  Preferred Call Back Phone Number? [phone]  ---------------------------------------------------------------------------  --------------  SCRIPT ANSWERS  Relationship to Patient? Covered Entity  Covered Entity Type? Physician Office?  Representative Name? Yareli

## 2023-12-31 DIAGNOSIS — N30.01 ACUTE CYSTITIS WITH HEMATURIA: ICD-10-CM

## 2024-01-02 RX ORDER — BACLOFEN 10 MG/1
10 TABLET ORAL 3 TIMES DAILY
Qty: 30 TABLET | Refills: 3 | Status: SHIPPED | OUTPATIENT
Start: 2024-01-02

## 2024-01-02 RX ORDER — PHENAZOPYRIDINE HYDROCHLORIDE 200 MG/1
TABLET, FILM COATED ORAL
Qty: 6 TABLET | Refills: 0 | OUTPATIENT
Start: 2024-01-02

## 2024-01-05 DIAGNOSIS — I65.23 CAROTID STENOSIS, ASYMPTOMATIC, BILATERAL: Primary | ICD-10-CM

## 2024-01-08 ENCOUNTER — HOSPITAL ENCOUNTER (OUTPATIENT)
Dept: VASCULAR LAB | Age: 69
Discharge: HOME | End: 2024-01-10
Attending: SURGERY
Payer: MEDICARE

## 2024-01-08 DIAGNOSIS — I65.23 CAROTID STENOSIS, ASYMPTOMATIC, BILATERAL: ICD-10-CM

## 2024-01-08 LAB
VAS LEFT BULB EDV: 15.4 CM/S
VAS LEFT BULB PSV: 69.8 CM/S
VAS LEFT CCA DIST PSV: 44.5 CM/S
VAS LEFT CCA MID EDV: 10.5 CM/S
VAS LEFT CCA MID PSV: 45.9 CM/S
VAS LEFT CCA PROX EDV: 9.1 CM/S
VAS LEFT CCA PROX PSV: 60.6 CM/S
VAS LEFT ECA EDV: 11.2 CM/S
VAS LEFT ECA PSV: 87.6 CM/S
VAS LEFT ICA DIST EDV: 14.3 CM/S
VAS LEFT ICA DIST PSV: 51 CM/S
VAS LEFT ICA MID EDV: 19.2 CM/S
VAS LEFT ICA MID PSV: 71.9 CM/S
VAS LEFT ICA PROX EDV: 23.6 CM/S
VAS LEFT ICA PROX PSV: 76.3 CM/S
VAS LEFT ICA/CCA PSV: 1.71
VAS LEFT VERTEBRAL EDV: 13.6 CM/S
VAS LEFT VERTEBRAL PSV: 62.3 CM/S
VAS RIGHT BULB EDV: 22.5 CM/S
VAS RIGHT BULB PSV: 84 CM/S
VAS RIGHT CCA DIST EDV: 18.4 CM/S
VAS RIGHT CCA DIST PSV: 63.9 CM/S
VAS RIGHT CCA MID EDV: 16.5 CM/S
VAS RIGHT CCA MID PSV: 63.1 CM/S
VAS RIGHT CCA PROX EDV: 10.6 CM/S
VAS RIGHT CCA PROX PSV: 63.9 CM/S
VAS RIGHT ECA EDV: 13 CM/S
VAS RIGHT ECA PSV: 124 CM/S
VAS RIGHT ICA DIST EDV: 21.6 CM/S
VAS RIGHT ICA DIST PSV: 66.2 CM/S
VAS RIGHT ICA MID EDV: 31.1 CM/S
VAS RIGHT ICA MID PSV: 112 CM/S
VAS RIGHT ICA PROX EDV: 31.1 CM/S
VAS RIGHT ICA PROX PSV: 105 CM/S
VAS RIGHT ICA/CCA PSV: 1.75
VAS RIGHT VERTEBRAL EDV: 18.5 CM/S
VAS RIGHT VERTEBRAL PSV: 55.5 CM/S

## 2024-01-08 PROCEDURE — 93880 EXTRACRANIAL BILAT STUDY: CPT

## 2024-01-08 PROCEDURE — 93880 EXTRACRANIAL BILAT STUDY: CPT | Performed by: SURGERY

## 2024-01-11 ENCOUNTER — OFFICE VISIT (OUTPATIENT)
Dept: VASCULAR SURGERY | Age: 69
End: 2024-01-11
Payer: MEDICARE

## 2024-01-11 ENCOUNTER — OFFICE VISIT (OUTPATIENT)
Dept: FAMILY MEDICINE CLINIC | Age: 69
End: 2024-01-11

## 2024-01-11 VITALS
HEART RATE: 89 BPM | WEIGHT: 166 LBS | BODY MASS INDEX: 27.66 KG/M2 | OXYGEN SATURATION: 96 % | SYSTOLIC BLOOD PRESSURE: 143 MMHG | HEIGHT: 65 IN | RESPIRATION RATE: 18 BRPM | DIASTOLIC BLOOD PRESSURE: 84 MMHG

## 2024-01-11 VITALS
HEART RATE: 86 BPM | DIASTOLIC BLOOD PRESSURE: 74 MMHG | TEMPERATURE: 97.3 F | BODY MASS INDEX: 27.55 KG/M2 | SYSTOLIC BLOOD PRESSURE: 136 MMHG | WEIGHT: 163 LBS | OXYGEN SATURATION: 97 %

## 2024-01-11 DIAGNOSIS — I65.23 CAROTID STENOSIS, ASYMPTOMATIC, BILATERAL: Primary | ICD-10-CM

## 2024-01-11 DIAGNOSIS — M47.817 LUMBOSACRAL SPONDYLOSIS WITHOUT MYELOPATHY: ICD-10-CM

## 2024-01-11 DIAGNOSIS — G25.81 RESTLESS LEG SYNDROME: ICD-10-CM

## 2024-01-11 DIAGNOSIS — F32.4 MAJOR DEPRESSIVE DISORDER WITH SINGLE EPISODE, IN PARTIAL REMISSION (HCC): ICD-10-CM

## 2024-01-11 DIAGNOSIS — J43.2 CENTRILOBULAR EMPHYSEMA (HCC): ICD-10-CM

## 2024-01-11 DIAGNOSIS — C50.311 MALIGNANT NEOPLASM OF LOWER-INNER QUADRANT OF RIGHT BREAST OF FEMALE, ESTROGEN RECEPTOR POSITIVE (HCC): ICD-10-CM

## 2024-01-11 DIAGNOSIS — M54.16 CHRONIC LUMBAR RADICULOPATHY: ICD-10-CM

## 2024-01-11 DIAGNOSIS — Z17.0 MALIGNANT NEOPLASM OF LOWER-INNER QUADRANT OF RIGHT BREAST OF FEMALE, ESTROGEN RECEPTOR POSITIVE (HCC): ICD-10-CM

## 2024-01-11 DIAGNOSIS — N18.31 STAGE 3A CHRONIC KIDNEY DISEASE (HCC): ICD-10-CM

## 2024-01-11 DIAGNOSIS — R29.898 LEFT LEG WEAKNESS: Primary | ICD-10-CM

## 2024-01-11 DIAGNOSIS — M79.652 LEFT THIGH PAIN: ICD-10-CM

## 2024-01-11 DIAGNOSIS — G62.9 NEUROPATHY: ICD-10-CM

## 2024-01-11 PROCEDURE — 3017F COLORECTAL CA SCREEN DOC REV: CPT | Performed by: SURGERY

## 2024-01-11 PROCEDURE — 99214 OFFICE O/P EST MOD 30 MIN: CPT | Performed by: SURGERY

## 2024-01-11 PROCEDURE — G8484 FLU IMMUNIZE NO ADMIN: HCPCS | Performed by: SURGERY

## 2024-01-11 PROCEDURE — 1090F PRES/ABSN URINE INCON ASSESS: CPT | Performed by: SURGERY

## 2024-01-11 PROCEDURE — G8427 DOCREV CUR MEDS BY ELIG CLIN: HCPCS | Performed by: SURGERY

## 2024-01-11 PROCEDURE — 3077F SYST BP >= 140 MM HG: CPT | Performed by: SURGERY

## 2024-01-11 PROCEDURE — G8399 PT W/DXA RESULTS DOCUMENT: HCPCS | Performed by: SURGERY

## 2024-01-11 PROCEDURE — 1036F TOBACCO NON-USER: CPT | Performed by: SURGERY

## 2024-01-11 PROCEDURE — 3079F DIAST BP 80-89 MM HG: CPT | Performed by: SURGERY

## 2024-01-11 PROCEDURE — G8417 CALC BMI ABV UP PARAM F/U: HCPCS | Performed by: SURGERY

## 2024-01-11 PROCEDURE — 1123F ACP DISCUSS/DSCN MKR DOCD: CPT | Performed by: SURGERY

## 2024-01-11 RX ORDER — GABAPENTIN 100 MG/1
100 CAPSULE ORAL
Qty: 90 CAPSULE | Refills: 0 | Status: SHIPPED | OUTPATIENT
Start: 2024-01-11 | End: 2024-04-10

## 2024-01-11 RX ORDER — ROPINIROLE 0.25 MG/1
0.25 TABLET, FILM COATED ORAL NIGHTLY
Qty: 30 TABLET | Refills: 3 | Status: SHIPPED | OUTPATIENT
Start: 2024-01-11

## 2024-01-11 ASSESSMENT — ENCOUNTER SYMPTOMS
DIARRHEA: 0
WHEEZING: 0
COUGH: 0
VOMITING: 0
ABDOMINAL PAIN: 0
SHORTNESS OF BREATH: 0
CHEST TIGHTNESS: 0
CONSTIPATION: 0
NAUSEA: 0
ANAL BLEEDING: 0
CHOKING: 0
BLOOD IN STOOL: 0

## 2024-01-11 ASSESSMENT — PATIENT HEALTH QUESTIONNAIRE - PHQ9
9. THOUGHTS THAT YOU WOULD BE BETTER OFF DEAD, OR OF HURTING YOURSELF: 0
5. POOR APPETITE OR OVEREATING: 0
6. FEELING BAD ABOUT YOURSELF - OR THAT YOU ARE A FAILURE OR HAVE LET YOURSELF OR YOUR FAMILY DOWN: 0
SUM OF ALL RESPONSES TO PHQ QUESTIONS 1-9: 0
SUM OF ALL RESPONSES TO PHQ QUESTIONS 1-9: 0
2. FEELING DOWN, DEPRESSED OR HOPELESS: 0
4. FEELING TIRED OR HAVING LITTLE ENERGY: 0
SUM OF ALL RESPONSES TO PHQ9 QUESTIONS 1 & 2: 0
10. IF YOU CHECKED OFF ANY PROBLEMS, HOW DIFFICULT HAVE THESE PROBLEMS MADE IT FOR YOU TO DO YOUR WORK, TAKE CARE OF THINGS AT HOME, OR GET ALONG WITH OTHER PEOPLE: 0
SUM OF ALL RESPONSES TO PHQ QUESTIONS 1-9: 0
7. TROUBLE CONCENTRATING ON THINGS, SUCH AS READING THE NEWSPAPER OR WATCHING TELEVISION: 0
1. LITTLE INTEREST OR PLEASURE IN DOING THINGS: 0
8. MOVING OR SPEAKING SO SLOWLY THAT OTHER PEOPLE COULD HAVE NOTICED. OR THE OPPOSITE, BEING SO FIGETY OR RESTLESS THAT YOU HAVE BEEN MOVING AROUND A LOT MORE THAN USUAL: 0
3. TROUBLE FALLING OR STAYING ASLEEP: 0
SUM OF ALL RESPONSES TO PHQ QUESTIONS 1-9: 0

## 2024-01-11 ASSESSMENT — VISUAL ACUITY: OU: 1

## 2024-01-11 NOTE — PATIENT INSTRUCTIONS
Try ropinirole 0.25mg at bedtime for restless legs - we can increase the dose in about a week if needed   Restart gabapentin 100mg at bedtime for pain

## 2024-01-11 NOTE — PROGRESS NOTES
[facility]  [facility]  [address] [facility] [address]  Dept: [phone]  Dept Fax: [phone]      Angela Hutchinson is a 68 y.o. female who presents today for hermedical conditions/complaints as noted below.  Angela Hutchinson is c/o of Gastroesophageal Reflux (F/u), Hypertension (F/u), and Leg Pain (LT leg feels like it is going t give out if she stands to long, no bruises, no swelling, leg gave out the other day hurt the side of her RT foot )        Assessment/Plan:     1. Left leg weakness  -     CT FEMUR LEFT W WO CONTRAST; Future  -     CT KNEE LEFT W WO CONTRAST; Future  2. Malignant neoplasm of lower-inner quadrant of right breast of female, estrogen receptor positive (HCC)  3. Centrilobular emphysema (HCC)  4. Major depressive disorder with single episode, in partial remission (HCC)  5. Stage 3a chronic kidney disease (HCC)  6. Left thigh pain  -     CT FEMUR LEFT W WO CONTRAST; Future  -     CT KNEE LEFT W WO CONTRAST; Future  7. Lumbosacral spondylosis without myelopathy  -     gabapentin (NEURONTIN) 100 MG capsule; Take 1 capsule by mouth nightly for 90 days., Disp-90 capsule, R-0Normal  8. Chronic lumbar radiculopathy  -     gabapentin (NEURONTIN) 100 MG capsule; Take 1 capsule by mouth nightly for 90 days., Disp-90 capsule, R-0Normal  9. Restless leg syndrome  -     rOPINIRole (REQUIP) 0.25 MG tablet; Take 1 tablet by mouth nightly, Disp-30 tablet, R-3Normal          No follow-ups on file.      HPI     Left leg giving out with standing, walking, stairs. She feels that the spinal stimulator will not help this issue. Was standing while unlocking her front door today and her leg gave out. Would like imaging to look at the muscles of left thigh because does not believe this is coming from her back, and because does not think the issue is bone related because her pain is in the anterior muscles of the thigh.     Seen ENT< has been referred to neuro ENT, waiting to

## 2024-01-11 NOTE — PROGRESS NOTES
Psychiatric:         Mood and Affect: Mood normal.         Speech: Speech normal.         Behavior: Behavior normal.       Imaging/Labs:     Carotid duplex from January 2024 reviewed, reveals bilateral ICA with <50% stenosis, plaque is smooth with any ulcerations upon my review                                      Assessment and Plan:     Asymptomatic bilateral carotid artery stenosis  Optimal medical therapy with aspirin and statins, BP control and continued smoking cessation  Exercise and walking to improve overall cardiovascular health  Yearly surveillance of carotid stenosis with carotid duplex  Can consider elective repair of carotid disease when greater then 70%   Will make referral to Neurosurgery Dr. Archibald for evaluation for spinal cord stimulator placement and evaluation    Optimal medical management is an important part of overall treatment of all patients with carotid bifurcation disease, regardless of the degree of stenosis or the plan for intervention. This therapy is directed both at the reduction of stroke and overall cardiovascular events, including cardiovascular-related mortality.    Clinical guidelines issued by the American Heart Association and the American Stroke Association recommends:    I. Lowering blood pressure to a target 140/90 mm Hg by lifestyle interventions and antihypertensive treatment is recommended in individuals who have hypertension with asymptomatic carotid atherosclerosis.    II. Glucose control to nearly normoglycemic levels (target hemoglobin A1C 7%) is recommended among diabetic patients to reduce microvascular complications and, with lesser certainty, macrovascular complications other than stroke.    III.  Patients with known atherosclerosis have demonstrated reduced stroke rates when treated with lipid-lowering therapy.  And continued low dose statin will help stabilize plaque and decrease the inflammatory response of atherosclerosis.    IV.  Smoking nearly doubles

## 2024-01-12 ASSESSMENT — ENCOUNTER SYMPTOMS
COLOR CHANGE: 0
BACK PAIN: 1
ABDOMINAL PAIN: 0
ALLERGIC/IMMUNOLOGIC NEGATIVE: 1
SHORTNESS OF BREATH: 0
CHEST TIGHTNESS: 0

## 2024-01-17 ENCOUNTER — TELEPHONE (OUTPATIENT)
Dept: ORTHOPEDIC SURGERY | Age: 69
End: 2024-01-17

## 2024-01-17 ENCOUNTER — OFFICE VISIT (OUTPATIENT)
Dept: SURGERY | Age: 69
End: 2024-01-17
Payer: MEDICARE

## 2024-01-17 DIAGNOSIS — Z85.3 HISTORY OF BREAST CANCER: ICD-10-CM

## 2024-01-17 DIAGNOSIS — Z90.10 ACQUIRED ABSENCE OF BREAST, UNSPECIFIED LATERALITY: Primary | ICD-10-CM

## 2024-01-17 PROCEDURE — 1090F PRES/ABSN URINE INCON ASSESS: CPT | Performed by: PLASTIC SURGERY

## 2024-01-17 PROCEDURE — G8399 PT W/DXA RESULTS DOCUMENT: HCPCS | Performed by: PLASTIC SURGERY

## 2024-01-17 PROCEDURE — 3017F COLORECTAL CA SCREEN DOC REV: CPT | Performed by: PLASTIC SURGERY

## 2024-01-17 PROCEDURE — G8427 DOCREV CUR MEDS BY ELIG CLIN: HCPCS | Performed by: PLASTIC SURGERY

## 2024-01-17 PROCEDURE — 1123F ACP DISCUSS/DSCN MKR DOCD: CPT | Performed by: PLASTIC SURGERY

## 2024-01-17 PROCEDURE — 1036F TOBACCO NON-USER: CPT | Performed by: PLASTIC SURGERY

## 2024-01-17 PROCEDURE — G8417 CALC BMI ABV UP PARAM F/U: HCPCS | Performed by: PLASTIC SURGERY

## 2024-01-17 PROCEDURE — G8484 FLU IMMUNIZE NO ADMIN: HCPCS | Performed by: PLASTIC SURGERY

## 2024-01-17 PROCEDURE — 99213 OFFICE O/P EST LOW 20 MIN: CPT | Performed by: PLASTIC SURGERY

## 2024-01-17 NOTE — PROGRESS NOTES
(SANCTURA) 20 MG tablet Take 1 tablet by mouth 2 times daily      pravastatin (PRAVACHOL) 20 MG tablet TAKE ONE TABLET BY MOUTH DAILY (Patient taking differently: Take 1 tablet by mouth daily) 90 tablet 1    omeprazole (PRILOSEC) 40 MG delayed release capsule TAKE ONE CAPSULE BY MOUTH TWICE A DAY (Patient taking differently: Take 1 capsule by mouth in the morning and at bedtime TAKE ONE CAPSULE BY MOUTH TWICE A DAY) 180 capsule 1    triamcinolone (KENALOG) 0.025 % cream Apply topically daily as needed On face      celecoxib (CELEBREX) 100 MG capsule TAKE ONE CAPSULE BY MOUTH DAILY (Patient taking differently: Take 1 capsule by mouth as needed) 90 capsule 1    meclizine (ANTIVERT) 25 MG tablet Take 1 tablet by mouth 3 times daily as needed for Dizziness 90 tablet 3    albuterol sulfate HFA (VENTOLIN HFA) 108 (90 Base) MCG/ACT inhaler Inhale 2 puffs into the lungs 4 times daily as needed for Wheezing 1 each 3    ipratropium (ATROVENT) 0.02 % nebulizer solution TAKE 1 VIAL (2.5 MILLILITERS) BY NEBULIZER FOUR TIMES A DAY (Patient taking differently: Take 2.5 mLs by nebulization daily as needed) 300 mL 5    Probiotic Product (PROBIOTIC-10 PO) Take 1 capsule by mouth daily      aspirin 81 MG tablet Take 1 tablet by mouth daily      OMEGA-3 KRILL OIL PO Take by mouth daily      calcium carbonate 600 MG TABS tablet Take 1 tablet by mouth 2 times daily      vitamin D 1000 units CAPS Take 1 capsule by mouth daily      sertraline (ZOLOFT) 100 MG tablet Take 1 tablet by mouth at bedtime      vitamin E 1000 units capsule Take 1 capsule by mouth daily       No current facility-administered medications for this visit.      Allergies:     Allergies   Allergen Reactions    Morphine     Bee Venom     Wasp Venom Protein Swelling     Review of Systems:   Constitutional: Negative for fever, chills, fatigue and unexpected weight change.   HENT: Patient with history of hearing loss with hearing aids.  Eyes: Patient with history of

## 2024-01-18 ENCOUNTER — TELEPHONE (OUTPATIENT)
Dept: ADMINISTRATIVE | Age: 69
End: 2024-01-18

## 2024-01-18 NOTE — TELEPHONE ENCOUNTER
PAT CALLING REGARDING LAST VISIT. SHE WAS SEEN AND REFERRED TO A DR CANO, BUT NEVER GOT A CALL TO SCHED W/ THEM. UNCLEAR HOW TO PROCEED AND WOULD LIKE TO TALK TO A NURSE OR DR NOLAN TO DISCUSS ASAP.

## 2024-01-18 NOTE — TELEPHONE ENCOUNTER
DOROTHEAM with contact information for Dr. Perry with neurology at [facility] so she can schedule an appointment. No additional needs.

## 2024-01-19 ENCOUNTER — TELEPHONE (OUTPATIENT)
Dept: ADMINISTRATIVE | Age: 69
End: 2024-01-19

## 2024-01-19 NOTE — TELEPHONE ENCOUNTER
Pt calling needing to speak to whoever was helping with her neuro referral, pt states they havent called her yet and was told to call back if they dont call her by 1pm today. Please call pt at phone number [phone]

## 2024-01-19 NOTE — TELEPHONE ENCOUNTER
Dr. Perry neurology contact information provided to patient and encouraged to reach out to them to get scheduled. Patient voiced understanding.

## 2024-01-22 ENCOUNTER — TELEPHONE (OUTPATIENT)
Dept: SURGERY | Age: 69
End: 2024-01-22

## 2024-01-22 NOTE — TELEPHONE ENCOUNTER
1/22/24- Spoke to patient, surgery scheduled at [facility]. Patient confirmed and information sent to patient.     Surgery date/time: 3/14/24 at 9am  Arrival time: 7am  PAT: 2/26/24 at 9:00am  Post op: 3/20/24 at 10:15am  Post op: 3/27/24 at 10:15am

## 2024-01-23 ENCOUNTER — PATIENT MESSAGE (OUTPATIENT)
Dept: FAMILY MEDICINE CLINIC | Age: 69
End: 2024-01-23

## 2024-01-23 NOTE — TELEPHONE ENCOUNTER
From: Angela Hutchinson  To: Dr. Rosalia Dubois  Sent: 1/23/2024 12:52 PM EST  Subject: MRI    If you are able to find where my MRI was taken, could you please direct me to the department so I can call them.

## 2024-01-23 NOTE — TELEPHONE ENCOUNTER
From: Angela Hutchinson  To: Dr. Rosalia Dubois  Sent: 1/23/2024 12:33 PM EST  Subject: Mri    Is it possible that your records show when I had my mri for my back. I’m not sure if it was at [facility] or [facility]. I have an appointment with surgeon Dr Perry for a spinal stimulator and I need my MRI results.

## 2024-01-24 ENCOUNTER — TELEPHONE (OUTPATIENT)
Dept: FAMILY MEDICINE CLINIC | Age: 69
End: 2024-01-24

## 2024-01-24 ENCOUNTER — HOSPITAL ENCOUNTER (OUTPATIENT)
Dept: CT IMAGING | Age: 69
Discharge: HOME | End: 2024-01-26
Payer: MEDICARE

## 2024-01-24 DIAGNOSIS — M79.652 LEFT THIGH PAIN: ICD-10-CM

## 2024-01-24 DIAGNOSIS — R29.898 LEFT LEG WEAKNESS: ICD-10-CM

## 2024-01-24 PROCEDURE — 73700 CT LOWER EXTREMITY W/O DYE: CPT

## 2024-01-24 NOTE — TELEPHONE ENCOUNTER
[facility] called again. Per Dr. Dubois, Without Contrast is ok.    Informed [facility], they will do verbal order and change it

## 2024-01-24 NOTE — TELEPHONE ENCOUNTER
[facility] CT calls requesting clarification for the CT KNEE and CT FEMUR. Given the diagnosis', these types of CT's would be WO contrast. Is there another diagnosis that would necessitate W contrast? If not, they are requesting both order be changed to WO. Please advise.     Note, patient is scheduled today at 2:00

## 2024-02-06 ENCOUNTER — TELEPHONE (OUTPATIENT)
Dept: ONCOLOGY | Age: 69
End: 2024-02-06

## 2024-02-06 ENCOUNTER — OFFICE VISIT (OUTPATIENT)
Dept: ONCOLOGY | Age: 69
End: 2024-02-06
Payer: MEDICARE

## 2024-02-06 VITALS
WEIGHT: 164 LBS | SYSTOLIC BLOOD PRESSURE: 136 MMHG | BODY MASS INDEX: 27.72 KG/M2 | HEART RATE: 83 BPM | TEMPERATURE: 97.7 F | DIASTOLIC BLOOD PRESSURE: 86 MMHG

## 2024-02-06 DIAGNOSIS — M85.89 OSTEOPENIA OF MULTIPLE SITES: ICD-10-CM

## 2024-02-06 DIAGNOSIS — D05.11 DUCTAL CARCINOMA IN SITU (DCIS) OF RIGHT BREAST: Primary | ICD-10-CM

## 2024-02-06 DIAGNOSIS — L60.8 CHANGE IN NAIL APPEARANCE: ICD-10-CM

## 2024-02-06 DIAGNOSIS — T45.1X5A ADVERSE EFFECT OF CHEMOTHERAPY, INITIAL ENCOUNTER: ICD-10-CM

## 2024-02-06 PROBLEM — G20.A1 PARKINSON'S DISEASE: Status: ACTIVE | Noted: 2024-02-06

## 2024-02-06 PROCEDURE — G8427 DOCREV CUR MEDS BY ELIG CLIN: HCPCS | Performed by: INTERNAL MEDICINE

## 2024-02-06 PROCEDURE — 1036F TOBACCO NON-USER: CPT | Performed by: INTERNAL MEDICINE

## 2024-02-06 PROCEDURE — G8399 PT W/DXA RESULTS DOCUMENT: HCPCS | Performed by: INTERNAL MEDICINE

## 2024-02-06 PROCEDURE — G8484 FLU IMMUNIZE NO ADMIN: HCPCS | Performed by: INTERNAL MEDICINE

## 2024-02-06 PROCEDURE — 1123F ACP DISCUSS/DSCN MKR DOCD: CPT | Performed by: INTERNAL MEDICINE

## 2024-02-06 PROCEDURE — 3017F COLORECTAL CA SCREEN DOC REV: CPT | Performed by: INTERNAL MEDICINE

## 2024-02-06 PROCEDURE — 3079F DIAST BP 80-89 MM HG: CPT | Performed by: INTERNAL MEDICINE

## 2024-02-06 PROCEDURE — 3075F SYST BP GE 130 - 139MM HG: CPT | Performed by: INTERNAL MEDICINE

## 2024-02-06 PROCEDURE — 99214 OFFICE O/P EST MOD 30 MIN: CPT | Performed by: INTERNAL MEDICINE

## 2024-02-06 PROCEDURE — G8417 CALC BMI ABV UP PARAM F/U: HCPCS | Performed by: INTERNAL MEDICINE

## 2024-02-06 PROCEDURE — 1090F PRES/ABSN URINE INCON ASSESS: CPT | Performed by: INTERNAL MEDICINE

## 2024-02-06 NOTE — PROGRESS NOTES
Patient ID: Angela Hutchinson, 1955, [id number], 68 y.o.  Referred by :  No ref. provider found   Reason for consultation: Right lower DCIS      HISTORY OF PRESENT ILLNESS:    Oncologic History:    Angela Hutchinson is a very pleasant 68 y.o. female.  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% and negative for progesterone  Osteopenia with recurrent fracture  Patient today to discuss MRI finding and adjuvant hormonal treatment  Bone density scan did show osteopeni    Age at menarche 11  Number of pregnancy 4  Menopause 43  Birth control for 5 years  No family history of cancer    Problem list  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   Osteopenia with bone density scan September 2023  Bone density scan did show osteopenia    Oncology treatment  Right mastectomy with a sentinel lymph node biopsy  Tamoxifen started on October 23/2023    Interval history  on tamoxifen and she has tolerated treatment well she did notice some change in her nail related to tamoxifen  No leg swelling no vaginal bleeding  Intentional weight loss        Past Medical History:   Diagnosis Date    ADHD     mild, no RX    Arthritis     Blurred vision, right eye     wrinkle in Rt eye- is now resolved    Breast cancer (HCC) 08/28/2023    Rt breast    Carotid artery disease (HCC)     dr miller vascular last appt 1/20. Has carotid stenosis, pt reports \"left side is worse than right\"    COVID-19 2019    \"happened right when COVID first came out\"    GERD (gastroesophageal reflux disease)     Hearing loss     has bilat hearing aids    Hiatal hernia     resolved per patient    High cholesterol

## 2024-02-22 ENCOUNTER — PATIENT MESSAGE (OUTPATIENT)
Dept: FAMILY MEDICINE CLINIC | Age: 69
End: 2024-02-22

## 2024-02-22 NOTE — TELEPHONE ENCOUNTER
From: Angela Hutchinson  To: Dr. Rosalia Dubois  Sent: 2/22/2024 12:19 PM EST  Subject: Bladder infection    I was diagnosed with a bladder infection around the 12th of February. For reasons I don’t know, I was given 7 days of bactrim and 3 days of cipro 250mg. My infection is still really bad and I’m in burning pain most of the day. Could you please call in another prescription to [facility]. Maybe I need something stronger. I’m on my last day today.

## 2024-02-23 RX ORDER — NITROFURANTOIN 25; 75 MG/1; MG/1
100 CAPSULE ORAL 2 TIMES DAILY
Qty: 14 CAPSULE | Refills: 0 | Status: SHIPPED | OUTPATIENT
Start: 2024-02-23 | End: 2024-03-01

## 2024-02-26 ENCOUNTER — APPOINTMENT (OUTPATIENT)
Dept: GENERAL RADIOLOGY | Age: 69
End: 2024-02-26
Payer: MEDICARE

## 2024-02-26 ENCOUNTER — HOSPITAL ENCOUNTER (EMERGENCY)
Age: 69
Discharge: HOME | End: 2024-02-26
Attending: EMERGENCY MEDICINE
Payer: MEDICARE

## 2024-02-26 ENCOUNTER — HOSPITAL ENCOUNTER (OUTPATIENT)
Dept: PREADMISSION TESTING | Age: 69
Discharge: HOME | End: 2024-03-01
Payer: MEDICARE

## 2024-02-26 VITALS
RESPIRATION RATE: 16 BRPM | OXYGEN SATURATION: 96 % | HEART RATE: 76 BPM | SYSTOLIC BLOOD PRESSURE: 145 MMHG | DIASTOLIC BLOOD PRESSURE: 71 MMHG | BODY MASS INDEX: 28 KG/M2 | TEMPERATURE: 98.2 F | HEIGHT: 64 IN | WEIGHT: 164 LBS

## 2024-02-26 VITALS
WEIGHT: 162 LBS | BODY MASS INDEX: 27.66 KG/M2 | DIASTOLIC BLOOD PRESSURE: 68 MMHG | TEMPERATURE: 97.5 F | RESPIRATION RATE: 18 BRPM | HEIGHT: 64 IN | OXYGEN SATURATION: 94 % | SYSTOLIC BLOOD PRESSURE: 143 MMHG | HEART RATE: 77 BPM

## 2024-02-26 DIAGNOSIS — S22.41XA CLOSED FRACTURE OF MULTIPLE RIBS OF RIGHT SIDE, INITIAL ENCOUNTER: Primary | ICD-10-CM

## 2024-02-26 LAB
ANION GAP SERPL CALCULATED.3IONS-SCNC: 10 MMOL/L (ref 9–17)
BUN SERPL-MCNC: 19 MG/DL (ref 8–23)
BUN/CREAT SERPL: 21 (ref 9–20)
CALCIUM SERPL-MCNC: 8.7 MG/DL (ref 8.6–10.4)
CHLORIDE SERPL-SCNC: 108 MMOL/L (ref 98–107)
CO2 SERPL-SCNC: 25 MMOL/L (ref 20–31)
CREAT SERPL-MCNC: 0.9 MG/DL (ref 0.5–0.9)
ERYTHROCYTE [DISTWIDTH] IN BLOOD BY AUTOMATED COUNT: 18.1 % (ref 11.8–14.4)
GFR SERPL CREATININE-BSD FRML MDRD: >60 ML/MIN/1.73M2
GLUCOSE SERPL-MCNC: 88 MG/DL (ref 70–99)
HCT VFR BLD AUTO: 40.4 % (ref 36.3–47.1)
HGB BLD-MCNC: 12.3 G/DL (ref 11.9–15.1)
MCH RBC QN AUTO: 26.3 PG (ref 25.2–33.5)
MCHC RBC AUTO-ENTMCNC: 30.4 G/DL (ref 28.4–34.8)
MCV RBC AUTO: 86.3 FL (ref 82.6–102.9)
NRBC BLD-RTO: 0 PER 100 WBC
PLATELET # BLD AUTO: 238 K/UL (ref 138–453)
PMV BLD AUTO: 10 FL (ref 8.1–13.5)
POTASSIUM SERPL-SCNC: 4.1 MMOL/L (ref 3.7–5.3)
RBC # BLD AUTO: 4.68 M/UL (ref 3.95–5.11)
SODIUM SERPL-SCNC: 143 MMOL/L (ref 135–144)
WBC OTHER # BLD: 8 K/UL (ref 3.5–11.3)

## 2024-02-26 PROCEDURE — 85027 COMPLETE CBC AUTOMATED: CPT

## 2024-02-26 PROCEDURE — 99284 EMERGENCY DEPT VISIT MOD MDM: CPT

## 2024-02-26 PROCEDURE — 36415 COLL VENOUS BLD VENIPUNCTURE: CPT

## 2024-02-26 PROCEDURE — G0480 DRUG TEST DEF 1-7 CLASSES: HCPCS

## 2024-02-26 PROCEDURE — 80048 BASIC METABOLIC PNL TOTAL CA: CPT

## 2024-02-26 PROCEDURE — 6360000002 HC RX W HCPCS: Performed by: NURSE PRACTITIONER

## 2024-02-26 PROCEDURE — 71101 X-RAY EXAM UNILAT RIBS/CHEST: CPT

## 2024-02-26 PROCEDURE — 6370000000 HC RX 637 (ALT 250 FOR IP): Performed by: NURSE PRACTITIONER

## 2024-02-26 PROCEDURE — 96372 THER/PROPH/DIAG INJ SC/IM: CPT

## 2024-02-26 RX ORDER — HYDROCODONE BITARTRATE AND ACETAMINOPHEN 5; 325 MG/1; MG/1
1 TABLET ORAL EVERY 8 HOURS PRN
Qty: 12 TABLET | Refills: 0 | Status: SHIPPED | OUTPATIENT
Start: 2024-02-26 | End: 2024-03-01

## 2024-02-26 RX ORDER — FENTANYL CITRATE 0.05 MG/ML
50 INJECTION, SOLUTION INTRAMUSCULAR; INTRAVENOUS ONCE
Status: COMPLETED | OUTPATIENT
Start: 2024-02-26 | End: 2024-02-26

## 2024-02-26 RX ORDER — ONDANSETRON 4 MG/1
4 TABLET, ORALLY DISINTEGRATING ORAL ONCE
Status: COMPLETED | OUTPATIENT
Start: 2024-02-26 | End: 2024-02-26

## 2024-02-26 RX ORDER — KETOROLAC TROMETHAMINE 30 MG/ML
30 INJECTION, SOLUTION INTRAMUSCULAR; INTRAVENOUS ONCE
Status: COMPLETED | OUTPATIENT
Start: 2024-02-26 | End: 2024-02-26

## 2024-02-26 RX ADMIN — KETOROLAC TROMETHAMINE 30 MG: 30 INJECTION INTRAMUSCULAR; INTRAVENOUS at 19:53

## 2024-02-26 RX ADMIN — ONDANSETRON 4 MG: 4 TABLET, ORALLY DISINTEGRATING ORAL at 19:51

## 2024-02-26 RX ADMIN — FENTANYL CITRATE 50 MCG: 0.05 INJECTION, SOLUTION INTRAMUSCULAR; INTRAVENOUS at 19:52

## 2024-02-26 ASSESSMENT — ENCOUNTER SYMPTOMS
VOMITING: 0
BACK PAIN: 0
SHORTNESS OF BREATH: 0
ABDOMINAL PAIN: 0
NAUSEA: 0
COLOR CHANGE: 0
COUGH: 0

## 2024-02-26 ASSESSMENT — PAIN - FUNCTIONAL ASSESSMENT: PAIN_FUNCTIONAL_ASSESSMENT: 0-10

## 2024-02-26 ASSESSMENT — PAIN SCALES - GENERAL
PAINLEVEL_OUTOF10: 9
PAINLEVEL_OUTOF10: 5

## 2024-02-26 ASSESSMENT — PAIN DESCRIPTION - ORIENTATION: ORIENTATION: RIGHT

## 2024-02-26 ASSESSMENT — PAIN DESCRIPTION - PAIN TYPE: TYPE: CHRONIC PAIN

## 2024-02-26 ASSESSMENT — PAIN DESCRIPTION - DESCRIPTORS: DESCRIPTORS: ACHING

## 2024-02-26 ASSESSMENT — PAIN DESCRIPTION - LOCATION: LOCATION: BREAST

## 2024-02-26 ASSESSMENT — PAIN DESCRIPTION - FREQUENCY: FREQUENCY: CONTINUOUS

## 2024-02-26 NOTE — PRE-PROCEDURE INSTRUCTIONS
On the Day of Your Surgery, Thursday, 3/7/24, Please Arrive At 9:00 AM     Enter the hospital through the Main Entrance, take the lobby elevators to the second floor and check in at the Surgery Registration desk.     Continue to take your home medications as you normally do up to and including the night before surgery with the exception of blood thinning medications.    Blood Thinning Medications:  Please stop prescription blood thinning medications such as Apixaban (Eliquis); Clopidogrel (Plavix); Dabigatran (Pradaxa); Prasugrel (Effient); Rivaroxaban (Xarelto); Ticagrelor (Brilinta); Warfarin (Coumadin) only as directed by your surgeon and/or the prescribing physician    Some common examples of other medications that can thin your blood are: Aspirin, Ibuprofen (Advil, Motrin), Naproxen (Aleve), Meloxicam (Mobic), Celecoxib (Celebrex), Fish Oil, many Herbal Supplements.  These medications should usually be stopped at least 7 days prior to surgery.    Celebrex, omega 3 krill oil, probiotic and vitamin E.  Check with prescribing Dr and or surgeon when to stop baby aspirin prior to procedure.    Tylenol is OK to take for pain the week prior to surgery.    Failure to stop certain medications may interfere with your scheduled surgery.    If you receive instructions from your surgeon regarding what medications to stop prior to surgery, please follow those specific instructions.    If You Have Diabetes:  Do not take any of your diabetic medications, (injectables or by mouth) the morning of surgery unless otherwise instructed by the doctor who manages your diabetes. If you are taking insulin, contact the doctor the manages your diabetes for instructions about any changes to your insulin dosages the day before surgery.      Please take the following medication(s) the day of surgery with small sips of water:              Omeprazole, duloxetine, and baclofen if needed. Use your nebulizer morning of surgery and bring your

## 2024-02-26 NOTE — ED PROVIDER NOTES
[facility] ED  eMERGENCY dEPARTMENT eNCOUnter      Pt Name: Angela Hutchinson  MRN: [id number]  Birthdate 1955  Date of evaluation: 2/26/2024  Provider: ERICA Muse CNP    CHIEF COMPLAINT       Chief Complaint   Patient presents with    Rib Pain (injury)         HISTORY OF PRESENT ILLNESS  (Location/Symptom, Timing/Onset, Context/Setting, Quality, Duration, Modifying Factors, Severity.)   Angela Hutchinson is a 68 y.o. female who presents to the emergency department. C/o pain to the right lateral and anterior rib areas s/p fall today. Denies hitting her head and LOC. Denies dizziness or feeling lightheaded prior to the fall. Denies CP, SOB. Denies pain to her head, neck, back, abdomen, extremities. Rates her pain 9/10. Her pain is worse with movement, palpation, and inspiration.       Nursing Notes were reviewed.    ALLERGIES     Morphine, Bee venom, and Wasp venom protein    CURRENT MEDICATIONS       Previous Medications    ALBUTEROL SULFATE HFA (VENTOLIN HFA) 108 (90 BASE) MCG/ACT INHALER    Inhale 2 puffs into the lungs 4 times daily as needed for Wheezing    ALENDRONATE (FOSAMAX) 70 MG TABLET    Take 1 tablet by mouth every 7 days    AMLODIPINE (NORVASC) 10 MG TABLET    Take 1 tablet by mouth at bedtime Take one tablet by mouth once nightly    ASPIRIN 81 MG TABLET    Take 1 tablet by mouth daily    BACLOFEN (LIORESAL) 10 MG TABLET    Take 1 tablet by mouth 3 times daily TAKE 1 TABLET BY MOUTH THREE TIMES A DAY AS NEEDED FOR PAIN    CALCIUM CARBONATE 600 MG TABS TABLET    Take 1 tablet by mouth 2 times daily    CELECOXIB (CELEBREX) 100 MG CAPSULE    TAKE ONE CAPSULE BY MOUTH DAILY    DULOXETINE (CYMBALTA) 30 MG EXTENDED RELEASE CAPSULE    TAKE 1 CAPSULE BY MOUTH DAILY    EPINEPHRINE (EPIPEN 2-JONATHAN) 0.3 MG/0.3ML SOAJ INJECTION    Inject 0.3 mLs into the muscle once for 1 dose Use as directed for allergic reaction    GABAPENTIN (NEURONTIN) 100 MG CAPSULE    Take 1 capsule by mouth

## 2024-02-27 NOTE — ED PROVIDER NOTES
eMERGENCY dEPARTMENT eNCOUnter   Independent Attestation     Pt Name: Angela Hutchinson  MRN: [id number]  Birthdate 1955  Date of evaluation: 2/26/24     Angela Hutchinson is a 68 y.o. female with CC: Rib Pain (injury)      Based on the medical record the care appears appropriate.  I was personally available for consultation in the Emergency Department.    Elena Payan MD  Attending Emergency Physician                  Elena Payan MD  02/26/24 [id number]

## 2024-02-27 NOTE — ED NOTES
Instructed on incentive spirometer. Patient was able to demonstrate with no difficulty. Family at bedside.

## 2024-02-27 NOTE — PERIOP NOTE
Dr. Stoll reviewed medical history, echo, stress test from 2018, and EKG. Patient had a fall yesterday evening and was seen in the ED, diagnosed with multiple rib fractures of right side. Dr. Stoll suggesting we postpone surgery for 2 weeks to allow time for recovery.    I called the patient to ask how she is feeling, she does report severe pain, she is trying her best to use the IS regularly however it is difficult at this time due to pain. She expressed worry about delaying surgery too long as she needs an MRI d/t frequent falls and cannot have this completed until after surgery.     I discussed the above information with Marietta at Dr. Conway's office, she will discuss case with Dr. Conway and confirm when she feels it is best to complete the surgery.

## 2024-02-28 ENCOUNTER — CLINICAL DOCUMENTATION ONLY (OUTPATIENT)
Facility: CLINIC | Age: 69
End: 2024-02-28

## 2024-02-28 ENCOUNTER — OFFICE VISIT (OUTPATIENT)
Dept: SURGERY | Age: 69
End: 2024-02-28
Payer: MEDICARE

## 2024-02-28 VITALS
BODY MASS INDEX: 28 KG/M2 | OXYGEN SATURATION: 100 % | RESPIRATION RATE: 16 BRPM | SYSTOLIC BLOOD PRESSURE: 137 MMHG | HEART RATE: 72 BPM | WEIGHT: 164 LBS | DIASTOLIC BLOOD PRESSURE: 79 MMHG | HEIGHT: 64 IN

## 2024-02-28 DIAGNOSIS — Z90.10 ACQUIRED ABSENCE OF BREAST, UNSPECIFIED LATERALITY: Primary | ICD-10-CM

## 2024-02-28 LAB
3-OH-COTININE URINE: <50 NG/ML
ANABASINE URINE: <5 NG/ML
COTININE, URINE: <15 NG/ML
NICOTINE URINE: <15 NG/ML

## 2024-02-28 PROCEDURE — 3078F DIAST BP <80 MM HG: CPT | Performed by: PLASTIC SURGERY

## 2024-02-28 PROCEDURE — 1036F TOBACCO NON-USER: CPT | Performed by: PLASTIC SURGERY

## 2024-02-28 PROCEDURE — 3075F SYST BP GE 130 - 139MM HG: CPT | Performed by: PLASTIC SURGERY

## 2024-02-28 PROCEDURE — 1123F ACP DISCUSS/DSCN MKR DOCD: CPT | Performed by: PLASTIC SURGERY

## 2024-02-28 PROCEDURE — 3017F COLORECTAL CA SCREEN DOC REV: CPT | Performed by: PLASTIC SURGERY

## 2024-02-28 PROCEDURE — 1090F PRES/ABSN URINE INCON ASSESS: CPT | Performed by: PLASTIC SURGERY

## 2024-02-28 PROCEDURE — G8399 PT W/DXA RESULTS DOCUMENT: HCPCS | Performed by: PLASTIC SURGERY

## 2024-02-28 PROCEDURE — G8427 DOCREV CUR MEDS BY ELIG CLIN: HCPCS | Performed by: PLASTIC SURGERY

## 2024-02-28 PROCEDURE — G8417 CALC BMI ABV UP PARAM F/U: HCPCS | Performed by: PLASTIC SURGERY

## 2024-02-28 PROCEDURE — 99212 OFFICE O/P EST SF 10 MIN: CPT | Performed by: PLASTIC SURGERY

## 2024-02-28 PROCEDURE — G8484 FLU IMMUNIZE NO ADMIN: HCPCS | Performed by: PLASTIC SURGERY

## 2024-02-28 NOTE — PROGRESS NOTES
[facility] PHYSICIANS [facility] SURGICAL SPECIALISTS  [address] [facility] [address]       Progress note     Chief Complaint   Patient presents with    Post-Op Check     Bilateral expander         HPI:   Angela Hutchinson is a 68 y.o. female who presents to discuss her second stage options  Breast weight right side was 375 g                  HPI:  Patient is status post right mastectomy.  Patient status post right 350 expander placement.  Patient with a history of breast cancer.  Patient now presents for further expansion.    Patient seen and examined.  Patient is s/p right mastectomy.  Breast weight 375 grams.  Right breast expander insertion 350cc.  It was expanded 100 cc. Patient has a right serratus flap.  Her surgery was performed on 10/11/23    Total volume:                     RIGHT 400 cc        Interim history  Patient status post fall with rib fracture minimally displaced 8/9 ribs    Medications:     Current Outpatient Medications   Medication Sig Dispense Refill    Lactobacillus (PROBIOTIC ACIDOPHILUS PO) Take 1 tablet by mouth daily      HYDROcodone-acetaminophen (NORCO) 5-325 MG per tablet Take 1 tablet by mouth every 8 hours as needed for Pain for up to 4 days. Max Daily Amount: 3 tablets 12 tablet 0    nitrofurantoin, macrocrystal-monohydrate, (MACROBID) 100 MG capsule Take 1 capsule by mouth 2 times daily for 7 days 14 capsule 0    gabapentin (NEURONTIN) 100 MG capsule Take 1 capsule by mouth nightly for 90 days. 90 capsule 0    rOPINIRole (REQUIP) 0.25 MG tablet Take 1 tablet by mouth nightly 30 tablet 3    baclofen (LIORESAL) 10 MG tablet Take 1 tablet by mouth 3 times daily TAKE 1 TABLET BY MOUTH THREE TIMES A DAY AS NEEDED FOR PAIN (Patient taking differently: Take 1 tablet by mouth 3 times daily as needed TAKE 1 TABLET BY MOUTH THREE TIMES A DAY AS NEEDED FOR PAIN) 30 tablet 3    umeclidinium-vilanterol (ANORO ELLIPTA) 62.5-25 MCG/ACT

## 2024-03-03 DIAGNOSIS — R42 VERTIGO: ICD-10-CM

## 2024-03-04 RX ORDER — MECLIZINE HYDROCHLORIDE 25 MG/1
TABLET ORAL
Qty: 90 TABLET | Refills: 3 | OUTPATIENT
Start: 2024-03-04

## 2024-03-12 ENCOUNTER — ANESTHESIA (OUTPATIENT)
Dept: OPERATING ROOM | Age: 69
End: 2024-03-12
Payer: MEDICARE

## 2024-03-12 ENCOUNTER — HOSPITAL ENCOUNTER (OUTPATIENT)
Age: 69
Setting detail: OUTPATIENT SURGERY
Discharge: HOME | End: 2024-03-12
Attending: PLASTIC SURGERY | Admitting: PLASTIC SURGERY
Payer: MEDICARE

## 2024-03-12 ENCOUNTER — ANESTHESIA EVENT (OUTPATIENT)
Dept: OPERATING ROOM | Age: 69
End: 2024-03-12
Payer: MEDICARE

## 2024-03-12 VITALS
DIASTOLIC BLOOD PRESSURE: 93 MMHG | RESPIRATION RATE: 24 BRPM | TEMPERATURE: 98.2 F | SYSTOLIC BLOOD PRESSURE: 147 MMHG | HEART RATE: 95 BPM | HEIGHT: 64 IN | WEIGHT: 162 LBS | BODY MASS INDEX: 27.66 KG/M2 | OXYGEN SATURATION: 98 %

## 2024-03-12 DIAGNOSIS — Z85.3 HISTORY OF BREAST CANCER: Primary | ICD-10-CM

## 2024-03-12 DIAGNOSIS — N64.89 BREAST ASYMMETRY: ICD-10-CM

## 2024-03-12 PROCEDURE — 3600000002 HC SURGERY LEVEL 2 BASE: Performed by: PLASTIC SURGERY

## 2024-03-12 PROCEDURE — 6360000002 HC RX W HCPCS: Performed by: NURSE ANESTHETIST, CERTIFIED REGISTERED

## 2024-03-12 PROCEDURE — 3700000001 HC ADD 15 MINUTES (ANESTHESIA): Performed by: PLASTIC SURGERY

## 2024-03-12 PROCEDURE — 7100000000 HC PACU RECOVERY - FIRST 15 MIN: Performed by: PLASTIC SURGERY

## 2024-03-12 PROCEDURE — 3700000000 HC ANESTHESIA ATTENDED CARE: Performed by: PLASTIC SURGERY

## 2024-03-12 PROCEDURE — 7100000001 HC PACU RECOVERY - ADDTL 15 MIN: Performed by: PLASTIC SURGERY

## 2024-03-12 PROCEDURE — 11970 RPLCMT TISS XPNDR PERM IMPLT: CPT | Performed by: PLASTIC SURGERY

## 2024-03-12 PROCEDURE — 6360000002 HC RX W HCPCS: Performed by: PLASTIC SURGERY

## 2024-03-12 PROCEDURE — 2709999900 HC NON-CHARGEABLE SUPPLY: Performed by: PLASTIC SURGERY

## 2024-03-12 PROCEDURE — 2720000010 HC SURG SUPPLY STERILE: Performed by: PLASTIC SURGERY

## 2024-03-12 PROCEDURE — 3600000012 HC SURGERY LEVEL 2 ADDTL 15MIN: Performed by: PLASTIC SURGERY

## 2024-03-12 PROCEDURE — C1789 PROSTHESIS, BREAST, IMP: HCPCS | Performed by: PLASTIC SURGERY

## 2024-03-12 PROCEDURE — 6360000002 HC RX W HCPCS: Performed by: ANESTHESIOLOGY

## 2024-03-12 PROCEDURE — 2500000003 HC RX 250 WO HCPCS: Performed by: NURSE ANESTHETIST, CERTIFIED REGISTERED

## 2024-03-12 PROCEDURE — 7100000010 HC PHASE II RECOVERY - FIRST 15 MIN: Performed by: PLASTIC SURGERY

## 2024-03-12 PROCEDURE — 7100000011 HC PHASE II RECOVERY - ADDTL 15 MIN: Performed by: PLASTIC SURGERY

## 2024-03-12 PROCEDURE — 2580000003 HC RX 258: Performed by: ANESTHESIOLOGY

## 2024-03-12 PROCEDURE — A4217 STERILE WATER/SALINE, 500 ML: HCPCS | Performed by: PLASTIC SURGERY

## 2024-03-12 PROCEDURE — 19316 MASTOPEXY: CPT | Performed by: PLASTIC SURGERY

## 2024-03-12 PROCEDURE — 2580000003 HC RX 258: Performed by: PLASTIC SURGERY

## 2024-03-12 PROCEDURE — 76942 ECHO GUIDE FOR BIOPSY: CPT | Performed by: ANESTHESIOLOGY

## 2024-03-12 DEVICE — SMOOTH ROUND ULTRA HIGH PROFILE SILICONE GEL-FILLED BREAST IMPLANT, 350CC  SMOOTH ROUND SILICONE
Type: IMPLANTABLE DEVICE | Status: FUNCTIONAL
Brand: MENTOR MEMORYGEL BREAST IMPLANT

## 2024-03-12 RX ORDER — OXYCODONE HYDROCHLORIDE 5 MG/1
5 TABLET ORAL
Status: DISCONTINUED | OUTPATIENT
Start: 2024-03-12 | End: 2024-03-12 | Stop reason: HOSPADM

## 2024-03-12 RX ORDER — FENTANYL CITRATE 50 UG/ML
INJECTION, SOLUTION INTRAMUSCULAR; INTRAVENOUS PRN
Status: DISCONTINUED | OUTPATIENT
Start: 2024-03-12 | End: 2024-03-12 | Stop reason: SDUPTHER

## 2024-03-12 RX ORDER — LIDOCAINE HYDROCHLORIDE 10 MG/ML
1 INJECTION, SOLUTION EPIDURAL; INFILTRATION; INTRACAUDAL; PERINEURAL
Status: DISCONTINUED | OUTPATIENT
Start: 2024-03-12 | End: 2024-03-12 | Stop reason: HOSPADM

## 2024-03-12 RX ORDER — SODIUM CHLORIDE 0.9 % (FLUSH) 0.9 %
5-40 SYRINGE (ML) INJECTION EVERY 12 HOURS SCHEDULED
Status: DISCONTINUED | OUTPATIENT
Start: 2024-03-12 | End: 2024-03-12 | Stop reason: HOSPADM

## 2024-03-12 RX ORDER — CEPHALEXIN 500 MG/1
500 CAPSULE ORAL 4 TIMES DAILY
Qty: 40 CAPSULE | Refills: 0 | Status: SHIPPED | OUTPATIENT
Start: 2024-03-12 | End: 2024-03-22

## 2024-03-12 RX ORDER — HYDROMORPHONE HYDROCHLORIDE 2 MG/ML
INJECTION, SOLUTION INTRAMUSCULAR; INTRAVENOUS; SUBCUTANEOUS PRN
Status: DISCONTINUED | OUTPATIENT
Start: 2024-03-12 | End: 2024-03-12 | Stop reason: SDUPTHER

## 2024-03-12 RX ORDER — FENTANYL CITRATE 50 UG/ML
50 INJECTION, SOLUTION INTRAMUSCULAR; INTRAVENOUS EVERY 5 MIN PRN
Status: DISCONTINUED | OUTPATIENT
Start: 2024-03-12 | End: 2024-03-12 | Stop reason: HOSPADM

## 2024-03-12 RX ORDER — PROPOFOL 10 MG/ML
INJECTION, EMULSION INTRAVENOUS PRN
Status: DISCONTINUED | OUTPATIENT
Start: 2024-03-12 | End: 2024-03-12 | Stop reason: SDUPTHER

## 2024-03-12 RX ORDER — SODIUM CHLORIDE 0.9 % (FLUSH) 0.9 %
5-40 SYRINGE (ML) INJECTION PRN
Status: DISCONTINUED | OUTPATIENT
Start: 2024-03-12 | End: 2024-03-12 | Stop reason: HOSPADM

## 2024-03-12 RX ORDER — SODIUM CHLORIDE, SODIUM LACTATE, POTASSIUM CHLORIDE, CALCIUM CHLORIDE 600; 310; 30; 20 MG/100ML; MG/100ML; MG/100ML; MG/100ML
INJECTION, SOLUTION INTRAVENOUS CONTINUOUS
Status: DISCONTINUED | OUTPATIENT
Start: 2024-03-12 | End: 2024-03-12 | Stop reason: HOSPADM

## 2024-03-12 RX ORDER — DIPHENHYDRAMINE HYDROCHLORIDE 50 MG/ML
12.5 INJECTION INTRAMUSCULAR; INTRAVENOUS
Status: DISCONTINUED | OUTPATIENT
Start: 2024-03-12 | End: 2024-03-12 | Stop reason: HOSPADM

## 2024-03-12 RX ORDER — SODIUM CHLORIDE 9 MG/ML
INJECTION, SOLUTION INTRAVENOUS CONTINUOUS
Status: DISCONTINUED | OUTPATIENT
Start: 2024-03-12 | End: 2024-03-12 | Stop reason: HOSPADM

## 2024-03-12 RX ORDER — LIDOCAINE HYDROCHLORIDE 20 MG/ML
INJECTION, SOLUTION EPIDURAL; INFILTRATION; INTRACAUDAL; PERINEURAL PRN
Status: DISCONTINUED | OUTPATIENT
Start: 2024-03-12 | End: 2024-03-12 | Stop reason: SDUPTHER

## 2024-03-12 RX ORDER — EPHEDRINE SULFATE/0.9% NACL/PF 25 MG/5 ML
SYRINGE (ML) INTRAVENOUS PRN
Status: DISCONTINUED | OUTPATIENT
Start: 2024-03-12 | End: 2024-03-12 | Stop reason: SDUPTHER

## 2024-03-12 RX ORDER — FENTANYL CITRATE 50 UG/ML
25 INJECTION, SOLUTION INTRAMUSCULAR; INTRAVENOUS EVERY 5 MIN PRN
Status: COMPLETED | OUTPATIENT
Start: 2024-03-12 | End: 2024-03-12

## 2024-03-12 RX ORDER — ONDANSETRON 2 MG/ML
INJECTION INTRAMUSCULAR; INTRAVENOUS PRN
Status: DISCONTINUED | OUTPATIENT
Start: 2024-03-12 | End: 2024-03-12 | Stop reason: SDUPTHER

## 2024-03-12 RX ORDER — ROCURONIUM BROMIDE 10 MG/ML
INJECTION, SOLUTION INTRAVENOUS PRN
Status: DISCONTINUED | OUTPATIENT
Start: 2024-03-12 | End: 2024-03-12 | Stop reason: SDUPTHER

## 2024-03-12 RX ORDER — ONDANSETRON 2 MG/ML
4 INJECTION INTRAMUSCULAR; INTRAVENOUS
Status: DISCONTINUED | OUTPATIENT
Start: 2024-03-12 | End: 2024-03-12 | Stop reason: HOSPADM

## 2024-03-12 RX ORDER — SODIUM CHLORIDE 9 MG/ML
INJECTION, SOLUTION INTRAVENOUS PRN
Status: DISCONTINUED | OUTPATIENT
Start: 2024-03-12 | End: 2024-03-12 | Stop reason: HOSPADM

## 2024-03-12 RX ORDER — KETAMINE HCL IN NACL, ISO-OSM 100MG/10ML
SYRINGE (ML) INJECTION PRN
Status: DISCONTINUED | OUTPATIENT
Start: 2024-03-12 | End: 2024-03-12 | Stop reason: SDUPTHER

## 2024-03-12 RX ORDER — ROPIVACAINE HYDROCHLORIDE 2 MG/ML
INJECTION, SOLUTION EPIDURAL; INFILTRATION; PERINEURAL
Status: COMPLETED | OUTPATIENT
Start: 2024-03-12 | End: 2024-03-12

## 2024-03-12 RX ORDER — DEXAMETHASONE SODIUM PHOSPHATE 10 MG/ML
INJECTION, SOLUTION INTRAMUSCULAR; INTRAVENOUS PRN
Status: DISCONTINUED | OUTPATIENT
Start: 2024-03-12 | End: 2024-03-12 | Stop reason: SDUPTHER

## 2024-03-12 RX ORDER — HYDROCODONE BITARTRATE AND ACETAMINOPHEN 5; 325 MG/1; MG/1
1 TABLET ORAL EVERY 6 HOURS PRN
Qty: 28 TABLET | Refills: 0 | Status: SHIPPED | OUTPATIENT
Start: 2024-03-12 | End: 2024-03-19

## 2024-03-12 RX ADMIN — ROCURONIUM BROMIDE 30 MG: 10 INJECTION, SOLUTION INTRAVENOUS at 12:15

## 2024-03-12 RX ADMIN — HYDROMORPHONE HYDROCHLORIDE 0.2 MG: 2 INJECTION, SOLUTION INTRAMUSCULAR; INTRAVENOUS; SUBCUTANEOUS at 13:18

## 2024-03-12 RX ADMIN — PROPOFOL 150 MG: 10 INJECTION, EMULSION INTRAVENOUS at 10:13

## 2024-03-12 RX ADMIN — PROPOFOL 20 MCG/KG/MIN: 10 INJECTION, EMULSION INTRAVENOUS at 10:20

## 2024-03-12 RX ADMIN — FENTANYL CITRATE 50 MCG: 50 INJECTION INTRAMUSCULAR; INTRAVENOUS at 11:35

## 2024-03-12 RX ADMIN — FENTANYL CITRATE 50 MCG: 50 INJECTION INTRAMUSCULAR; INTRAVENOUS at 15:47

## 2024-03-12 RX ADMIN — PROPOFOL 50 MG: 10 INJECTION, EMULSION INTRAVENOUS at 11:31

## 2024-03-12 RX ADMIN — Medication 25 MG: at 10:20

## 2024-03-12 RX ADMIN — Medication 2000 MG: at 10:21

## 2024-03-12 RX ADMIN — FENTANYL CITRATE 50 MCG: 50 INJECTION INTRAMUSCULAR; INTRAVENOUS at 11:38

## 2024-03-12 RX ADMIN — SODIUM CHLORIDE, POTASSIUM CHLORIDE, SODIUM LACTATE AND CALCIUM CHLORIDE: 600; 310; 30; 20 INJECTION, SOLUTION INTRAVENOUS at 09:47

## 2024-03-12 RX ADMIN — EPHEDRINE SULFATE 10 MG: 5 INJECTION INTRAVENOUS at 10:30

## 2024-03-12 RX ADMIN — SUGAMMADEX 200 MG: 100 INJECTION, SOLUTION INTRAVENOUS at 14:07

## 2024-03-12 RX ADMIN — SODIUM CHLORIDE, POTASSIUM CHLORIDE, SODIUM LACTATE AND CALCIUM CHLORIDE: 600; 310; 30; 20 INJECTION, SOLUTION INTRAVENOUS at 11:16

## 2024-03-12 RX ADMIN — HYDROMORPHONE HYDROCHLORIDE 0.2 MG: 2 INJECTION, SOLUTION INTRAMUSCULAR; INTRAVENOUS; SUBCUTANEOUS at 13:41

## 2024-03-12 RX ADMIN — LIDOCAINE HYDROCHLORIDE 60 MG: 20 INJECTION, SOLUTION EPIDURAL; INFILTRATION; INTRACAUDAL; PERINEURAL at 10:13

## 2024-03-12 RX ADMIN — ONDANSETRON 4 MG: 2 INJECTION INTRAMUSCULAR; INTRAVENOUS at 10:20

## 2024-03-12 RX ADMIN — Medication 25 MG: at 11:32

## 2024-03-12 RX ADMIN — HYDROMORPHONE HYDROCHLORIDE 0.4 MG: 2 INJECTION, SOLUTION INTRAMUSCULAR; INTRAVENOUS; SUBCUTANEOUS at 12:21

## 2024-03-12 RX ADMIN — EPHEDRINE SULFATE 10 MG: 5 INJECTION INTRAVENOUS at 11:54

## 2024-03-12 RX ADMIN — FENTANYL CITRATE 50 MCG: 50 INJECTION INTRAMUSCULAR; INTRAVENOUS at 10:32

## 2024-03-12 RX ADMIN — ROPIVACAINE HYDROCHLORIDE 60 ML: 2 INJECTION, SOLUTION EPIDURAL; INFILTRATION at 10:30

## 2024-03-12 RX ADMIN — FENTANYL CITRATE 25 MCG: 50 INJECTION INTRAMUSCULAR; INTRAVENOUS at 15:08

## 2024-03-12 RX ADMIN — FENTANYL CITRATE 50 MCG: 50 INJECTION INTRAMUSCULAR; INTRAVENOUS at 10:13

## 2024-03-12 RX ADMIN — DEXAMETHASONE SODIUM PHOSPHATE 10 MG: 10 INJECTION, SOLUTION INTRAMUSCULAR; INTRAVENOUS at 10:20

## 2024-03-12 RX ADMIN — FENTANYL CITRATE 25 MCG: 50 INJECTION INTRAMUSCULAR; INTRAVENOUS at 15:28

## 2024-03-12 RX ADMIN — ROCURONIUM BROMIDE 40 MG: 10 INJECTION, SOLUTION INTRAVENOUS at 10:13

## 2024-03-12 ASSESSMENT — PAIN DESCRIPTION - DESCRIPTORS
DESCRIPTORS: ACHING;DULL
DESCRIPTORS: ACHING
DESCRIPTORS: ACHING;DULL
DESCRIPTORS: ACHING;DULL

## 2024-03-12 ASSESSMENT — PAIN DESCRIPTION - ORIENTATION
ORIENTATION: LEFT

## 2024-03-12 ASSESSMENT — PAIN DESCRIPTION - LOCATION
LOCATION: BREAST

## 2024-03-12 ASSESSMENT — PAIN SCALES - GENERAL
PAINLEVEL_OUTOF10: 4
PAINLEVEL_OUTOF10: 5
PAINLEVEL_OUTOF10: 7
PAINLEVEL_OUTOF10: 4
PAINLEVEL_OUTOF10: 7
PAINLEVEL_OUTOF10: 4

## 2024-03-12 ASSESSMENT — PAIN - FUNCTIONAL ASSESSMENT
PAIN_FUNCTIONAL_ASSESSMENT: FACE, LEGS, ACTIVITY, CRY, AND CONSOLABILITY (FLACC)
PAIN_FUNCTIONAL_ASSESSMENT: 0-10

## 2024-03-12 ASSESSMENT — ENCOUNTER SYMPTOMS: SHORTNESS OF BREATH: 0

## 2024-03-12 NOTE — OP NOTE
patient was symmetrical.  Then the staples were removed.  Then using a combination of 3-0 Vicryl and 2- 0 Vicryl in an interrupted manner the deep tissue was closed.  This was done on the right.  Then using 2-0 strata fix in a subcuticular manner the skin was closed on the right side.  Then using 3-0 Vicryl in an interrupted manner the vertical limb was closed.  Then using 3- 0 Vicryl in an interrupted manner the periareolar skin was closed.  Then using 3-0 Quill in a subcuticular manner the skin was closed.  Then an incisional wound VAC was placed.  A good seal was obtained.  Then a surgical bra was placed.  Patient tolerated procedure well.  Patient was transferred to recovery room in stable condition.  Electronically signed by Jo Conway MD on 3/12/2024 at 2:21 PM

## 2024-03-12 NOTE — H&P
significant emotional depression or mental health disorders.  Although many individuals may benefit psychologically from the results of elective surgery, effects on mental health cannot be accurately predicted.    DVT/PE Risks and Advisory:  There is a risk of blood clots, Deep Vein Thrombosis (DVT) and Pulmonary Embolus (PE) with every surgical procedure. It varies with the risk factors below. The higher the risk factors, the greater the risk and the more involved you must be in both understanding these risks and, when permitted by your physician, walking and moving your legs. There may also be leg stockings, squeezing active leg devices, and possibly medicines to help lower your risk.   The risks of DVT/PE may be almost as great as the prophylactic therapy when involving Aspirin, Heparin, and Enoxaparin.  Be aware that if your surgery is elective, those patients with very high risks should consider not proceeding with such elective surgery.      ADDITIONAL SURGERY NECESSARY (Re-Operations)  There are many variable conditions that may influence the long-term result of surgery. It is unknown how your tissue may respond or how wound healing will occur after surgery.  Secondary surgery may be necessary to perform additional tightening or repositioning of body structures.  Should complications occur, additional surgery or other treatments may be necessary.  Even though risks and complications occur infrequently, the risks cited are particularly associated with this surgery.  Other complications and risks can occur but are even more uncommon.  The practice of medicine and surgery is not an exact science.  Although good results are expected, there is no guarantee or warranty expressed or implied, on the results that may be obtained.  In some situations, it may not be possible to achieve optimal results with a single surgical procedure.  You and your surgeon will discuss the options available should additional surgery be 
expanders and breast implants.     Implant Shift and Tissue Stretching:   The breast implant may move from its initial place. This could cause discomfort and change your breast shape (visible folds in the skin). Certain techniques of putting implants may increase the risk of shifting. More surgery may be required to correct this problem. It may not be possible to fix this problem once it happens.    Contamination of Implants:   Skin oil, lint from surgical drapes, or talc may get on the implant's surface during surgery. Effects of this are not known.    Unusual Activities and Occupations:   Certain activities and occupations may impact the breast implant. Trauma can break or damage the implants. It can also lead to bleeding, fluid buildup, or other problems.    Change in Nipple and Skin Sensation:   If your nipples are not removed in your breast surgery, you may not have any feeling after the surgery. Other areas of your breast skin may also be numb or have less sensation.     Use of Non-living Biological Tissue:   Your surgeon may use other biological tissue to put the implant in place. Usually, this tissue comes from a human cadaver or pig or cow skin. You should ask your surgeon about these materials. They help form the pocket around the implant and provide more cover for it. Your cells will move into the tissue and make it your own. This tissue material may produce fluid and need drains for a long time.    Anaplastic Large Cell Lymphoma (ALCL):  “Breast implant-associated anaplastic large cell lymphoma (NIKOLAS-ALCL)” is an uncommon form of cancer. It may occur after breast implant surgery. This type of lymphoma can occur in the scar formed around saline or silicone breast implants. Scientists are studying this risk and how this disease might be linked to breast implants. Lymphoma is a rare cancer of the immune system and can occur anywhere in the body.    The FDA estimates that there have been at least 733 cases of

## 2024-03-12 NOTE — ANESTHESIA PRE PROCEDURE
CMP:   Lab Results   Component Value Date/Time     02/26/2024 09:24 AM    K 4.1 02/26/2024 09:24 AM     02/26/2024 09:24 AM    CO2 25 02/26/2024 09:24 AM    BUN 19 02/26/2024 09:24 AM    CREATININE 0.9 02/26/2024 09:24 AM    GFRAA >60 10/26/2021 10:25 AM    AGRATIO 1.0 12/06/2023 11:45 AM    LABGLOM >60 02/26/2024 09:24 AM    GLUCOSE 88 02/26/2024 09:24 AM    PROT 7.1 12/06/2023 11:45 AM    CALCIUM 8.7 02/26/2024 09:24 AM    BILITOT 0.2 12/06/2023 11:45 AM    ALKPHOS 82 12/06/2023 11:45 AM    AST 23 12/06/2023 11:45 AM    ALT 14 12/06/2023 11:45 AM       POC Tests: No results for input(s): \"POCGLU\", \"POCNA\", \"POCK\", \"POCCL\", \"POCBUN\", \"POCHEMO\", \"POCHCT\" in the last 72 hours.    Coags:   Lab Results   Component Value Date/Time    PROTIME 12.6 10/24/2022 11:00 AM    INR 1.0 10/24/2022 11:00 AM    APTT 26.8 10/24/2022 11:00 AM       HCG (If Applicable): No results found for: \"PREGTESTUR\", \"PREGSERUM\", \"HCG\", \"HCGQUANT\"     ABGs: No results found for: \"PHART\", \"PO2ART\", \"ZTA5QEO\", \"QBY4VVM\", \"BEART\", \"C0LUYOPI\"     Type & Screen (If Applicable):  No results found for: \"LABABO\", \"LABRH\"    Drug/Infectious Status (If Applicable):  Lab Results   Component Value Date/Time    HEPCAB NONREACTIVE 04/17/2019 03:21 PM       COVID-19 Screening (If Applicable): No results found for: \"COVID19\"        Anesthesia Evaluation    Airway: Mallampati: I  TM distance: >3 FB   Neck ROM: full  Mouth opening: > = 3 FB   Dental:          Pulmonary:   (+)  COPD:              (-) shortness of breath                           Cardiovascular:    (+) CAD:    (-)  angina          Echocardiogram reviewed                  Neuro/Psych:               GI/Hepatic/Renal:             Endo/Other:                     Abdominal:             Vascular:   + PVD, aortic or cerebral.       Other Findings:             Anesthesia Plan      general     ASA 3                                   Zenia Sarah MD   3/12/2024

## 2024-03-12 NOTE — ANESTHESIA PROCEDURE NOTES
Peripheral Block    Patient location during procedure: OR  Reason for block: post-op pain management and at surgeon's request  Start time: 3/12/2024 10:30 AM  End time: 3/12/2024 10:38 AM  Staffing  Performed: anesthesiologist   Anesthesiologist: Zenia Sarah MD  Performed by: Zenia Sarah MD  Authorized by: Zenia Sarah MD    Preanesthetic Checklist  Completed: patient identified, IV checked, site marked, risks and benefits discussed, surgical/procedural consents, equipment checked, pre-op evaluation, timeout performed, anesthesia consent given, oxygen available, monitors applied/VS acknowledged, fire risk safety assessment completed and verbalized and blood product R/B/A discussed and consented  Peripheral Block   Patient position: supine  Prep: ChloraPrep  Provider prep: mask and sterile gloves  Patient monitoring: cardiac monitor, continuous pulse ox, continuous capnometry, frequent blood pressure checks, IV access and oxygen  Block type: PECS I and PECS II  Laterality: bilateral  Injection technique: single-shot  Guidance: ultrasound guided    Needle   Needle type: insulated echogenic nerve stimulator needle   Assessment   Injection assessment: negative aspiration for heme and no intravascular symptoms  Slow fractionated injection: yes    Additional Notes  U/S [id number]  Medications Administered  ropivacaine (NAROPIN) injection 0.2% - Perineural   60 mL - 3/12/2024 10:30:00 AM

## 2024-03-12 NOTE — DISCHARGE INSTRUCTIONS
POST-OPERATIVE INSTRUCTIONS FOR SUTURE LINECARE   Incisions and suture lines are a necessary part of surgery.  These lines take many months to fully heal. Part of the healing process requires proper cleansing and care.  In addition, there are treatments that can help resulting scars to be flatter, finer, and less noticeable. There is no guarantee to what a scar will look like once it has fully healed, however the following instructions are important to a good outcome.  Do not smoke.  You have been advised to quit before surgery.  Do not start after surgery.  Smoking decreases the oxygen in your blood and greatly impacts your ability to heal.  Do not smoke until your incisions have fully healed.     If you have no visible sutures but only tape (steri-strips) over the incision, you do not need to do anything until the strips fall off.  You may use antibiotic ointment on the area after the steri-strips have fallen off.      IF YOU HAVE AN INCISIONAL WOUND VAC, DO NOT REMOVE THE DRESSING UNTIL YOU SEE DR. LOWRY BACK IN THE OFFICE POSTOPERATIVELY.  You may sponge bath until office visit.  We do not expect too much drainage in the canister.  You may remove your surgical bra in 24 to 48 hours and wash it and place it back on.    If you have sutures, they will remain in the skin anywhere from 7-21 days, or longer depending on the area and your healing.  Please call for an appointment if one has not been scheduled.  You may notice a slight drainage of either blood or a clear type of liquid from the area, which is normal.  If the drainage has an odor to it call the office.   DIET: Resume your previous diet.   ACTIVITY: Avoid straining, strenuous exercise, or lifting over five pounds for 14 days, unless instructed otherwise.  Depending on they type of surgery you have had this may be as long as SIX WEEKS.   (Do not bend over for the first 24 hours after your surgery).  Direct trauma and physical stress may result in a

## 2024-03-12 NOTE — ANESTHESIA POSTPROCEDURE EVALUATION
Department of Anesthesiology  Postprocedure Note    Patient: Angela Hutchinson  MRN: [id number]  YOB: 1955  Date of evaluation: 3/12/2024    Procedure Summary       Date: 03/12/24 Room / Location: [address]    Anesthesia Start: 1006 Anesthesia Stop: 1427    Procedure: REMOVAL RIGHT BREAST TISSUE EXPANDER INSERT RIGHT SILICONE IMPLANT WITH FANY INCISION BREAST WOUND VAC-   CAPSULOTOMY,  CAPSULORRHAPY,  MASTOPEXY LEFT BREAST (Bilateral: Breast) Diagnosis:       HX: breast cancer      Acquired absence of right breast and nipple      (HX: breast cancer [Z85.3])      (Acquired absence of right breast and nipple [Z90.11])    Surgeons: Jo Conway MD Responsible Provider: Zenia Sarah MD    Anesthesia Type: General ASA Status: 3            Anesthesia Type: General    Kristel Phase I: Kristel Score: 9    Kristel Phase II: Kristel Score: 10    Anesthesia Post Evaluation    Cardiovascular status: hemodynamically stable    No notable events documented.

## 2024-03-18 ENCOUNTER — OFFICE VISIT (OUTPATIENT)
Dept: FAMILY MEDICINE CLINIC | Age: 69
End: 2024-03-18
Payer: MEDICARE

## 2024-03-18 VITALS
SYSTOLIC BLOOD PRESSURE: 122 MMHG | WEIGHT: 162.6 LBS | DIASTOLIC BLOOD PRESSURE: 74 MMHG | TEMPERATURE: 98.2 F | BODY MASS INDEX: 27.91 KG/M2 | OXYGEN SATURATION: 94 % | HEART RATE: 90 BPM

## 2024-03-18 DIAGNOSIS — R42 VERTIGO: ICD-10-CM

## 2024-03-18 DIAGNOSIS — M47.817 LUMBOSACRAL SPONDYLOSIS WITHOUT MYELOPATHY: ICD-10-CM

## 2024-03-18 DIAGNOSIS — S22.41XD CLOSED FRACTURE OF MULTIPLE RIBS OF RIGHT SIDE WITH ROUTINE HEALING, SUBSEQUENT ENCOUNTER: Primary | ICD-10-CM

## 2024-03-18 DIAGNOSIS — M54.16 CHRONIC LUMBAR RADICULOPATHY: ICD-10-CM

## 2024-03-18 PROCEDURE — G8399 PT W/DXA RESULTS DOCUMENT: HCPCS | Performed by: INTERNAL MEDICINE

## 2024-03-18 PROCEDURE — G8417 CALC BMI ABV UP PARAM F/U: HCPCS | Performed by: INTERNAL MEDICINE

## 2024-03-18 PROCEDURE — 99214 OFFICE O/P EST MOD 30 MIN: CPT | Performed by: INTERNAL MEDICINE

## 2024-03-18 PROCEDURE — 1036F TOBACCO NON-USER: CPT | Performed by: INTERNAL MEDICINE

## 2024-03-18 PROCEDURE — 1090F PRES/ABSN URINE INCON ASSESS: CPT | Performed by: INTERNAL MEDICINE

## 2024-03-18 PROCEDURE — 1123F ACP DISCUSS/DSCN MKR DOCD: CPT | Performed by: INTERNAL MEDICINE

## 2024-03-18 PROCEDURE — G8484 FLU IMMUNIZE NO ADMIN: HCPCS | Performed by: INTERNAL MEDICINE

## 2024-03-18 PROCEDURE — G8427 DOCREV CUR MEDS BY ELIG CLIN: HCPCS | Performed by: INTERNAL MEDICINE

## 2024-03-18 PROCEDURE — 3017F COLORECTAL CA SCREEN DOC REV: CPT | Performed by: INTERNAL MEDICINE

## 2024-03-18 PROCEDURE — 3078F DIAST BP <80 MM HG: CPT | Performed by: INTERNAL MEDICINE

## 2024-03-18 PROCEDURE — 3074F SYST BP LT 130 MM HG: CPT | Performed by: INTERNAL MEDICINE

## 2024-03-18 RX ORDER — GABAPENTIN 100 MG/1
100 CAPSULE ORAL 3 TIMES DAILY
Qty: 90 CAPSULE | Refills: 2 | Status: SHIPPED | OUTPATIENT
Start: 2024-03-18 | End: 2024-06-16

## 2024-03-18 RX ORDER — LIDOCAINE 50 MG/G
1 PATCH TOPICAL DAILY
Qty: 30 PATCH | Refills: 1 | Status: SHIPPED | OUTPATIENT
Start: 2024-03-18

## 2024-03-18 RX ORDER — MECLIZINE HYDROCHLORIDE 25 MG/1
25 TABLET ORAL 3 TIMES DAILY PRN
Qty: 90 TABLET | Refills: 3 | Status: SHIPPED | OUTPATIENT
Start: 2024-03-18

## 2024-03-18 NOTE — PROGRESS NOTES
[facility]  [facility]  [address] [place] [id number]  Dept: [phone]  Dept Fax: [phone]      Angela Hutchinson is a 68 y.o. female who presents today for hermedical conditions/complaints as noted below.  Angela Hutchinson is c/o of Gastroesophageal Reflux (F/u), Fall (Pt fell at home, slipped on wood floors at home, does have some broken ribs, been taking her Gabapentin 3 times a day ), and Medication Check (Discuss meds )        Assessment/Plan:     1. Closed fracture of multiple ribs of right side with routine healing, subsequent encounter  -     lidocaine (LIDODERM) 5 %; Place 1 patch onto the skin daily 12 hours on, 12 hours off., Disp-30 patch, R-1Normal  2. Lumbosacral spondylosis without myelopathy  -     gabapentin (NEURONTIN) 100 MG capsule; Take 1 capsule by mouth 3 times daily for 90 days., Disp-90 capsule, R-2Normal  3. Chronic lumbar radiculopathy  -     gabapentin (NEURONTIN) 100 MG capsule; Take 1 capsule by mouth 3 times daily for 90 days., Disp-90 capsule, R-2Normal  4. Vertigo  -     meclizine (ANTIVERT) 25 MG tablet; Take 1 tablet by mouth 3 times daily as needed for Dizziness, Disp-90 tablet, R-3Normal          No follow-ups on file.      HPI     HPI  Slipped while walking on hard floors in her rubber socks, fractured ribs on right side. Has grippy socks now.   Had right breast surgery two weeks after.   She continues to have a lot of discomfort - has not been taking the norco, but she has been taking more gabapentin - taking 100mg three times. Would like to have new Rx for current dosing so she doesn't run out     BP Readings from Last 3 Encounters:   03/18/24 122/74   03/12/24 (!) 147/93   02/26/24 (!) 143/68              Past Medical History:   Diagnosis Date    ADHD     mild, no RX    Arthritis     Blurred vision, right eye     wrinkle in Rt eye-    Breast cancer (HCC) 08/28/2023    Rt breast. Never had chemo or radiation. Only in the breast

## 2024-03-20 ENCOUNTER — OFFICE VISIT (OUTPATIENT)
Dept: SURGERY | Age: 69
End: 2024-03-20

## 2024-03-20 VITALS
WEIGHT: 163 LBS | OXYGEN SATURATION: 100 % | DIASTOLIC BLOOD PRESSURE: 79 MMHG | RESPIRATION RATE: 16 BRPM | HEIGHT: 64 IN | HEART RATE: 56 BPM | SYSTOLIC BLOOD PRESSURE: 141 MMHG | BODY MASS INDEX: 27.83 KG/M2

## 2024-03-20 DIAGNOSIS — Z98.890 POSTOPERATIVE STATE: Primary | ICD-10-CM

## 2024-03-20 PROCEDURE — 99024 POSTOP FOLLOW-UP VISIT: CPT | Performed by: PLASTIC SURGERY

## 2024-03-20 NOTE — PROGRESS NOTES
[facility] SURGICAL SPECIALISTS  [address]  [facility] [address]       OFFICE POST-OP NOTE    Patient Name:  Angela Hutchinson    :  1955    MRN:  [id number]  STATUS POST  No chief complaint on file.      SUBJECTIVE  Patient seen and examined.  Patient status post removal of right breast tissue expander, placement of right silicone implant 350 cc ultrahigh, patient status post capsulotomy right side and capsulorrhaphy right side, patient status post mastopexy left side.  Patient surgery was performed on 3/12/2024.        PHYSICAL EXAM  Vital Signs:  There were no vitals taken for this visit.    Incisions: Wound VAC is intact.  Skin:  No evidence of infection.   Neurologic:  Alert & oriented x 3.    ASSESSMENT   Diagnosis Orders   1. Postoperative state            PLAN  1.  Continue wound VAC  2.  Follow-up in 1 week.    Plan discussed with patient.    Electronically signed by:  LON LOWRY M.D.   3/20/2024

## 2024-03-22 ENCOUNTER — OFFICE VISIT (OUTPATIENT)
Dept: SURGERY | Age: 69
End: 2024-03-22

## 2024-03-22 VITALS
WEIGHT: 163 LBS | BODY MASS INDEX: 27.83 KG/M2 | DIASTOLIC BLOOD PRESSURE: 78 MMHG | HEIGHT: 64 IN | RESPIRATION RATE: 16 BRPM | HEART RATE: 83 BPM | OXYGEN SATURATION: 100 % | SYSTOLIC BLOOD PRESSURE: 134 MMHG

## 2024-03-22 DIAGNOSIS — Z98.890 POSTOPERATIVE STATE: Primary | ICD-10-CM

## 2024-03-22 PROCEDURE — 99024 POSTOP FOLLOW-UP VISIT: CPT | Performed by: PLASTIC SURGERY

## 2024-03-22 NOTE — PROGRESS NOTES
[facility] PHYSICIANS [facility] SURGICAL SPECIALISTS  [address]  [facility] [address]       OFFICE POST-OP NOTE    Patient Name:  Angela Hutchinson    :  1955    MRN:  [id number]  STATUS POST  Chief Complaint   Patient presents with    Post-Op Check     Bilateral expander DOS 3/12/2024            SUBJECTIVE  Patient seen and examined.  Patient status post removal of right breast tissue expander, placement of right silicone implant 350 cc ultrahigh, patient status post capsulotomy right side and capsulorrhaphy right side, patient status post mastopexy left side.  Patient surgery was performed on 3/12/2024.    Interim History:  Patient seen and examined. Wound vac removed. Patient states that she is doing well. She reports some skin irritation bilaterally near her axilla. She states there her  purchased an OTC antibiotic cream that she has been applying to the affected areas. She has no other complaints at this time.    PHYSICAL EXAM  Vital Signs:  /78 (Site: Left Upper Arm, Position: Sitting, Cuff Size: Medium Adult)   Pulse 83   Resp 16   Ht 1.626 m (5' 4\")   Wt 73.9 kg (163 lb)   SpO2 100%   BMI 27.98 kg/m²     Incisions: Suture lines dry and intact, healing well.    Skin:  No evidence of infection.   Neurologic:  Alert & oriented x 3.            ASSESSMENT   Diagnosis Orders   1. Postoperative state            PLAN  1. Patient instructed to continue applying antibiotic cream to areas of skin irritation at home.   2. Patient instructed to begin exercising the right breast as discussed in the office today.  3. Patient to follow up in 2-3 weeks.    Plan discussed with patient.    Electronically signed by:  LON LOWRY M.D.   3/22/2024

## 2024-03-25 ENCOUNTER — TELEPHONE (OUTPATIENT)
Dept: FAMILY MEDICINE CLINIC | Age: 69
End: 2024-03-25

## 2024-03-25 RX ORDER — TAMOXIFEN CITRATE 20 MG/1
20 TABLET ORAL DAILY
Qty: 90 TABLET | Refills: 1 | Status: SHIPPED | OUTPATIENT
Start: 2024-03-25 | End: 2024-06-23

## 2024-03-25 ASSESSMENT — ENCOUNTER SYMPTOMS
SHORTNESS OF BREATH: 0
CONSTIPATION: 0
CHOKING: 0
VOMITING: 0
DIARRHEA: 0
COUGH: 0
NAUSEA: 0
ANAL BLEEDING: 0
WHEEZING: 0
ABDOMINAL PAIN: 0
CHEST TIGHTNESS: 0
BLOOD IN STOOL: 0

## 2024-03-25 ASSESSMENT — VISUAL ACUITY: OU: 1

## 2024-03-25 NOTE — TELEPHONE ENCOUNTER
[facility]     Letter attached

## 2024-03-27 DIAGNOSIS — K21.9 GASTRO-ESOPHAGEAL REFLUX DISEASE WITHOUT ESOPHAGITIS: ICD-10-CM

## 2024-03-27 RX ORDER — OMEPRAZOLE 40 MG/1
40 CAPSULE, DELAYED RELEASE ORAL 2 TIMES DAILY
Qty: 180 CAPSULE | Refills: 1 | Status: SHIPPED | OUTPATIENT
Start: 2024-03-27

## 2024-03-27 NOTE — TELEPHONE ENCOUNTER
[facility]  Lab Frequency Next Occurrence   VL DUP CAROTID BILATERAL Once 01/04/2024   Vestibular test Once 12/07/2023   Vascular duplex carotid bilateral Once 01/09/2025               Patient Active Problem List:     Gastro-esophageal reflux disease without esophagitis     Epigastric abdominal pain     Allergic to bees     Anxiety     Depression     Esophageal spasm     Gas bloat syndrome     Hyperlipidemia     Hypertension     Irritable bowel syndrome     Osteoarthritis of knee     Osteopenia     Paraesophageal hernia     Primary hypertriglyceridemia     Tubular adenoma of colon     Macular pucker, right     Macular edema, cystoid, right     Centrilobular emphysema (HCC)     Vertebrogenic low back pain     Carotid stenosis, asymptomatic, bilateral     Neuropathy     Failed back syndrome     Chronic lumbar radiculopathy     Lumbosacral spondylosis without myelopathy     Arthralgia     Arthritis of foot     Closed fracture of base of fifth metatarsal bone     Lower extremity edema     Other mechanical complication of implanted electronic neurostimulator, generator, subsequent encounter     Spinal stenosis of thoracic region     Weakness of both lower extremities     Ductal carcinoma in situ (DCIS) of right breast     Age related osteoporosis     H/O recurrent vertebral fractures     Osteoporosis due to aromatase inhibitor     Osteopenia of multiple sites     Malignant neoplasm of lower-inner quadrant of right breast of female, estrogen receptor positive (HCC)     Parkinson's disease     Chemotherapy adverse reaction     Change in nail appearance     Breast asymmetry     History of breast cancer

## 2024-04-10 ENCOUNTER — OFFICE VISIT (OUTPATIENT)
Dept: SURGERY | Age: 69
End: 2024-04-10

## 2024-04-10 VITALS
WEIGHT: 160.6 LBS | HEIGHT: 64 IN | HEART RATE: 88 BPM | OXYGEN SATURATION: 96 % | RESPIRATION RATE: 16 BRPM | DIASTOLIC BLOOD PRESSURE: 90 MMHG | SYSTOLIC BLOOD PRESSURE: 141 MMHG | BODY MASS INDEX: 27.42 KG/M2

## 2024-04-10 DIAGNOSIS — Z98.890 POSTOPERATIVE STATE: Primary | ICD-10-CM

## 2024-04-10 PROCEDURE — 99024 POSTOP FOLLOW-UP VISIT: CPT | Performed by: PLASTIC SURGERY

## 2024-04-10 NOTE — PROGRESS NOTES
[facility] PHYSICIANS [facility] SURGICAL SPECIALISTS  [address]  [facility] [address]       OFFICE POST-OP NOTE    Patient Name:  Angela Hutchinson    :  1955    MRN:  [id number]  STATUS POST  Chief Complaint   Patient presents with    Post-Op Check     Left Breast  DOS: 2024       SUBJECTIVE  Patient seen and examined.  Patient status post removal of right breast tissue expander, placement of right silicone implant 350 cc ultrahigh, patient status post capsulotomy right side and capsulorrhaphy right side, patient status post mastopexy left side.  Patient surgery was performed on 3/12/2024.        PHYSICAL EXAM  Vital Signs:  BP (!) 141/90   Pulse 88   Resp 16   Ht 1.626 m (5' 4\")   Wt 72.8 kg (160 lb 9.6 oz)   SpO2 96%   BMI 27.57 kg/m²     Incisions: Suture line is intact.  Patient has a scabbing on the left breast T-zone.  Skin:  No evidence of infection.   Neurologic:  Alert & oriented x 3.    ASSESSMENT   Diagnosis Orders   1. Postoperative state            PLAN  1.  Continue breast exercises  2.  Follow-up in 3 week.    Plan discussed with patient.    Electronically signed by:  LON LOWRY M.D.   4/10/2024

## 2024-04-17 ENCOUNTER — OFFICE VISIT (OUTPATIENT)
Dept: SURGERY | Age: 69
End: 2024-04-17
Payer: MEDICARE

## 2024-04-17 VITALS
HEART RATE: 90 BPM | HEIGHT: 64 IN | WEIGHT: 161.6 LBS | SYSTOLIC BLOOD PRESSURE: 138 MMHG | OXYGEN SATURATION: 96 % | DIASTOLIC BLOOD PRESSURE: 78 MMHG | BODY MASS INDEX: 27.59 KG/M2

## 2024-04-17 DIAGNOSIS — D05.11 DUCTAL CARCINOMA IN SITU (DCIS) OF RIGHT BREAST: Primary | ICD-10-CM

## 2024-04-17 PROCEDURE — 3078F DIAST BP <80 MM HG: CPT | Performed by: SURGERY

## 2024-04-17 PROCEDURE — 3017F COLORECTAL CA SCREEN DOC REV: CPT | Performed by: SURGERY

## 2024-04-17 PROCEDURE — 1123F ACP DISCUSS/DSCN MKR DOCD: CPT | Performed by: SURGERY

## 2024-04-17 PROCEDURE — G8417 CALC BMI ABV UP PARAM F/U: HCPCS | Performed by: SURGERY

## 2024-04-17 PROCEDURE — 99212 OFFICE O/P EST SF 10 MIN: CPT | Performed by: SURGERY

## 2024-04-17 PROCEDURE — 1090F PRES/ABSN URINE INCON ASSESS: CPT | Performed by: SURGERY

## 2024-04-17 PROCEDURE — G8427 DOCREV CUR MEDS BY ELIG CLIN: HCPCS | Performed by: SURGERY

## 2024-04-17 PROCEDURE — 3075F SYST BP GE 130 - 139MM HG: CPT | Performed by: SURGERY

## 2024-04-17 PROCEDURE — 1036F TOBACCO NON-USER: CPT | Performed by: SURGERY

## 2024-04-17 PROCEDURE — G8399 PT W/DXA RESULTS DOCUMENT: HCPCS | Performed by: SURGERY

## 2024-05-08 DIAGNOSIS — G25.81 RESTLESS LEG SYNDROME: ICD-10-CM

## 2024-05-08 RX ORDER — ROPINIROLE 0.25 MG/1
0.25 TABLET, FILM COATED ORAL NIGHTLY
Qty: 90 TABLET | Refills: 1 | Status: SHIPPED | OUTPATIENT
Start: 2024-05-08

## 2024-05-08 NOTE — TELEPHONE ENCOUNTER
DUP CAROTID BILATERAL Once 01/04/2024   Vestibular test Once 12/07/2023   Vascular duplex carotid bilateral Once 01/09/2025   SPENCER DIGITAL SCREENING AUGMENTED BILATERAL Once 10/18/2024               Patient Active Problem List:     Gastro-esophageal reflux disease without esophagitis     Epigastric abdominal pain     Allergic to bees     Anxiety     Depression     Esophageal spasm     Gas bloat syndrome     Hyperlipidemia     Hypertension     Irritable bowel syndrome     Osteoarthritis of knee     Osteopenia     Paraesophageal hernia     Primary hypertriglyceridemia     Tubular adenoma of colon     Macular pucker, right     Macular edema, cystoid, right     Centrilobular emphysema (HCC)     Vertebrogenic low back pain     Carotid stenosis, asymptomatic, bilateral     Neuropathy     Failed back syndrome     Chronic lumbar radiculopathy     Lumbosacral spondylosis without myelopathy     Arthralgia     Arthritis of foot     Closed fracture of base of fifth metatarsal bone     Lower extremity edema     Other mechanical complication of implanted electronic neurostimulator, generator, subsequent encounter     Spinal stenosis of thoracic region     Weakness of both lower extremities     Ductal carcinoma in situ (DCIS) of right breast     Age related osteoporosis     H/O recurrent vertebral fractures     Osteoporosis due to aromatase inhibitor     Osteopenia of multiple sites     Malignant neoplasm of lower-inner quadrant of right breast of female, estrogen receptor positive (HCC)     Parkinson's disease (HCC)     Chemotherapy adverse reaction     Change in nail appearance     Breast asymmetry     History of breast cancer

## 2024-05-28 ENCOUNTER — OFFICE VISIT (OUTPATIENT)
Dept: ONCOLOGY | Age: 69
End: 2024-05-28
Payer: MEDICARE

## 2024-05-28 ENCOUNTER — TELEPHONE (OUTPATIENT)
Dept: ONCOLOGY | Age: 69
End: 2024-05-28

## 2024-05-28 VITALS
SYSTOLIC BLOOD PRESSURE: 131 MMHG | WEIGHT: 160 LBS | DIASTOLIC BLOOD PRESSURE: 72 MMHG | BODY MASS INDEX: 27.46 KG/M2 | HEART RATE: 85 BPM | TEMPERATURE: 97.3 F

## 2024-05-28 DIAGNOSIS — D05.11 DUCTAL CARCINOMA IN SITU (DCIS) OF RIGHT BREAST: Primary | ICD-10-CM

## 2024-05-28 DIAGNOSIS — T45.1X5D ADVERSE EFFECT OF CHEMOTHERAPY, SUBSEQUENT ENCOUNTER: ICD-10-CM

## 2024-05-28 DIAGNOSIS — L65.9 HAIR LOSS: ICD-10-CM

## 2024-05-28 PROCEDURE — 1036F TOBACCO NON-USER: CPT | Performed by: INTERNAL MEDICINE

## 2024-05-28 PROCEDURE — G8417 CALC BMI ABV UP PARAM F/U: HCPCS | Performed by: INTERNAL MEDICINE

## 2024-05-28 PROCEDURE — 99214 OFFICE O/P EST MOD 30 MIN: CPT | Performed by: INTERNAL MEDICINE

## 2024-05-28 PROCEDURE — G8399 PT W/DXA RESULTS DOCUMENT: HCPCS | Performed by: INTERNAL MEDICINE

## 2024-05-28 PROCEDURE — G8427 DOCREV CUR MEDS BY ELIG CLIN: HCPCS | Performed by: INTERNAL MEDICINE

## 2024-05-28 PROCEDURE — 3078F DIAST BP <80 MM HG: CPT | Performed by: INTERNAL MEDICINE

## 2024-05-28 PROCEDURE — 3075F SYST BP GE 130 - 139MM HG: CPT | Performed by: INTERNAL MEDICINE

## 2024-05-28 PROCEDURE — 1123F ACP DISCUSS/DSCN MKR DOCD: CPT | Performed by: INTERNAL MEDICINE

## 2024-05-28 PROCEDURE — 3017F COLORECTAL CA SCREEN DOC REV: CPT | Performed by: INTERNAL MEDICINE

## 2024-05-28 PROCEDURE — 1090F PRES/ABSN URINE INCON ASSESS: CPT | Performed by: INTERNAL MEDICINE

## 2024-05-28 NOTE — PROGRESS NOTES
Patient ID: Angela Hutchinson, 1955, [id number], 68 y.o.  Referred by :  No ref. provider found   Reason for consultation: Right lower DCIS      HISTORY OF PRESENT ILLNESS:    Oncologic History:    Angela Hutchinson is a very pleasant 68 y.o. female.  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% and negative for progesterone  Osteopenia with recurrent fracture  Patient today to discuss MRI finding and adjuvant hormonal treatment  Bone density scan did show osteopenia and decided not to proceed with aromatase inhibitors but tamoxifen    Age at menarche 11  Number of pregnancy 4  Menopause 43  Birth control for 5 years  No family history of cancer    Problem list  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 Never had chemo or radiation. Only in the breast duct    CAD (coronary artery disease)     Noted on CT lung screen    Carotid artery disease (HCC)     dr miller vascular last appt 1/20. Has carotid stenosis, pt reports \"left side is worse than right\". The pt just reported she saw him in 12/2023 and the

## 2024-05-28 NOTE — TELEPHONE ENCOUNTER
AVS 05/28/24      Rtc in 3 months with labs    RV 08/27/24    Labs to be done week prior    PT was given AVS and appt schedule    Electronically signed by Mounika Shankar on 5/28/2024 at 3:07 PM

## 2024-05-29 ENCOUNTER — OFFICE VISIT (OUTPATIENT)
Dept: SURGERY | Age: 69
End: 2024-05-29

## 2024-05-29 VITALS
HEIGHT: 64 IN | RESPIRATION RATE: 16 BRPM | DIASTOLIC BLOOD PRESSURE: 83 MMHG | OXYGEN SATURATION: 100 % | WEIGHT: 160 LBS | BODY MASS INDEX: 27.31 KG/M2 | HEART RATE: 85 BPM | SYSTOLIC BLOOD PRESSURE: 134 MMHG

## 2024-05-29 DIAGNOSIS — Z98.890 POSTOPERATIVE STATE: Primary | ICD-10-CM

## 2024-05-29 PROCEDURE — 99024 POSTOP FOLLOW-UP VISIT: CPT | Performed by: PLASTIC SURGERY

## 2024-05-29 NOTE — PROGRESS NOTES
[facility] PHYSICIANS [facility], [facility] SPECIAL[facility]  [address]  [facility] [address]       OFFICE POST-OP NOTE    Patient Name:  Angela Hutchinson    :  1955    MRN:  [id number]  STATUS POST  Chief Complaint   Patient presents with    Post-Op Check     Right Breast Silicone Implant, Left Breast Mastopexy       SUBJECTIVE  Patient seen and examined.  Patient status post removal of right breast tissue expander, placement of right silicone implant 350 cc ultrahigh, patient status post capsulotomy right side and capsulorrhaphy right side, patient status post mastopexy left side.  Patient surgery was performed on 3/12/2024.        PHYSICAL EXAM  Vital Signs:  /83 (Site: Left Upper Arm, Position: Sitting, Cuff Size: Small Adult)   Pulse 85   Resp 16   Ht 1.626 m (5' 4\")   Wt 72.6 kg (160 lb)   SpO2 100%   BMI 27.46 kg/m²     Incisions: Suture line is intact.  Patient is healing well.  Skin:  No evidence of infection.   Neurologic:  Alert & oriented x 3.    ASSESSMENT   Diagnosis Orders   1. Postoperative state              PLAN  1.  Continue breast exercises  2.  Follow-up in 3 months.    Plan discussed with patient.    Electronically signed by:  LON LOWRY M.D.   2024

## 2024-05-31 ENCOUNTER — TELEPHONE (OUTPATIENT)
Dept: FAMILY MEDICINE CLINIC | Age: 69
End: 2024-05-31

## 2024-05-31 NOTE — TELEPHONE ENCOUNTER
Recived fax from [facility] requesting letter stating if it is okay for pt to hold aspirin before procedure     Procedure date is 06/20/2024       Fax attached

## 2024-06-03 NOTE — TELEPHONE ENCOUNTER
she can hold her aspirin for 7 days prior to surgical procedure. Last date of aspirin should be 6/13/24 and she may resume dosing 24 hours after procedure if appropriate per surgeon.  Letter written, please fax as indicated

## 2024-06-25 ENCOUNTER — TELEMEDICINE (OUTPATIENT)
Dept: FAMILY MEDICINE CLINIC | Age: 69
End: 2024-06-25
Payer: MEDICARE

## 2024-06-25 DIAGNOSIS — M54.41 CHRONIC BILATERAL LOW BACK PAIN WITH RIGHT-SIDED SCIATICA: ICD-10-CM

## 2024-06-25 DIAGNOSIS — J43.2 CENTRILOBULAR EMPHYSEMA (HCC): ICD-10-CM

## 2024-06-25 DIAGNOSIS — G89.29 CHRONIC BILATERAL LOW BACK PAIN WITH RIGHT-SIDED SCIATICA: ICD-10-CM

## 2024-06-25 DIAGNOSIS — K59.03 DRUG-INDUCED CONSTIPATION: ICD-10-CM

## 2024-06-25 DIAGNOSIS — M54.16 CHRONIC LUMBAR RADICULOPATHY: Primary | ICD-10-CM

## 2024-06-25 PROCEDURE — G8427 DOCREV CUR MEDS BY ELIG CLIN: HCPCS | Performed by: INTERNAL MEDICINE

## 2024-06-25 PROCEDURE — 1090F PRES/ABSN URINE INCON ASSESS: CPT | Performed by: INTERNAL MEDICINE

## 2024-06-25 PROCEDURE — 3017F COLORECTAL CA SCREEN DOC REV: CPT | Performed by: INTERNAL MEDICINE

## 2024-06-25 PROCEDURE — 99214 OFFICE O/P EST MOD 30 MIN: CPT | Performed by: INTERNAL MEDICINE

## 2024-06-25 PROCEDURE — G8399 PT W/DXA RESULTS DOCUMENT: HCPCS | Performed by: INTERNAL MEDICINE

## 2024-06-25 PROCEDURE — 1123F ACP DISCUSS/DSCN MKR DOCD: CPT | Performed by: INTERNAL MEDICINE

## 2024-06-25 PROCEDURE — 3023F SPIROM DOC REV: CPT | Performed by: INTERNAL MEDICINE

## 2024-06-25 PROCEDURE — G8417 CALC BMI ABV UP PARAM F/U: HCPCS | Performed by: INTERNAL MEDICINE

## 2024-06-25 PROCEDURE — 1036F TOBACCO NON-USER: CPT | Performed by: INTERNAL MEDICINE

## 2024-06-25 RX ORDER — LACTULOSE 10 G/15ML
10 SOLUTION ORAL 2 TIMES DAILY
Qty: 420 ML | Refills: 0 | Status: SHIPPED | OUTPATIENT
Start: 2024-06-25

## 2024-06-25 RX ORDER — TRAMADOL HYDROCHLORIDE 50 MG/1
50 TABLET ORAL EVERY 8 HOURS PRN
Qty: 15 TABLET | Refills: 0 | Status: SHIPPED | OUTPATIENT
Start: 2024-06-25 | End: 2024-07-02

## 2024-06-25 RX ORDER — BACLOFEN 10 MG/1
10 TABLET ORAL 3 TIMES DAILY PRN
Qty: 90 TABLET | Refills: 1 | Status: SHIPPED | OUTPATIENT
Start: 2024-06-25

## 2024-06-25 NOTE — PROGRESS NOTES
Angela Hutchinson, was evaluated through a synchronous (real-time) audio-video encounter. The patient (or guardian if applicable) is aware that this is a billable service, which includes applicable co-pays. This Virtual Visit was conducted with patient's (and/or legal guardian's) consent. Patient identification was verified, and a caregiver was present when appropriate.   The patient was located at Home: [address] [place] [address]  Provider was located at Facility (Appt Dept): [address] [place] [address]  Confirm you are appropriately licensed, registered, or certified to deliver care in the state where the patient is located as indicated above. If you are not or unsure, please re-schedule the visit: Yes, I confirm.     Angela Hutchinson (:  1955) is a Established patient, presenting virtually for evaluation of the following:    Assessment & Plan   Below is the assessment and plan developed based on review of pertinent history, physical exam, labs, studies, and medications.  1. Chronic lumbar radiculopathy  -     traMADol (ULTRAM) 50 MG tablet; Take 1 tablet by mouth every 8 hours as needed for Pain for up to 7 days. Intended supply: 7 days. Take lowest dose possible to manage pain Max Daily Amount: 150 mg, Disp-15 tablet, R-0Normal  2. Chronic bilateral low back pain with right-sided sciatica  -     baclofen (LIORESAL) 10 MG tablet; Take 1 tablet by mouth 3 times daily as needed (pain), Disp-90 tablet, R-1Normal  3. Centrilobular emphysema (HCC)  -     ipratropium (ATROVENT) 0.02 % nebulizer solution; Take 2.5 mLs by nebulization 4 times daily, Disp-300 mL, R-5Normal  4. Drug-induced constipation  -     lactulose (CHRONULAC) 10 GM/15ML solution; Take 15 mLs by mouth 2 times daily, Disp-420 mL, R-0Normal    No follow-ups on file.     Patient Instructions   Increase Glycolax to 2 scoops daily   Continue prune juice   Add lactulose if needed - prescription called in today   Try tramadol

## 2024-06-25 NOTE — PATIENT INSTRUCTIONS
Increase Glycolax to 2 scoops daily   Continue prune juice   Add lactulose if needed - prescription called in today   Try tramadol at bedtime to see if helps with the pain without worsening constipation   Call Dr. Mcdowell's office to follow-up on the dizziness you are having

## 2024-07-05 DIAGNOSIS — E78.2 MIXED HYPERLIPIDEMIA: ICD-10-CM

## 2024-07-05 RX ORDER — PRAVASTATIN SODIUM 20 MG
TABLET ORAL
Qty: 90 TABLET | Refills: 1 | Status: SHIPPED | OUTPATIENT
Start: 2024-07-05

## 2024-07-05 ASSESSMENT — ENCOUNTER SYMPTOMS
ABDOMINAL PAIN: 0
WHEEZING: 0
BACK PAIN: 1
ANAL BLEEDING: 0
BLOOD IN STOOL: 0
NAUSEA: 0
SHORTNESS OF BREATH: 0
CONSTIPATION: 1
VOMITING: 0
COUGH: 0
CHOKING: 0
CHEST TIGHTNESS: 0
DIARRHEA: 0

## 2024-07-05 NOTE — TELEPHONE ENCOUNTER
Last visit: 6/25/24  Last Med refill: 8/9/23  Does patient have enough medication for 72 hours: No:     Next Visit Date:  Future Appointments   Date Time Provider Department Center   8/27/2024  3:00 PM Kodi Anderson MD [facility]   9/4/2024 11:30 AM Jo Conway MD [facility]   10/16/2024 11:00 AM Janneth Tsang MD [facility]       Health Maintenance   Topic Date Due    Shingles vaccine (2 of 3) 05/17/2025 (Originally 11/20/2016)    DTaP/Tdap/Td vaccine (1 - Tdap) 09/09/2030 (Originally 9/10/2020)    Flu vaccine (1) 08/01/2024    Breast cancer screen  08/15/2024    Annual Wellness Visit (Medicare)  11/06/2024    Low dose CT lung screening &/or counseling  11/17/2024    Lipids  12/06/2024    Depression Monitoring  01/11/2025    GFR test (Diabetes, CKD 3-4, OR last GFR 15-59)  02/26/2025    Colorectal Cancer Screen  09/06/2027    DEXA (modify frequency per FRAX score)  Completed    Pneumococcal 65+ years Vaccine  Completed    COVID-19 Vaccine  Completed    Respiratory Syncytial Virus (RSV) Pregnant or age 60 yrs+  Completed    Hepatitis C screen  Completed    Hepatitis A vaccine  Aged Out    Hepatitis B vaccine  Aged Out    Hib vaccine  Aged Out    Polio vaccine  Aged Out    Meningococcal (ACWY) vaccine  Aged Out    A1C test (Diabetic or Prediabetic)  Discontinued    Diabetes screen  Discontinued       Hemoglobin A1C (%)   Date Value   11/06/2023 5.3   08/23/2022 5.8             ( goal A1C is < 7)   No components found for: \"LABMICR\"  No components found for: \"LDLCHOLESTEROL\", \"LDLCALC\"    (goal LDL is <100)   AST (U/L)   Date Value   12/06/2023 23     ALT (U/L)   Date Value   12/06/2023 14     BUN (mg/dL)   Date Value   02/26/2024 19     BP Readings from Last 3 Encounters:   05/29/24 134/83   05/28/24 131/72   04/17/24 138/78          (goal 120/80)    All Future Testing planned in [facility]  Lab Frequency Next Occurrence   VL DUP CAROTID BILATERAL Once 01/04/2024   Vestibular test Once

## 2024-07-23 ENCOUNTER — APPOINTMENT (OUTPATIENT)
Dept: CT IMAGING | Age: 69
End: 2024-07-23
Payer: MEDICARE

## 2024-07-23 ENCOUNTER — HOSPITAL ENCOUNTER (EMERGENCY)
Age: 69
Discharge: HOME | End: 2024-07-23
Attending: EMERGENCY MEDICINE
Payer: MEDICARE

## 2024-07-23 VITALS
HEART RATE: 79 BPM | WEIGHT: 168 LBS | RESPIRATION RATE: 18 BRPM | BODY MASS INDEX: 28.84 KG/M2 | SYSTOLIC BLOOD PRESSURE: 147 MMHG | OXYGEN SATURATION: 97 % | DIASTOLIC BLOOD PRESSURE: 73 MMHG | TEMPERATURE: 98.1 F

## 2024-07-23 DIAGNOSIS — S39.012A BACK STRAIN, INITIAL ENCOUNTER: Primary | ICD-10-CM

## 2024-07-23 DIAGNOSIS — N39.0 URINARY TRACT INFECTION WITHOUT HEMATURIA, SITE UNSPECIFIED: ICD-10-CM

## 2024-07-23 LAB
ANION GAP SERPL CALCULATED.3IONS-SCNC: 10 MMOL/L (ref 9–17)
BACTERIA URNS QL MICRO: ABNORMAL
BASOPHILS # BLD: 0.04 K/UL (ref 0–0.2)
BASOPHILS NFR BLD: 1 % (ref 0–2)
BILIRUB UR QL STRIP: NEGATIVE
BUN SERPL-MCNC: 17 MG/DL (ref 8–23)
BUN/CREAT SERPL: 19 (ref 9–20)
CALCIUM SERPL-MCNC: 8.5 MG/DL (ref 8.6–10.4)
CHLORIDE SERPL-SCNC: 111 MMOL/L (ref 98–107)
CLARITY UR: ABNORMAL
CO2 SERPL-SCNC: 22 MMOL/L (ref 20–31)
COLOR UR: YELLOW
CREAT SERPL-MCNC: 0.9 MG/DL (ref 0.5–0.9)
EOSINOPHIL # BLD: 0.22 K/UL (ref 0–0.44)
EOSINOPHILS RELATIVE PERCENT: 3 % (ref 1–4)
EPI CELLS #/AREA URNS HPF: ABNORMAL /HPF (ref 0–5)
ERYTHROCYTE [DISTWIDTH] IN BLOOD BY AUTOMATED COUNT: 15.8 % (ref 11.8–14.4)
GFR, ESTIMATED: 69 ML/MIN/1.73M2
GLUCOSE SERPL-MCNC: 96 MG/DL (ref 70–99)
GLUCOSE UR STRIP-MCNC: NEGATIVE MG/DL
HCT VFR BLD AUTO: 36.1 % (ref 36.3–47.1)
HGB BLD-MCNC: 11.2 G/DL (ref 11.9–15.1)
HGB UR QL STRIP.AUTO: NEGATIVE
IMM GRANULOCYTES # BLD AUTO: 0.01 K/UL (ref 0–0.3)
IMM GRANULOCYTES NFR BLD: 0 %
KETONES UR STRIP-MCNC: NEGATIVE MG/DL
LEUKOCYTE ESTERASE UR QL STRIP: ABNORMAL
LYMPHOCYTES NFR BLD: 2.25 K/UL (ref 1.1–3.7)
LYMPHOCYTES RELATIVE PERCENT: 29 % (ref 24–43)
MCH RBC QN AUTO: 26.7 PG (ref 25.2–33.5)
MCHC RBC AUTO-ENTMCNC: 31 G/DL (ref 28.4–34.8)
MCV RBC AUTO: 86.2 FL (ref 82.6–102.9)
MONOCYTES NFR BLD: 0.72 K/UL (ref 0.1–1.2)
MONOCYTES NFR BLD: 9 % (ref 3–12)
NEUTROPHILS NFR BLD: 58 % (ref 36–65)
NEUTS SEG NFR BLD: 4.62 K/UL (ref 1.5–8.1)
NITRITE UR QL STRIP: POSITIVE
NRBC BLD-RTO: 0 PER 100 WBC
PH UR STRIP: 6 [PH] (ref 5–8)
PLATELET # BLD AUTO: 177 K/UL (ref 138–453)
PMV BLD AUTO: 10.6 FL (ref 8.1–13.5)
POTASSIUM SERPL-SCNC: 4.1 MMOL/L (ref 3.7–5.3)
PROT UR STRIP-MCNC: NEGATIVE MG/DL
RBC # BLD AUTO: 4.19 M/UL (ref 3.95–5.11)
RBC # BLD: ABNORMAL 10*6/UL
RBC #/AREA URNS HPF: ABNORMAL /HPF (ref 0–2)
SODIUM SERPL-SCNC: 143 MMOL/L (ref 135–144)
SP GR UR STRIP: 1.03 (ref 1–1.03)
UROBILINOGEN UR STRIP-ACNC: NORMAL EU/DL (ref 0–1)
WBC #/AREA URNS HPF: ABNORMAL /HPF (ref 0–5)
WBC OTHER # BLD: 7.9 K/UL (ref 3.5–11.3)

## 2024-07-23 PROCEDURE — 80048 BASIC METABOLIC PNL TOTAL CA: CPT

## 2024-07-23 PROCEDURE — 72128 CT CHEST SPINE W/O DYE: CPT

## 2024-07-23 PROCEDURE — 87077 CULTURE AEROBIC IDENTIFY: CPT

## 2024-07-23 PROCEDURE — 72131 CT LUMBAR SPINE W/O DYE: CPT

## 2024-07-23 PROCEDURE — 99284 EMERGENCY DEPT VISIT MOD MDM: CPT

## 2024-07-23 PROCEDURE — 81001 URINALYSIS AUTO W/SCOPE: CPT

## 2024-07-23 PROCEDURE — 87186 SC STD MICRODIL/AGAR DIL: CPT

## 2024-07-23 PROCEDURE — 87086 URINE CULTURE/COLONY COUNT: CPT

## 2024-07-23 PROCEDURE — 85025 COMPLETE CBC W/AUTO DIFF WBC: CPT

## 2024-07-23 PROCEDURE — 6370000000 HC RX 637 (ALT 250 FOR IP): Performed by: NURSE PRACTITIONER

## 2024-07-23 RX ORDER — CEPHALEXIN 500 MG/1
500 CAPSULE ORAL ONCE
Status: COMPLETED | OUTPATIENT
Start: 2024-07-23 | End: 2024-07-23

## 2024-07-23 RX ORDER — LIDOCAINE 4 G/G
2 PATCH TOPICAL DAILY
Status: DISCONTINUED | OUTPATIENT
Start: 2024-07-23 | End: 2024-07-24 | Stop reason: HOSPADM

## 2024-07-23 RX ORDER — OXYCODONE HYDROCHLORIDE AND ACETAMINOPHEN 5; 325 MG/1; MG/1
1 TABLET ORAL ONCE
Status: COMPLETED | OUTPATIENT
Start: 2024-07-23 | End: 2024-07-23

## 2024-07-23 RX ORDER — CEPHALEXIN 500 MG/1
500 CAPSULE ORAL 2 TIMES DAILY
Qty: 14 CAPSULE | Refills: 0 | Status: SHIPPED | OUTPATIENT
Start: 2024-07-23 | End: 2024-07-30

## 2024-07-23 RX ORDER — METHOCARBAMOL 500 MG/1
500 TABLET, FILM COATED ORAL 3 TIMES DAILY PRN
Qty: 30 TABLET | Refills: 0 | Status: SHIPPED | OUTPATIENT
Start: 2024-07-23 | End: 2024-08-02

## 2024-07-23 RX ADMIN — CEPHALEXIN 500 MG: 500 CAPSULE ORAL at 22:13

## 2024-07-23 RX ADMIN — OXYCODONE HYDROCHLORIDE AND ACETAMINOPHEN 1 TABLET: 5; 325 TABLET ORAL at 21:09

## 2024-07-23 ASSESSMENT — ENCOUNTER SYMPTOMS
ABDOMINAL PAIN: 0
BACK PAIN: 1
COLOR CHANGE: 0

## 2024-07-23 NOTE — ED PROVIDER NOTES
eMERGENCY dEPARTMENT eNCOUnter   Independent Attestation     Pt Name: Angela Hutchinson  MRN: [id number]  Birthdate 1955  Date of evaluation: 7/23/24     Angela Hutchinson is a 68 y.o. female with CC: Back Pain (Spinal stimulator surgery 6 weeks ago, pain started back up 3 days ago. Pain to middle back)      Based on the medical record the care appears appropriate.  I was personally available for consultation in the Emergency Department.    Ashvin Giron DO  Attending Emergency Physician                  Ashvin Giron DO  07/23/24 [id number]

## 2024-07-24 NOTE — DISCHARGE INSTRUCTIONS
Take medications as prescribed.  Robaxin can cause drowsiness.  Follow-up with your primary care provider.  Return to emergency department for worsening or new symptoms.  You may also follow-up with urologist provided for your recurrent urinary tract infections.

## 2024-07-24 NOTE — ED PROVIDER NOTES
[facility] ED  eMERGENCY dEPARTMENT eNCOUnter      Pt Name: Angela Hutchinson  MRN: [id number]  Birthdate 1955  Date of evaluation: 7/23/2024  Provider: ERICA Romero CNP    CHIEF COMPLAINT       Chief Complaint   Patient presents with    Back Pain     Spinal stimulator surgery 6 weeks ago, pain started back up 3 days ago. Pain to middle back         HISTORY OF PRESENT ILLNESS  (Location/Symptom, Timing/Onset, Context/Setting, Quality, Duration, Modifying Factors, Severity.)   Angela Hutchinson is a 68 y.o. female who presents to the emergency department for evaluation of mid lower back pain after she was bending over to  laundry today.  Patient has history of chronic back pain.  Rates her pain a 5 out of 10 aggravated with certain movement.  Pain described as a tightness and sharp pain in her back.  Pain does not radiate down her legs.  Denies loss of bowel or bladder control.  No dysuria or hematuria.  No abdominal pain.  No history of kidney stones.      Nursing Notes were reviewed.    ALLERGIES     Morphine, Bee venom, and Wasp venom protein    CURRENT MEDICATIONS       Discharge Medication List as of 7/23/2024 10:48 PM        CONTINUE these medications which have NOT CHANGED    Details   pravastatin (PRAVACHOL) 20 MG tablet TAKE 1 TABLET BY MOUTH DAILY, Disp-90 tablet, R-1Normal      baclofen (LIORESAL) 10 MG tablet Take 1 tablet by mouth 3 times daily as needed (pain), Disp-90 tablet, R-1Normal      ipratropium (ATROVENT) 0.02 % nebulizer solution Take 2.5 mLs by nebulization 4 times daily, Disp-300 mL, R-5Normal      lactulose (CHRONULAC) 10 GM/15ML solution Take 15 mLs by mouth 2 times daily, Disp-420 mL, R-0Normal      rOPINIRole (REQUIP) 0.25 MG tablet TAKE ONE TABLET BY MOUTH ONCE NIGHTLY, Disp-90 tablet, R-1Normal      omeprazole (PRILOSEC) 40 MG delayed release capsule Take 1 capsule by mouth in the morning and at bedtime TAKE ONE CAPSULE BY MOUTH TWICE A DAY, Disp-180

## 2024-07-25 LAB
MICROORGANISM SPEC CULT: ABNORMAL
SPECIMEN DESCRIPTION: ABNORMAL

## 2024-08-02 ENCOUNTER — OFFICE VISIT (OUTPATIENT)
Dept: ONCOLOGY | Age: 69
End: 2024-08-02
Payer: MEDICARE

## 2024-08-02 VITALS
SYSTOLIC BLOOD PRESSURE: 134 MMHG | DIASTOLIC BLOOD PRESSURE: 77 MMHG | TEMPERATURE: 97.9 F | HEART RATE: 97 BPM | WEIGHT: 162.4 LBS | BODY MASS INDEX: 27.88 KG/M2

## 2024-08-02 DIAGNOSIS — D64.9 NORMOCYTIC ANEMIA: ICD-10-CM

## 2024-08-02 DIAGNOSIS — D05.11 DUCTAL CARCINOMA IN SITU (DCIS) OF RIGHT BREAST: ICD-10-CM

## 2024-08-02 DIAGNOSIS — L65.9 HAIR LOSS: Primary | ICD-10-CM

## 2024-08-02 DIAGNOSIS — M85.89 OSTEOPENIA OF MULTIPLE SITES: ICD-10-CM

## 2024-08-02 PROCEDURE — 3075F SYST BP GE 130 - 139MM HG: CPT | Performed by: INTERNAL MEDICINE

## 2024-08-02 PROCEDURE — G8427 DOCREV CUR MEDS BY ELIG CLIN: HCPCS | Performed by: INTERNAL MEDICINE

## 2024-08-02 PROCEDURE — G8417 CALC BMI ABV UP PARAM F/U: HCPCS | Performed by: INTERNAL MEDICINE

## 2024-08-02 PROCEDURE — 1036F TOBACCO NON-USER: CPT | Performed by: INTERNAL MEDICINE

## 2024-08-02 PROCEDURE — 3017F COLORECTAL CA SCREEN DOC REV: CPT | Performed by: INTERNAL MEDICINE

## 2024-08-02 PROCEDURE — 99214 OFFICE O/P EST MOD 30 MIN: CPT | Performed by: INTERNAL MEDICINE

## 2024-08-02 PROCEDURE — 1123F ACP DISCUSS/DSCN MKR DOCD: CPT | Performed by: INTERNAL MEDICINE

## 2024-08-02 PROCEDURE — 3078F DIAST BP <80 MM HG: CPT | Performed by: INTERNAL MEDICINE

## 2024-08-02 PROCEDURE — G8399 PT W/DXA RESULTS DOCUMENT: HCPCS | Performed by: INTERNAL MEDICINE

## 2024-08-02 PROCEDURE — 1090F PRES/ABSN URINE INCON ASSESS: CPT | Performed by: INTERNAL MEDICINE

## 2024-08-02 RX ORDER — ANASTROZOLE 1 MG/1
1 TABLET ORAL DAILY
Qty: 30 TABLET | Refills: 0 | Status: SHIPPED | OUTPATIENT
Start: 2024-08-02 | End: 2024-09-01

## 2024-08-02 NOTE — PROGRESS NOTES
reconstruction, and/or  weight based adjustment of the mA/kV was utilized to reduce the radiation  dose to as low as reasonably achievable.    COMPARISON:  10/28/2022    HISTORY:  ORDERING SYSTEM PROVIDED HISTORY: Mid back pain while bending over picking up  laundry today  TECHNOLOGIST PROVIDED HISTORY:  Mid back pain while bending over picking up laundry today  Reason for Exam: Mid back pain while bending over picking up laundry today;  ORDERING SYSTEM PROVIDED HISTORY: Lower back pain after bending over to pick  up laundry today  TECHNOLOGIST PROVIDED HISTORY:  Lower back pain after bending over to  laundry today  Decision Support Exception - unselect if not a suspected or confirmed  emergency medical condition->Emergency Medical Condition (MA)  Reason for Exam: lower back pain while bending over picking up laundry today    FINDINGS:  BONES/ALIGNMENT: The bones are demineralized.  There is grade 1  anterolisthesis L4 on L5.  The vertebral body heights are maintained. No  osseous destructive lesion is seen.  Status post posterior decompression  stabilization of L3-L5.    DEGENERATIVE CHANGES: There is mild multilevel spondylosis and facet  arthropathy.    SOFT TISSUES: No paraspinal mass is seen.  A spinal stimulator is insitu.    There is bibasilar scarring.  Coronary artery calcifications are a marker of  atherosclerosis.  Small to moderate hiatal hernia is noted.  Status post  cholecystectomy.  Mild-to-moderate atherosclerosis involves the abdominal  aorta and bilateral common Ayleen ease.  Impression: 1. No acute fracture.  2. Unchanged grade 1 anterolisthesis of L4 on L5.  CT THORACIC SPINE WO CONTRAST  Narrative: EXAMINATION:  CT OF THE THORACIC SPINE WITHOUT CONTRAST; CT OF THE LUMBAR SPINE WITHOUT  CONTRAST  7/23/2024 9:28 pm; 7/23/2024 9:29 pm:    TECHNIQUE:  CT of the thoracic spine was performed without the administration of  intravenous contrast. Multiplanar reformatted images are provided for

## 2024-08-07 DIAGNOSIS — I10 ESSENTIAL HYPERTENSION: ICD-10-CM

## 2024-08-07 NOTE — TELEPHONE ENCOUNTER
Last visit: 6/25/24  Last Med refill: 11/13/23  Does patient have enough medication for 72 hours: No:     Next Visit Date:  Future Appointments   Date Time Provider Department Center   8/27/2024  2:45 PM Kodi Anderson MD [facility]   9/4/2024 11:30 AM Jo Conway MD [facility]   10/16/2024 11:00 AM Janneth Tsang MD [facility]       Health Maintenance   Topic Date Due    Flu vaccine (1) 08/01/2024    Breast cancer screen  08/15/2024    Shingles vaccine (2 of 3) 05/17/2025 (Originally 11/20/2016)    DTaP/Tdap/Td vaccine (1 - Tdap) 09/09/2030 (Originally 9/10/2020)    Annual Wellness Visit (Medicare)  11/06/2024    Lung Cancer Screening &/or Counseling  11/17/2024    Lipids  12/06/2024    Depression Monitoring  01/11/2025    GFR test (Diabetes, CKD 3-4, OR last GFR 15-59)  07/23/2025    Colorectal Cancer Screen  09/06/2027    DEXA (modify frequency per FRAX score)  Completed    Pneumococcal 65+ years Vaccine  Completed    COVID-19 Vaccine  Completed    Respiratory Syncytial Virus (RSV) Pregnant or age 60 yrs+  Completed    Hepatitis C screen  Completed    Hepatitis A vaccine  Aged Out    Hepatitis B vaccine  Aged Out    Hib vaccine  Aged Out    Polio vaccine  Aged Out    Meningococcal (ACWY) vaccine  Aged Out    A1C test (Diabetic or Prediabetic)  Discontinued    Diabetes screen  Discontinued       Hemoglobin A1C (%)   Date Value   11/06/2023 5.3   08/23/2022 5.8             ( goal A1C is < 7)   No components found for: \"LABMICR\"  No components found for: \"LDLCHOLESTEROL\", \"LDLCALC\"    (goal LDL is <100)   AST (U/L)   Date Value   12/06/2023 23     ALT (U/L)   Date Value   12/06/2023 14     BUN (mg/dL)   Date Value   07/23/2024 17     BP Readings from Last 3 Encounters:   08/02/24 134/77   07/23/24 (!) 147/73   05/29/24 134/83          (goal 120/80)    All Future Testing planned in [facility]  Lab Frequency Next Occurrence   Vestibular test Once 12/07/2023   Vascular duplex carotid bilateral

## 2024-08-08 RX ORDER — AMLODIPINE BESYLATE 10 MG/1
10 TABLET ORAL NIGHTLY
Qty: 90 TABLET | Refills: 1 | Status: SHIPPED | OUTPATIENT
Start: 2024-08-08

## 2024-08-09 ENCOUNTER — TELEPHONE (OUTPATIENT)
Dept: SURGERY | Age: 69
End: 2024-08-09

## 2024-08-09 DIAGNOSIS — D05.11 DUCTAL CARCINOMA IN SITU (DCIS) OF RIGHT BREAST: Primary | ICD-10-CM

## 2024-08-09 NOTE — TELEPHONE ENCOUNTER
I spoke with central scheduling to discuss the diagnosis code that is being rejected. She attempted to get a nurse on the backline but was unsuccessful. I provided my direct number. For now, I do not have an alternate code.

## 2024-08-09 NOTE — TELEPHONE ENCOUNTER
Central scheduling called and needs a different dx for the mammogram order. Patients insurance does not accept the dx.

## 2024-08-09 NOTE — TELEPHONE ENCOUNTER
Central scheduling called to have our office revise the current order for screening mammogram to a diagnostic per radiology protocol. The order has been updated.

## 2024-08-12 DIAGNOSIS — R92.8 ABNORMAL MAMMOGRAM: Primary | ICD-10-CM

## 2024-08-21 ENCOUNTER — APPOINTMENT (OUTPATIENT)
Dept: GENERAL RADIOLOGY | Age: 69
DRG: 481 | End: 2024-08-21
Payer: MEDICARE

## 2024-08-21 ENCOUNTER — HOSPITAL ENCOUNTER (INPATIENT)
Age: 69
LOS: 5 days | Discharge: NURSING, REHAB OR LONG-TERM CARE | DRG: 481 | End: 2024-08-27
Attending: EMERGENCY MEDICINE | Admitting: SURGERY
Payer: MEDICARE

## 2024-08-21 DIAGNOSIS — J43.2 CENTRILOBULAR EMPHYSEMA (HCC): ICD-10-CM

## 2024-08-21 DIAGNOSIS — S72.142A INTERTROCHANTERIC FRACTURE OF LEFT HIP, CLOSED, INITIAL ENCOUNTER (HCC): Primary | ICD-10-CM

## 2024-08-21 LAB
ANION GAP SERPL CALCULATED.3IONS-SCNC: 13 MMOL/L (ref 9–16)
BASOPHILS # BLD: 0.05 K/UL (ref 0–0.2)
BASOPHILS NFR BLD: 0 % (ref 0–2)
BUN SERPL-MCNC: 25 MG/DL (ref 8–23)
CALCIUM SERPL-MCNC: 9.6 MG/DL (ref 8.6–10.4)
CHLORIDE SERPL-SCNC: 106 MMOL/L (ref 98–107)
CO2 SERPL-SCNC: 21 MMOL/L (ref 20–31)
CREAT SERPL-MCNC: 1 MG/DL (ref 0.5–0.9)
EOSINOPHIL # BLD: 0.19 K/UL (ref 0–0.44)
EOSINOPHILS RELATIVE PERCENT: 2 % (ref 1–4)
ERYTHROCYTE [DISTWIDTH] IN BLOOD BY AUTOMATED COUNT: 15.9 % (ref 11.8–14.4)
GFR, ESTIMATED: 64 ML/MIN/1.73M2
GLUCOSE SERPL-MCNC: 96 MG/DL (ref 74–99)
HCT VFR BLD AUTO: 37.4 % (ref 36.3–47.1)
HGB BLD-MCNC: 11.4 G/DL (ref 11.9–15.1)
IMM GRANULOCYTES # BLD AUTO: 0.05 K/UL (ref 0–0.3)
IMM GRANULOCYTES NFR BLD: 0 %
LYMPHOCYTES NFR BLD: 3.68 K/UL (ref 1.1–3.7)
LYMPHOCYTES RELATIVE PERCENT: 31 % (ref 24–43)
MCH RBC QN AUTO: 25.7 PG (ref 25.2–33.5)
MCHC RBC AUTO-ENTMCNC: 30.5 G/DL (ref 28.4–34.8)
MCV RBC AUTO: 84.4 FL (ref 82.6–102.9)
MONOCYTES NFR BLD: 1.18 K/UL (ref 0.1–1.2)
MONOCYTES NFR BLD: 10 % (ref 3–12)
NEUTROPHILS NFR BLD: 57 % (ref 36–65)
NEUTS SEG NFR BLD: 6.64 K/UL (ref 1.5–8.1)
NRBC BLD-RTO: 0 PER 100 WBC
PLATELET # BLD AUTO: 240 K/UL (ref 138–453)
PMV BLD AUTO: 10.6 FL (ref 8.1–13.5)
POTASSIUM SERPL-SCNC: 4 MMOL/L (ref 3.7–5.3)
RBC # BLD AUTO: 4.43 M/UL (ref 3.95–5.11)
RBC # BLD: ABNORMAL 10*6/UL
SODIUM SERPL-SCNC: 140 MMOL/L (ref 136–145)
TROPONIN I SERPL HS-MCNC: 10 NG/L (ref 0–14)
WBC OTHER # BLD: 11.8 K/UL (ref 3.5–11.3)

## 2024-08-21 PROCEDURE — 99285 EMERGENCY DEPT VISIT HI MDM: CPT

## 2024-08-21 PROCEDURE — 73590 X-RAY EXAM OF LOWER LEG: CPT

## 2024-08-21 PROCEDURE — 85025 COMPLETE CBC W/AUTO DIFF WBC: CPT

## 2024-08-21 PROCEDURE — 73120 X-RAY EXAM OF HAND: CPT

## 2024-08-21 PROCEDURE — 96374 THER/PROPH/DIAG INJ IV PUSH: CPT

## 2024-08-21 PROCEDURE — 73552 X-RAY EXAM OF FEMUR 2/>: CPT

## 2024-08-21 PROCEDURE — 73551 X-RAY EXAM OF FEMUR 1: CPT

## 2024-08-21 PROCEDURE — 84484 ASSAY OF TROPONIN QUANT: CPT

## 2024-08-21 PROCEDURE — 80048 BASIC METABOLIC PNL TOTAL CA: CPT

## 2024-08-21 PROCEDURE — 73523 X-RAY EXAM HIPS BI 5/> VIEWS: CPT

## 2024-08-21 PROCEDURE — 96376 TX/PRO/DX INJ SAME DRUG ADON: CPT

## 2024-08-21 PROCEDURE — 27230 TREAT THIGH FRACTURE: CPT

## 2024-08-21 PROCEDURE — 6360000002 HC RX W HCPCS

## 2024-08-21 PROCEDURE — 96375 TX/PRO/DX INJ NEW DRUG ADDON: CPT

## 2024-08-21 RX ORDER — FENTANYL CITRATE 50 UG/ML
50 INJECTION, SOLUTION INTRAMUSCULAR; INTRAVENOUS ONCE
Status: COMPLETED | OUTPATIENT
Start: 2024-08-21 | End: 2024-08-21

## 2024-08-21 RX ADMIN — FENTANYL CITRATE 50 MCG: 50 INJECTION, SOLUTION INTRAMUSCULAR; INTRAVENOUS at 23:23

## 2024-08-21 RX ADMIN — HYDROMORPHONE HYDROCHLORIDE 1 MG: 1 INJECTION, SOLUTION INTRAMUSCULAR; INTRAVENOUS; SUBCUTANEOUS at 23:55

## 2024-08-21 RX ADMIN — FENTANYL CITRATE 50 MCG: 50 INJECTION, SOLUTION INTRAMUSCULAR; INTRAVENOUS at 22:21

## 2024-08-21 ASSESSMENT — PAIN SCALES - GENERAL
PAINLEVEL_OUTOF10: 10
PAINLEVEL_OUTOF10: 9
PAINLEVEL_OUTOF10: 9

## 2024-08-22 ENCOUNTER — APPOINTMENT (OUTPATIENT)
Dept: GENERAL RADIOLOGY | Age: 69
DRG: 481 | End: 2024-08-22
Payer: MEDICARE

## 2024-08-22 ENCOUNTER — ANESTHESIA (OUTPATIENT)
Dept: OPERATING ROOM | Age: 69
End: 2024-08-22
Payer: MEDICARE

## 2024-08-22 ENCOUNTER — APPOINTMENT (OUTPATIENT)
Dept: CT IMAGING | Age: 69
DRG: 481 | End: 2024-08-22
Payer: MEDICARE

## 2024-08-22 ENCOUNTER — ANESTHESIA EVENT (OUTPATIENT)
Dept: OPERATING ROOM | Age: 69
End: 2024-08-22
Payer: MEDICARE

## 2024-08-22 PROBLEM — S22.070A CLOSED WEDGE COMPRESSION FRACTURE OF T9 VERTEBRA (HCC): Status: ACTIVE | Noted: 2024-08-22

## 2024-08-22 PROBLEM — S72.142A DISPLACED INTERTROCHANTERIC FRACTURE OF LEFT FEMUR, INITIAL ENCOUNTER FOR CLOSED FRACTURE (HCC): Status: ACTIVE | Noted: 2024-08-22

## 2024-08-22 LAB
25(OH)D3 SERPL-MCNC: 33.1 NG/ML (ref 30–100)
ALBUMIN SERPL-MCNC: 4.3 G/DL (ref 3.5–5.2)
ALBUMIN/GLOB SERPL: 2 {RATIO} (ref 1–2.5)
ALP SERPL-CCNC: 110 U/L (ref 35–104)
ALT SERPL-CCNC: 16 U/L (ref 10–35)
AMMONIA PLAS-SCNC: 28 UMOL/L (ref 11–51)
AMPHET UR QL SCN: NEGATIVE
ANION GAP SERPL CALCULATED.3IONS-SCNC: 11 MMOL/L (ref 9–16)
ANION GAP SERPL CALCULATED.3IONS-SCNC: 12 MMOL/L (ref 9–16)
AST SERPL-CCNC: 30 U/L (ref 10–35)
BACTERIA URNS QL MICRO: ABNORMAL
BARBITURATES UR QL SCN: NEGATIVE
BASOPHILS # BLD: 0.03 K/UL (ref 0–0.2)
BASOPHILS # BLD: 0.03 K/UL (ref 0–0.2)
BASOPHILS NFR BLD: 0 % (ref 0–2)
BASOPHILS NFR BLD: 0 % (ref 0–2)
BENZODIAZ UR QL: NEGATIVE
BILIRUB DIRECT SERPL-MCNC: 0.2 MG/DL (ref 0–0.2)
BILIRUB INDIRECT SERPL-MCNC: 0.1 MG/DL (ref 0–1)
BILIRUB SERPL-MCNC: 0.2 MG/DL (ref 0–1.2)
BILIRUB UR QL STRIP: NEGATIVE
BUN SERPL-MCNC: 15 MG/DL (ref 8–23)
BUN SERPL-MCNC: 23 MG/DL (ref 8–23)
CA-I BLD-SCNC: 1.11 MMOL/L (ref 1.13–1.33)
CALCIUM SERPL-MCNC: 8.4 MG/DL (ref 8.6–10.4)
CALCIUM SERPL-MCNC: 9.1 MG/DL (ref 8.6–10.4)
CANNABINOIDS UR QL SCN: NEGATIVE
CASTS #/AREA URNS LPF: ABNORMAL /LPF (ref 0–2)
CASTS #/AREA URNS LPF: ABNORMAL /LPF (ref 0–2)
CHLORIDE SERPL-SCNC: 105 MMOL/L (ref 98–107)
CHLORIDE SERPL-SCNC: 106 MMOL/L (ref 98–107)
CLARITY UR: CLEAR
CO2 SERPL-SCNC: 19 MMOL/L (ref 20–31)
CO2 SERPL-SCNC: 22 MMOL/L (ref 20–31)
COCAINE UR QL SCN: NEGATIVE
COLOR UR: YELLOW
CREAT SERPL-MCNC: 0.8 MG/DL (ref 0.5–0.9)
CREAT SERPL-MCNC: 0.8 MG/DL (ref 0.5–0.9)
CRYSTALS URNS MICRO: ABNORMAL /HPF
CRYSTALS URNS MICRO: ABNORMAL /HPF
EKG ATRIAL RATE: 102 BPM
EKG P AXIS: 56 DEGREES
EKG P-R INTERVAL: 154 MS
EKG Q-T INTERVAL: 362 MS
EKG QRS DURATION: 78 MS
EKG QTC CALCULATION (BAZETT): 471 MS
EKG R AXIS: 21 DEGREES
EKG T AXIS: 49 DEGREES
EKG VENTRICULAR RATE: 102 BPM
EOSINOPHIL # BLD: <0.03 K/UL (ref 0–0.44)
EOSINOPHIL # BLD: <0.03 K/UL (ref 0–0.44)
EOSINOPHILS RELATIVE PERCENT: 0 % (ref 1–4)
EOSINOPHILS RELATIVE PERCENT: 0 % (ref 1–4)
EPI CELLS #/AREA URNS HPF: ABNORMAL /HPF (ref 0–5)
ERYTHROCYTE [DISTWIDTH] IN BLOOD BY AUTOMATED COUNT: 15.9 % (ref 11.8–14.4)
ERYTHROCYTE [DISTWIDTH] IN BLOOD BY AUTOMATED COUNT: 15.9 % (ref 11.8–14.4)
FENTANYL UR QL: POSITIVE
GFR, ESTIMATED: 75 ML/MIN/1.73M2
GFR, ESTIMATED: 75 ML/MIN/1.73M2
GLOBULIN SER CALC-MCNC: 2.7 G/DL
GLUCOSE SERPL-MCNC: 128 MG/DL (ref 74–99)
GLUCOSE SERPL-MCNC: 129 MG/DL (ref 74–99)
GLUCOSE UR STRIP-MCNC: NEGATIVE MG/DL
HCT VFR BLD AUTO: 30.5 % (ref 36.3–47.1)
HCT VFR BLD AUTO: 35.9 % (ref 36.3–47.1)
HGB BLD-MCNC: 11.1 G/DL (ref 11.9–15.1)
HGB BLD-MCNC: 9.4 G/DL (ref 11.9–15.1)
HGB UR QL STRIP.AUTO: NEGATIVE
IMM GRANULOCYTES # BLD AUTO: 0.05 K/UL (ref 0–0.3)
IMM GRANULOCYTES # BLD AUTO: 0.06 K/UL (ref 0–0.3)
IMM GRANULOCYTES NFR BLD: 0 %
IMM GRANULOCYTES NFR BLD: 0 %
KETONES UR STRIP-MCNC: NEGATIVE MG/DL
LEUKOCYTE ESTERASE UR QL STRIP: ABNORMAL
LYMPHOCYTES NFR BLD: 0.84 K/UL (ref 1.1–3.7)
LYMPHOCYTES NFR BLD: 1.96 K/UL (ref 1.1–3.7)
LYMPHOCYTES RELATIVE PERCENT: 14 % (ref 24–43)
LYMPHOCYTES RELATIVE PERCENT: 7 % (ref 24–43)
MAGNESIUM SERPL-MCNC: 1.9 MG/DL (ref 1.6–2.4)
MCH RBC QN AUTO: 26 PG (ref 25.2–33.5)
MCH RBC QN AUTO: 26 PG (ref 25.2–33.5)
MCHC RBC AUTO-ENTMCNC: 30.8 G/DL (ref 28.4–34.8)
MCHC RBC AUTO-ENTMCNC: 30.9 G/DL (ref 28.4–34.8)
MCV RBC AUTO: 84.1 FL (ref 82.6–102.9)
MCV RBC AUTO: 84.3 FL (ref 82.6–102.9)
METHADONE UR QL: NEGATIVE
MONOCYTES NFR BLD: 0.81 K/UL (ref 0.1–1.2)
MONOCYTES NFR BLD: 1.5 K/UL (ref 0.1–1.2)
MONOCYTES NFR BLD: 10 % (ref 3–12)
MONOCYTES NFR BLD: 6 % (ref 3–12)
NEUTROPHILS NFR BLD: 76 % (ref 36–65)
NEUTROPHILS NFR BLD: 87 % (ref 36–65)
NEUTS SEG NFR BLD: 10.95 K/UL (ref 1.5–8.1)
NEUTS SEG NFR BLD: 11.2 K/UL (ref 1.5–8.1)
NITRITE UR QL STRIP: POSITIVE
NRBC BLD-RTO: 0 PER 100 WBC
NRBC BLD-RTO: 0 PER 100 WBC
OPIATES UR QL SCN: NEGATIVE
OXYCODONE UR QL SCN: NEGATIVE
PCP UR QL SCN: NEGATIVE
PH UR STRIP: 5.5 [PH] (ref 5–8)
PHOSPHATE SERPL-MCNC: 3.4 MG/DL (ref 2.5–4.5)
PLATELET # BLD AUTO: 175 K/UL (ref 138–453)
PLATELET # BLD AUTO: 212 K/UL (ref 138–453)
PMV BLD AUTO: 10.2 FL (ref 8.1–13.5)
PMV BLD AUTO: 10.5 FL (ref 8.1–13.5)
POTASSIUM SERPL-SCNC: 4.2 MMOL/L (ref 3.7–5.3)
POTASSIUM SERPL-SCNC: 4.3 MMOL/L (ref 3.7–5.3)
PROT SERPL-MCNC: 7 G/DL (ref 6.6–8.7)
PROT UR STRIP-MCNC: NEGATIVE MG/DL
RBC # BLD AUTO: 3.62 M/UL (ref 3.95–5.11)
RBC # BLD AUTO: 4.27 M/UL (ref 3.95–5.11)
RBC # BLD: ABNORMAL 10*6/UL
RBC # BLD: ABNORMAL 10*6/UL
RBC #/AREA URNS HPF: ABNORMAL /HPF (ref 0–2)
SODIUM SERPL-SCNC: 136 MMOL/L (ref 136–145)
SODIUM SERPL-SCNC: 139 MMOL/L (ref 136–145)
SP GR UR STRIP: 1.05 (ref 1–1.03)
TEST INFORMATION: ABNORMAL
TROPONIN I SERPL HS-MCNC: 11 NG/L (ref 0–14)
UROBILINOGEN UR STRIP-ACNC: NORMAL EU/DL (ref 0–1)
WBC #/AREA URNS HPF: ABNORMAL /HPF (ref 0–5)
WBC OTHER # BLD: 12.9 K/UL (ref 3.5–11.3)
WBC OTHER # BLD: 14.5 K/UL (ref 3.5–11.3)

## 2024-08-22 PROCEDURE — 3700000000 HC ANESTHESIA ATTENDED CARE: Performed by: STUDENT IN AN ORGANIZED HEALTH CARE EDUCATION/TRAINING PROGRAM

## 2024-08-22 PROCEDURE — 2W6MX0Z TRACTION OF LEFT LOWER EXTREMITY USING TRACTION APPARATUS: ICD-10-PCS | Performed by: STUDENT IN AN ORGANIZED HEALTH CARE EDUCATION/TRAINING PROGRAM

## 2024-08-22 PROCEDURE — 3700000001 HC ADD 15 MINUTES (ANESTHESIA): Performed by: STUDENT IN AN ORGANIZED HEALTH CARE EDUCATION/TRAINING PROGRAM

## 2024-08-22 PROCEDURE — 6360000002 HC RX W HCPCS

## 2024-08-22 PROCEDURE — 84100 ASSAY OF PHOSPHORUS: CPT

## 2024-08-22 PROCEDURE — 2580000003 HC RX 258: Performed by: STUDENT IN AN ORGANIZED HEALTH CARE EDUCATION/TRAINING PROGRAM

## 2024-08-22 PROCEDURE — 36415 COLL VENOUS BLD VENIPUNCTURE: CPT

## 2024-08-22 PROCEDURE — 6360000002 HC RX W HCPCS: Performed by: STUDENT IN AN ORGANIZED HEALTH CARE EDUCATION/TRAINING PROGRAM

## 2024-08-22 PROCEDURE — 2500000003 HC RX 250 WO HCPCS

## 2024-08-22 PROCEDURE — 3600000004 HC SURGERY LEVEL 4 BASE: Performed by: STUDENT IN AN ORGANIZED HEALTH CARE EDUCATION/TRAINING PROGRAM

## 2024-08-22 PROCEDURE — 7100000001 HC PACU RECOVERY - ADDTL 15 MIN: Performed by: STUDENT IN AN ORGANIZED HEALTH CARE EDUCATION/TRAINING PROGRAM

## 2024-08-22 PROCEDURE — 2700000000 HC OXYGEN THERAPY PER DAY

## 2024-08-22 PROCEDURE — C1713 ANCHOR/SCREW BN/BN,TIS/BN: HCPCS | Performed by: STUDENT IN AN ORGANIZED HEALTH CARE EDUCATION/TRAINING PROGRAM

## 2024-08-22 PROCEDURE — 2580000003 HC RX 258

## 2024-08-22 PROCEDURE — 84484 ASSAY OF TROPONIN QUANT: CPT

## 2024-08-22 PROCEDURE — 51702 INSERT TEMP BLADDER CATH: CPT

## 2024-08-22 PROCEDURE — 71045 X-RAY EXAM CHEST 1 VIEW: CPT

## 2024-08-22 PROCEDURE — 0QH736Z INSERTION OF INTRAMEDULLARY INTERNAL FIXATION DEVICE INTO LEFT UPPER FEMUR, PERCUTANEOUS APPROACH: ICD-10-PCS | Performed by: STUDENT IN AN ORGANIZED HEALTH CARE EDUCATION/TRAINING PROGRAM

## 2024-08-22 PROCEDURE — 2W3FX1Z IMMOBILIZATION OF LEFT HAND USING SPLINT: ICD-10-PCS | Performed by: STUDENT IN AN ORGANIZED HEALTH CARE EDUCATION/TRAINING PROGRAM

## 2024-08-22 PROCEDURE — 72125 CT NECK SPINE W/O DYE: CPT

## 2024-08-22 PROCEDURE — 73560 X-RAY EXAM OF KNEE 1 OR 2: CPT

## 2024-08-22 PROCEDURE — 96376 TX/PRO/DX INJ SAME DRUG ADON: CPT

## 2024-08-22 PROCEDURE — 93005 ELECTROCARDIOGRAM TRACING: CPT

## 2024-08-22 PROCEDURE — 3600000014 HC SURGERY LEVEL 4 ADDTL 15MIN: Performed by: STUDENT IN AN ORGANIZED HEALTH CARE EDUCATION/TRAINING PROGRAM

## 2024-08-22 PROCEDURE — 85025 COMPLETE CBC W/AUTO DIFF WBC: CPT

## 2024-08-22 PROCEDURE — 2580000003 HC RX 258: Performed by: SURGERY

## 2024-08-22 PROCEDURE — 99223 1ST HOSP IP/OBS HIGH 75: CPT | Performed by: STUDENT IN AN ORGANIZED HEALTH CARE EDUCATION/TRAINING PROGRAM

## 2024-08-22 PROCEDURE — 81001 URINALYSIS AUTO W/SCOPE: CPT

## 2024-08-22 PROCEDURE — 2720000010 HC SURG SUPPLY STERILE: Performed by: STUDENT IN AN ORGANIZED HEALTH CARE EDUCATION/TRAINING PROGRAM

## 2024-08-22 PROCEDURE — 73610 X-RAY EXAM OF ANKLE: CPT

## 2024-08-22 PROCEDURE — 73502 X-RAY EXAM HIP UNI 2-3 VIEWS: CPT

## 2024-08-22 PROCEDURE — 82306 VITAMIN D 25 HYDROXY: CPT

## 2024-08-22 PROCEDURE — 2709999900 HC NON-CHARGEABLE SUPPLY: Performed by: STUDENT IN AN ORGANIZED HEALTH CARE EDUCATION/TRAINING PROGRAM

## 2024-08-22 PROCEDURE — 82140 ASSAY OF AMMONIA: CPT

## 2024-08-22 PROCEDURE — 6370000000 HC RX 637 (ALT 250 FOR IP)

## 2024-08-22 PROCEDURE — 6360000004 HC RX CONTRAST MEDICATION

## 2024-08-22 PROCEDURE — 6370000000 HC RX 637 (ALT 250 FOR IP): Performed by: STUDENT IN AN ORGANIZED HEALTH CARE EDUCATION/TRAINING PROGRAM

## 2024-08-22 PROCEDURE — C1769 GUIDE WIRE: HCPCS | Performed by: STUDENT IN AN ORGANIZED HEALTH CARE EDUCATION/TRAINING PROGRAM

## 2024-08-22 PROCEDURE — 6360000002 HC RX W HCPCS: Performed by: ANESTHESIOLOGY

## 2024-08-22 PROCEDURE — 51798 US URINE CAPACITY MEASURE: CPT

## 2024-08-22 PROCEDURE — 80076 HEPATIC FUNCTION PANEL: CPT

## 2024-08-22 PROCEDURE — 80048 BASIC METABOLIC PNL TOTAL CA: CPT

## 2024-08-22 PROCEDURE — 6360000002 HC RX W HCPCS: Performed by: CHIROPRACTOR

## 2024-08-22 PROCEDURE — 94640 AIRWAY INHALATION TREATMENT: CPT

## 2024-08-22 PROCEDURE — 82330 ASSAY OF CALCIUM: CPT

## 2024-08-22 PROCEDURE — 83735 ASSAY OF MAGNESIUM: CPT

## 2024-08-22 PROCEDURE — 70450 CT HEAD/BRAIN W/O DYE: CPT

## 2024-08-22 PROCEDURE — 99222 1ST HOSP IP/OBS MODERATE 55: CPT | Performed by: NEUROLOGICAL SURGERY

## 2024-08-22 PROCEDURE — 27245 TREAT THIGH FRACTURE: CPT | Performed by: STUDENT IN AN ORGANIZED HEALTH CARE EDUCATION/TRAINING PROGRAM

## 2024-08-22 PROCEDURE — 2000000000 HC ICU R&B

## 2024-08-22 PROCEDURE — 71260 CT THORAX DX C+: CPT

## 2024-08-22 PROCEDURE — 80307 DRUG TEST PRSMV CHEM ANLYZR: CPT

## 2024-08-22 PROCEDURE — 7100000000 HC PACU RECOVERY - FIRST 15 MIN: Performed by: STUDENT IN AN ORGANIZED HEALTH CARE EDUCATION/TRAINING PROGRAM

## 2024-08-22 DEVICE — SCREW TFNA FEN 100MM: Type: IMPLANTABLE DEVICE | Site: LEG | Status: FUNCTIONAL

## 2024-08-22 DEVICE — SCREW LK F/IM NAIL 5X32MM XL25 ST: Type: IMPLANTABLE DEVICE | Site: LEG | Status: FUNCTIONAL

## 2024-08-22 DEVICE — NAIL 10MM 125 DEG TI CANN TFNA 235MM LT: Type: IMPLANTABLE DEVICE | Site: LEG | Status: FUNCTIONAL

## 2024-08-22 RX ORDER — ALBUTEROL SULFATE 90 UG/1
2 AEROSOL, METERED RESPIRATORY (INHALATION) 4 TIMES DAILY PRN
Status: DISCONTINUED | OUTPATIENT
Start: 2024-08-22 | End: 2024-08-27 | Stop reason: HOSPADM

## 2024-08-22 RX ORDER — LACTULOSE 10 G/15ML
10 SOLUTION ORAL 2 TIMES DAILY
Status: DISCONTINUED | OUTPATIENT
Start: 2024-08-22 | End: 2024-08-27 | Stop reason: HOSPADM

## 2024-08-22 RX ORDER — POLYETHYLENE GLYCOL 3350 17 G/17G
17 POWDER, FOR SOLUTION ORAL DAILY
Status: DISCONTINUED | OUTPATIENT
Start: 2024-08-22 | End: 2024-08-27 | Stop reason: HOSPADM

## 2024-08-22 RX ORDER — ROPINIROLE 0.25 MG/1
0.25 TABLET, FILM COATED ORAL NIGHTLY
Status: DISCONTINUED | OUTPATIENT
Start: 2024-08-22 | End: 2024-08-27 | Stop reason: HOSPADM

## 2024-08-22 RX ORDER — SODIUM CHLORIDE 9 MG/ML
INJECTION, SOLUTION INTRAVENOUS PRN
Status: DISCONTINUED | OUTPATIENT
Start: 2024-08-22 | End: 2024-08-27 | Stop reason: HOSPADM

## 2024-08-22 RX ORDER — SODIUM CHLORIDE, SODIUM LACTATE, POTASSIUM CHLORIDE, CALCIUM CHLORIDE 600; 310; 30; 20 MG/100ML; MG/100ML; MG/100ML; MG/100ML
INJECTION, SOLUTION INTRAVENOUS CONTINUOUS
Status: DISCONTINUED | OUTPATIENT
Start: 2024-08-22 | End: 2024-08-23

## 2024-08-22 RX ORDER — PROPOFOL 10 MG/ML
INJECTION, EMULSION INTRAVENOUS PRN
Status: DISCONTINUED | OUTPATIENT
Start: 2024-08-22 | End: 2024-08-22 | Stop reason: SDUPTHER

## 2024-08-22 RX ORDER — OXYCODONE HYDROCHLORIDE 5 MG/1
5 TABLET ORAL EVERY 6 HOURS PRN
Status: DISCONTINUED | OUTPATIENT
Start: 2024-08-22 | End: 2024-08-22

## 2024-08-22 RX ORDER — POTASSIUM CHLORIDE 29.8 MG/ML
20 INJECTION INTRAVENOUS PRN
Status: DISCONTINUED | OUTPATIENT
Start: 2024-08-22 | End: 2024-08-22

## 2024-08-22 RX ORDER — DEXAMETHASONE SODIUM PHOSPHATE 10 MG/ML
INJECTION, SOLUTION INTRAMUSCULAR; INTRAVENOUS PRN
Status: DISCONTINUED | OUTPATIENT
Start: 2024-08-22 | End: 2024-08-22 | Stop reason: SDUPTHER

## 2024-08-22 RX ORDER — DEXMEDETOMIDINE HYDROCHLORIDE 100 UG/ML
INJECTION, SOLUTION INTRAVENOUS PRN
Status: DISCONTINUED | OUTPATIENT
Start: 2024-08-22 | End: 2024-08-22 | Stop reason: SDUPTHER

## 2024-08-22 RX ORDER — HALOPERIDOL 5 MG/ML
5 INJECTION INTRAMUSCULAR ONCE
Status: COMPLETED | OUTPATIENT
Start: 2024-08-22 | End: 2024-08-22

## 2024-08-22 RX ORDER — IOPAMIDOL 755 MG/ML
75 INJECTION, SOLUTION INTRAVASCULAR
Status: COMPLETED | OUTPATIENT
Start: 2024-08-22 | End: 2024-08-22

## 2024-08-22 RX ORDER — SODIUM CHLORIDE 9 MG/ML
INJECTION, SOLUTION INTRAVENOUS PRN
Status: DISCONTINUED | OUTPATIENT
Start: 2024-08-22 | End: 2024-08-22 | Stop reason: HOSPADM

## 2024-08-22 RX ORDER — MAGNESIUM SULFATE IN WATER 40 MG/ML
2000 INJECTION, SOLUTION INTRAVENOUS PRN
Status: DISCONTINUED | OUTPATIENT
Start: 2024-08-22 | End: 2024-08-22

## 2024-08-22 RX ORDER — SODIUM CHLORIDE, SODIUM LACTATE, POTASSIUM CHLORIDE, CALCIUM CHLORIDE 600; 310; 30; 20 MG/100ML; MG/100ML; MG/100ML; MG/100ML
INJECTION, SOLUTION INTRAVENOUS CONTINUOUS PRN
Status: DISCONTINUED | OUTPATIENT
Start: 2024-08-22 | End: 2024-08-22 | Stop reason: SDUPTHER

## 2024-08-22 RX ORDER — NITROFURANTOIN 25; 75 MG/1; MG/1
100 CAPSULE ORAL 2 TIMES DAILY
Status: COMPLETED | OUTPATIENT
Start: 2024-08-22 | End: 2024-08-26

## 2024-08-22 RX ORDER — SODIUM CHLORIDE 0.9 % (FLUSH) 0.9 %
5-40 SYRINGE (ML) INJECTION PRN
Status: DISCONTINUED | OUTPATIENT
Start: 2024-08-22 | End: 2024-08-27 | Stop reason: HOSPADM

## 2024-08-22 RX ORDER — SODIUM CHLORIDE 0.9 % (FLUSH) 0.9 %
5-40 SYRINGE (ML) INJECTION EVERY 12 HOURS SCHEDULED
Status: DISCONTINUED | OUTPATIENT
Start: 2024-08-22 | End: 2024-08-22 | Stop reason: HOSPADM

## 2024-08-22 RX ORDER — OXYCODONE HYDROCHLORIDE 5 MG/1
10 TABLET ORAL EVERY 4 HOURS PRN
Status: DISCONTINUED | OUTPATIENT
Start: 2024-08-22 | End: 2024-08-26

## 2024-08-22 RX ORDER — SODIUM CHLORIDE 0.9 % (FLUSH) 0.9 %
5-40 SYRINGE (ML) INJECTION PRN
Status: DISCONTINUED | OUTPATIENT
Start: 2024-08-22 | End: 2024-08-22 | Stop reason: HOSPADM

## 2024-08-22 RX ORDER — ONDANSETRON 2 MG/ML
4 INJECTION INTRAMUSCULAR; INTRAVENOUS ONCE
Status: COMPLETED | OUTPATIENT
Start: 2024-08-22 | End: 2024-08-22

## 2024-08-22 RX ORDER — ETOMIDATE 2 MG/ML
10 INJECTION INTRAVENOUS ONCE
Status: COMPLETED | OUTPATIENT
Start: 2024-08-22 | End: 2024-08-22

## 2024-08-22 RX ORDER — PHENYLEPHRINE HCL IN 0.9% NACL 1 MG/10 ML
SYRINGE (ML) INTRAVENOUS PRN
Status: DISCONTINUED | OUTPATIENT
Start: 2024-08-22 | End: 2024-08-22 | Stop reason: SDUPTHER

## 2024-08-22 RX ORDER — GABAPENTIN 300 MG/1
300 CAPSULE ORAL EVERY 8 HOURS
Status: DISCONTINUED | OUTPATIENT
Start: 2024-08-22 | End: 2024-08-22

## 2024-08-22 RX ORDER — SODIUM CHLORIDE 0.9 % (FLUSH) 0.9 %
5-40 SYRINGE (ML) INJECTION EVERY 12 HOURS SCHEDULED
Status: DISCONTINUED | OUTPATIENT
Start: 2024-08-22 | End: 2024-08-27 | Stop reason: HOSPADM

## 2024-08-22 RX ORDER — CEFAZOLIN SODIUM 1 G/3ML
INJECTION, POWDER, FOR SOLUTION INTRAMUSCULAR; INTRAVENOUS PRN
Status: DISCONTINUED | OUTPATIENT
Start: 2024-08-22 | End: 2024-08-22 | Stop reason: SDUPTHER

## 2024-08-22 RX ORDER — ASPIRIN 81 MG/1
81 TABLET ORAL DAILY
Status: DISCONTINUED | OUTPATIENT
Start: 2024-08-22 | End: 2024-08-22

## 2024-08-22 RX ORDER — MAGNESIUM HYDROXIDE 1200 MG/15ML
LIQUID ORAL CONTINUOUS PRN
Status: COMPLETED | OUTPATIENT
Start: 2024-08-22 | End: 2024-08-22

## 2024-08-22 RX ORDER — HYDRALAZINE HYDROCHLORIDE 20 MG/ML
10 INJECTION INTRAMUSCULAR; INTRAVENOUS
Status: DISCONTINUED | OUTPATIENT
Start: 2024-08-22 | End: 2024-08-22 | Stop reason: HOSPADM

## 2024-08-22 RX ORDER — FENTANYL CITRATE 50 UG/ML
100 INJECTION, SOLUTION INTRAMUSCULAR; INTRAVENOUS ONCE
Status: COMPLETED | OUTPATIENT
Start: 2024-08-22 | End: 2024-08-22

## 2024-08-22 RX ORDER — PRAVASTATIN SODIUM 20 MG
20 TABLET ORAL NIGHTLY
Status: DISCONTINUED | OUTPATIENT
Start: 2024-08-22 | End: 2024-08-27 | Stop reason: HOSPADM

## 2024-08-22 RX ORDER — ACETAMINOPHEN 500 MG
1000 TABLET ORAL EVERY 8 HOURS SCHEDULED
Status: DISCONTINUED | OUTPATIENT
Start: 2024-08-22 | End: 2024-08-27 | Stop reason: HOSPADM

## 2024-08-22 RX ORDER — PANTOPRAZOLE SODIUM 40 MG/1
40 TABLET, DELAYED RELEASE ORAL
Status: DISCONTINUED | OUTPATIENT
Start: 2024-08-22 | End: 2024-08-27 | Stop reason: HOSPADM

## 2024-08-22 RX ORDER — ANASTROZOLE 1 MG/1
1 TABLET ORAL DAILY
Status: DISCONTINUED | OUTPATIENT
Start: 2024-08-22 | End: 2024-08-22

## 2024-08-22 RX ORDER — OXYCODONE HYDROCHLORIDE 5 MG/1
5 TABLET ORAL EVERY 4 HOURS PRN
Status: DISCONTINUED | OUTPATIENT
Start: 2024-08-22 | End: 2024-08-26

## 2024-08-22 RX ORDER — NALOXONE HYDROCHLORIDE 0.4 MG/ML
INJECTION, SOLUTION INTRAMUSCULAR; INTRAVENOUS; SUBCUTANEOUS PRN
Status: DISCONTINUED | OUTPATIENT
Start: 2024-08-22 | End: 2024-08-22 | Stop reason: HOSPADM

## 2024-08-22 RX ORDER — AMLODIPINE BESYLATE 10 MG/1
10 TABLET ORAL NIGHTLY
Status: DISCONTINUED | OUTPATIENT
Start: 2024-08-23 | End: 2024-08-27 | Stop reason: HOSPADM

## 2024-08-22 RX ORDER — HALOPERIDOL 5 MG/ML
INJECTION INTRAMUSCULAR
Status: DISCONTINUED
Start: 2024-08-22 | End: 2024-08-22

## 2024-08-22 RX ORDER — ROCURONIUM BROMIDE 10 MG/ML
INJECTION, SOLUTION INTRAVENOUS PRN
Status: DISCONTINUED | OUTPATIENT
Start: 2024-08-22 | End: 2024-08-22 | Stop reason: SDUPTHER

## 2024-08-22 RX ORDER — FENTANYL CITRATE 50 UG/ML
INJECTION, SOLUTION INTRAMUSCULAR; INTRAVENOUS PRN
Status: DISCONTINUED | OUTPATIENT
Start: 2024-08-22 | End: 2024-08-22 | Stop reason: SDUPTHER

## 2024-08-22 RX ORDER — ONDANSETRON 4 MG/1
4 TABLET, ORALLY DISINTEGRATING ORAL EVERY 8 HOURS PRN
Status: DISCONTINUED | OUTPATIENT
Start: 2024-08-22 | End: 2024-08-27 | Stop reason: HOSPADM

## 2024-08-22 RX ORDER — ONDANSETRON 2 MG/ML
INJECTION INTRAMUSCULAR; INTRAVENOUS PRN
Status: DISCONTINUED | OUTPATIENT
Start: 2024-08-22 | End: 2024-08-22 | Stop reason: SDUPTHER

## 2024-08-22 RX ORDER — ONDANSETRON 2 MG/ML
4 INJECTION INTRAMUSCULAR; INTRAVENOUS
Status: DISCONTINUED | OUTPATIENT
Start: 2024-08-22 | End: 2024-08-22 | Stop reason: HOSPADM

## 2024-08-22 RX ORDER — CALCIUM GLUCONATE 20 MG/ML
1000 INJECTION, SOLUTION INTRAVENOUS ONCE
Status: COMPLETED | OUTPATIENT
Start: 2024-08-22 | End: 2024-08-22

## 2024-08-22 RX ORDER — OXYCODONE HYDROCHLORIDE 5 MG/1
5 TABLET ORAL EVERY 4 HOURS PRN
Status: DISCONTINUED | OUTPATIENT
Start: 2024-08-22 | End: 2024-08-22

## 2024-08-22 RX ORDER — LABETALOL HYDROCHLORIDE 5 MG/ML
10 INJECTION, SOLUTION INTRAVENOUS
Status: DISCONTINUED | OUTPATIENT
Start: 2024-08-22 | End: 2024-08-22 | Stop reason: HOSPADM

## 2024-08-22 RX ORDER — LIDOCAINE HYDROCHLORIDE 10 MG/ML
INJECTION, SOLUTION EPIDURAL; INFILTRATION; INTRACAUDAL; PERINEURAL PRN
Status: DISCONTINUED | OUTPATIENT
Start: 2024-08-22 | End: 2024-08-22 | Stop reason: SDUPTHER

## 2024-08-22 RX ORDER — ONDANSETRON 2 MG/ML
4 INJECTION INTRAMUSCULAR; INTRAVENOUS EVERY 6 HOURS PRN
Status: DISCONTINUED | OUTPATIENT
Start: 2024-08-22 | End: 2024-08-27 | Stop reason: HOSPADM

## 2024-08-22 RX ORDER — POTASSIUM CHLORIDE 7.45 MG/ML
10 INJECTION INTRAVENOUS PRN
Status: DISCONTINUED | OUTPATIENT
Start: 2024-08-22 | End: 2024-08-22

## 2024-08-22 RX ADMIN — ETOMIDATE INJECTION 5 MG: 2 SOLUTION INTRAVENOUS at 01:36

## 2024-08-22 RX ADMIN — DEXMEDETOMIDINE HYDROCHLORIDE 4 MCG: 100 INJECTION, SOLUTION INTRAVENOUS at 17:36

## 2024-08-22 RX ADMIN — NITROFURANTOIN MONOHYDRATE/MACROCRYSTALS 100 MG: 25; 75 CAPSULE ORAL at 20:53

## 2024-08-22 RX ADMIN — SODIUM CHLORIDE, PRESERVATIVE FREE 10 ML: 5 INJECTION INTRAVENOUS at 08:56

## 2024-08-22 RX ADMIN — ROCURONIUM BROMIDE 50 MG: 10 INJECTION, SOLUTION INTRAVENOUS at 16:52

## 2024-08-22 RX ADMIN — ONDANSETRON 4 MG: 2 INJECTION INTRAMUSCULAR; INTRAVENOUS at 02:03

## 2024-08-22 RX ADMIN — SODIUM CHLORIDE, POTASSIUM CHLORIDE, SODIUM LACTATE AND CALCIUM CHLORIDE: 600; 310; 30; 20 INJECTION, SOLUTION INTRAVENOUS at 19:38

## 2024-08-22 RX ADMIN — SODIUM CHLORIDE, POTASSIUM CHLORIDE, SODIUM LACTATE AND CALCIUM CHLORIDE: 600; 310; 30; 20 INJECTION, SOLUTION INTRAVENOUS at 01:32

## 2024-08-22 RX ADMIN — ACETAMINOPHEN 1000 MG: 500 TABLET ORAL at 21:42

## 2024-08-22 RX ADMIN — PANTOPRAZOLE SODIUM 40 MG: 40 TABLET, DELAYED RELEASE ORAL at 08:54

## 2024-08-22 RX ADMIN — OXYCODONE HYDROCHLORIDE 10 MG: 5 TABLET ORAL at 20:54

## 2024-08-22 RX ADMIN — CALCIUM GLUCONATE 1000 MG: 20 INJECTION, SOLUTION INTRAVENOUS at 22:44

## 2024-08-22 RX ADMIN — NITROFURANTOIN MONOHYDRATE/MACROCRYSTALS 100 MG: 25; 75 CAPSULE ORAL at 13:34

## 2024-08-22 RX ADMIN — OXYCODONE HYDROCHLORIDE 5 MG: 5 TABLET ORAL at 08:54

## 2024-08-22 RX ADMIN — FENTANYL CITRATE 50 MCG: 50 INJECTION, SOLUTION INTRAMUSCULAR; INTRAVENOUS at 17:55

## 2024-08-22 RX ADMIN — IOPAMIDOL 75 ML: 755 INJECTION, SOLUTION INTRAVENOUS at 03:34

## 2024-08-22 RX ADMIN — IPRATROPIUM BROMIDE 0.5 MG: 0.5 SOLUTION RESPIRATORY (INHALATION) at 12:05

## 2024-08-22 RX ADMIN — HALOPERIDOL LACTATE 5 MG: 5 INJECTION, SOLUTION INTRAMUSCULAR at 03:15

## 2024-08-22 RX ADMIN — Medication 50 MCG: at 17:28

## 2024-08-22 RX ADMIN — PROPOFOL 130 MG: 10 INJECTION, EMULSION INTRAVENOUS at 16:51

## 2024-08-22 RX ADMIN — SODIUM CHLORIDE, POTASSIUM CHLORIDE, SODIUM LACTATE AND CALCIUM CHLORIDE: 600; 310; 30; 20 INJECTION, SOLUTION INTRAVENOUS at 16:43

## 2024-08-22 RX ADMIN — FENTANYL CITRATE 50 MCG: 50 INJECTION, SOLUTION INTRAMUSCULAR; INTRAVENOUS at 16:51

## 2024-08-22 RX ADMIN — SERTRALINE HYDROCHLORIDE 100 MG: 50 TABLET ORAL at 20:52

## 2024-08-22 RX ADMIN — HYDROMORPHONE HYDROCHLORIDE 1 MG: 1 INJECTION, SOLUTION INTRAMUSCULAR; INTRAVENOUS; SUBCUTANEOUS at 01:54

## 2024-08-22 RX ADMIN — FAMOTIDINE 20 MG: 10 INJECTION, SOLUTION INTRAVENOUS at 02:53

## 2024-08-22 RX ADMIN — FENTANYL CITRATE 50 MCG: 50 INJECTION, SOLUTION INTRAMUSCULAR; INTRAVENOUS at 18:00

## 2024-08-22 RX ADMIN — FENTANYL CITRATE 100 MCG: 50 INJECTION, SOLUTION INTRAMUSCULAR; INTRAVENOUS at 00:37

## 2024-08-22 RX ADMIN — HYDROMORPHONE HYDROCHLORIDE 0.5 MG: 1 INJECTION, SOLUTION INTRAMUSCULAR; INTRAVENOUS; SUBCUTANEOUS at 18:23

## 2024-08-22 RX ADMIN — DEXMEDETOMIDINE HYDROCHLORIDE 8 MCG: 100 INJECTION, SOLUTION INTRAVENOUS at 17:21

## 2024-08-22 RX ADMIN — OXYCODONE HYDROCHLORIDE 5 MG: 5 TABLET ORAL at 12:59

## 2024-08-22 RX ADMIN — DEXAMETHASONE SODIUM PHOSPHATE 4 MG: 10 INJECTION, SOLUTION INTRAMUSCULAR; INTRAVENOUS at 17:15

## 2024-08-22 RX ADMIN — SODIUM CHLORIDE, POTASSIUM CHLORIDE, SODIUM LACTATE AND CALCIUM CHLORIDE: 600; 310; 30; 20 INJECTION, SOLUTION INTRAVENOUS at 04:38

## 2024-08-22 RX ADMIN — DEXMEDETOMIDINE HYDROCHLORIDE 4 MCG: 100 INJECTION, SOLUTION INTRAVENOUS at 17:40

## 2024-08-22 RX ADMIN — HYDROMORPHONE HYDROCHLORIDE 1 MG: 1 INJECTION, SOLUTION INTRAMUSCULAR; INTRAVENOUS; SUBCUTANEOUS at 01:48

## 2024-08-22 RX ADMIN — CEFAZOLIN 2 G: 1 INJECTION, POWDER, FOR SOLUTION INTRAMUSCULAR; INTRAVENOUS at 17:10

## 2024-08-22 RX ADMIN — LIDOCAINE HYDROCHLORIDE 50 MG: 10 INJECTION, SOLUTION EPIDURAL; INFILTRATION; INTRACAUDAL; PERINEURAL at 16:51

## 2024-08-22 RX ADMIN — ROPINIROLE HYDROCHLORIDE 0.25 MG: 0.25 TABLET, FILM COATED ORAL at 20:53

## 2024-08-22 RX ADMIN — SODIUM CHLORIDE, PRESERVATIVE FREE 10 ML: 5 INJECTION INTRAVENOUS at 20:06

## 2024-08-22 RX ADMIN — FENTANYL CITRATE 50 MCG: 50 INJECTION, SOLUTION INTRAMUSCULAR; INTRAVENOUS at 17:10

## 2024-08-22 RX ADMIN — ACETAMINOPHEN 1000 MG: 500 TABLET ORAL at 06:28

## 2024-08-22 RX ADMIN — PRAVASTATIN SODIUM 20 MG: 20 TABLET ORAL at 20:53

## 2024-08-22 RX ADMIN — ONDANSETRON 4 MG: 2 INJECTION INTRAMUSCULAR; INTRAVENOUS at 20:52

## 2024-08-22 RX ADMIN — LACTULOSE 10 G: 20 SOLUTION ORAL at 20:54

## 2024-08-22 RX ADMIN — Medication 100 MCG: at 17:31

## 2024-08-22 RX ADMIN — ONDANSETRON 4 MG: 2 INJECTION INTRAMUSCULAR; INTRAVENOUS at 17:15

## 2024-08-22 RX ADMIN — ACETAMINOPHEN 1000 MG: 500 TABLET ORAL at 12:58

## 2024-08-22 RX ADMIN — Medication 2000 MG: at 20:05

## 2024-08-22 RX ADMIN — Medication 2000 MG: at 08:55

## 2024-08-22 ASSESSMENT — PAIN DESCRIPTION - LOCATION
LOCATION: LEG;HIP
LOCATION: LEG
LOCATION: LEG
LOCATION: HIP
LOCATION: LEG
LOCATION: LEG;HIP
LOCATION: HIP
LOCATION: LEG;HIP
LOCATION: LEG
LOCATION: HIP

## 2024-08-22 ASSESSMENT — PAIN DESCRIPTION - FREQUENCY
FREQUENCY: CONTINUOUS

## 2024-08-22 ASSESSMENT — PAIN DESCRIPTION - ONSET
ONSET: ON-GOING

## 2024-08-22 ASSESSMENT — PAIN DESCRIPTION - ORIENTATION
ORIENTATION: LEFT

## 2024-08-22 ASSESSMENT — PAIN DESCRIPTION - PAIN TYPE
TYPE: ACUTE PAIN
TYPE: SURGICAL PAIN;ACUTE PAIN
TYPE: ACUTE PAIN

## 2024-08-22 ASSESSMENT — PAIN - FUNCTIONAL ASSESSMENT
PAIN_FUNCTIONAL_ASSESSMENT: PREVENTS OR INTERFERES WITH MANY ACTIVE NOT PASSIVE ACTIVITIES
PAIN_FUNCTIONAL_ASSESSMENT: PREVENTS OR INTERFERES WITH ALL ACTIVE AND SOME PASSIVE ACTIVITIES
PAIN_FUNCTIONAL_ASSESSMENT: PREVENTS OR INTERFERES WITH MANY ACTIVE NOT PASSIVE ACTIVITIES
PAIN_FUNCTIONAL_ASSESSMENT: PREVENTS OR INTERFERES WITH MANY ACTIVE NOT PASSIVE ACTIVITIES

## 2024-08-22 ASSESSMENT — PAIN SCALES - GENERAL
PAINLEVEL_OUTOF10: 7
PAINLEVEL_OUTOF10: 7
PAINLEVEL_OUTOF10: 6
PAINLEVEL_OUTOF10: 6
PAINLEVEL_OUTOF10: 10
PAINLEVEL_OUTOF10: 9
PAINLEVEL_OUTOF10: 9
PAINLEVEL_OUTOF10: 8
PAINLEVEL_OUTOF10: 8
PAINLEVEL_OUTOF10: 7
PAINLEVEL_OUTOF10: 6
PAINLEVEL_OUTOF10: 7
PAINLEVEL_OUTOF10: 5
PAINLEVEL_OUTOF10: 6

## 2024-08-22 ASSESSMENT — PAIN DESCRIPTION - DESCRIPTORS
DESCRIPTORS: ACHING;DISCOMFORT
DESCRIPTORS: ACHING;SPASM
DESCRIPTORS: ACHING;DISCOMFORT
DESCRIPTORS: ACHING
DESCRIPTORS: ACHING;SPASM
DESCRIPTORS: ACHING
DESCRIPTORS: DISCOMFORT
DESCRIPTORS: DISCOMFORT
DESCRIPTORS: ACHING
DESCRIPTORS: SPASM

## 2024-08-22 NOTE — PLAN OF CARE
Patient:  Angela Hutchinson  YOB: 1955     68 y.o. female    Orthopedic post-operative instructions    Angela Hutchinson is a 68 y.o. female who and is being seen for     - Left Intertrochanteric femur fracture s/p placement of cephalomedullary nail Left Femur , DOS: 8/22/24    - No further orthopaedic surgical intervention planned at this time   - Optifoams on LLE. Please maintain and keep clean and dry. Reinforce dressings as needed per nursing.  - WBAT  to the LLE  - Complete post-op antibiotics: Ancef 2g q8h x2 doses  - Diet: Ok for Adult diet from ortho perspective   - DVT ppx:  Okay to start chemical anticoagulation POD#1. At minimum discharge on ASA 81mg BID if medically able for 30 days post-op.   - PT/OT evaluation post-op.  - Pain control and medical management per primary  - Ice extremity for pain and swelling  - Ok to discharge home after completion of post-op antibiotics, evaluation by PT/OT, and once medically stable   - Follow up with Dr. Hussein 9/11/24   -Please contact Ortho on call with any questions    Donald Kulkarni DO  Orthopedic Surgery Resident, PGY-1  [place], [place]

## 2024-08-22 NOTE — ED NOTES
Pt refusing to continue xrays until additional pain medication is given.   Writer attempting to find resident or attending to notify.

## 2024-08-22 NOTE — CONSULTS
[facility]    Department of Neurosurgery  Resident Consult Note      Reason for Consult:  T9 fx  Requesting Physician: ED team  Neurosurgeon:   [x]Dr. Archibald  []Dr. Ortega  []Dr. Perkins  []Dr. Warren  []Dr. Trinidad  []Dr. Nugent  History Obtained From: Patient, chart review  Chief complaint:  fall x2,  Allergies: Morphine, Bee venom, and Wasp venom protein    History of Presenting Illness     Angela Hutchinson is a 68 y.o. female with a history of high-grade ductal carcinoma in situ, osteopenia with recurrent fractures (left foot, left fifth  metatarsal) presented on 8/21/2024 after sustaining 2 mechanical falls.  Patient reported inability to move her legs, and was experiencing significant pain at the hips following the incident.  Underwent trauma pan scan which revealed a left intertrochanteric femur fracture and an acute/subacute anterior wedge compression deformity involving the inferior endplate of T9 with moderate loss of anterior vertebral body height new compared to 07/23/2024.    Patient was seen and examined at approximately 10:30 AM on 8/22/2024.  She appeared to be visibly anxious and was expressing concerns with regards to her upcoming hip surgery.  Exam was limited due to significant bilateral lower extremity pain.  No weakness in upper extremities appreciated.  She was antigravity in her right leg briefly but was unable to sustain due to significant pain.  She does have an upgoing toe in the right foot.  The patient had seemingly 1/5 weakness in her left leg but this was significantly limited by pain.  Plantar response was equivocal in her left foot.  Otherwise, no sensory changes.  No bladder or bowel changes.      Review of Systems   Constitutional:  Negative for activity change, appetite change, chills, diaphoresis, fatigue and fever.   HENT:  Negative for congestion, drooling, rhinorrhea, sore throat, trouble swallowing and voice change.    Eyes:  Negative for

## 2024-08-22 NOTE — ED NOTES
ED to inpatient nurses report      Chief Complaint:  Chief Complaint   Patient presents with    Fall    Hip Pain     Left hip     Present to ED from: home    MOA:     LOC: alert and orientated to name, place, date  Mobility: Independent at baseline, x2 current   Oxygen Baseline: RA    Current needs required: 3l nc   Pending ED orders: none  Present condition: stable    Why did the patient come to the ED? Pt arrived via EMS with left hip pain.  Pt fell over shoes at home pta.  Per EMS pt was yelling out in pain and took two of tramadol before EMS arrived.  Pt presents with hip deformity, pt unable to move leg  Pt denies hitting head, -LOC.  25 mcg fentanyl given in route per Dr Griffiths order.  Pt alert and oriented x4.  What is the plan? Admission, ortho and trauma surg consult  Any procedures or intervention occur? Xray, ct  Any safety concerns??    Mental Status:       Psych Assessment:   Psychosocial  Psychosocial (WDL): Within Defined Limits  Vital signs   Vitals:    08/21/24 2151 08/21/24 2226 08/21/24 2345 08/22/24 0000   BP: (!) 158/100  (!) 154/84 (!) 146/76   Pulse: 97 96 87 95   Resp: 19 17 18 14   Temp:       TempSrc:       SpO2: 98%   94%        Vitals:  Patient Vitals for the past 24 hrs:   BP Temp Temp src Pulse Resp SpO2   08/22/24 0000 (!) 146/76 -- -- 95 14 94 %   08/21/24 2345 (!) 154/84 -- -- 87 18 --   08/21/24 2226 -- -- -- 96 17 --   08/21/24 2151 (!) 158/100 -- -- 97 19 98 %   08/21/24 2150 -- 98.4 °F (36.9 °C) Oral 95 20 96 %      Visit Vitals  BP (!) 146/76   Pulse 95   Temp 98.4 °F (36.9 °C) (Oral)   Resp 14   SpO2 94%        LDAs:   Peripheral IV 08/21/24 Right Antecubital (Active)   Site Assessment Clean, dry & intact 08/21/24 2152   Line Status Blood return noted;Flushed;Normal saline locked;Specimen collected 08/21/24 2152   Dressing Status Clean, dry & intact 08/21/24 2152   Dressing Type Transparent 08/21/24 2152       Ambulatory Status:  Presents to emergency department  because of falls

## 2024-08-22 NOTE — PLAN OF CARE
Problem: Safety - Adult  Goal: Free from fall injury  8/22/2024 1151 by Cornelius Whiteside RN  Outcome: Progressing  8/22/2024 0510 by Ruben Sams RN  Outcome: Progressing     Problem: Discharge Planning  Goal: Discharge to home or other facility with appropriate resources  8/22/2024 1151 by Cornelius Whiteside RN  Outcome: Progressing  8/22/2024 0510 by Ruben Sams RN  Outcome: Progressing     Problem: Pain  Goal: Verbalizes/displays adequate comfort level or baseline comfort level  8/22/2024 1151 by Cornelius Whiteside RN  Outcome: Progressing  8/22/2024 0510 by Ruben Sams RN  Outcome: Progressing     Problem: Skin/Tissue Integrity  Goal: Absence of new skin breakdown  Description: 1.  Monitor for areas of redness and/or skin breakdown  2.  Assess vascular access sites hourly  3.  Every 4-6 hours minimum:  Change oxygen saturation probe site  4.  Every 4-6 hours:  If on nasal continuous positive airway pressure, respiratory therapy assess nares and determine need for appliance change or resting period.  8/22/2024 1151 by Cornelius Whiteside RN  Outcome: Progressing  8/22/2024 0510 by Ruben Sams RN  Outcome: Progressing     Problem: ABCDS Injury Assessment  Goal: Absence of physical injury  8/22/2024 1151 by Cornelius Whiteside RN  Outcome: Progressing  8/22/2024 0510 by Ruben Sams RN  Outcome: Progressing

## 2024-08-22 NOTE — ED NOTES
Pt very anxious in CT room, refusing to be moved over to the CT scanner bed.   Dr Avalos bedside with writer and multiple nurse in attempt to move pt.

## 2024-08-22 NOTE — ANESTHESIA POSTPROCEDURE EVALUATION
Department of Anesthesiology  Postprocedure Note    Patient: Angela Hutchinson  MRN: [id number]  YOB: 1955  Date of evaluation: 8/22/2024    Procedure Summary       Date: 08/22/24 Room / Location: [address]    Anesthesia Start: 1643 Anesthesia Stop: 1800    Procedure: FEMUR CEPHAMEDULLARY NAILING (Left: Leg Upper) Diagnosis:       Closed fracture of left femur, unspecified fracture morphology, unspecified portion of femur, initial encounter ([facility])      (Closed fracture of left femur, unspecified fracture morphology, unspecified portion of femur, initial encounter ([facility]) [S72.92XA])    Surgeons: Omid Hussein DO Responsible Provider: Simon Weiss MD    Anesthesia Type: general ASA Status: 3            Anesthesia Type: No value filed.    Kristel Phase I: Kristel Score: 9    Kristel Phase II:      Anesthesia Post Evaluation    Patient location during evaluation: PACU  Patient participation: complete - patient participated  Level of consciousness: awake  Airway patency: patent  Nausea & Vomiting: no nausea and no vomiting  Cardiovascular status: blood pressure returned to baseline  Respiratory status: acceptable  Hydration status: euvolemic  Comments: BP (!) 139/92   Pulse (!) 117   Temp 98.1 °F (36.7 °C) (Temporal)   Resp 19   Ht 1.626 m (5' 4\")   Wt 73.5 kg (162 lb)   SpO2 95%   BMI 27.81 kg/m²   Pain Assessment: 0-10 Pain Level: 6 (Tolerable)    No notable events documented.

## 2024-08-22 NOTE — PROGRESS NOTES
Occupational Therapy    [facility]  Occupational Therapy Not Seen Note    DATE: 2024    NAME: Angela Hutchinson  MRN: [id number]   : 1955      Patient not seen this date for Occupational Therapy due to:    Surgery/Procedure: OR with ortho today and SBR remains in place. Will check back post-op for OT evaluation.   Electronically signed by DAVID Forrest/L on 2024 at 8:08 AM

## 2024-08-22 NOTE — PROGRESS NOTES
Trauma Tertiary Survey    Admit Date: 8/21/2024  Hospital day 0      Subjective:     Pt seen and examined at the bedside. She complains of pain in the left hip and left thumb.    Objective:   PHYSICAL EXAM:   Physical Exam  Constitutional:       General: She is not in acute distress.     Appearance: She is not ill-appearing, toxic-appearing or diaphoretic.   HENT:      Head: Normocephalic and atraumatic.      Right Ear: External ear normal.      Left Ear: External ear normal.      Nose: Nose normal.      Mouth/Throat:      Mouth: Mucous membranes are moist.   Eyes:      Extraocular Movements: Extraocular movements intact.   Cardiovascular:      Rate and Rhythm: Normal rate and regular rhythm.   Pulmonary:      Effort: Pulmonary effort is normal. No respiratory distress.   Abdominal:      Palpations: Abdomen is soft.      Tenderness: There is no abdominal tenderness.   Musculoskeletal:         General: Tenderness and signs of injury present.      Comments: Swelling and ecchymosis to the left thumb.    LLE in traction splint.    Skin:     General: Skin is warm and dry.      Capillary Refill: Capillary refill takes less than 2 seconds.   Neurological:      General: No focal deficit present.      Mental Status: She is alert and oriented to person, place, and time.           Spine:     Spine Tenderness ROM   Cervical 0 /10 Normal   Thoracic 0 /10 Normal   Lumbar 0 /10 Abnormal, secondary to left hip pain     Musculoskeletal    Joint Tenderness Swelling ROM   Right shoulder absent absent normal   Left shoulder absent absent normal   Right elbow absent absent normal   Left elbow absent absent normal   Right wrist absent absent normal   Left wrist absent absent normal   Right hand grasp absent absent normal   Left hand grasp present present normal   Right hip absent absent normal   Left hip present absent abnormal - decreased secondary to traction   Right knee absent absent normal   Left knee absent absent abnormal -

## 2024-08-22 NOTE — PROGRESS NOTES
Pt was evaluated at bedside during morning rounds. Planned TICU admission post-surgery. Adequate pain management/anti-spastics recommended post-surgery.

## 2024-08-22 NOTE — PROGRESS NOTES
SPIRITUAL HEALTH - [facility] [place]     Emergency/Trauma Note    PATIENT NAME: Angela Hutchinson    Shift date: 8/21/2024  Shift day: Wednesday   Shift # 3    Room # ED27 Name: Angela Hutchinson            Age: 68 y.o.  Gender: female          [employer]: [employer]   Place of Islam: [facility]    Trauma/Incident type: Adult Trauma Consult  Admit Date & Time: 8/21/2024  9:48 PM  TRAUMA NAME: N/A    ADVANCE DIRECTIVES IN CHART?  Yes    NAME OF DECISION MAKER: Per scanned document, patient's primary decision maker is her spouse, Nick.     RELATIONSHIP OF DECISION MAKER TO PATIENT: Patient's Spouse    PATIENT/EVENT DESCRIPTION:  Angela Hutchinson is a 68 y.o. female who arrived to ED27 and was paged out as an \"Adult Trauma Consult\" due to a \"Fall.\" Patient was being tended to by the orthopedic team, when  initially arrived to unit. Pt to be admitted to 0415/0415-01.       SPIRITUAL ASSESSMENT-INTERVENTION-OUTCOME:   responded to page and gathered patient information outside room.  returned to room after orthopedic intervention. Patient was tearful when  visited. Patient shared that she is feeling \"upset\" at herself for her fall.  held patient's hand and provided comfort and support to her.  also provided support and hospitality to patient's spouse who returned to room. Patient thanked  for care and support.      PATIENT BELONGINGS:  No belongings noted    ANY BELONGINGS OF SIGNIFICANT VALUE NOTED:  N/A    REGISTRATION STAFF NOTIFIED?  Yes      WHAT IS YOUR SPIRITUAL CARE PLAN FOR THIS PATIENT?:  Chaplains can make follow-up visit, per request. Chaplains can be reached 24/7 via [facility].       08/22/24 0157   Encounter Summary   Encounter Overview/Reason Crisis   Service Provided For Patient and family together   Referral/Consult From Multi-disciplinary team  (Adult Trauma Consult)   Support System Spouse   Last Encounter  08/21/24   Complexity of

## 2024-08-22 NOTE — ED NOTES
Pt arrived via EMS with left hip pain.  Pt fell over shoes at home pta.  Per EMS pt was yelling out in pain and took two of tramadol before EMS arrived.  Pt presents with hip deformity, pt unable to move leg  Pt denies hitting head, -LOC.  25 mcg fentanyl given in route per Dr Griffiths order.  Pt alert and oriented x4.  IV established by EMS, EKG completed, labs drawn.  Waiting further orders to plan of care

## 2024-08-22 NOTE — ED NOTES
Writer and CT bedside to transport pt to CT.  Pt refusing CT, does not want to be moved to another bed.  Writer explained to pt the importance of the scans.  Pt refuses at this time.   Resident notified.

## 2024-08-22 NOTE — ED NOTES
The following labs were labeled with appropriate pt sticker and tubed to lab:     [x] Blue     [x] Lavender   [] on ice  [x] Green/yellow  [x] Green/black [] on ice  [] Akers  [] on ice  [x] Yellow  [] Red  [] Pink  [] Type/ Screen  [] ABG  [] VBG    [] COVID-19 swab    [] Rapid  [] PCR  [] Flu swab  [] Peds Viral Panel     [] Urine Sample  [] Fecal Sample  [] Pelvic Cultures  [] Blood Cultures  [] X 2  [] STREP Cultures  [] Wound Cultures

## 2024-08-22 NOTE — OP NOTE
Operative Note    Angela Hutchinson  YOB: 1955  [id number]      Pre-operative Diagnosis: Left Intertrochanteric Femur fracture    Post-operative Diagnosis: Left Intertrochanteric femur fracture    Procedure: Placement cephalomedullary nail Left Femur    Anesthesia: General    Surgeons: Omid Hussein DO    Assistants: Cayla Dickinson    Estimated Blood Loss: 50 cc    IVF: 700cc crystalloid    Complications: None    Specimens: Was Not Obtained    Findings: Displaced Left femur fracture    Implants: Synthes TFNA 10x235    Indications:   This is a 69 yo Female who presents for operative intervention of their Left intertrochanteric femur fracture. All treatment options, including conservative and operative, were discussed with the patient in detail including the risks and benefits of each. Risks including but not limited to  bleeding, blood clots, infection, damage to nearby tissues, vessels and nerves, wound healing complications, failed procedure, stiffness, loss of motion, malunion, nonunion, anesthesia risk, loss of life were all discussed with the patient.  Knowing these risks, the patient wished to proceed with surgery as indicated. Consent was obtained. Site Marked. All questions answered to the patient's satisfaction.      Operative:    The patient was taken to the operative suite, placed under general anesthesia without any complications.  Ancef 2 gm was given prior to the procedure.  At this time, all team members paused to identify proper patient name, indications, site and allergies.  All team members were in agreeance.  The patient was placed on a Libby Table.  Patient was adequately secured to the operative table and all bony prominences were well-padded. Using the Libby table attachments, reduction of the intertrochanteric femur fracture was achieved and confirmed with intraoperative fluoroscopy.  The Left lower extremity was prepped and draped in normal sterile fashion.   Using a marking pen,

## 2024-08-22 NOTE — CARE COORDINATION
SBIRT completed with pt. Pt admitted after a fall.  Pt reports she does not drink alcohol or use any drugs. She denies any recent feelings of depression/SI.  SBIRT is negative            Alcohol Screening and Brief Intervention        No results for input(s): \"ALC\" in the last 72 hours.    Alcohol Pre-screening     (WOMEN ONLY) How many times in the past year have you had 4 or more drinks in a day?: None       Drug Pre-Screening  none       Drug Screening DAST       Mood Pre-Screening (PHQ-2)  During the past 2 weeks, have you been bothered by, feeling down, depressed or hopeless?  No        I have interviewed Angela Hutchinson, [phone] regarding  Her alcohol consumption/drug use and risk for excessive use. Screenings were negative.  Patient  N/A intervention at this time.   Deferred []    Completed on: 8/22/2024   DALLAS BAIG

## 2024-08-22 NOTE — ED PROVIDER NOTES
[facility]     Emergency Department     Faculty Note/ Attestation      Pt Name: Angela Hutchinson                                       MRN: [id number]  Birthdate 1955  Date of evaluation: 8/21/2024    Patients PCP:    Rosalia Dubois MD    Note Started: 10:07 PM EDT      Attestation  I performed a history and physical examination of the patient and discussed management with the resident. I reviewed the resident’s note and agree with the documented findings and plan of care. Any areas of disagreement are noted on the chart. I was personally present for the key portions of any procedures. I have documented in the chart those procedures where I was not present during the key portions. I have reviewed the emergency nurses triage note. I agree with the chief complaint, past medical history, past surgical history, allergies, medications, social and family history as documented unless otherwise noted below.    For Physician Assistant/ Nurse Practitioner cases/documentation I have personally evaluated this patient and have completed at least one if not all key elements of the E/M (history, physical exam, and MDM). Additional findings are as noted.      Initial Screens:        [place] Coma Scale  Eye Opening: Spontaneous  Best Verbal Response: Oriented  Best Motor Response: Obeys commands  Zuly Coma Scale Score: 15    Vitals:    Vitals:    08/21/24 2150 08/21/24 2151   BP:  (!) 158/100   Pulse: 95 97   Resp: 20 19   Temp: 98.4 °F (36.9 °C)    TempSrc: Oral    SpO2: 96% 98%       CHIEF COMPLAINT       Chief Complaint   Patient presents with    Fall    Hip Pain     Left hip             DIAGNOSTIC RESULTS             RADIOLOGY:   No orders to display         LABS:  Labs Reviewed - No data to display      EMERGENCY DEPARTMENT COURSE:     -------------------------  BP: (!) 158/100, Temp: 98.4 °F (36.9 °C), Pulse: 97, Respirations: 19      Comments    69 yo with fall x2  Decreased ambulation

## 2024-08-22 NOTE — H&P
Musculoskeletal:  Negative for back pain and neck pain.        Left leg pain   Neurological:  Negative for dizziness, syncope, weakness, light-headedness and headaches.           PHYSICAL EXAMINATION:     VITAL SIGNS:   Vitals:    08/22/24 0635   BP: (!) 156/109   Pulse: (!) 108   Resp: 12   Temp: 98.9 °F (37.2 °C)   SpO2: 94%       Physical Exam  Vitals and nursing note reviewed.   Constitutional:       General: She is not in acute distress.  HENT:      Head: Normocephalic and atraumatic.      Mouth/Throat:      Mouth: Mucous membranes are moist.   Eyes:      Pupils: Pupils are equal, round, and reactive to light.   Cardiovascular:      Rate and Rhythm: Normal rate.      Heart sounds: No murmur heard.  Pulmonary:      Effort: Pulmonary effort is normal. No respiratory distress.      Breath sounds: Normal breath sounds.   Abdominal:      General: Bowel sounds are normal. There is no distension.      Palpations: Abdomen is soft.      Tenderness: There is no abdominal tenderness.   Musculoskeletal:         General: Tenderness (Left lower extremity) present.      Cervical back: Normal range of motion and neck supple. No tenderness.      Comments: No tenderness or deformity remaining extremities   Skin:     General: Skin is warm.      Capillary Refill: Capillary refill takes less than 2 seconds.   Neurological:      General: No focal deficit present.      Mental Status: She is alert and oriented to person, place, and time.   Psychiatric:         Mood and Affect: Mood normal.          FOCUSED ABDOMINAL SONOGRAM FOR TRAUMA (FAST): A good  quality examination was performed and representative images were obtained.    No free fluid in the abdomen        RADIOLOGY  CT HEAD WO CONTRAST   Final Result   No acute intracranial abnormality.         CT CHEST ABDOMEN PELVIS W CONTRAST Additional Contrast? None   Final Result   1. Mildly comminuted intertrochanteric left hip fracture with approximately   90 degrees varus

## 2024-08-22 NOTE — CONSULTS
tablet by mouth at bedtime    ProviderKristina MD   vitamin E 1000 units capsule Take 1 capsule by mouth daily    Provider, MD Kristina       Allergies:     Morphine, Bee venom, and Wasp venom protein    Social Hx:    reports that she quit smoking about 6 years ago. Her smoking use included cigarettes. She started smoking about 46 years ago. She has a 40.3 pack-year smoking history. She has never been exposed to tobacco smoke. She has never used smokeless tobacco.   reports no history of alcohol use.   reports no history of drug use.    Family Hx:     family history includes Cancer in her father.    Pertinent Systemic Review:    Constitutional: Negative for fever and chills.   Respiratory: Negative for cough.    Cardiovascular: Negative for chest pain.   Musculoskeletal: Positive for pain in their left hip.   Skin: Negative for itching and rash.   Neurological: Negative for numbness, tingling, weakness.     OBJECTIVE     PE:  Blood pressure (!) 146/76, pulse 95, temperature 98.4 °F (36.9 °C), temperature source Oral, resp. rate 14, SpO2 94%, not currently breastfeeding.    General: Alert and oriented, NAD, cooperative.   Head: Normocephalic, atraumatic.  Cardiovascular: Regular rate.  Respiratory: Chest symmetric, No accessory muscle use.  Musculoskeletal:  Pelvis: Stable to anterior and lateral compression.    RUE:   Skin intact. No skin tenting. No ecchymosis, abrasion, deformity, or lacerations. Non-tender to palpation. No bony crepitus. No significant swelling. Comparments soft and easily compressible. Full AROM at shoulder/elbow/wrist/fingers, without pain. Ulnar/Median/AIN/PIN/Radial motor intact. Axillary/MCN/Median/Ulnar/Radial nerve distributions SILT. Radial pulse 2+ with BCR.    LUE:   Skin intact. No skin tenting. Significant swelling and bruising about thumb. Patient able to range thumb with mild discomfort in comparison to her left hip. No abrasion, deformity, or lacerations. No bony crepitus.      ________________________________  Braulio Pimentel D.O.  Orthopedic Surgery Resident, PGY-3  [place], [place]    Please excuse any typos/errors, as this note was created with the assistance of voice recognition software. While intending to generate a document that actually reflects the content of the visit, the document can still have some errors including those of syntax and sound-a-like substitutions which may escape proof reading. In such instances, actual meaning can be extrapolated by context.

## 2024-08-22 NOTE — CARE COORDINATION
Attempted to meet with patient to complete initial case management assessment but patient is off the unit to OR

## 2024-08-22 NOTE — ANESTHESIA PRE PROCEDURE
Delmar Hart MD at [facility] OR    VITRECTOMY Right 03/10/2020    VITRECTOMY 23 GAUGE, MEMBRANE PEELING, GAS FLUID EXCHANGE, ICG     VITRECTOMY Right 03/10/2020    VITRECTOMY 23 GAUGE, MEMBRANE PEELING, AIR FLUID EXCHANGE, ICG performed by Bladimir Au MD at [facility] OR       Social History:    Social History     Tobacco Use    Smoking status: Former     Current packs/day: 0.00     Average packs/day: 1 pack/day for 40.3 years (40.3 ttl pk-yrs)     Types: Cigarettes     Start date:      Quit date: 2018     Years since quittin.3     Passive exposure: Never    Smokeless tobacco: Never    Tobacco comments:     Light smoker.  Quit on and off several years.   Substance Use Topics    Alcohol use: No                                Counseling given: Not Answered  Tobacco comments: Light smoker.  Quit on and off several years.      Vital Signs (Current):   Vitals:    24 0505 24 0635 24 1028 24 1205   BP:  (!) 156/109 129/80    Pulse:  (!) 108 (!) 110 (!) 108   Resp:  12 15 19   Temp:  98.9 °F (37.2 °C) 98.8 °F (37.1 °C)    TempSrc:  Temporal Temporal    SpO2:  94% 92% 92%   Weight: 73.5 kg (162 lb)      Height: 1.626 m (5' 4\")                                                 BP Readings from Last 3 Encounters:   24 129/80   24 134/77   24 (!) 147/73       NPO Status: Time of last liquid consumption:  (unknown)                        Time of last solid consumption:  (unknown)                        Date of last liquid consumption:  (unknown)                        Date of last solid food consumption:  (unknown)    BMI:   Wt Readings from Last 3 Encounters:   24 73.5 kg (162 lb)   24 73.7 kg (162 lb 6.4 oz)   24 76.2 kg (168 lb)     Body mass index is 27.81 kg/m².    CBC:   Lab Results   Component Value Date/Time    WBC 14.5 2024 05:13 AM    RBC 4.27 2024 05:13 AM    HGB 11.1 2024 05:13 AM    HCT 35.9 2024 05:13 AM    MCV 84.1

## 2024-08-22 NOTE — DISCHARGE INSTRUCTIONS
Orthopaedic Instructions:  -Weight bearing status: Weight bearing as tolerated with the left leg  -Do not remove Optifoam bandage (the large sealed \"Band-aid\"-like dressing) until your follow-up date in clinic. It is okay to shower with the Optifoam bandage. Should it fall off, replace with band-aids or gauze & tape until dry. It is still okay to shower if it falls off, but avoid baths and underwater submersion.  -Always look for signs of compartment syndrome: pain out of proportion to the injury, pain not controlled with pain medication, numbness in digits, changing of color of digits (paleness). If these signs occur return to ED immediately for reassessment.  -Always work on toe, and ankle motion to decrease swelling.  -Ice (20 minutes on and off 1 hour) and elevate to reduce swelling and throbbing pain.  -Should urinate within 8 hours of surgery.  -Call the office or come to Emergency Room if signs of infection appear (hot, swollen, red, draining pus, fever)  -Take medications as prescribed.  -Wean off narcotics (percocet/norco) as soon as possible. Do not take tylenol if still taking narcotics.  -Follow up with Dr. Hussein in his office on 9/11/24 at 3:15 PM. Call [phone]  to schedule/confirm or with any questions/concerns.

## 2024-08-23 LAB
BASOPHILS # BLD: <0.03 K/UL (ref 0–0.2)
BASOPHILS NFR BLD: 0 % (ref 0–2)
EOSINOPHIL # BLD: <0.03 K/UL (ref 0–0.44)
EOSINOPHILS RELATIVE PERCENT: 0 % (ref 1–4)
ERYTHROCYTE [DISTWIDTH] IN BLOOD BY AUTOMATED COUNT: 15.9 % (ref 11.8–14.4)
HCT VFR BLD AUTO: 29 % (ref 36.3–47.1)
HGB BLD-MCNC: 8.4 G/DL (ref 11.9–15.1)
IMM GRANULOCYTES # BLD AUTO: <0.03 K/UL (ref 0–0.3)
IMM GRANULOCYTES NFR BLD: 0 %
LYMPHOCYTES NFR BLD: 0.98 K/UL (ref 1.1–3.7)
LYMPHOCYTES RELATIVE PERCENT: 12 % (ref 24–43)
MCH RBC QN AUTO: 25.5 PG (ref 25.2–33.5)
MCHC RBC AUTO-ENTMCNC: 29 G/DL (ref 28.4–34.8)
MCV RBC AUTO: 88.1 FL (ref 82.6–102.9)
MONOCYTES NFR BLD: 0.83 K/UL (ref 0.1–1.2)
MONOCYTES NFR BLD: 10 % (ref 3–12)
NEUTROPHILS NFR BLD: 77 % (ref 36–65)
NEUTS SEG NFR BLD: 6.33 K/UL (ref 1.5–8.1)
NRBC BLD-RTO: 0 PER 100 WBC
PLATELET # BLD AUTO: 163 K/UL (ref 138–453)
PMV BLD AUTO: 10.9 FL (ref 8.1–13.5)
RBC # BLD AUTO: 3.29 M/UL (ref 3.95–5.11)
RBC # BLD: ABNORMAL 10*6/UL
WBC OTHER # BLD: 8.2 K/UL (ref 3.5–11.3)

## 2024-08-23 PROCEDURE — 2580000003 HC RX 258

## 2024-08-23 PROCEDURE — 6360000002 HC RX W HCPCS

## 2024-08-23 PROCEDURE — 2060000000 HC ICU INTERMEDIATE R&B

## 2024-08-23 PROCEDURE — 6370000000 HC RX 637 (ALT 250 FOR IP)

## 2024-08-23 PROCEDURE — 97166 OT EVAL MOD COMPLEX 45 MIN: CPT

## 2024-08-23 PROCEDURE — 2700000000 HC OXYGEN THERAPY PER DAY

## 2024-08-23 PROCEDURE — 94761 N-INVAS EAR/PLS OXIMETRY MLT: CPT

## 2024-08-23 PROCEDURE — 94640 AIRWAY INHALATION TREATMENT: CPT

## 2024-08-23 PROCEDURE — 85025 COMPLETE CBC W/AUTO DIFF WBC: CPT

## 2024-08-23 PROCEDURE — 97530 THERAPEUTIC ACTIVITIES: CPT

## 2024-08-23 PROCEDURE — 99233 SBSQ HOSP IP/OBS HIGH 50: CPT | Performed by: SURGERY

## 2024-08-23 PROCEDURE — 97535 SELF CARE MNGMENT TRAINING: CPT

## 2024-08-23 PROCEDURE — 6370000000 HC RX 637 (ALT 250 FOR IP): Performed by: SURGERY

## 2024-08-23 PROCEDURE — 90832 PSYTX W PT 30 MINUTES: CPT | Performed by: SOCIAL WORKER

## 2024-08-23 PROCEDURE — 36415 COLL VENOUS BLD VENIPUNCTURE: CPT

## 2024-08-23 RX ORDER — METHOCARBAMOL 500 MG/1
500 TABLET, FILM COATED ORAL EVERY 8 HOURS
Status: DISCONTINUED | OUTPATIENT
Start: 2024-08-23 | End: 2024-08-23

## 2024-08-23 RX ORDER — METHOCARBAMOL 750 MG/1
750 TABLET, FILM COATED ORAL EVERY 8 HOURS
Status: DISCONTINUED | OUTPATIENT
Start: 2024-08-23 | End: 2024-08-24

## 2024-08-23 RX ORDER — KETOROLAC TROMETHAMINE 15 MG/ML
15 INJECTION, SOLUTION INTRAMUSCULAR; INTRAVENOUS EVERY 6 HOURS
Status: DISPENSED | OUTPATIENT
Start: 2024-08-23 | End: 2024-08-24

## 2024-08-23 RX ADMIN — TIOTROPIUM BROMIDE AND OLODATEROL 2 PUFF: 3.124; 2.736 SPRAY, METERED RESPIRATORY (INHALATION) at 11:56

## 2024-08-23 RX ADMIN — IPRATROPIUM BROMIDE 0.5 MG: 0.5 SOLUTION RESPIRATORY (INHALATION) at 11:56

## 2024-08-23 RX ADMIN — SODIUM CHLORIDE, PRESERVATIVE FREE 10 ML: 5 INJECTION INTRAVENOUS at 08:32

## 2024-08-23 RX ADMIN — METHOCARBAMOL 500 MG: 500 TABLET ORAL at 03:48

## 2024-08-23 RX ADMIN — LACTULOSE 10 G: 20 SOLUTION ORAL at 20:59

## 2024-08-23 RX ADMIN — LACTULOSE 10 G: 20 SOLUTION ORAL at 08:32

## 2024-08-23 RX ADMIN — SODIUM CHLORIDE, PRESERVATIVE FREE 10 ML: 5 INJECTION INTRAVENOUS at 21:01

## 2024-08-23 RX ADMIN — PRAVASTATIN SODIUM 20 MG: 20 TABLET ORAL at 21:00

## 2024-08-23 RX ADMIN — NITROFURANTOIN MONOHYDRATE/MACROCRYSTALS 100 MG: 25; 75 CAPSULE ORAL at 08:32

## 2024-08-23 RX ADMIN — AMLODIPINE BESYLATE 10 MG: 10 TABLET ORAL at 21:00

## 2024-08-23 RX ADMIN — ROPINIROLE HYDROCHLORIDE 0.25 MG: 0.25 TABLET, FILM COATED ORAL at 21:00

## 2024-08-23 RX ADMIN — NITROFURANTOIN MONOHYDRATE/MACROCRYSTALS 100 MG: 25; 75 CAPSULE ORAL at 21:00

## 2024-08-23 RX ADMIN — PANTOPRAZOLE SODIUM 40 MG: 40 TABLET, DELAYED RELEASE ORAL at 05:36

## 2024-08-23 RX ADMIN — OXYCODONE HYDROCHLORIDE 10 MG: 5 TABLET ORAL at 05:37

## 2024-08-23 RX ADMIN — METHOCARBAMOL 750 MG: 750 TABLET ORAL at 21:00

## 2024-08-23 RX ADMIN — ACETAMINOPHEN 1000 MG: 500 TABLET ORAL at 05:36

## 2024-08-23 RX ADMIN — POLYETHYLENE GLYCOL 3350 17 G: 17 POWDER, FOR SOLUTION ORAL at 08:32

## 2024-08-23 RX ADMIN — OXYCODONE HYDROCHLORIDE 5 MG: 5 TABLET ORAL at 17:23

## 2024-08-23 RX ADMIN — SERTRALINE HYDROCHLORIDE 100 MG: 50 TABLET ORAL at 21:00

## 2024-08-23 RX ADMIN — METHOCARBAMOL 750 MG: 750 TABLET ORAL at 11:52

## 2024-08-23 RX ADMIN — OXYCODONE HYDROCHLORIDE 10 MG: 5 TABLET ORAL at 11:51

## 2024-08-23 RX ADMIN — OXYCODONE HYDROCHLORIDE 10 MG: 5 TABLET ORAL at 00:59

## 2024-08-23 RX ADMIN — SODIUM CHLORIDE, POTASSIUM CHLORIDE, SODIUM LACTATE AND CALCIUM CHLORIDE: 600; 310; 30; 20 INJECTION, SOLUTION INTRAVENOUS at 06:33

## 2024-08-23 RX ADMIN — OXYCODONE HYDROCHLORIDE 10 MG: 5 TABLET ORAL at 21:29

## 2024-08-23 RX ADMIN — IPRATROPIUM BROMIDE 0.5 MG: 0.5 SOLUTION RESPIRATORY (INHALATION) at 20:37

## 2024-08-23 RX ADMIN — Medication 2000 MG: at 03:37

## 2024-08-23 RX ADMIN — ACETAMINOPHEN 1000 MG: 500 TABLET ORAL at 13:46

## 2024-08-23 RX ADMIN — Medication 10 MG: at 21:00

## 2024-08-23 RX ADMIN — ACETAMINOPHEN 1000 MG: 500 TABLET ORAL at 20:59

## 2024-08-23 RX ADMIN — Medication 2000 MG: at 10:27

## 2024-08-23 RX ADMIN — KETOROLAC TROMETHAMINE 15 MG: 15 INJECTION, SOLUTION INTRAMUSCULAR; INTRAVENOUS at 13:47

## 2024-08-23 ASSESSMENT — PAIN DESCRIPTION - LOCATION
LOCATION: LEG;HIP
LOCATION: HIP
LOCATION: LEG
LOCATION: HIP;LEG
LOCATION: LEG;HIP

## 2024-08-23 ASSESSMENT — PAIN - FUNCTIONAL ASSESSMENT
PAIN_FUNCTIONAL_ASSESSMENT: PREVENTS OR INTERFERES WITH MANY ACTIVE NOT PASSIVE ACTIVITIES

## 2024-08-23 ASSESSMENT — PAIN SCALES - GENERAL
PAINLEVEL_OUTOF10: 6
PAINLEVEL_OUTOF10: 8
PAINLEVEL_OUTOF10: 8
PAINLEVEL_OUTOF10: 7
PAINLEVEL_OUTOF10: 8
PAINLEVEL_OUTOF10: 7
PAINLEVEL_OUTOF10: 6
PAINLEVEL_OUTOF10: 4
PAINLEVEL_OUTOF10: 2
PAINLEVEL_OUTOF10: 7
PAINLEVEL_OUTOF10: 8
PAINLEVEL_OUTOF10: 10
PAINLEVEL_OUTOF10: 5

## 2024-08-23 ASSESSMENT — PAIN DESCRIPTION - ORIENTATION
ORIENTATION: LEFT

## 2024-08-23 ASSESSMENT — PAIN DESCRIPTION - DESCRIPTORS
DESCRIPTORS: SPASM;CRAMPING;DISCOMFORT
DESCRIPTORS: SPASM
DESCRIPTORS: DISCOMFORT;SPASM

## 2024-08-23 NOTE — PLAN OF CARE
Problem: Safety - Adult  Goal: Free from fall injury  Outcome: Progressing     Problem: Discharge Planning  Goal: Discharge to home or other facility with appropriate resources  Outcome: Progressing     Problem: Pain  Goal: Verbalizes/displays adequate comfort level or baseline comfort level  Outcome: Progressing     Problem: Skin/Tissue Integrity  Goal: Absence of new skin breakdown  Description: 1.  Monitor for areas of redness and/or skin breakdown  2.  Assess vascular access sites hourly  3.  Every 4-6 hours minimum:  Change oxygen saturation probe site  4.  Every 4-6 hours:  If on nasal continuous positive airway pressure, respiratory therapy assess nares and determine need for appliance change or resting period.  Outcome: Progressing     Problem: ABCDS Injury Assessment  Goal: Absence of physical injury  Outcome: Progressing   Patient remains free from injury, and new skin breakdown. Reluctant to allow us to reposition her due to pain.

## 2024-08-23 NOTE — CARE COORDINATION
Case Management Assessment  Initial Evaluation    Date/Time of Evaluation: 8/23/2024 12:11 PM  Assessment Completed by: Kailee Denson RN    If patient is discharged prior to next notation, then this note serves as note for discharge by case management.    Patient Name: Angela Hutchinson                   YOB: 1955  Diagnosis: Displaced intertrochanteric fracture of left femur, initial encounter for closed fracture (HCC) [S72.142A]                   Date / Time: 8/21/2024  9:48 PM    Patient Admission Status: Inpatient   Readmission Risk (Low < 19, Mod (19-27), High > 27): Readmission Risk Score: 18.8    Current PCP: Rosalia Dubois MD  PCP verified by CM? (P) Yes    Chart Reviewed: Yes      History Provided by: (P) Patient  Patient Orientation: (P) Alert and Oriented    Patient Cognition: (P) Alert    Hospitalization in the last 30 days (Readmission):  No    If yes, Readmission Assessment in CM Navigator will be completed.    Advance Directives:      Code Status: Full Code   Patient's Primary Decision Maker is: (P) Legal Next of Kin    Primary Decision Maker: Nick Hutchinson - Spouse - [phone]    Secondary Decision Maker: Clementina Ortega - Child - [phone]    Discharge Planning:    Patient lives with: (P) Spouse/Significant Other Type of Home: (P) House  Primary Care Giver: (P) Self  Patient Support Systems include: (P) Spouse/Significant Other   Current Financial resources: (P) Medicare  Current community resources: (P) None  Current services prior to admission: (P) None            Current DME:              Type of Home Care services:  (P) None    ADLS  Prior functional level: (P) Independent in ADLs/IADLs  Current functional level: (P) Dressing, Toileting, Cooking, Housework, Shopping, Mobility    PT AM-PAC:   /24  OT AM-PAC: 16 /24    Family can provide assistance at DC: (P) Yes  Would you like Case Management to discuss the discharge plan with any other family members/significant others, and  if so, who? (P) No  Plans to Return to Present Housing: (P) Unknown at present  Other Identified Issues/Barriers to RETURNING to current housing: level of care needed  Potential Assistance needed at discharge: (P) Skilled Nursing Facility, Outpatient PT/OT, Home Care            Potential DME:    Patient expects to discharge to: (P) Rehabilitation facility  Plan for transportation at discharge:      Financial    Payor: MEDICARE / Plan: MEDICARE PART A AND B / Product Type: *No Product type* /     Does insurance require precert for SNF: Yes    Potential assistance Purchasing Medications: (P) No  Meds-to-Beds request: Yes      [facility] [id number] - [place], [place] - [address] - P [phone] - F [phone]  [address] [facility] [address] DANIELEDDo[facility] [address]  Phone: [phone] Fax: [phone]      Notes:    Factors facilitating achievement of predicted outcomes: Family support, Friend support, Motivated, Cooperative, and Pleasant    Barriers to discharge: Medical complications    Additional Case Management Notes: Patient from home independent with , transition and DC planning depend on pt progress after ortho surg./ nuero surg consult& PT eval    The Plan for Transition of Care is related to the following treatment goals of Displaced intertrochanteric fracture of left femur, initial encounter for closed fracture (HCC) [S72.142J]    IF APPLICABLE: The Patient and/or patient representative Angela and her family were provided with a choice of provider and agrees with the discharge plan. Freedom of choice list with basic dialogue that supports the patient's individualized plan of care/goals and shares the quality data associated with the providers was provided to:     Patient Representative Name:       The Patient and/or Patient Representative Agree with the Discharge Plan?      Kailee Denson RN  Case Management Department  Ph: [phone] Fax: [phone]

## 2024-08-23 NOTE — PROGRESS NOTES
ICU PROGRESS NOTE        PATIENT NAME: Angela Hutchinson  MEDICAL RECORD NO. [id number]  DATE: 8/23/2024    HD: # 1      ACUTE DIAGNOSES/PLAN  Neuro:  GCS 15  Did not hit her brain  CT head- No acute intracranial abnormality   Lilian 2.5,5 mg prn, Robaxin 500 mg, ropinirole 0.25  Neurosurgey consulted  Hold all antiplatelets and anticoagulants   MRI spine-pending  CV  -113  -147  MAP 81-99  Trop 11  ASA  Norvasc 5mg  Pulm   Acute/subacute anterior wedge compression deformity involving the inferior endplate of T9   MRI intrathoracic- pending   Status post wide posterior decompression at the L4-5 level.      O2 sat >95 on 2L NC    GI/Nutrition  Diet: regular  Protonix 40  Passed gases no bowel movement   Lactulose, glycolax  Renal/lytes  UOP: 1635 ML IN LAST 24 HOURS  Na/k 136/4.2  Bun/creat 15/0.8  Mg/p 1.9/3.4  Heme  DVT prophylaxis-None  Hb 8.4 from 9.3 and 11.1 yesterday  Plat 163  7.   Endocrine        1. Bg 129  Musculoskeletal  Left intertrochanteric hip fracture   S/P FEMUR FRACTURE REPAIR. Placement cephalomedullary nail Left Femur  WBAT to the LLE    Hip x-ray (8/22)- Internal fixation of the left femur without evidence of hardware complication.   Skin  LEFT THUMB BRUISES  Micro  UA: 5-10 casts  Moderate bacteria (usually have bacteria in urine)  +ve nitrates, small LE  Family/dispo  REMAIN IN ICU FOR NOW   Lines  pIV (8/22), Foly (8/22)     CHECKLIST      RESTRAINTS: WBAT to the LLE   IVF: LR @100  NUTRITION: regular  ANTIBIOTICS: nitrofurantonin. Cefazolin last dose today  GI: Protonix 40  DVT: none  GLYCEMIC CONTROL: noen  MOBILITY: N/A  Wound care: daily dressing    Chief Complaint: \"ffsh\"    SUBJECTIVE    Angela Hutchinson is a 68 y.o. female with a history of high-grade ductal carcinoma in situ, osteopenia with recurrent fractures (left foot, left fifth  metatarsal) presented on 8/21/2024 after sustaining 2 mechanical falls.  Patient reported inability to move her legs, and was experiencing  dislocation or joint abnormality evident at the left tibia/fibula and left ankle.  Bone density is normal.  Soft tissues are unremarkable.     1. Intertrochanteric left hip fracture with approximately 90 degrees varus angulation and no significant displacement. 2. No fracture or dislocation of the left tibia/fibula or left ankle.     XR ANKLE LEFT (MIN 3 VIEWS)    Result Date: 8/22/2024  EXAMINATION: 2 XRAY VIEWS OF THE LEFT FEMUR; THREE XRAY VIEWS OF THE LEFT ANKLE; 2 XRAY VIEWS OF THE LEFT TIBIA AND FIBULA 8/21/2024 10:41 pm COMPARISON: None. HISTORY: ORDERING SYSTEM PROVIDED HISTORY: fall TECHNOLOGIST PROVIDED HISTORY: fall FINDINGS: Intertrochanteric left hip fracture noted with approximately 90 degrees varus angulation and no significant displacement.  The left femur is otherwise unremarkable.  No fracture, dislocation or joint abnormality evident at the left tibia/fibula and left ankle.  Bone density is normal.  Soft tissues are unremarkable.     1. Intertrochanteric left hip fracture with approximately 90 degrees varus angulation and no significant displacement. 2. No fracture or dislocation of the left tibia/fibula or left ankle.     XR FEMUR RIGHT (MIN 2 VIEWS)    Result Date: 8/22/2024  EXAMINATION: 2 XRAY VIEWS OF THE RIGHT TIBIA AND FIBULA; 2 XRAY VIEWS OF THE RIGHT FEMUR 8/21/2024 10:41 pm COMPARISON: None. HISTORY: ORDERING SYSTEM PROVIDED HISTORY: fall TECHNOLOGIST PROVIDED HISTORY: fall FINDINGS: No fracture, dislocation or joint abnormality evident at the right femur and right tibia/fibula.  The patient is status post total right knee arthroplasty.  Bone density is normal.  Soft tissues are unremarkable.     1. No acute fracture or dislocation of the right femur or right tibia/fibula. 2. Status post total right knee arthroplasty.     XR TIBIA FIBULA RIGHT (2 VIEWS)    Result Date: 8/22/2024  EXAMINATION: 2 XRAY VIEWS OF THE RIGHT TIBIA AND FIBULA; 2 XRAY VIEWS OF THE RIGHT FEMUR 8/21/2024 10:41 pm
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
## 2024-08-23 NOTE — PROGRESS NOTES
Occupational Therapy  Facility/Department: [facility]   Occupational Therapy Initial Evaluation    Patient Name: Angela Hutchinson        MRN: [id number]    : 1955    Date of Service: 2024    Chief Complaint   Patient presents with    Fall    Hip Pain     Left hip     Discharge Recommendations  Discharge Recommendations: Patient would benefit from continued therapy after discharge    OT Equipment Recommendations  Other: CTA    Assessment  Performance deficits / Impairments: Decreased functional mobility ;Decreased endurance;Decreased ADL status;Decreased balance;Decreased vision/visual deficit;Decreased strength;Decreased high-level IADLs  Prognosis: Good  Decision Making: Medium Complexity  REQUIRES OT FOLLOW-UP: Yes  Activity Tolerance  Activity Tolerance: Patient limited by fatigue;Patient limited by pain  Safety Devices  Type of Devices: Patient at risk for falls;Left in bed;Call light within reach;Nurse notified;Gait belt  Restraints  Restraints Initially in Place: No    Restrictions/Precautions  Restrictions/Precautions  Restrictions/Precautions: Fall Risk;Contact Precautions;General Precautions;Weight Bearing  Required Braces or Orthoses?: No  Lower Extremity Weight Bearing Restrictions  Left Lower Extremity Weight Bearing: Weight Bearing As Tolerated    Subjective  General  Patient assessed for rehabilitation services?: Yes  Family / Caregiver Present: Yes (Spouse, dtr)  Diagnosis: L hip fx-CMN   Subjective  Subjective: RN approved therapy session, pt states having pain at LLE at 7/10, pt cooperative overall       Home Setup/Prior Level of Function  Social/Functional History  Lives With: Spouse  Type of Home: House  Home Layout: Multi-level (Split level)  Entrance Stairs - Number of Steps: 2  Bathroom Shower/Tub: Tub/Shower unit  Bathroom Toilet: Standard  Bathroom Equipment: Grab bars in shower;Shower chair  ADL Assistance: Independent  Homemaking Assistance: Independent  Homemaking

## 2024-08-23 NOTE — PROGRESS NOTES
Ok for Adult diet from ortho perspective   - DVT ppx:  Okay to start chemical anticoagulation POD#1. At minimum discharge on ASA 81mg BID if medically able for 30 days post-op.   - PT/OT evaluation post-op.  - Pain control and medical management per primary  - Ice extremity for pain and swelling  - Ok to discharge home after completion of post-op antibiotics, evaluation by PT/OT, and once medically stable   - Follow up with Dr. Hussein 9/11/24   -Please contact Ortho on call with any questions      Dmitry Dickinson MD  Orthopedic Surgery, PGY-2  [place], [place]

## 2024-08-23 NOTE — PROGRESS NOTES
[facility] Inpatient Progress Note  CHANDA JIANG   8/23/2024    Angela Annaanthony  1955  [id number]      Time spent with Patient: 0 minutes  Time spent with Family:     This writer was unable to complete screen or therapy services with patient at bedside at this time due to the following:  Other service provider in room / procedure being performed

## 2024-08-23 NOTE — PROGRESS NOTES
Physical Therapy Cancel Note      DATE: 2024    NAME: Angela Hutchinson  MRN: [id number]   : 1955      Patient not seen this date for Physical Therapy due to:    Other: pt recently received Toradol, but states she's in severe pain; per RN, not due for oxy's for 1.75 hours; pt is getting ready to be transferred to [facility] (momentarily); will check pt 24 and evaluate as appropriate      Electronically signed by Cali Garcia PT on 2024 at 2:19 PM

## 2024-08-24 ENCOUNTER — APPOINTMENT (OUTPATIENT)
Dept: MRI IMAGING | Age: 69
DRG: 481 | End: 2024-08-24
Payer: MEDICARE

## 2024-08-24 LAB
ANION GAP SERPL CALCULATED.3IONS-SCNC: 11 MMOL/L (ref 9–16)
BASOPHILS # BLD: <0.03 K/UL (ref 0–0.2)
BASOPHILS NFR BLD: 0 % (ref 0–2)
BUN SERPL-MCNC: 21 MG/DL (ref 8–23)
CALCIUM SERPL-MCNC: 8.3 MG/DL (ref 8.6–10.4)
CHLORIDE SERPL-SCNC: 105 MMOL/L (ref 98–107)
CO2 SERPL-SCNC: 22 MMOL/L (ref 20–31)
CREAT SERPL-MCNC: 0.9 MG/DL (ref 0.5–0.9)
EOSINOPHIL # BLD: 0.22 K/UL (ref 0–0.44)
EOSINOPHILS RELATIVE PERCENT: 3 % (ref 1–4)
ERYTHROCYTE [DISTWIDTH] IN BLOOD BY AUTOMATED COUNT: 15.8 % (ref 11.8–14.4)
GFR, ESTIMATED: 69 ML/MIN/1.73M2
GLUCOSE SERPL-MCNC: 105 MG/DL (ref 74–99)
HCT VFR BLD AUTO: 24.4 % (ref 36.3–47.1)
HGB BLD-MCNC: 7.6 G/DL (ref 11.9–15.1)
IMM GRANULOCYTES # BLD AUTO: 0.03 K/UL (ref 0–0.3)
IMM GRANULOCYTES NFR BLD: 0 %
LYMPHOCYTES NFR BLD: 1.15 K/UL (ref 1.1–3.7)
LYMPHOCYTES RELATIVE PERCENT: 17 % (ref 24–43)
MCH RBC QN AUTO: 26.7 PG (ref 25.2–33.5)
MCHC RBC AUTO-ENTMCNC: 31.1 G/DL (ref 28.4–34.8)
MCV RBC AUTO: 85.6 FL (ref 82.6–102.9)
MONOCYTES NFR BLD: 0.66 K/UL (ref 0.1–1.2)
MONOCYTES NFR BLD: 10 % (ref 3–12)
NEUTROPHILS NFR BLD: 70 % (ref 36–65)
NEUTS SEG NFR BLD: 4.77 K/UL (ref 1.5–8.1)
NRBC BLD-RTO: 0 PER 100 WBC
PLATELET # BLD AUTO: 135 K/UL (ref 138–453)
PMV BLD AUTO: 10.5 FL (ref 8.1–13.5)
POTASSIUM SERPL-SCNC: 3.8 MMOL/L (ref 3.7–5.3)
RBC # BLD AUTO: 2.85 M/UL (ref 3.95–5.11)
RBC # BLD: ABNORMAL 10*6/UL
SODIUM SERPL-SCNC: 138 MMOL/L (ref 136–145)
WBC OTHER # BLD: 6.9 K/UL (ref 3.5–11.3)

## 2024-08-24 PROCEDURE — 6360000002 HC RX W HCPCS

## 2024-08-24 PROCEDURE — 2580000003 HC RX 258

## 2024-08-24 PROCEDURE — 72146 MRI CHEST SPINE W/O DYE: CPT

## 2024-08-24 PROCEDURE — 36415 COLL VENOUS BLD VENIPUNCTURE: CPT

## 2024-08-24 PROCEDURE — 97530 THERAPEUTIC ACTIVITIES: CPT

## 2024-08-24 PROCEDURE — 6370000000 HC RX 637 (ALT 250 FOR IP)

## 2024-08-24 PROCEDURE — 2700000000 HC OXYGEN THERAPY PER DAY

## 2024-08-24 PROCEDURE — 94640 AIRWAY INHALATION TREATMENT: CPT

## 2024-08-24 PROCEDURE — 2060000000 HC ICU INTERMEDIATE R&B

## 2024-08-24 PROCEDURE — 85025 COMPLETE CBC W/AUTO DIFF WBC: CPT

## 2024-08-24 PROCEDURE — 80048 BASIC METABOLIC PNL TOTAL CA: CPT

## 2024-08-24 PROCEDURE — 97163 PT EVAL HIGH COMPLEX 45 MIN: CPT

## 2024-08-24 PROCEDURE — 97110 THERAPEUTIC EXERCISES: CPT

## 2024-08-24 PROCEDURE — 94761 N-INVAS EAR/PLS OXIMETRY MLT: CPT

## 2024-08-24 RX ORDER — METHOCARBAMOL 750 MG/1
750 TABLET, FILM COATED ORAL 4 TIMES DAILY
Status: DISCONTINUED | OUTPATIENT
Start: 2024-08-24 | End: 2024-08-27 | Stop reason: HOSPADM

## 2024-08-24 RX ORDER — KETOROLAC TROMETHAMINE 15 MG/ML
15 INJECTION, SOLUTION INTRAMUSCULAR; INTRAVENOUS ONCE
Status: COMPLETED | OUTPATIENT
Start: 2024-08-24 | End: 2024-08-24

## 2024-08-24 RX ADMIN — PANTOPRAZOLE SODIUM 40 MG: 40 TABLET, DELAYED RELEASE ORAL at 05:58

## 2024-08-24 RX ADMIN — LACTULOSE 10 G: 20 SOLUTION ORAL at 20:23

## 2024-08-24 RX ADMIN — Medication 10 MG: at 20:24

## 2024-08-24 RX ADMIN — SODIUM CHLORIDE, PRESERVATIVE FREE 10 ML: 5 INJECTION INTRAVENOUS at 20:24

## 2024-08-24 RX ADMIN — IPRATROPIUM BROMIDE 0.5 MG: 0.5 SOLUTION RESPIRATORY (INHALATION) at 21:13

## 2024-08-24 RX ADMIN — ACETAMINOPHEN 1000 MG: 500 TABLET ORAL at 05:58

## 2024-08-24 RX ADMIN — OXYCODONE HYDROCHLORIDE 10 MG: 5 TABLET ORAL at 21:50

## 2024-08-24 RX ADMIN — ROPINIROLE HYDROCHLORIDE 0.25 MG: 0.25 TABLET, FILM COATED ORAL at 20:24

## 2024-08-24 RX ADMIN — SERTRALINE HYDROCHLORIDE 100 MG: 50 TABLET ORAL at 20:23

## 2024-08-24 RX ADMIN — POLYETHYLENE GLYCOL 3350 17 G: 17 POWDER, FOR SOLUTION ORAL at 08:07

## 2024-08-24 RX ADMIN — METHOCARBAMOL 750 MG: 750 TABLET ORAL at 13:23

## 2024-08-24 RX ADMIN — OXYCODONE HYDROCHLORIDE 10 MG: 5 TABLET ORAL at 08:17

## 2024-08-24 RX ADMIN — TIOTROPIUM BROMIDE AND OLODATEROL 2 PUFF: 3.124; 2.736 SPRAY, METERED RESPIRATORY (INHALATION) at 09:10

## 2024-08-24 RX ADMIN — METHOCARBAMOL 750 MG: 750 TABLET ORAL at 08:06

## 2024-08-24 RX ADMIN — IPRATROPIUM BROMIDE 0.5 MG: 0.5 SOLUTION RESPIRATORY (INHALATION) at 12:14

## 2024-08-24 RX ADMIN — AMLODIPINE BESYLATE 10 MG: 10 TABLET ORAL at 20:23

## 2024-08-24 RX ADMIN — METHOCARBAMOL 750 MG: 750 TABLET ORAL at 01:45

## 2024-08-24 RX ADMIN — NITROFURANTOIN MONOHYDRATE/MACROCRYSTALS 100 MG: 25; 75 CAPSULE ORAL at 20:24

## 2024-08-24 RX ADMIN — LACTULOSE 10 G: 20 SOLUTION ORAL at 08:07

## 2024-08-24 RX ADMIN — ACETAMINOPHEN 1000 MG: 500 TABLET ORAL at 20:23

## 2024-08-24 RX ADMIN — IPRATROPIUM BROMIDE 0.5 MG: 0.5 SOLUTION RESPIRATORY (INHALATION) at 16:12

## 2024-08-24 RX ADMIN — KETOROLAC TROMETHAMINE 15 MG: 15 INJECTION, SOLUTION INTRAMUSCULAR; INTRAVENOUS at 01:44

## 2024-08-24 RX ADMIN — IPRATROPIUM BROMIDE 0.5 MG: 0.5 SOLUTION RESPIRATORY (INHALATION) at 09:01

## 2024-08-24 RX ADMIN — ACETAMINOPHEN 1000 MG: 500 TABLET ORAL at 13:23

## 2024-08-24 RX ADMIN — METHOCARBAMOL 750 MG: 750 TABLET ORAL at 20:24

## 2024-08-24 RX ADMIN — METHOCARBAMOL 750 MG: 750 TABLET ORAL at 17:38

## 2024-08-24 RX ADMIN — NITROFURANTOIN MONOHYDRATE/MACROCRYSTALS 100 MG: 25; 75 CAPSULE ORAL at 08:07

## 2024-08-24 RX ADMIN — PRAVASTATIN SODIUM 20 MG: 20 TABLET ORAL at 20:24

## 2024-08-24 ASSESSMENT — PAIN SCALES - WONG BAKER: WONGBAKER_NUMERICALRESPONSE: NO HURT

## 2024-08-24 ASSESSMENT — PAIN DESCRIPTION - ORIENTATION: ORIENTATION: LEFT

## 2024-08-24 ASSESSMENT — PAIN SCALES - GENERAL
PAINLEVEL_OUTOF10: 8
PAINLEVEL_OUTOF10: 7
PAINLEVEL_OUTOF10: 5
PAINLEVEL_OUTOF10: 0

## 2024-08-24 ASSESSMENT — PAIN DESCRIPTION - LOCATION: LOCATION: HIP;LEG;BACK

## 2024-08-24 NOTE — PLAN OF CARE
Problem: Safety - Adult  Goal: Free from fall injury  8/24/2024 0927 by Erin Chadwick RN  Outcome: Progressing  8/23/2024 2256 by Laura Zendejas RN  Outcome: Progressing     Problem: Discharge Planning  Goal: Discharge to home or other facility with appropriate resources  8/24/2024 0927 by Erin Chadwick RN  Outcome: Progressing  Flowsheets (Taken 8/24/2024 0800)  Discharge to home or other facility with appropriate resources: Identify barriers to discharge with patient and caregiver  8/23/2024 2256 by Laura Zendejas RN  Outcome: Progressing     Problem: Pain  Goal: Verbalizes/displays adequate comfort level or baseline comfort level  8/24/2024 0927 by Erin Chadwick RN  Outcome: Progressing  8/23/2024 2256 by Laura Zendejas RN  Outcome: Progressing     Problem: Skin/Tissue Integrity  Goal: Absence of new skin breakdown  Description: 1.  Monitor for areas of redness and/or skin breakdown  2.  Assess vascular access sites hourly  3.  Every 4-6 hours minimum:  Change oxygen saturation probe site  4.  Every 4-6 hours:  If on nasal continuous positive airway pressure, respiratory therapy assess nares and determine need for appliance change or resting period.  8/24/2024 0927 by Erin Chadwick RN  Outcome: Progressing  8/23/2024 2256 by Laura Zendejas RN  Outcome: Progressing     Problem: ABCDS Injury Assessment  Goal: Absence of physical injury  8/24/2024 0927 by Erin Chadwick RN  Outcome: Progressing  8/23/2024 2256 by Laura Zendejas RN  Outcome: Progressing

## 2024-08-24 NOTE — PROGRESS NOTES
PROGRESS NOTE      PATIENT NAME: Angela Hutchinson  MEDICAL RECORD NO. [id number]  DATE: 8/24/2024  SURGEON: Clint Brown MD  PRIMARY CARE PHYSICIAN: Rosalia Dubois MD    HD: # 2    ASSESSMENT    Patient Active Problem List   Diagnosis    Gastro-esophageal reflux disease without esophagitis    Epigastric abdominal pain    Allergic to bees    Anxiety    Depression    Esophageal spasm    Gas bloat syndrome    Hyperlipidemia    Hypertension    Irritable bowel syndrome    Osteoarthritis of knee    Osteopenia    Paraesophageal hernia    Primary hypertriglyceridemia    Tubular adenoma of colon    Macular pucker, right    Macular edema, cystoid, right    Centrilobular emphysema (HCC)    Vertebrogenic low back pain    Carotid stenosis, asymptomatic, bilateral    Neuropathy    Failed back syndrome    Chronic lumbar radiculopathy    Lumbosacral spondylosis without myelopathy    Arthralgia    Arthritis of foot    Closed fracture of base of fifth metatarsal bone    Lower extremity edema    Other mechanical complication of implanted electronic neurostimulator, generator, subsequent encounter    Spinal stenosis of thoracic region    Weakness of both lower extremities    Ductal carcinoma in situ (DCIS) of right breast    Age related osteoporosis    H/O recurrent vertebral fractures    Osteoporosis due to aromatase inhibitor    Osteopenia of multiple sites    Malignant neoplasm of lower-inner quadrant of right breast of female, estrogen receptor positive (HCC)    Parkinson's disease (HCC)    Chemotherapy adverse reaction    Change in nail appearance    Breast asymmetry    History of breast cancer    Hair loss    Normocytic anemia    Intertrochanteric fracture of left hip, closed, initial encounter ([facility])    Closed wedge compression fracture of T9 vertebra ([facility])       MEDICAL DECISION MAKING AND PLAN    Diagnosis: Fall, Left intertrochanteric hip fracture              Plan: Ortho - s/p L femur CMN 8/22  Transfer from [place]

## 2024-08-24 NOTE — PROGRESS NOTES
Physical Therapy  Facility/Department: [address] STEPDOWN  Physical Therapy Initial Assessment    Name: Angela Hutchinson  : 1955  MRN: [id number]  Date of Service: 2024    Chief Complaint   Patient presents with    Fall    Hip Pain     Left hip      S/P cephalomedullary nail placement of left femur Dx: Left intertrochanteric fracture     CT LUMBAR SPINE BONY RECONSTRUCTION   Result Date: 2024  Acute/subacute anterior wedge compression deformity involving the inferior endplate of T9 with moderate loss of anterior vertebral body height new compared to 2024.    Per EMR:  NS Plan 24, MRI thoracic w/o contrast , Brace (Not ordered or in room)    Discharge Recommendations:  Therapy recommended at discharge   PT Equipment Recommendations  Other: TBD pending progress and dischage dispositioni    Further therapy recommended at discharge.The patient should be able to tolerate at least 3 hours of therapy per day over 5 days or 15 hours over 7 days.   This patient may benefit from a Physical Medicine and Rehab consult.        Patient Diagnosis(es): The encounter diagnosis was Intertrochanteric fracture of left hip, closed, initial encounter ([facility]).    Past Medical History:  has a past medical history of ADHD, Arthritis, Blurred vision, right eye, Breast cancer ([facility]), CAD (coronary artery disease), Carotid artery disease ([facility]), COPD (chronic obstructive pulmonary disease) ([facility]), COVID-19, GERD (gastroesophageal reflux disease), Hearing loss, Hiatal hernia, High cholesterol, History of closed shoulder dislocation, [place] (hard of hearing), Hx of gastric ulcer, Hyperlipidemia, Hypertension, IBS (irritable bowel syndrome), Macular pucker, right eye, Skin cancer of forehead, Syncope, Tension headache, Tremor, UTI (urinary tract infection), and Wears glasses.    Past Surgical History:  has a past surgical history that includes Colonoscopy (N/A, 2016); Revision total knee arthroplasty (Right); Esophagogastric  Tolerance Comments: Out of bed activity deferred per NS NP while awaiting back brace.    Plan  Physical Therapy Plan  General Plan: 6-7 times per week  Specific Instructions for Next Treatment: Progress activity and encourage out of bed activity  Current Treatment Recommendations: Strengthening, Balance training, Functional mobility training, Transfer training, Endurance training, Gait training, Stair training, Neuromuscular re-education, Home exercise program, Safety education & training, Therapeutic activities, ROM, Patient/Caregiver education & training, Equipment evaluation, education, & procurement  Additional Comments: PT OOB treatmet to be deferred due to pending back brace.  Safety Devices  Type of Devices: Patient at risk for falls, Left in bed, Call light within reach, Nurse notified  Restraints  Restraints Initially in Place: No    Restrictions  Restrictions/Precautions  Restrictions/Precautions: Fall Risk, Contact Precautions, General Precautions, Weight Bearing, Surgical Protocols, Other (comment) (awaiting brace per NS)  Required Braces or Orthoses?: Yes (TLSO when walking and out of bed)  Lower Extremity Weight Bearing Restrictions  Left Lower Extremity Weight Bearing: Weight Bearing As Tolerated  Position Activity Restriction  Other position/activity restrictions: TLSO when walking and out of bed (not in room this AM), WBAT Left LE, activity as tolerated , O2 vis NC     Subjective  General  Chart Reviewed: Yes  Patient assessed for rehabilitation services?: Yes  Additional Pertinent Hx: mpression 68 y.o. female who is being seen for:     -Left intertrochanteric femur fracture     Procedures, DOS: 8/22/2024  -Left femur cephalomedullary nail     Plan  - No further orthopaedic surgical intervention planned at this time - WBAT  to the LLE         Social/Functional History  Social/Functional History  Lives With: Spouse  Type of Home: House  Home Layout: Multi-level  Entrance Stairs - Number of Steps:

## 2024-08-24 NOTE — PROGRESS NOTES
Resident Progress Note  Date: 2024  Time: 8:05 PM  Patient Name: Angela Hutchinson  Patient : 1955  Room/Bed: [facility]20422-  Allergies:   Allergies   Allergen Reactions    Morphine Itching    Bee Venom     Wasp Venom Protein Swelling       Interval History:    No acute events. MRI thoracic spine ordered.   Underwent cephalomedullary nail placement of left femur.   Working with OT.     No acute events overnight.     Current Medications:  Scheduled Meds:   methocarbamol  750 mg Oral Q8H    ketorolac  15 mg IntraVENous Q6H    melatonin  10 mg Oral Nightly    sodium chloride flush  5-40 mL IntraVENous 2 times per day    polyethylene glycol  17 g Oral Daily    acetaminophen  1,000 mg Oral 3 times per day    amLODIPine  10 mg Oral Nightly    ipratropium  0.5 mg Nebulization 4x Daily    lactulose  10 g Oral BID    pantoprazole  40 mg Oral QAM AC    pravastatin  20 mg Oral Nightly    rOPINIRole  0.25 mg Oral Nightly    sertraline  100 mg Oral Nightly    tiotropium-olodaterol  2 puff Inhalation Daily    nitrofurantoin (macrocrystal-monohydrate)  100 mg Oral BID       Continuous Infusions:   sodium chloride         Vitals:  Patient Vitals for the past 8 hrs:   BP Temp Temp src Pulse Resp SpO2   24 -- -- -- (!) 104 17 --   24 126/62 98.2 °F (36.8 °C) Oral (!) 105 14 95 %   24 1723 -- -- -- -- 16 --   24 1221 135/78 98.4 °F (36.9 °C) Oral (!) 112 18 93 %     Height and Weight  Height: 162.6 cm (5' 4\")  Weight - Scale: 73.5 kg (162 lb)  Weight Method: Stated  BSA (Calculated - sq m): 1.82 sq meters  BMI (Calculated): 27.9  Body mass index is 27.81 kg/m².  <16 Severe malnutrition  16-16.99 Moderate malnutrition  17-18.49 Mild malnutrition  18.5-24.9 Normal  25-29.9 Overweight (not obese)  30-34.9 Obese class 1 (Low Risk)  35-39.9 Obese class 2 (Moderate Risk)  ?40 Obese class 3 (High Risk)    Labs (last 48 hours):  Recent Results (from the past 48 hour(s))   EKG 12 Lead (Chest

## 2024-08-24 NOTE — PROGRESS NOTES
Resident Progress Note  Date: 2024  Time: 3:51 PM  Patient Name: Angela Hutchinson  Patient : 1955  Room/Bed: 0422/0422-02  Allergies:   Allergies   Allergen Reactions    Morphine Itching    Bee Venom     Wasp Venom Protein Swelling       Interval History:    No acute events. MRI thoracic spine ordered. Patients  states he has the spinal cord stimulator remote and info. Underwent cephalomedullary nail placement of left femur . Still complaining of a lot of pain.   No acute events overnight.     Current Medications:  Scheduled Meds:   methocarbamol  750 mg Oral 4x Daily    melatonin  10 mg Oral Nightly    sodium chloride flush  5-40 mL IntraVENous 2 times per day    polyethylene glycol  17 g Oral Daily    acetaminophen  1,000 mg Oral 3 times per day    amLODIPine  10 mg Oral Nightly    ipratropium  0.5 mg Nebulization 4x Daily    lactulose  10 g Oral BID    pantoprazole  40 mg Oral QAM AC    pravastatin  20 mg Oral Nightly    rOPINIRole  0.25 mg Oral Nightly    sertraline  100 mg Oral Nightly    tiotropium-olodaterol  2 puff Inhalation Daily    nitrofurantoin (macrocrystal-monohydrate)  100 mg Oral BID       Continuous Infusions:   sodium chloride         Vitals:  Patient Vitals for the past 8 hrs:   BP Temp Temp src Pulse Resp SpO2   24 1216 118/77 -- -- (!) 112 21 93 %   24 1215 -- 98.6 °F (37 °C) Oral (!) 109 19 92 %   24 0910 -- -- -- (!) 101 20 96 %   24 0901 -- -- -- (!) 105 18 96 %   24 0847 -- -- -- -- 18 --   24 0805 119/64 98.6 °F (37 °C) Oral 99 15 92 %     Height and Weight  Height: 162.6 cm (5' 4\")  Weight - Scale: 73.5 kg (162 lb)  Weight Method: Stated  BSA (Calculated - sq m): 1.82 sq meters  BMI (Calculated): 27.9  Body mass index is 27.81 kg/m².  <16 Severe malnutrition  16-16.99 Moderate malnutrition  17-18.49 Mild malnutrition  18.5-24.9 Normal  25-29.9 Overweight (not obese)  30-34.9 Obese class 1 (Low Risk)  35-39.9 Obese class 2  intracranial abnormality.     XR CHEST PORTABLE    Result Date: 8/22/2024  Normal examination with thoracic spine stimulator in place.     XR KNEE LEFT (1-2 VIEWS)    Result Date: 8/22/2024  Satisfactory position of traction pin across the distal femoral diaphysis.     XR HAND LEFT (2 VIEWS)    Result Date: 8/22/2024  Questionable nondisplaced fracture of the volar base of the 1st distal phalanx. Correlate with point tenderness.     XR TIBIA FIBULA LEFT (2 VIEWS)    Result Date: 8/22/2024  1. Intertrochanteric left hip fracture with approximately 90 degrees varus angulation and no significant displacement. 2. No fracture or dislocation of the left tibia/fibula or left ankle.     XR FEMUR LEFT 1 VW    Result Date: 8/22/2024  1. Intertrochanteric left hip fracture with approximately 90 degrees varus angulation and no significant displacement. 2. No fracture or dislocation of the left tibia/fibula or left ankle.     XR ANKLE LEFT (MIN 3 VIEWS)    Result Date: 8/22/2024  1. Intertrochanteric left hip fracture with approximately 90 degrees varus angulation and no significant displacement. 2. No fracture or dislocation of the left tibia/fibula or left ankle.     XR FEMUR RIGHT (MIN 2 VIEWS)    Result Date: 8/22/2024  1. No acute fracture or dislocation of the right femur or right tibia/fibula. 2. Status post total right knee arthroplasty.     XR TIBIA FIBULA RIGHT (2 VIEWS)    Result Date: 8/22/2024  1. No acute fracture or dislocation of the right femur or right tibia/fibula. 2. Status post total right knee arthroplasty.     XR HIP W PELVIS MIN 5 VWS BILATERAL    Result Date: 8/22/2024  Left intertrochanteric fracture with approximately 90 degrees varus angulation and no significant displacement.      Labs and imaging reviewed with Dr. Avendaño    Physical Exam    NEUROLOGIC EXAMINATION  GENERAL  Appears comfortable and in no distress   HEENT  Intact   cardiovascular  S1 and S2 heard; palpation of pulses: radial pulse    NECK

## 2024-08-24 NOTE — PROGRESS NOTES
POST ICU TRANSFER NOTE    Checklist:  [x]   Oxygen saturation is > 90% on FiO2< 50% (exceptions may be made for patient pathophysiology)    [x]   Vital signs remain at or near baseline without pharmaceutical adjuncts, blood products, or > 2L fluid bolus since transfer   [x]   No suspicion or evidence of a new untreated infection (confusion, cool or cyanotic extremities, poor capillary refill, metabolic acidosis, low urine output)  [x]   Stable GCS, seizures controlled, no invasive neurological monitoring   [x]   No alteration in mental status after transfer from ICU  [x]   No deterioration in renal function since transfer  [x]   Patient care needs do not exceed the capabilities of the unit they were transferred to (suctioning needs, glucose monitoring, neurological monitoring, neurovascular checks, vital sign monitoring, I&O monitoring, drain management        Golden Ferdinand Kennedy MD  Trauma/Surgery Service  8/24/2024 at 5:14 AM

## 2024-08-25 ENCOUNTER — APPOINTMENT (OUTPATIENT)
Dept: GENERAL RADIOLOGY | Age: 69
DRG: 481 | End: 2024-08-25
Payer: MEDICARE

## 2024-08-25 ENCOUNTER — APPOINTMENT (OUTPATIENT)
Dept: CT IMAGING | Age: 69
DRG: 481 | End: 2024-08-25
Payer: MEDICARE

## 2024-08-25 LAB
ANION GAP SERPL CALCULATED.3IONS-SCNC: 9 MMOL/L (ref 9–16)
BASOPHILS # BLD: <0.03 K/UL (ref 0–0.2)
BASOPHILS NFR BLD: 0 % (ref 0–2)
BNP SERPL-MCNC: 465 PG/ML (ref 0–300)
BUN SERPL-MCNC: 14 MG/DL (ref 8–23)
CALCIUM SERPL-MCNC: 8.4 MG/DL (ref 8.6–10.4)
CHLORIDE SERPL-SCNC: 105 MMOL/L (ref 98–107)
CO2 SERPL-SCNC: 23 MMOL/L (ref 20–31)
CREAT SERPL-MCNC: 0.7 MG/DL (ref 0.5–0.9)
EOSINOPHIL # BLD: 0.32 K/UL (ref 0–0.44)
EOSINOPHILS RELATIVE PERCENT: 3 % (ref 1–4)
ERYTHROCYTE [DISTWIDTH] IN BLOOD BY AUTOMATED COUNT: 15.8 % (ref 11.8–14.4)
GFR, ESTIMATED: 87 ML/MIN/1.73M2
GLUCOSE SERPL-MCNC: 124 MG/DL (ref 74–99)
HCT VFR BLD AUTO: 27.1 % (ref 36.3–47.1)
HGB BLD-MCNC: 8.4 G/DL (ref 11.9–15.1)
IMM GRANULOCYTES # BLD AUTO: 0.05 K/UL (ref 0–0.3)
IMM GRANULOCYTES NFR BLD: 1 %
LACTIC ACID, WHOLE BLOOD: 0.8 MMOL/L (ref 0.7–2.1)
LYMPHOCYTES NFR BLD: 1.53 K/UL (ref 1.1–3.7)
LYMPHOCYTES RELATIVE PERCENT: 16 % (ref 24–43)
MCH RBC QN AUTO: 26.7 PG (ref 25.2–33.5)
MCHC RBC AUTO-ENTMCNC: 31 G/DL (ref 28.4–34.8)
MCV RBC AUTO: 86 FL (ref 82.6–102.9)
MONOCYTES NFR BLD: 0.84 K/UL (ref 0.1–1.2)
MONOCYTES NFR BLD: 9 % (ref 3–12)
NEUTROPHILS NFR BLD: 71 % (ref 36–65)
NEUTS SEG NFR BLD: 7.07 K/UL (ref 1.5–8.1)
NRBC BLD-RTO: 0 PER 100 WBC
PLATELET # BLD AUTO: 195 K/UL (ref 138–453)
PMV BLD AUTO: 10.4 FL (ref 8.1–13.5)
POTASSIUM SERPL-SCNC: 4.1 MMOL/L (ref 3.7–5.3)
RBC # BLD AUTO: 3.15 M/UL (ref 3.95–5.11)
RBC # BLD: ABNORMAL 10*6/UL
SODIUM SERPL-SCNC: 137 MMOL/L (ref 136–145)
TROPONIN I SERPL HS-MCNC: 13 NG/L (ref 0–14)
WBC OTHER # BLD: 9.8 K/UL (ref 3.5–11.3)

## 2024-08-25 PROCEDURE — 6360000002 HC RX W HCPCS

## 2024-08-25 PROCEDURE — 6370000000 HC RX 637 (ALT 250 FOR IP)

## 2024-08-25 PROCEDURE — 2580000003 HC RX 258

## 2024-08-25 PROCEDURE — 71275 CT ANGIOGRAPHY CHEST: CPT

## 2024-08-25 PROCEDURE — 83880 ASSAY OF NATRIURETIC PEPTIDE: CPT

## 2024-08-25 PROCEDURE — 94761 N-INVAS EAR/PLS OXIMETRY MLT: CPT

## 2024-08-25 PROCEDURE — 80048 BASIC METABOLIC PNL TOTAL CA: CPT

## 2024-08-25 PROCEDURE — 71045 X-RAY EXAM CHEST 1 VIEW: CPT

## 2024-08-25 PROCEDURE — 6360000004 HC RX CONTRAST MEDICATION

## 2024-08-25 PROCEDURE — 93005 ELECTROCARDIOGRAM TRACING: CPT

## 2024-08-25 PROCEDURE — 83605 ASSAY OF LACTIC ACID: CPT

## 2024-08-25 PROCEDURE — 94640 AIRWAY INHALATION TREATMENT: CPT

## 2024-08-25 PROCEDURE — 85025 COMPLETE CBC W/AUTO DIFF WBC: CPT

## 2024-08-25 PROCEDURE — 2060000000 HC ICU INTERMEDIATE R&B

## 2024-08-25 PROCEDURE — 2700000000 HC OXYGEN THERAPY PER DAY

## 2024-08-25 PROCEDURE — 36415 COLL VENOUS BLD VENIPUNCTURE: CPT

## 2024-08-25 PROCEDURE — 84484 ASSAY OF TROPONIN QUANT: CPT

## 2024-08-25 RX ORDER — 0.9 % SODIUM CHLORIDE 0.9 %
500 INTRAVENOUS SOLUTION INTRAVENOUS ONCE
Status: COMPLETED | OUTPATIENT
Start: 2024-08-25 | End: 2024-08-25

## 2024-08-25 RX ORDER — IOPAMIDOL 755 MG/ML
100 INJECTION, SOLUTION INTRAVASCULAR
Status: COMPLETED | OUTPATIENT
Start: 2024-08-25 | End: 2024-08-25

## 2024-08-25 RX ORDER — KETOROLAC TROMETHAMINE 15 MG/ML
15 INJECTION, SOLUTION INTRAMUSCULAR; INTRAVENOUS ONCE
Status: COMPLETED | OUTPATIENT
Start: 2024-08-25 | End: 2024-08-25

## 2024-08-25 RX ADMIN — PANTOPRAZOLE SODIUM 40 MG: 40 TABLET, DELAYED RELEASE ORAL at 05:33

## 2024-08-25 RX ADMIN — SERTRALINE HYDROCHLORIDE 100 MG: 50 TABLET ORAL at 20:41

## 2024-08-25 RX ADMIN — METHOCARBAMOL 750 MG: 750 TABLET ORAL at 16:42

## 2024-08-25 RX ADMIN — NITROFURANTOIN MONOHYDRATE/MACROCRYSTALS 100 MG: 25; 75 CAPSULE ORAL at 20:40

## 2024-08-25 RX ADMIN — IPRATROPIUM BROMIDE 0.5 MG: 0.5 SOLUTION RESPIRATORY (INHALATION) at 20:26

## 2024-08-25 RX ADMIN — LACTULOSE 10 G: 20 SOLUTION ORAL at 20:40

## 2024-08-25 RX ADMIN — SODIUM CHLORIDE, PRESERVATIVE FREE 10 ML: 5 INJECTION INTRAVENOUS at 20:52

## 2024-08-25 RX ADMIN — POLYETHYLENE GLYCOL 3350 17 G: 17 POWDER, FOR SOLUTION ORAL at 09:14

## 2024-08-25 RX ADMIN — ACETAMINOPHEN 1000 MG: 500 TABLET ORAL at 20:40

## 2024-08-25 RX ADMIN — ROPINIROLE HYDROCHLORIDE 0.25 MG: 0.25 TABLET, FILM COATED ORAL at 20:40

## 2024-08-25 RX ADMIN — OXYCODONE HYDROCHLORIDE 10 MG: 5 TABLET ORAL at 09:12

## 2024-08-25 RX ADMIN — OXYCODONE HYDROCHLORIDE 10 MG: 5 TABLET ORAL at 12:57

## 2024-08-25 RX ADMIN — IPRATROPIUM BROMIDE 0.5 MG: 0.5 SOLUTION RESPIRATORY (INHALATION) at 16:22

## 2024-08-25 RX ADMIN — SODIUM CHLORIDE 500 ML: 9 INJECTION, SOLUTION INTRAVENOUS at 18:31

## 2024-08-25 RX ADMIN — PRAVASTATIN SODIUM 20 MG: 20 TABLET ORAL at 20:40

## 2024-08-25 RX ADMIN — KETOROLAC TROMETHAMINE 15 MG: 15 INJECTION, SOLUTION INTRAMUSCULAR; INTRAVENOUS at 20:23

## 2024-08-25 RX ADMIN — SODIUM CHLORIDE, PRESERVATIVE FREE 10 ML: 5 INJECTION INTRAVENOUS at 09:15

## 2024-08-25 RX ADMIN — Medication 10 MG: at 20:40

## 2024-08-25 RX ADMIN — IOPAMIDOL 100 ML: 755 INJECTION, SOLUTION INTRAVENOUS at 19:03

## 2024-08-25 RX ADMIN — METHOCARBAMOL 750 MG: 750 TABLET ORAL at 09:13

## 2024-08-25 RX ADMIN — AMLODIPINE BESYLATE 10 MG: 10 TABLET ORAL at 20:41

## 2024-08-25 RX ADMIN — METHOCARBAMOL 750 MG: 750 TABLET ORAL at 12:57

## 2024-08-25 RX ADMIN — ACETAMINOPHEN 1000 MG: 500 TABLET ORAL at 05:33

## 2024-08-25 RX ADMIN — LACTULOSE 10 G: 20 SOLUTION ORAL at 09:14

## 2024-08-25 RX ADMIN — METHOCARBAMOL 750 MG: 750 TABLET ORAL at 20:40

## 2024-08-25 RX ADMIN — IPRATROPIUM BROMIDE 0.5 MG: 0.5 SOLUTION RESPIRATORY (INHALATION) at 08:41

## 2024-08-25 RX ADMIN — TIOTROPIUM BROMIDE AND OLODATEROL 2 PUFF: 3.124; 2.736 SPRAY, METERED RESPIRATORY (INHALATION) at 08:41

## 2024-08-25 RX ADMIN — NITROFURANTOIN MONOHYDRATE/MACROCRYSTALS 100 MG: 25; 75 CAPSULE ORAL at 09:15

## 2024-08-25 RX ADMIN — OXYCODONE HYDROCHLORIDE 10 MG: 5 TABLET ORAL at 16:42

## 2024-08-25 ASSESSMENT — PAIN DESCRIPTION - LOCATION
LOCATION: HIP;LEG
LOCATION: LEG

## 2024-08-25 ASSESSMENT — PAIN SCALES - GENERAL
PAINLEVEL_OUTOF10: 7
PAINLEVEL_OUTOF10: 9
PAINLEVEL_OUTOF10: 7
PAINLEVEL_OUTOF10: 8
PAINLEVEL_OUTOF10: 10
PAINLEVEL_OUTOF10: 8
PAINLEVEL_OUTOF10: 9
PAINLEVEL_OUTOF10: 7
PAINLEVEL_OUTOF10: 7
PAINLEVEL_OUTOF10: 8
PAINLEVEL_OUTOF10: 9

## 2024-08-25 ASSESSMENT — PAIN SCALES - WONG BAKER
WONGBAKER_NUMERICALRESPONSE: HURTS LITTLE MORE
WONGBAKER_NUMERICALRESPONSE: HURTS A LITTLE BIT
WONGBAKER_NUMERICALRESPONSE: HURTS LITTLE MORE
WONGBAKER_NUMERICALRESPONSE: HURTS A LITTLE BIT
WONGBAKER_NUMERICALRESPONSE: HURTS LITTLE MORE
WONGBAKER_NUMERICALRESPONSE: HURTS A LITTLE BIT
WONGBAKER_NUMERICALRESPONSE: HURTS LITTLE MORE
WONGBAKER_NUMERICALRESPONSE: HURTS LITTLE MORE
WONGBAKER_NUMERICALRESPONSE: HURTS A LITTLE BIT
WONGBAKER_NUMERICALRESPONSE: HURTS LITTLE MORE
WONGBAKER_NUMERICALRESPONSE: HURTS A LITTLE BIT
WONGBAKER_NUMERICALRESPONSE: HURTS A LITTLE BIT
WONGBAKER_NUMERICALRESPONSE: HURTS LITTLE MORE
WONGBAKER_NUMERICALRESPONSE: HURTS A LITTLE BIT
WONGBAKER_NUMERICALRESPONSE: HURTS A LITTLE BIT

## 2024-08-25 ASSESSMENT — PAIN DESCRIPTION - DESCRIPTORS
DESCRIPTORS: ACHING
DESCRIPTORS: ACHING

## 2024-08-25 ASSESSMENT — PAIN DESCRIPTION - ORIENTATION
ORIENTATION: LEFT

## 2024-08-25 ASSESSMENT — PAIN - FUNCTIONAL ASSESSMENT
PAIN_FUNCTIONAL_ASSESSMENT: ACTIVITIES ARE NOT PREVENTED
PAIN_FUNCTIONAL_ASSESSMENT: ACTIVITIES ARE NOT PREVENTED

## 2024-08-25 NOTE — PROGRESS NOTES
Resident Progress Note  Date: 2024  Time: 1:18 PM  Patient Name: Angela Hutchinson  Patient : 1955  Room/Bed: [facility]2/0422-02  Allergies:   Allergies   Allergen Reactions    Morphine Itching    Bee Venom     Wasp Venom Protein Swelling       Interval History:    No acute events. MRI thoracic spine ordered. Patients  states he has the spinal cord stimulator remote and info. Underwent cephalomedullary nail placement of left femur . Still complaining of a lot of pain.   No acute events overnight.     Current Medications:  Scheduled Meds:   methocarbamol  750 mg Oral 4x Daily    melatonin  10 mg Oral Nightly    sodium chloride flush  5-40 mL IntraVENous 2 times per day    polyethylene glycol  17 g Oral Daily    acetaminophen  1,000 mg Oral 3 times per day    amLODIPine  10 mg Oral Nightly    ipratropium  0.5 mg Nebulization 4x Daily    lactulose  10 g Oral BID    pantoprazole  40 mg Oral QAM AC    pravastatin  20 mg Oral Nightly    rOPINIRole  0.25 mg Oral Nightly    sertraline  100 mg Oral Nightly    tiotropium-olodaterol  2 puff Inhalation Daily    nitrofurantoin (macrocrystal-monohydrate)  100 mg Oral BID       Continuous Infusions:   sodium chloride         Vitals:  Patient Vitals for the past 8 hrs:   BP Temp Temp src Pulse Resp SpO2   24 1257 -- -- -- -- 16 --   24 1212 (!) 95/57 98.3 °F (36.8 °C) Oral (!) 107 17 (!) 89 %   24 0942 -- -- -- -- 20 --   24 0912 -- -- -- -- 19 --   24 0844 -- -- -- 90 17 98 %   24 0824 115/83 98.5 °F (36.9 °C) Oral (!) 102 14 96 %     Height and Weight  Height: 162.6 cm (5' 4\")  Weight - Scale: 73.5 kg (162 lb)  Weight Method: Stated  BSA (Calculated - sq m): 1.82 sq meters  BMI (Calculated): 27.9  Body mass index is 27.81 kg/m².  <16 Severe malnutrition  16-16.99 Moderate malnutrition  17-18.49 Mild malnutrition  18.5-24.9 Normal  25-29.9 Overweight (not obese)  30-34.9 Obese class 1 (Low Risk)  35-39.9 Obese class 2

## 2024-08-25 NOTE — PROGRESS NOTES
PROGRESS NOTE      PATIENT NAME: Angela Hutchinson  MEDICAL RECORD NO. [id number]  DATE: 8/25/2024  SURGEON: Clint Brown MD  PRIMARY CARE PHYSICIAN: Rosalia Dubois MD    HD: # 3    ASSESSMENT    Patient Active Problem List   Diagnosis    Gastro-esophageal reflux disease without esophagitis    Epigastric abdominal pain    Allergic to bees    Anxiety    Depression    Esophageal spasm    Gas bloat syndrome    Hyperlipidemia    Hypertension    Irritable bowel syndrome    Osteoarthritis of knee    Osteopenia    Paraesophageal hernia    Primary hypertriglyceridemia    Tubular adenoma of colon    Macular pucker, right    Macular edema, cystoid, right    Centrilobular emphysema (HCC)    Vertebrogenic low back pain    Carotid stenosis, asymptomatic, bilateral    Neuropathy    Failed back syndrome    Chronic lumbar radiculopathy    Lumbosacral spondylosis without myelopathy    Arthralgia    Arthritis of foot    Closed fracture of base of fifth metatarsal bone    Lower extremity edema    Other mechanical complication of implanted electronic neurostimulator, generator, subsequent encounter    Spinal stenosis of thoracic region    Weakness of both lower extremities    Ductal carcinoma in situ (DCIS) of right breast    Age related osteoporosis    H/O recurrent vertebral fractures    Osteoporosis due to aromatase inhibitor    Osteopenia of multiple sites    Malignant neoplasm of lower-inner quadrant of right breast of female, estrogen receptor positive (HCC)    Parkinson's disease (HCC)    Chemotherapy adverse reaction    Change in nail appearance    Breast asymmetry    History of breast cancer    Hair loss    Normocytic anemia    Intertrochanteric fracture of left hip, closed, initial encounter ([facility])    Closed wedge compression fracture of T9 vertebra ([facility])       MEDICAL DECISION MAKING AND PLAN    Left intertrochanteric hip fracture  Ortho consulted  s/p L femur CMN 8/22  WBAT LLE  MMPT  PT/OT  Acute/subacute anterior

## 2024-08-25 NOTE — PLAN OF CARE
Problem: Safety - Adult  Goal: Free from fall injury  Outcome: Progressing  Flowsheets (Taken 8/25/2024 1030)  Free From Fall Injury: Instruct family/caregiver on patient safety     Problem: Discharge Planning  Goal: Discharge to home or other facility with appropriate resources  Outcome: Progressing     Problem: Pain  Goal: Verbalizes/displays adequate comfort level or baseline comfort level  Outcome: Progressing     Problem: Skin/Tissue Integrity  Goal: Absence of new skin breakdown  Description: 1.  Monitor for areas of redness and/or skin breakdown  2.  Assess vascular access sites hourly  3.  Every 4-6 hours minimum:  Change oxygen saturation probe site  4.  Every 4-6 hours:  If on nasal continuous positive airway pressure, respiratory therapy assess nares and determine need for appliance change or resting period.  Outcome: Progressing     Problem: ABCDS Injury Assessment  Goal: Absence of physical injury  Outcome: Progressing  Flowsheets (Taken 8/25/2024 1030)  Absence of Physical Injury: Implement safety measures based on patient assessment

## 2024-08-25 NOTE — PROGRESS NOTES
--  NO ACUTE NEUROSURGICAL INTERVENTION AT THIS TIME    NEUROSURGERY TO SIGN OFF     Please contact Neurosurgery with any questions.    PATIENT TO FOLLOW UP WITH DR JACKSON IN HIS CLINIC IN 2-4 WEEKS  ---  Soo Fonseca, ERICA - CNP  --

## 2024-08-25 NOTE — PROGRESS NOTES
At about 1754, I was perfect-served for worsening pain and tachycardia. I reviewed pt's chart and realized she had been tachycardic since about mid-day. I called nurse and she denied any worsening leg swelling, bloody stools, or fevers. Pt was however requiring more oxygenation from 2 L to 3 L saturating in the low 90s. Of note, pt does not use oxygen at home. I responded to pt's room and  was at bedside. Reviewing pt's medications, she had not been started on DVT ppx since surgery despite clearance from Ortho because of a pending thoracic spine MRI per neurosurgery. Pt was recently treated for breast cancer about a year ago and underwent mastectomy. Physical exam demonstrated some swelling in left lower extremity and right calf tenderness. Temp was 100.1. UOP about 600 cc for the shift (0.3 cc/kg/hr documented). Labs were ordered and CTA chest for PE studies. Pt was also given a 500 mL bolus NS. Case was reported to senior resident, Dr. Denisse Kirby.    Jorge Acosta MD  PGY-2 Trauma Surgery Service

## 2024-08-26 LAB
EKG ATRIAL RATE: 125 BPM
EKG ATRIAL RATE: 96 BPM
EKG P AXIS: 66 DEGREES
EKG P AXIS: 70 DEGREES
EKG P-R INTERVAL: 146 MS
EKG P-R INTERVAL: 154 MS
EKG Q-T INTERVAL: 296 MS
EKG Q-T INTERVAL: 360 MS
EKG QRS DURATION: 72 MS
EKG QRS DURATION: 76 MS
EKG QTC CALCULATION (BAZETT): 427 MS
EKG QTC CALCULATION (BAZETT): 454 MS
EKG R AXIS: 34 DEGREES
EKG R AXIS: 42 DEGREES
EKG T AXIS: 36 DEGREES
EKG T AXIS: 50 DEGREES
EKG VENTRICULAR RATE: 125 BPM
EKG VENTRICULAR RATE: 96 BPM

## 2024-08-26 PROCEDURE — 6360000002 HC RX W HCPCS

## 2024-08-26 PROCEDURE — 99231 SBSQ HOSP IP/OBS SF/LOW 25: CPT | Performed by: NURSE PRACTITIONER

## 2024-08-26 PROCEDURE — 6370000000 HC RX 637 (ALT 250 FOR IP)

## 2024-08-26 PROCEDURE — 97535 SELF CARE MNGMENT TRAINING: CPT

## 2024-08-26 PROCEDURE — 97530 THERAPEUTIC ACTIVITIES: CPT

## 2024-08-26 PROCEDURE — 99222 1ST HOSP IP/OBS MODERATE 55: CPT | Performed by: PHYSICAL MEDICINE & REHABILITATION

## 2024-08-26 PROCEDURE — 6370000000 HC RX 637 (ALT 250 FOR IP): Performed by: STUDENT IN AN ORGANIZED HEALTH CARE EDUCATION/TRAINING PROGRAM

## 2024-08-26 PROCEDURE — 2580000003 HC RX 258

## 2024-08-26 PROCEDURE — 94640 AIRWAY INHALATION TREATMENT: CPT

## 2024-08-26 PROCEDURE — 6370000000 HC RX 637 (ALT 250 FOR IP): Performed by: NURSE PRACTITIONER

## 2024-08-26 PROCEDURE — 97116 GAIT TRAINING THERAPY: CPT

## 2024-08-26 PROCEDURE — 2700000000 HC OXYGEN THERAPY PER DAY

## 2024-08-26 PROCEDURE — 1200000000 HC SEMI PRIVATE

## 2024-08-26 RX ORDER — IBUPROFEN 400 MG/1
400 TABLET, FILM COATED ORAL
Status: DISCONTINUED | OUTPATIENT
Start: 2024-08-26 | End: 2024-08-27 | Stop reason: HOSPADM

## 2024-08-26 RX ORDER — BENZOCAINE/MENTHOL 6 MG-10 MG
LOZENGE MUCOUS MEMBRANE 2 TIMES DAILY
Status: DISCONTINUED | OUTPATIENT
Start: 2024-08-26 | End: 2024-08-27 | Stop reason: HOSPADM

## 2024-08-26 RX ORDER — ENOXAPARIN SODIUM 100 MG/ML
40 INJECTION SUBCUTANEOUS 2 TIMES DAILY
Status: DISCONTINUED | OUTPATIENT
Start: 2024-08-26 | End: 2024-08-27 | Stop reason: HOSPADM

## 2024-08-26 RX ORDER — OXYCODONE HYDROCHLORIDE 5 MG/1
5 TABLET ORAL EVERY 4 HOURS PRN
Status: DISCONTINUED | OUTPATIENT
Start: 2024-08-26 | End: 2024-08-27 | Stop reason: HOSPADM

## 2024-08-26 RX ORDER — SENNOSIDES A AND B 8.6 MG/1
1 TABLET, FILM COATED ORAL NIGHTLY
Status: DISCONTINUED | OUTPATIENT
Start: 2024-08-26 | End: 2024-08-27 | Stop reason: HOSPADM

## 2024-08-26 RX ADMIN — ROPINIROLE HYDROCHLORIDE 0.25 MG: 0.25 TABLET, FILM COATED ORAL at 21:15

## 2024-08-26 RX ADMIN — OXYCODONE HYDROCHLORIDE 10 MG: 5 TABLET ORAL at 05:04

## 2024-08-26 RX ADMIN — OXYCODONE HYDROCHLORIDE 5 MG: 5 TABLET ORAL at 18:26

## 2024-08-26 RX ADMIN — IBUPROFEN 400 MG: 400 TABLET, FILM COATED ORAL at 11:34

## 2024-08-26 RX ADMIN — SODIUM CHLORIDE, PRESERVATIVE FREE 10 ML: 5 INJECTION INTRAVENOUS at 21:14

## 2024-08-26 RX ADMIN — POLYETHYLENE GLYCOL 3350 17 G: 17 POWDER, FOR SOLUTION ORAL at 08:37

## 2024-08-26 RX ADMIN — IPRATROPIUM BROMIDE 0.5 MG: 0.5 SOLUTION RESPIRATORY (INHALATION) at 07:55

## 2024-08-26 RX ADMIN — NITROFURANTOIN MONOHYDRATE/MACROCRYSTALS 100 MG: 25; 75 CAPSULE ORAL at 21:15

## 2024-08-26 RX ADMIN — OXYCODONE HYDROCHLORIDE 10 MG: 5 TABLET ORAL at 01:52

## 2024-08-26 RX ADMIN — LACTULOSE 10 G: 20 SOLUTION ORAL at 08:36

## 2024-08-26 RX ADMIN — ACETAMINOPHEN 1000 MG: 500 TABLET ORAL at 05:04

## 2024-08-26 RX ADMIN — METHOCARBAMOL 750 MG: 750 TABLET ORAL at 13:38

## 2024-08-26 RX ADMIN — OXYCODONE HYDROCHLORIDE 10 MG: 5 TABLET ORAL at 13:48

## 2024-08-26 RX ADMIN — SODIUM CHLORIDE, PRESERVATIVE FREE 10 ML: 5 INJECTION INTRAVENOUS at 08:37

## 2024-08-26 RX ADMIN — TIOTROPIUM BROMIDE AND OLODATEROL 2 PUFF: 3.124; 2.736 SPRAY, METERED RESPIRATORY (INHALATION) at 07:55

## 2024-08-26 RX ADMIN — METHOCARBAMOL 750 MG: 750 TABLET ORAL at 21:14

## 2024-08-26 RX ADMIN — IPRATROPIUM BROMIDE 0.5 MG: 0.5 SOLUTION RESPIRATORY (INHALATION) at 11:42

## 2024-08-26 RX ADMIN — LACTULOSE 10 G: 20 SOLUTION ORAL at 21:16

## 2024-08-26 RX ADMIN — SERTRALINE HYDROCHLORIDE 100 MG: 50 TABLET ORAL at 21:14

## 2024-08-26 RX ADMIN — IBUPROFEN 400 MG: 400 TABLET, FILM COATED ORAL at 16:04

## 2024-08-26 RX ADMIN — ENOXAPARIN SODIUM 40 MG: 100 INJECTION SUBCUTANEOUS at 21:14

## 2024-08-26 RX ADMIN — PANTOPRAZOLE SODIUM 40 MG: 40 TABLET, DELAYED RELEASE ORAL at 05:05

## 2024-08-26 RX ADMIN — ACETAMINOPHEN 1000 MG: 500 TABLET ORAL at 13:39

## 2024-08-26 RX ADMIN — NITROFURANTOIN MONOHYDRATE/MACROCRYSTALS 100 MG: 25; 75 CAPSULE ORAL at 08:37

## 2024-08-26 RX ADMIN — SENNOSIDES 8.6 MG: 8.6 TABLET, FILM COATED ORAL at 21:15

## 2024-08-26 RX ADMIN — AMLODIPINE BESYLATE 10 MG: 10 TABLET ORAL at 21:14

## 2024-08-26 RX ADMIN — ACETAMINOPHEN 1000 MG: 500 TABLET ORAL at 21:20

## 2024-08-26 RX ADMIN — Medication 10 MG: at 21:14

## 2024-08-26 RX ADMIN — METHOCARBAMOL 750 MG: 750 TABLET ORAL at 16:04

## 2024-08-26 RX ADMIN — IPRATROPIUM BROMIDE 0.5 MG: 0.5 SOLUTION RESPIRATORY (INHALATION) at 20:03

## 2024-08-26 RX ADMIN — METHOCARBAMOL 750 MG: 750 TABLET ORAL at 08:37

## 2024-08-26 RX ADMIN — PRAVASTATIN SODIUM 20 MG: 20 TABLET ORAL at 21:15

## 2024-08-26 RX ADMIN — ENOXAPARIN SODIUM 40 MG: 100 INJECTION SUBCUTANEOUS at 08:36

## 2024-08-26 ASSESSMENT — PAIN SCALES - WONG BAKER
WONGBAKER_NUMERICALRESPONSE: HURTS A LITTLE BIT

## 2024-08-26 ASSESSMENT — PAIN DESCRIPTION - ORIENTATION
ORIENTATION: LEFT

## 2024-08-26 ASSESSMENT — PAIN SCALES - GENERAL
PAINLEVEL_OUTOF10: 7
PAINLEVEL_OUTOF10: 0
PAINLEVEL_OUTOF10: 5
PAINLEVEL_OUTOF10: 7
PAINLEVEL_OUTOF10: 7
PAINLEVEL_OUTOF10: 6
PAINLEVEL_OUTOF10: 7
PAINLEVEL_OUTOF10: 10
PAINLEVEL_OUTOF10: 5
PAINLEVEL_OUTOF10: 2
PAINLEVEL_OUTOF10: 6
PAINLEVEL_OUTOF10: 9

## 2024-08-26 ASSESSMENT — PAIN DESCRIPTION - LOCATION
LOCATION: LEG;HIP
LOCATION: HIP
LOCATION: HIP;LEG
LOCATION: HIP

## 2024-08-26 ASSESSMENT — PAIN DESCRIPTION - PAIN TYPE
TYPE: ACUTE PAIN;SURGICAL PAIN
TYPE: ACUTE PAIN;SURGICAL PAIN

## 2024-08-26 ASSESSMENT — PAIN DESCRIPTION - DESCRIPTORS
DESCRIPTORS: ACHING;SPASM
DESCRIPTORS: ACHING;SORE;SPASM;TENDER
DESCRIPTORS: ACHING;SPASM;SORE;TENDER
DESCRIPTORS: ACHING;SORE;SPASM;TENDER

## 2024-08-26 ASSESSMENT — PAIN - FUNCTIONAL ASSESSMENT
PAIN_FUNCTIONAL_ASSESSMENT: ACTIVITIES ARE NOT PREVENTED
PAIN_FUNCTIONAL_ASSESSMENT: PREVENTS OR INTERFERES WITH MANY ACTIVE NOT PASSIVE ACTIVITIES

## 2024-08-26 ASSESSMENT — PAIN DESCRIPTION - ONSET: ONSET: ON-GOING

## 2024-08-26 ASSESSMENT — PAIN DESCRIPTION - FREQUENCY: FREQUENCY: INTERMITTENT

## 2024-08-26 NOTE — PLAN OF CARE
Problem: Safety - Adult  Goal: Free from fall injury  8/25/2024 2133 by Laura Zendejas RN  Outcome: Progressing  8/25/2024 1433 by Kasey Santana RN  Outcome: Progressing  Flowsheets (Taken 8/25/2024 1030)  Free From Fall Injury: Instruct family/caregiver on patient safety     Problem: Discharge Planning  Goal: Discharge to home or other facility with appropriate resources  8/25/2024 2133 by Laura Zendejas RN  Outcome: Progressing  8/25/2024 1433 by Kasey Santana RN  Outcome: Progressing     Problem: Pain  Goal: Verbalizes/displays adequate comfort level or baseline comfort level  8/25/2024 2133 by Laura Zendejas RN  Outcome: Progressing  8/25/2024 1433 by Kasey Santana RN  Outcome: Progressing     Problem: Skin/Tissue Integrity  Goal: Absence of new skin breakdown  Description: 1.  Monitor for areas of redness and/or skin breakdown  2.  Assess vascular access sites hourly  3.  Every 4-6 hours minimum:  Change oxygen saturation probe site  4.  Every 4-6 hours:  If on nasal continuous positive airway pressure, respiratory therapy assess nares and determine need for appliance change or resting period.  8/25/2024 2133 by Laura Zendejas RN  Outcome: Progressing  8/25/2024 1433 by Kasey Santana RN  Outcome: Progressing     Problem: ABCDS Injury Assessment  Goal: Absence of physical injury  8/25/2024 2133 by Laura Zendejas RN  Outcome: Progressing  8/25/2024 1433 by Kasey Santana RN  Outcome: Progressing  Flowsheets (Taken 8/25/2024 1030)  Absence of Physical Injury: Implement safety measures based on patient assessment

## 2024-08-26 NOTE — PROGRESS NOTES
Occupational Therapy  [facility] [address] [facility]  Occupational Therapy Daily Treatment Note    Name: Angela Hutchinson  : 1955  MRN: [id number]  Date of Service: 2024    Discharge Recommendations:  Further therapy recommended at discharge.The patient should be able to tolerate at least 3 hours of therapy per day over 5 days or 15 hours over 7 days.   This patient may benefit from a Physical Medicine and Rehab consult.             Patient Diagnosis(es): The encounter diagnosis was Intertrochanteric fracture of left hip, closed, initial encounter ([facility]).  Past Medical History:  has a past medical history of ADHD, Arthritis, Blurred vision, right eye, Breast cancer ([facility]), CAD (coronary artery disease), Carotid artery disease ([facility]), COPD (chronic obstructive pulmonary disease) ([facility]), COVID-19, GERD (gastroesophageal reflux disease), Hearing loss, Hiatal hernia, High cholesterol, History of closed shoulder dislocation, [place] (hard of hearing), Hx of gastric ulcer, Hyperlipidemia, Hypertension, IBS (irritable bowel syndrome), Macular pucker, right eye, Skin cancer of forehead, Syncope, Tension headache, Tremor, UTI (urinary tract infection), and Wears glasses.  Past Surgical History:  has a past surgical history that includes Colonoscopy (N/A, ); Revision total knee arthroplasty (Right); Esophagogastric fundoplication (); Appendectomy; Upper gastrointestinal endoscopy; Cholecystectomy; Upper gastrointestinal endoscopy (N/A, 10/26/2018); back surgery; Finger surgery (Left); Cataract removal with implant (Bilateral); eye surgery; hiatal hernia repair (); vitrectomy (Right, 03/10/2020); vitrectomy (Right, 03/10/2020); Pain management procedure (N/A, 2023); SPENCER STEREO BREAST BX W LOC DEVICE 1ST LESION RIGHT (Right, 2023); Radiofrequency ablation (2023); Mastectomy (Right, 10/11/2023); Breast enhancement surgery (Right, 10/11/2023); Breast reconstruction (Bilateral, 2024);

## 2024-08-26 NOTE — CARE COORDINATION
Transitional Planning  Called [phone] spoke with Julio Cesar letting him know I need PT OT to see and document for Rehab  Spoke pt and her spouse at bedside about SNF vs ARU  PS Trauma for PM&R  Await choices from pt and her spouse    PM&R done recommendation pending therapy notes    Mounika here from [facility] states pt's spouse called them.  She said they should be able to take her and they do not need PM&R    Spoke with pt and her spouse for choices   ARU  [facility] referral sent  [facility]  [facility]  [facility]-referral sent  [facility]

## 2024-08-26 NOTE — CONSULTS
°F (36.8 °C) (Oral)   Resp 19   Ht 1.626 m (5' 4\")   Wt 73.5 kg (162 lb)   SpO2 94%   BMI 27.81 kg/m²     General appearance: alert, appears stated age, cooperative, and no distress  HEENT: Normocephalic, without obvious abnormality, atraumatic               Eyes: conjunctivae clear.                Throat: tongue normal.               Neck:  symmetrical, trachea midline.  Pulm: clear to auscultation bilaterally.  Cardiac: regular rate and rhythm, no murmur.  Abdomen: soft, non-tender; bowel sounds normal.  MSK: extremities normal, atraumatic, no edema, normal tone.   ROM: Functional range of motion upper extremities distal lower extremities difficult straight leg raising bilaterally  Mental status/Psych: Alert, orientedX3, thought content appropriate.  Knew year president location follows commands  Skin:     Neuro:      Sensory: Intact in BUE and BLE to soft and pin sensation.  Motor: Muscle tone and bulk are normal bilaterally. No pronator drift.  At least 4/5 upper extremities distal lower extremities difficulty with straight leg raise bilaterally          Diagnostics:  CBC   Lab Results   Component Value Date/Time    WBC 9.8 08/25/2024 06:20 PM    RBC 3.15 08/25/2024 06:20 PM    HGB 8.4 08/25/2024 06:20 PM    HCT 27.1 08/25/2024 06:20 PM    MCV 86.0 08/25/2024 06:20 PM    RDW 15.8 08/25/2024 06:20 PM     08/25/2024 06:20 PM     BMP    Lab Results   Component Value Date/Time     08/25/2024 06:20 PM    K 4.1 08/25/2024 06:20 PM     08/25/2024 06:20 PM    CO2 23 08/25/2024 06:20 PM    BUN 14 08/25/2024 06:20 PM     Uric Acid  No components found for: \"URIC\"  VITAMIN B12 No components found for: \"B12\"  PT/INR  No results found for: \"PTINR\"      Impression: Ms. Angela Hutchinson is a 68 y.o. female with a history of Intertrochanteric fracture of left hip, closed, initial encounter ([facility])    Fall  Left anterior IT fracture status post CMN nailing 8/22 weightbearing as tolerated  Acute/subacute  anterior wedge compression deformity at T8-TLSO  Episode of tachycardia/increased pain 8/25 CTA PE no PE  Osteopenia  Right mastectomy due to Paget's disease  Normocytic anemia  Chronic pain has spinal cord stimulator placed 2024  UTI Macrobid-Macrobid  ADHD per records  COPD-albuterol, [place] Stiolto  GERD-Protonix  Hypertension/hyperlipidemia-Norvasc, probable  Depression-Zoloft  Pain-Roxicodone Toradol Motrin and Tylenol, Robaxin  Restless leg-Requip  Anemia hemoglobin 8.4    Recommendations:  1. Diagnosis: Fall with left IT fracture status post CMN nailing, compression deformity T8  2. Therapy: Await therapies patient just received TLSO  3. Medical  Necessity: As above  4. Support: Clarify  5. Rehab recommendation: Disposition recommendation follow therapy  evaluation with TLSO brace, discussed ARU versus SNF with patient and  patient notes she is concerned of overdoing in therapies and injuring her lower back-discussed TLSO brace.  Patient notes she has been basically bedbound for the last month prior due to low back pain ?  6. DVT proph: Lovenox    It was my pleasure to evaluate Angela Hutchinson today.  Please call with questions.    Desmond Cisneros MD          This note is created with the assistance of a speech recognition program.  While intending to generate a document that actually reflects the content of the visit, the document can still have some errors including those of syntax and sound a like substitutions which may escape proof reading.  In such instances, actual meaning can be extrapolated by contextual diversion

## 2024-08-26 NOTE — PROGRESS NOTES
PROGRESS NOTE      PATIENT NAME: Angela Hutchinson  MEDICAL RECORD NO. [id number]  DATE: 8/26/2024  SURGEON: Clint Brown MD  PRIMARY CARE PHYSICIAN: Rosalia Dubois MD    HD: # 4    ASSESSMENT    Patient Active Problem List   Diagnosis    Gastro-esophageal reflux disease without esophagitis    Epigastric abdominal pain    Allergic to bees    Anxiety    Depression    Esophageal spasm    Gas bloat syndrome    Hyperlipidemia    Hypertension    Irritable bowel syndrome    Osteoarthritis of knee    Osteopenia    Paraesophageal hernia    Primary hypertriglyceridemia    Tubular adenoma of colon    Macular pucker, right    Macular edema, cystoid, right    Centrilobular emphysema (HCC)    Vertebrogenic low back pain    Carotid stenosis, asymptomatic, bilateral    Neuropathy    Failed back syndrome    Chronic lumbar radiculopathy    Lumbosacral spondylosis without myelopathy    Arthralgia    Arthritis of foot    Closed fracture of base of fifth metatarsal bone    Lower extremity edema    Other mechanical complication of implanted electronic neurostimulator, generator, subsequent encounter    Spinal stenosis of thoracic region    Weakness of both lower extremities    Ductal carcinoma in situ (DCIS) of right breast    Age related osteoporosis    H/O recurrent vertebral fractures    Osteoporosis due to aromatase inhibitor    Osteopenia of multiple sites    Malignant neoplasm of lower-inner quadrant of right breast of female, estrogen receptor positive (HCC)    Parkinson's disease (HCC)    Chemotherapy adverse reaction    Change in nail appearance    Breast asymmetry    History of breast cancer    Hair loss    Normocytic anemia    Intertrochanteric fracture of left hip, closed, initial encounter ([facility])    Closed wedge compression fracture of T9 vertebra ([facility])       MEDICAL DECISION MAKING AND PLAN    Left intertrochanteric hip fracture  Ortho consulted  s/p L femur CMN 8/22  WBAT LLE  Acute/subacute anterior wedge compression

## 2024-08-26 NOTE — PLAN OF CARE
Problem: Safety - Adult  Goal: Free from fall injury  8/26/2024 0906 by Cristofer Kulkarni RN  Outcome: Progressing  8/25/2024 2133 by Laura Zendejas RN  Outcome: Progressing     Problem: Discharge Planning  Goal: Discharge to home or other facility with appropriate resources  8/26/2024 0906 by Cristofer Kulkarni RN  Outcome: Progressing  8/25/2024 2133 by Laura Zendejas RN  Outcome: Progressing     Problem: Pain  Goal: Verbalizes/displays adequate comfort level or baseline comfort level  8/26/2024 0906 by Cristofer Kulkarni RN  Outcome: Progressing  8/25/2024 2133 by Laura Zendejas RN  Outcome: Progressing     Problem: Skin/Tissue Integrity  Goal: Absence of new skin breakdown  Description: 1.  Monitor for areas of redness and/or skin breakdown  2.  Assess vascular access sites hourly  3.  Every 4-6 hours minimum:  Change oxygen saturation probe site  4.  Every 4-6 hours:  If on nasal continuous positive airway pressure, respiratory therapy assess nares and determine need for appliance change or resting period.  8/26/2024 0906 by Cristofer Kulkarni RN  Outcome: Progressing  8/25/2024 2133 by Laura Zendejas RN  Outcome: Progressing     Problem: ABCDS Injury Assessment  Goal: Absence of physical injury  8/26/2024 0906 by Cristofer Kulkarni RN  Outcome: Progressing  8/25/2024 2133 by Laura Zendejas RN  Outcome: Progressing

## 2024-08-26 NOTE — PROGRESS NOTES
Physical Therapy  Facility/Department: [address] STEPDOWN  Physical Therapy Daily Treatment Note    Name: Angela Hutchinson  : 1955  MRN: [id number]  Date of Service: 2024    Discharge Recommendations:  Patient would benefit from continued therapy after discharge, Patient able to tolerate 3 hours of therapy per day   PT Equipment Recommendations  Equipment Needed: No (CTA, unsafe to ambulate without skilled assistance.)      Patient Diagnosis(es): The encounter diagnosis was Intertrochanteric fracture of left hip, closed, initial encounter (HCC).  Past Medical History:  has a past medical history of ADHD, Arthritis, Blurred vision, right eye, Breast cancer (HCC), CAD (coronary artery disease), Carotid artery disease (HCC), COPD (chronic obstructive pulmonary disease) ([facility]), COVID-19, GERD (gastroesophageal reflux disease), Hearing loss, Hiatal hernia, High cholesterol, History of closed shoulder dislocation, [place] (hard of hearing), Hx of gastric ulcer, Hyperlipidemia, Hypertension, IBS (irritable bowel syndrome), Macular pucker, right eye, Skin cancer of forehead, Syncope, Tension headache, Tremor, UTI (urinary tract infection), and Wears glasses.  Past Surgical History:  has a past surgical history that includes Colonoscopy (N/A, ); Revision total knee arthroplasty (Right); Esophagogastric fundoplication (); Appendectomy; Upper gastrointestinal endoscopy; Cholecystectomy; Upper gastrointestinal endoscopy (N/A, 10/26/2018); back surgery; Finger surgery (Left); Cataract removal with implant (Bilateral); eye surgery; hiatal hernia repair (); vitrectomy (Right, 03/10/2020); vitrectomy (Right, 03/10/2020); Pain management procedure (N/A, 2023); [facility] STEREO BREAST BX W LOC DEVICE 1ST LESION RIGHT (Right, 2023); Radiofrequency ablation (2023); Mastectomy (Right, 10/11/2023); Breast enhancement surgery (Right, 10/11/2023); Breast reconstruction (Bilateral, 2024); Femur fracture surgery  Restrictions  Left Lower Extremity Weight Bearing: Weight Bearing As Tolerated  Required Braces or Orthoses  Spinal: Thoracic Lumbar Sacral Orthotics (as needed while ambulating out of bed)  Position Activity Restriction  Other position/activity restrictions: activity as tolerated     Subjective  General  Patient assessed for rehabilitation services?: Yes  Response To Previous Treatment: Patient with no complaints from previous session.  Family / Caregiver Present: Yes (spouse)  Follows Commands: Within Functional Limits  Subjective  Subjective: Pt supine in bed and agreeable to therapy, RN agreeable to therapy.  Pt pleasant and cooperative throughout.  Pt reports 10/10 back pain with mobility, pt repositioned for comfort following ambulation.              Cognition   Cognition  Overall Cognitive Status: WFL    Objective                         Bed mobility  Supine to Sit: Maximum assistance (assistance for BLE and trunk progression.)  Sit to Supine: Maximum assistance (assistance for BLE and trunk progression.)  Scooting: Minimal assistance  Bed Mobility Comments: HOB nearly flat, log roll performed for supine<>sit, pt declines to attempt log roll with sit<>supine transfer. Verbal cues for sequencing.  Transfers  Sit to Stand: Minimal Assistance  Stand to Sit: Minimal Assistance  Comment: Transfers performed with a RW, verbal cues for UE placement.  Ambulation  WB Status: WBAT LLE  Ambulation  Surface: Level tile  Device: Rolling Walker  Assistance: Minimal assistance  Quality of Gait: unsteady, step-to pattern, short stride, decreased L stance phase  Gait Deviations: Slow Angella;Decreased step length;Decreased step height  Distance: 2 feet forward/retro  More Ambulation?: No  Stairs/Curb  Stairs?: No     Balance  Posture: Fair  Sitting - Static: Good;-  Sitting - Dynamic: Fair;+  Standing - Static: Fair;-  Standing - Dynamic: Poor;+  Comments: standing balance assessed while using a RW.

## 2024-08-27 VITALS
TEMPERATURE: 98.9 F | HEART RATE: 105 BPM | HEIGHT: 64 IN | OXYGEN SATURATION: 93 % | RESPIRATION RATE: 20 BRPM | BODY MASS INDEX: 27.66 KG/M2 | SYSTOLIC BLOOD PRESSURE: 120 MMHG | DIASTOLIC BLOOD PRESSURE: 66 MMHG | WEIGHT: 162 LBS

## 2024-08-27 LAB
ANION GAP SERPL CALCULATED.3IONS-SCNC: 11 MMOL/L (ref 9–16)
BASOPHILS # BLD: <0.03 K/UL (ref 0–0.2)
BASOPHILS NFR BLD: 0 % (ref 0–2)
BUN SERPL-MCNC: 14 MG/DL (ref 8–23)
CALCIUM SERPL-MCNC: 8.2 MG/DL (ref 8.6–10.4)
CHLORIDE SERPL-SCNC: 108 MMOL/L (ref 98–107)
CO2 SERPL-SCNC: 22 MMOL/L (ref 20–31)
CREAT SERPL-MCNC: 0.7 MG/DL (ref 0.5–0.9)
EOSINOPHIL # BLD: 0.26 K/UL (ref 0–0.44)
EOSINOPHILS RELATIVE PERCENT: 4 % (ref 1–4)
ERYTHROCYTE [DISTWIDTH] IN BLOOD BY AUTOMATED COUNT: 15.4 % (ref 11.8–14.4)
GFR, ESTIMATED: >90 ML/MIN/1.73M2
GLUCOSE SERPL-MCNC: 123 MG/DL (ref 74–99)
HCT VFR BLD AUTO: 23.5 % (ref 36.3–47.1)
HCT VFR BLD AUTO: 24.2 % (ref 36.3–47.1)
HGB BLD-MCNC: 7.4 G/DL (ref 11.9–15.1)
HGB BLD-MCNC: 7.6 G/DL (ref 11.9–15.1)
IMM GRANULOCYTES # BLD AUTO: 0.03 K/UL (ref 0–0.3)
IMM GRANULOCYTES NFR BLD: 1 %
LYMPHOCYTES NFR BLD: 1.08 K/UL (ref 1.1–3.7)
LYMPHOCYTES RELATIVE PERCENT: 18 % (ref 24–43)
MCH RBC QN AUTO: 26.6 PG (ref 25.2–33.5)
MCHC RBC AUTO-ENTMCNC: 31.5 G/DL (ref 28.4–34.8)
MCV RBC AUTO: 84.5 FL (ref 82.6–102.9)
MONOCYTES NFR BLD: 0.5 K/UL (ref 0.1–1.2)
MONOCYTES NFR BLD: 9 % (ref 3–12)
NEUTROPHILS NFR BLD: 68 % (ref 36–65)
NEUTS SEG NFR BLD: 3.98 K/UL (ref 1.5–8.1)
NRBC BLD-RTO: 0 PER 100 WBC
PLATELET # BLD AUTO: 192 K/UL (ref 138–453)
PMV BLD AUTO: 10.4 FL (ref 8.1–13.5)
POTASSIUM SERPL-SCNC: 4 MMOL/L (ref 3.7–5.3)
RBC # BLD AUTO: 2.78 M/UL (ref 3.95–5.11)
RBC # BLD: ABNORMAL 10*6/UL
SODIUM SERPL-SCNC: 141 MMOL/L (ref 136–145)
WBC OTHER # BLD: 5.9 K/UL (ref 3.5–11.3)

## 2024-08-27 PROCEDURE — 80048 BASIC METABOLIC PNL TOTAL CA: CPT

## 2024-08-27 PROCEDURE — 6370000000 HC RX 637 (ALT 250 FOR IP)

## 2024-08-27 PROCEDURE — 94640 AIRWAY INHALATION TREATMENT: CPT

## 2024-08-27 PROCEDURE — 36415 COLL VENOUS BLD VENIPUNCTURE: CPT

## 2024-08-27 PROCEDURE — 6370000000 HC RX 637 (ALT 250 FOR IP): Performed by: STUDENT IN AN ORGANIZED HEALTH CARE EDUCATION/TRAINING PROGRAM

## 2024-08-27 PROCEDURE — 99232 SBSQ HOSP IP/OBS MODERATE 35: CPT | Performed by: PHYSICAL MEDICINE & REHABILITATION

## 2024-08-27 PROCEDURE — 2580000003 HC RX 258

## 2024-08-27 PROCEDURE — 6360000002 HC RX W HCPCS

## 2024-08-27 PROCEDURE — 85025 COMPLETE CBC W/AUTO DIFF WBC: CPT

## 2024-08-27 PROCEDURE — 2700000000 HC OXYGEN THERAPY PER DAY

## 2024-08-27 PROCEDURE — 85018 HEMOGLOBIN: CPT

## 2024-08-27 PROCEDURE — 6370000000 HC RX 637 (ALT 250 FOR IP): Performed by: NURSE PRACTITIONER

## 2024-08-27 PROCEDURE — 85014 HEMATOCRIT: CPT

## 2024-08-27 RX ORDER — ONDANSETRON 4 MG/1
4 TABLET, ORALLY DISINTEGRATING ORAL EVERY 8 HOURS PRN
Qty: 8 TABLET | Refills: 0 | DISCHARGE
Start: 2024-08-27

## 2024-08-27 RX ORDER — CARVEDILOL 6.25 MG/1
6.25 TABLET ORAL 2 TIMES DAILY WITH MEALS
Status: DISCONTINUED | OUTPATIENT
Start: 2024-08-27 | End: 2024-08-27 | Stop reason: HOSPADM

## 2024-08-27 RX ORDER — IBUPROFEN 400 MG/1
400 TABLET, FILM COATED ORAL EVERY 4 HOURS PRN
Qty: 120 TABLET | Refills: 0 | Status: CANCELLED | OUTPATIENT
Start: 2024-08-27

## 2024-08-27 RX ORDER — PRAVASTATIN SODIUM 20 MG
20 TABLET ORAL NIGHTLY
DISCHARGE
Start: 2024-08-27

## 2024-08-27 RX ORDER — BENZOCAINE/MENTHOL 6 MG-10 MG
LOZENGE MUCOUS MEMBRANE
Qty: 28 G | Refills: 1 | DISCHARGE
Start: 2024-08-27 | End: 2024-09-03

## 2024-08-27 RX ORDER — SENNOSIDES A AND B 8.6 MG/1
1 TABLET, FILM COATED ORAL NIGHTLY
Qty: 30 TABLET | DISCHARGE
Start: 2024-08-27 | End: 2024-09-26

## 2024-08-27 RX ORDER — ENOXAPARIN SODIUM 100 MG/ML
40 INJECTION SUBCUTANEOUS 2 TIMES DAILY
DISCHARGE
Start: 2024-08-27 | End: 2024-09-26

## 2024-08-27 RX ORDER — OXYCODONE HYDROCHLORIDE 5 MG/1
5 TABLET ORAL EVERY 4 HOURS PRN
Qty: 28 TABLET | Status: SHIPPED | OUTPATIENT
Start: 2024-08-27 | End: 2024-09-03

## 2024-08-27 RX ORDER — CARVEDILOL 6.25 MG/1
6.25 TABLET ORAL 2 TIMES DAILY WITH MEALS
Qty: 60 TABLET | Refills: 0 | DISCHARGE
Start: 2024-08-27

## 2024-08-27 RX ORDER — BENZOCAINE/MENTHOL 6 MG-10 MG
LOZENGE MUCOUS MEMBRANE
Qty: 30 G | Refills: 1 | Status: CANCELLED | OUTPATIENT
Start: 2024-08-27 | End: 2024-09-03

## 2024-08-27 RX ORDER — METHOCARBAMOL 750 MG/1
750 TABLET, FILM COATED ORAL 4 TIMES DAILY
Qty: 40 TABLET | Refills: 0 | DISCHARGE
Start: 2024-08-27 | End: 2024-09-06

## 2024-08-27 RX ORDER — POLYETHYLENE GLYCOL 3350 17 G/17G
17 POWDER, FOR SOLUTION ORAL DAILY
Qty: 30 PACKET | DISCHARGE
Start: 2024-08-28 | End: 2024-09-27

## 2024-08-27 RX ADMIN — IBUPROFEN 400 MG: 400 TABLET, FILM COATED ORAL at 11:47

## 2024-08-27 RX ADMIN — METHOCARBAMOL 750 MG: 750 TABLET ORAL at 11:47

## 2024-08-27 RX ADMIN — CARVEDILOL 6.25 MG: 6.25 TABLET, FILM COATED ORAL at 11:47

## 2024-08-27 RX ADMIN — METHOCARBAMOL 750 MG: 750 TABLET ORAL at 08:28

## 2024-08-27 RX ADMIN — ENOXAPARIN SODIUM 40 MG: 100 INJECTION SUBCUTANEOUS at 08:28

## 2024-08-27 RX ADMIN — TIOTROPIUM BROMIDE AND OLODATEROL 2 PUFF: 3.124; 2.736 SPRAY, METERED RESPIRATORY (INHALATION) at 08:13

## 2024-08-27 RX ADMIN — IPRATROPIUM BROMIDE 0.5 MG: 0.5 SOLUTION RESPIRATORY (INHALATION) at 08:14

## 2024-08-27 RX ADMIN — OXYCODONE HYDROCHLORIDE 5 MG: 5 TABLET ORAL at 08:28

## 2024-08-27 RX ADMIN — LACTULOSE 10 G: 20 SOLUTION ORAL at 08:28

## 2024-08-27 RX ADMIN — SODIUM CHLORIDE, PRESERVATIVE FREE 10 ML: 5 INJECTION INTRAVENOUS at 08:29

## 2024-08-27 RX ADMIN — HYDROCORTISONE: 10 CREAM TOPICAL at 08:29

## 2024-08-27 RX ADMIN — IBUPROFEN 400 MG: 400 TABLET, FILM COATED ORAL at 08:28

## 2024-08-27 RX ADMIN — POLYETHYLENE GLYCOL 3350 17 G: 17 POWDER, FOR SOLUTION ORAL at 08:28

## 2024-08-27 RX ADMIN — PANTOPRAZOLE SODIUM 40 MG: 40 TABLET, DELAYED RELEASE ORAL at 08:28

## 2024-08-27 ASSESSMENT — PAIN SCALES - WONG BAKER
WONGBAKER_NUMERICALRESPONSE: HURTS A LITTLE BIT

## 2024-08-27 ASSESSMENT — PAIN SCALES - GENERAL
PAINLEVEL_OUTOF10: 5
PAINLEVEL_OUTOF10: 8
PAINLEVEL_OUTOF10: 5
PAINLEVEL_OUTOF10: 5

## 2024-08-27 ASSESSMENT — PAIN DESCRIPTION - ORIENTATION: ORIENTATION: LEFT

## 2024-08-27 ASSESSMENT — PAIN DESCRIPTION - LOCATION: LOCATION: HIP

## 2024-08-27 NOTE — DISCHARGE INSTR - DIET

## 2024-08-27 NOTE — PLAN OF CARE
Problem: Safety - Adult  Goal: Free from fall injury  8/27/2024 1531 by Cristofer Kulkarni RN  Outcome: Completed  8/27/2024 0951 by Cristofer Kulkarni RN  Outcome: Progressing  8/27/2024 0355 by Brigitte Molina RN  Outcome: Progressing     Problem: Discharge Planning  Goal: Discharge to home or other facility with appropriate resources  8/27/2024 1531 by Cristofer Kulkarni RN  Outcome: Completed  8/27/2024 0951 by Cristofer Kulkarni RN  Outcome: Progressing  8/27/2024 0355 by Brigitte Molina RN  Outcome: Progressing     Problem: Pain  Goal: Verbalizes/displays adequate comfort level or baseline comfort level  8/27/2024 1531 by Cristofer Kulkarni RN  Outcome: Completed  8/27/2024 0951 by Cristofer Kulkarni RN  Outcome: Progressing  8/27/2024 0355 by Brigitte Molina RN  Outcome: Progressing     Problem: Skin/Tissue Integrity  Goal: Absence of new skin breakdown  Description: 1.  Monitor for areas of redness and/or skin breakdown  2.  Assess vascular access sites hourly  3.  Every 4-6 hours minimum:  Change oxygen saturation probe site  4.  Every 4-6 hours:  If on nasal continuous positive airway pressure, respiratory therapy assess nares and determine need for appliance change or resting period.  8/27/2024 1531 by Cristofer Kulkarni RN  Outcome: Completed  8/27/2024 0951 by Cristofer Kulkarni RN  Outcome: Progressing  8/27/2024 0355 by Brigitte Molina RN  Outcome: Progressing     Problem: ABCDS Injury Assessment  Goal: Absence of physical injury  8/27/2024 1531 by Cristofer Kulkarni RN  Outcome: Completed  8/27/2024 0951 by Cristofer Kulkarni RN  Outcome: Progressing  8/27/2024 0355 by Brigitte Molina RN  Outcome: Progressing

## 2024-08-27 NOTE — PROGRESS NOTES
Physical Medicine & Rehabilitation  Progress Note    8/27/2024 10:01 AM     CC: Ambulatory and ADL dysfunction due to fall with left anterior IT fracture status post CMN nailing as well as acute/subacute anterior wedge compression deformity T8 with TLSO    Subjective:   Feels well.  Noted some increased left hip spasm pain after therapies yesterday    ROS:  Denies fevers, chills, sweats.  No chest pain, palpitations, lightheadedness.  Denies coughing, wheezing or shortness of breath.  Denies abdominal pain, nausea, diarrhea or constipation.  No new areas of joint pain.  Denies new areas of numbness or weakness.  Denies new anxiety or depression issues.  No new skin problems.    Rehabilitation:   PT:    Bed mobility  Rolling to Left: Moderate assistance  Rolling to Right: Moderate assistance  Supine to Sit: Maximum assistance (assistance for BLE and trunk progression.)  Sit to Supine: Maximum assistance (assistance for BLE and trunk progression.)  Scooting: Minimal assistance  Bed Mobility Comments: HOB nearly flat, log roll performed for supine<>sit, pt declines to attempt log roll with sit<>supine transfer. Verbal cues for sequencing.    Transfers  Sit to Stand: Minimal Assistance  Stand to Sit: Minimal Assistance  Bed to Chair: Unable to assess  Comment: Transfers performed with a RW, verbal cues for UE placement.    Ambulation  Surface: Level tile  Device: Rolling Walker  Assistance: Minimal assistance  Quality of Gait: unsteady, step-to pattern, short stride, decreased L stance phase  Gait Deviations: Slow Angella, Decreased step length, Decreased step height  Distance: 2 feet forward/retro  More Ambulation?: No                OT:    DL  UE Dressing: Maximum assistance;Setup;Verbal cueing;Increased time to complete  UE Dressing Skilled Clinical Factors: Min A to doff/don gown seated at EOB and Max A to doff/don TLSO seated at EOB. TLSO for comfort. Pt and pt S/O education verbally and with demonstration how to

## 2024-08-27 NOTE — PROGRESS NOTES
Pt discharged to [facility] with [facility] transport. Pt IV, telemetry, and purewick removed without difficulty. Report called to [facility] RN and no questions asked at this time.  accompanying patient on transport. Pt has no questions or concerns at this time.

## 2024-08-27 NOTE — DISCHARGE INSTR - COC
Incision 08/22/24 Thigh Left (Active)   Dressing Status Clean;Dry;Intact 08/27/24 0800   Incision Cleansed Not Cleansed 08/27/24 0800   Dressing/Treatment Other (comment);Silver dressing 08/27/24 0800   Incision Assessment Other (Comment) 08/26/24 1945   Drainage Amount None (dry) 08/23/24 0800   Odor None 08/23/24 0800   Number of days: 4        Elimination:  Continence:   Bowel: Yes  Bladder: Yes  Urinary Catheter:  purewick    Colostomy/Ileostomy/Ileal Conduit: No       Date of Last BM: 08/22/2024    Intake/Output Summary (Last 24 hours) at 8/27/2024 0954  Last data filed at 8/27/2024 0900  Gross per 24 hour   Intake 720 ml   Output 400 ml   Net 320 ml     I/O last 3 completed shifts:  In: 360 [P.O.:360]  Out: 650 [Urine:650]    Safety Concerns:     History of Falls (last 30 days) and At Risk for Falls    Impairments/Disabilities:      None    Nutrition Therapy:  Current Nutrition Therapy:   - Oral Diet:  General    Routes of Feeding: Oral  Liquids: No Restrictions  Daily Fluid Restriction: no  Last Modified Barium Swallow with Video (Video Swallowing Test): not done    Treatments at the Time of Hospital Discharge:   Respiratory Treatments: albuterol / atrovent  Oxygen Therapy:  is on oxygen at 2 L/min per nasal cannula.  Ventilator:    - No ventilator support    Rehab Therapies: Physical Therapy and Occupational Therapy  Weight Bearing Status/Restrictions: No weight bearing restrictions  Other Medical Equipment (for information only, NOT a DME order):  wheelchair, walker, and TLSO brace with patient  Other Treatments: n/a    Patient's personal belongings (please select all that are sent with patient):  Max    RN SIGNATURE:  Electronically signed by RUSLAN PACKER RN on 8/27/24 at 9:58 AM EDT    CASE MANAGEMENT/SOCIAL WORK SECTION    Inpatient Status Date: ***    Readmission Risk Assessment Score:  Readmission Risk              Risk of Unplanned Readmission:  19           Discharging to Facility/ Agency

## 2024-08-27 NOTE — PLAN OF CARE
Problem: Safety - Adult  Goal: Free from fall injury  8/27/2024 0951 by Cristofer Kulkarni RN  Outcome: Progressing  8/27/2024 0355 by Brigitte Molina RN  Outcome: Progressing     Problem: Discharge Planning  Goal: Discharge to home or other facility with appropriate resources  8/27/2024 0951 by Cristofer Kulkarni RN  Outcome: Progressing  8/27/2024 0355 by Brigitte Molina RN  Outcome: Progressing     Problem: Pain  Goal: Verbalizes/displays adequate comfort level or baseline comfort level  8/27/2024 0951 by Cristofer Kulkarni RN  Outcome: Progressing  8/27/2024 0355 by Brigitte Molina RN  Outcome: Progressing     Problem: Skin/Tissue Integrity  Goal: Absence of new skin breakdown  Description: 1.  Monitor for areas of redness and/or skin breakdown  2.  Assess vascular access sites hourly  3.  Every 4-6 hours minimum:  Change oxygen saturation probe site  4.  Every 4-6 hours:  If on nasal continuous positive airway pressure, respiratory therapy assess nares and determine need for appliance change or resting period.  8/27/2024 0951 by Cristofer Kulkarni RN  Outcome: Progressing  8/27/2024 0355 by Brigitte Molina RN  Outcome: Progressing     Problem: ABCDS Injury Assessment  Goal: Absence of physical injury  8/27/2024 0951 by Cristofer Kulkarni RN  Outcome: Progressing  8/27/2024 0355 by Brigitte Molina RN  Outcome: Progressing

## 2024-08-27 NOTE — PROGRESS NOTES
deformity involving the inferior endplate of T9 vs T8  NS consulted  TLSO when out of bed  UTI, treatment completed   DVT proph: continue lovenox  Dispo: medically stable for discharge, pending recommendations for PT/OT and PM&R    SUBJECTIVE    Angela Hutchinson was seen at bedside.  Patient doing well, eager to leave the hospital. Denies any acute concerns. Reported due to timing of her medications, they had worn off prior to working with PT, but she was still able to and was sore after.    OBJECTIVE  VITALS: Temp: Temp: 98.4 °F (36.9 °C)Temp  Av.4 °F (36.9 °C)  Min: 98.2 °F (36.8 °C)  Max: 98.8 °F (37.1 °C) BP Systolic (24hrs), Av , Min:102 , Max:157   Diastolic (24hrs), Av, Min:73, Max:96   Pulse Pulse  Av.4  Min: 98  Max: 109 Resp Resp  Av.4  Min: 15  Max: 27 Pulse ox SpO2  Av.3 %  Min: 92 %  Max: 100 %    Constitutional:       General: She is not in acute distress.  HENT:      Head: Normocephalic and atraumatic.      Mouth/Throat:      Mouth: Mucous membranes are moist.   Eyes:      Pupils: Pupils are equal, round, and reactive to light.   Cardiovascular:      Rate and Rhythm: Normal rate.      Heart sounds: No murmur heard.  Pulmonary:      Effort: Pulmonary effort is normal. No respiratory distress.      Breath sounds: Normal breath sounds. IS: 2000  Abdominal:      General: Bowel sounds are normal. There is no distension.      Palpations: Abdomen is soft.      Tenderness: There is no abdominal tenderness.   Musculoskeletal:         General: Tenderness (Left lower extremity) present, improving.     Cervical back: Normal range of motion and neck supple. No tenderness.      Comments: No tenderness or deformity remaining extremities   Skin:     General: Skin is warm.      Capillary Refill: Capillary refill takes less than 2 seconds.   Neurological:      General: No focal deficit present.      Mental Status: She is alert and oriented to person, place, and time.   Psychiatric:          Mood and Affect: Mood normal.     I/O last 3 completed shifts:  In: 360 [P.O.:360]  Out: 650 [Urine:650]    Drain/tube output:  In: 360 [P.O.:360]  Out: 400 [Urine:400]    LAB:  AM labs pending 8/27:  CBC:   Recent Labs     08/25/24  1820   WBC 9.8   HGB 8.4*   HCT 27.1*   MCV 86.0        BMP:   Recent Labs     08/25/24  1820      K 4.1      CO2 23   BUN 14   CREATININE 0.7   GLUCOSE 124*       RADIOLOGY:  No new imaging since 8/25      Jemma Dave MD  8/27/2024  Attestation signed by Braulio Roque MD    I personally evaluated the patient and directed the medical decision making with Resident/NAOMI after the physical/radiologic exam and laboratory values were reviewed and confirmed.      Braulio Roque MD

## 2024-08-27 NOTE — DISCHARGE SUMMARY
8/22/2024  EXAMINATION: TWO XRAY VIEWS OF THE LEFT HAND 8/21/2024 10:41 pm COMPARISON: None. HISTORY: ORDERING SYSTEM PROVIDED HISTORY: fall TECHNOLOGIST PROVIDED HISTORY: fall FINDINGS: Questionable nondisplaced fracture of the volar base of the 1st distal phalanx.  No displaced fracture or dislocation.  The soft tissues are unremarkable.     Questionable nondisplaced fracture of the volar base of the 1st distal phalanx. Correlate with point tenderness.     XR TIBIA FIBULA LEFT (2 VIEWS)    Result Date: 8/22/2024  EXAMINATION: 2 XRAY VIEWS OF THE LEFT FEMUR; THREE XRAY VIEWS OF THE LEFT ANKLE; 2 XRAY VIEWS OF THE LEFT TIBIA AND FIBULA 8/21/2024 10:41 pm COMPARISON: None. HISTORY: ORDERING SYSTEM PROVIDED HISTORY: fall TECHNOLOGIST PROVIDED HISTORY: fall FINDINGS: Intertrochanteric left hip fracture noted with approximately 90 degrees varus angulation and no significant displacement.  The left femur is otherwise unremarkable.  No fracture, dislocation or joint abnormality evident at the left tibia/fibula and left ankle.  Bone density is normal.  Soft tissues are unremarkable.     1. Intertrochanteric left hip fracture with approximately 90 degrees varus angulation and no significant displacement. 2. No fracture or dislocation of the left tibia/fibula or left ankle.     XR FEMUR LEFT 1 VW    Result Date: 8/22/2024  EXAMINATION: 2 XRAY VIEWS OF THE LEFT FEMUR; THREE XRAY VIEWS OF THE LEFT ANKLE; 2 XRAY VIEWS OF THE LEFT TIBIA AND FIBULA 8/21/2024 10:41 pm COMPARISON: None. HISTORY: ORDERING SYSTEM PROVIDED HISTORY: fall TECHNOLOGIST PROVIDED HISTORY: fall FINDINGS: Intertrochanteric left hip fracture noted with approximately 90 degrees varus angulation and no significant displacement.  The left femur is otherwise unremarkable.  No fracture, dislocation or joint abnormality evident at the left tibia/fibula and left ankle.  Bone density is normal.  Soft tissues are unremarkable.     1. Intertrochanteric left hip  right femur and right tibia/fibula.  The patient is status post total right knee arthroplasty.  Bone density is normal.  Soft tissues are unremarkable.     1. No acute fracture or dislocation of the right femur or right tibia/fibula. 2. Status post total right knee arthroplasty.     XR HIP W PELVIS MIN 5 VWS BILATERAL    Result Date: 8/22/2024  EXAMINATION: ONE XRAY VIEW OF THE PELVIS AND TWO XRAY VIEWS OF EACH OF THE BILATERAL HIPS 8/21/2024 10:41 pm COMPARISON: None. HISTORY: ORDERING SYSTEM PROVIDED HISTORY: fall TECHNOLOGIST PROVIDED HISTORY: fall FINDINGS: There is a left intertrochanteric fracture with approximately 90 degrees varus angulation and no significant displacement.  Osseous structures otherwise appear intact.  Bone density appears normal.  Surrounding soft tissues are unremarkable.     Left intertrochanteric fracture with approximately 90 degrees varus angulation and no significant displacement.       DISCHARGE INSTRUCTIONS     Discharge Medications:        Medication List        START taking these medications      carvedilol 6.25 MG tablet  Commonly known as: COREG  Take 1 tablet by mouth 2 times daily (with meals)     enoxaparin 40 MG/0.4ML  Commonly known as: LOVENOX  Inject 0.4 mLs into the skin 2 times daily     hydrocortisone 1 % cream  Apply topically 2 times daily.     melatonin 5 MG Tabs tablet  Take 2 tablets by mouth nightly     methocarbamol 750 MG tablet  Commonly known as: ROBAXIN  Take 1 tablet by mouth 4 times daily for 10 days     ondansetron 4 MG disintegrating tablet  Commonly known as: ZOFRAN-ODT  Take 1 tablet by mouth every 8 hours as needed for Nausea or Vomiting     oxyCODONE 5 MG immediate release tablet  Commonly known as: ROXICODONE  Take 1 tablet by mouth every 4 hours as needed for Pain for up to 7 days. Max Daily Amount: 30 mg     polyethylene glycol 17 g packet  Commonly known as: GLYCOLAX  Take 1 packet by mouth daily  Start taking on: August 28, 2024     senna 8.6 MG

## 2024-08-27 NOTE — PROGRESS NOTES
PROGRESS NOTE          PATIENT NAME: Angela Hutchinson  MEDICAL RECORD NO. [id number]  DATE: 8/27/2024    HD: # 5      DIAGNOSIS AND PLAN    1. Left intertrochanteric hip fracture  Ortho consulted  s/p L femur CMN 8/22  WBAT LLE  2. Acute/subacute anterior wedge compression deformity involving the inferior endplate of T9 vs T8  NS consulted  TLSO brace  3. UTI  On Macrobid f/ 5 days, started 8/22 - ended 8/26  4. Pain management  Continue tylenol, ibuprofen, robaxin, prn jocelin  5. Hypertension  Continue norvasc  6. DVT proph  Continue lovenox  7. Dispo  PT/OT  Medically stable for discharge    Chief Complaint: \"I am doing good\"    SUBJECTIVE    The patient was seen and examined at bedside. She was mildly tachycardic overnight. Overall, she is doing well. She underwent two sessions of PT/OT yesterday and tolerated them well. She is managing her back pain with a TLSO brace. She has not had a bowel movement since 8/22. She has no difficulty with flatulence or urination.     OBJECTIVE  VITALS:   Vitals:    08/27/24 0328   BP: (!) 107/91   Pulse: (!) 108   Resp: 20   Temp: 98.4 °F (36.9 °C)   SpO2: 95%     Physical Exam  Constitutional:       General: She is not in acute distress.     Appearance: Normal appearance. She is not ill-appearing.   HENT:      Head: Normocephalic and atraumatic.      Right Ear: External ear normal.      Left Ear: External ear normal.      Nose: Nose normal.   Eyes:      General:         Right eye: No discharge.         Left eye: No discharge.   Cardiovascular:      Rate and Rhythm: Tachycardia present.      Pulses: Normal pulses.   Pulmonary:      Effort: Pulmonary effort is normal.      Breath sounds: Normal breath sounds.   Musculoskeletal:      Cervical back: Normal range of motion.   Skin:     General: Skin is warm and dry.   Neurological:      Mental Status: She is alert.   Psychiatric:         Mood and Affect: Mood normal.           Drain/tube output:  In: 600 [P.O.:600]  Out: 550

## 2024-08-27 NOTE — CARE COORDINATION
Transitional planning note: received call from [facility] with [facility] stating they have accepted patient. Met with patient and spouse at bedside to notify of acceptance to [facility] and they are agreeable to plan. Perfect serve message to trauma resident to notify of acceptance to [facility] rehab hospital and inquired if patient can be discharged today.     1130: [facility] . Faxed gabi to facility. Notified glory at [facility] of discharge order and that 3pm transport has been requested but not yet confirmed

## 2024-08-28 ENCOUNTER — HOSPITAL ENCOUNTER (EMERGENCY)
Age: 69
Discharge: HOME | End: 2024-08-29
Attending: STUDENT IN AN ORGANIZED HEALTH CARE EDUCATION/TRAINING PROGRAM
Payer: MEDICARE

## 2024-08-28 ENCOUNTER — HOSPITAL ENCOUNTER (OUTPATIENT)
Age: 69
Setting detail: SPECIMEN
Discharge: HOME | End: 2024-08-28

## 2024-08-28 ENCOUNTER — APPOINTMENT (OUTPATIENT)
Dept: GENERAL RADIOLOGY | Age: 69
End: 2024-08-28
Payer: MEDICARE

## 2024-08-28 VITALS
HEART RATE: 106 BPM | SYSTOLIC BLOOD PRESSURE: 134 MMHG | RESPIRATION RATE: 18 BRPM | TEMPERATURE: 99.5 F | DIASTOLIC BLOOD PRESSURE: 72 MMHG | OXYGEN SATURATION: 90 %

## 2024-08-28 DIAGNOSIS — K59.03 DRUG-INDUCED CONSTIPATION: ICD-10-CM

## 2024-08-28 DIAGNOSIS — G89.18 POSTOPERATIVE PAIN: Primary | ICD-10-CM

## 2024-08-28 LAB
ALBUMIN SERPL-MCNC: 3 G/DL (ref 3.5–5.2)
ALBUMIN/GLOB SERPL: 1 {RATIO} (ref 1–2.5)
ALP SERPL-CCNC: 84 U/L (ref 35–104)
ALT SERPL-CCNC: 10 U/L (ref 10–35)
ANION GAP SERPL CALCULATED.3IONS-SCNC: 7 MMOL/L (ref 9–16)
AST SERPL-CCNC: 19 U/L (ref 10–35)
BASOPHILS # BLD: <0.03 K/UL (ref 0–0.2)
BASOPHILS NFR BLD: 0 % (ref 0–2)
BILIRUB SERPL-MCNC: 0.4 MG/DL (ref 0–1.2)
BUN SERPL-MCNC: 16 MG/DL (ref 8–23)
CALCIUM SERPL-MCNC: 9.3 MG/DL (ref 8.6–10.4)
CHLORIDE SERPL-SCNC: 109 MMOL/L (ref 98–107)
CO2 SERPL-SCNC: 26 MMOL/L (ref 20–31)
CREAT SERPL-MCNC: 0.7 MG/DL (ref 0.5–0.9)
EOSINOPHIL # BLD: 0.22 K/UL (ref 0–0.44)
EOSINOPHILS RELATIVE PERCENT: 4 % (ref 1–4)
ERYTHROCYTE [DISTWIDTH] IN BLOOD BY AUTOMATED COUNT: 15.9 % (ref 11.8–14.4)
GFR, ESTIMATED: 90 ML/MIN/1.73M2
GLUCOSE SERPL-MCNC: 103 MG/DL (ref 74–99)
HCT VFR BLD AUTO: 22.7 % (ref 36.3–47.1)
HGB BLD-MCNC: 6.8 G/DL (ref 11.9–15.1)
IMM GRANULOCYTES # BLD AUTO: 0.05 K/UL (ref 0–0.3)
IMM GRANULOCYTES NFR BLD: 1 %
LYMPHOCYTES NFR BLD: 1.24 K/UL (ref 1.1–3.7)
LYMPHOCYTES RELATIVE PERCENT: 24 % (ref 24–43)
MAGNESIUM SERPL-MCNC: 2 MG/DL (ref 1.6–2.4)
MCH RBC QN AUTO: 26.3 PG (ref 25.2–33.5)
MCHC RBC AUTO-ENTMCNC: 30 G/DL (ref 28.4–34.8)
MCV RBC AUTO: 87.6 FL (ref 82.6–102.9)
MONOCYTES NFR BLD: 0.5 K/UL (ref 0.1–1.2)
MONOCYTES NFR BLD: 10 % (ref 3–12)
NEUTROPHILS NFR BLD: 61 % (ref 36–65)
NEUTS SEG NFR BLD: 3.14 K/UL (ref 1.5–8.1)
NRBC BLD-RTO: 0 PER 100 WBC
PLATELET # BLD AUTO: 226 K/UL (ref 138–453)
PMV BLD AUTO: 10.9 FL (ref 8.1–13.5)
POTASSIUM SERPL-SCNC: 4.4 MMOL/L (ref 3.7–5.3)
PROT SERPL-MCNC: 5.4 G/DL (ref 6.6–8.7)
RBC # BLD AUTO: 2.59 M/UL (ref 3.95–5.11)
RBC # BLD: ABNORMAL 10*6/UL
SODIUM SERPL-SCNC: 142 MMOL/L (ref 136–145)
WBC OTHER # BLD: 5.2 K/UL (ref 3.5–11.3)

## 2024-08-28 PROCEDURE — 86901 BLOOD TYPING SEROLOGIC RH(D): CPT

## 2024-08-28 PROCEDURE — 86900 BLOOD TYPING SEROLOGIC ABO: CPT

## 2024-08-28 PROCEDURE — 36415 COLL VENOUS BLD VENIPUNCTURE: CPT

## 2024-08-28 PROCEDURE — 80053 COMPREHEN METABOLIC PANEL: CPT

## 2024-08-28 PROCEDURE — 86850 RBC ANTIBODY SCREEN: CPT

## 2024-08-28 PROCEDURE — 6360000002 HC RX W HCPCS: Performed by: STUDENT IN AN ORGANIZED HEALTH CARE EDUCATION/TRAINING PROGRAM

## 2024-08-28 PROCEDURE — 73502 X-RAY EXAM HIP UNI 2-3 VIEWS: CPT

## 2024-08-28 PROCEDURE — 99284 EMERGENCY DEPT VISIT MOD MDM: CPT

## 2024-08-28 PROCEDURE — 6370000000 HC RX 637 (ALT 250 FOR IP): Performed by: STUDENT IN AN ORGANIZED HEALTH CARE EDUCATION/TRAINING PROGRAM

## 2024-08-28 PROCEDURE — 96375 TX/PRO/DX INJ NEW DRUG ADDON: CPT

## 2024-08-28 PROCEDURE — 83735 ASSAY OF MAGNESIUM: CPT

## 2024-08-28 PROCEDURE — 86920 COMPATIBILITY TEST SPIN: CPT

## 2024-08-28 PROCEDURE — P9603 ONE-WAY ALLOW PRORATED MILES: HCPCS

## 2024-08-28 PROCEDURE — 96374 THER/PROPH/DIAG INJ IV PUSH: CPT

## 2024-08-28 PROCEDURE — 85025 COMPLETE CBC W/AUTO DIFF WBC: CPT

## 2024-08-28 RX ORDER — HYDROMORPHONE HYDROCHLORIDE 1 MG/ML
1 INJECTION, SOLUTION INTRAMUSCULAR; INTRAVENOUS; SUBCUTANEOUS ONCE
Status: COMPLETED | OUTPATIENT
Start: 2024-08-28 | End: 2024-08-28

## 2024-08-28 RX ORDER — ONDANSETRON 2 MG/ML
4 INJECTION INTRAMUSCULAR; INTRAVENOUS ONCE
Status: COMPLETED | OUTPATIENT
Start: 2024-08-28 | End: 2024-08-28

## 2024-08-28 RX ADMIN — ONDANSETRON 4 MG: 2 INJECTION INTRAMUSCULAR; INTRAVENOUS at 20:07

## 2024-08-28 RX ADMIN — NALOXEGOL OXALATE 25 MG: 25 TABLET, FILM COATED ORAL at 23:50

## 2024-08-28 RX ADMIN — HYDROMORPHONE HYDROCHLORIDE 1 MG: 1 INJECTION, SOLUTION INTRAMUSCULAR; INTRAVENOUS; SUBCUTANEOUS at 20:08

## 2024-08-28 ASSESSMENT — PAIN SCALES - GENERAL
PAINLEVEL_OUTOF10: 9
PAINLEVEL_OUTOF10: 9

## 2024-08-28 ASSESSMENT — PAIN - FUNCTIONAL ASSESSMENT: PAIN_FUNCTIONAL_ASSESSMENT: 0-10

## 2024-08-28 NOTE — ED NOTES
Presents via EMS from [facility] with c/o left hip pain. Pt recently had a left hip fracture, surgery was done on 8/22. Per pt and staff at [facility], pt states she feels like her bones are cracking in her left hip. X-rays were ordered at facility, but pt was in too much pain to have them completed.

## 2024-08-29 ENCOUNTER — HOSPITAL ENCOUNTER (OUTPATIENT)
Age: 69
Setting detail: SPECIMEN
Discharge: HOME | End: 2024-08-29

## 2024-08-29 ENCOUNTER — TELEPHONE (OUTPATIENT)
Dept: ORTHOPEDIC SURGERY | Age: 69
End: 2024-08-29

## 2024-08-29 LAB
ABO/RH: NORMAL
ANTIBODY SCREEN: NEGATIVE
ARM BAND NUMBER: NORMAL
BLOOD BANK DISPENSE STATUS: NORMAL
BLOOD BANK SAMPLE EXPIRATION: NORMAL
BPU ID: NORMAL
COMPONENT: NORMAL
CROSSMATCH RESULT: NORMAL
ERYTHROCYTE [DISTWIDTH] IN BLOOD BY AUTOMATED COUNT: 15.4 % (ref 11.8–14.4)
HCT VFR BLD AUTO: 29.5 % (ref 36.3–47.1)
HGB BLD-MCNC: 9.4 G/DL (ref 11.9–15.1)
MCH RBC QN AUTO: 27.6 PG (ref 25.2–33.5)
MCHC RBC AUTO-ENTMCNC: 31.9 G/DL (ref 28.4–34.8)
MCV RBC AUTO: 86.5 FL (ref 82.6–102.9)
NRBC BLD-RTO: 0 PER 100 WBC
PLATELET # BLD AUTO: 298 K/UL (ref 138–453)
PMV BLD AUTO: 10.6 FL (ref 8.1–13.5)
RBC # BLD AUTO: 3.41 M/UL (ref 3.95–5.11)
TRANSFUSION STATUS: NORMAL
UNIT DIVISION: 0
WBC OTHER # BLD: 10.6 K/UL (ref 3.5–11.3)

## 2024-08-29 PROCEDURE — P9016 RBC LEUKOCYTES REDUCED: HCPCS

## 2024-08-29 PROCEDURE — P9603 ONE-WAY ALLOW PRORATED MILES: HCPCS

## 2024-08-29 PROCEDURE — 85027 COMPLETE CBC AUTOMATED: CPT

## 2024-08-29 PROCEDURE — 36415 COLL VENOUS BLD VENIPUNCTURE: CPT

## 2024-08-29 NOTE — ED NOTES
Dina from [facility] called with pt report. Per Dian pt is a 68 y.o. female, full code, that was just recently admitted to them after having left hip repair done by Dr. Rossi at [facility]. Dina continues to state that pt had therapy done today and during one of the movements the pt thought she felt something shift and has had severe pain since. Dina administered 0.5 mg Dilaudid, xrays were ordered and pt refused to get the xrays done unless she was \"knocked out.\" Dina had informed the pt that, that is not possible so the doctor wanted the pt taken to the ER for xray imaging. Dina also states that they are aware that the pt has a low HGB of 6.8, they have blood ready for the pt and they do not want the ER to do anything with her low hemoglobin because they will administer the blood when she returns to the facility. Dina states that there are 3 sites on the pts left hip and she voiced concern for the center incision that has drainage noted, but she was instructed by the doctor not to remove the surgery dressing.

## 2024-08-29 NOTE — ED PROVIDER NOTES
Head: Normocephalic and atraumatic.   Eyes:      General: No scleral icterus.     Conjunctiva/sclera: Conjunctivae normal.      Pupils: Pupils are equal, round, and reactive to light.   Cardiovascular:      Rate and Rhythm: Normal rate.   Pulmonary:      Effort: Pulmonary effort is normal. No respiratory distress.   Musculoskeletal:      Comments: Significant pain with any attempted range of motion of left lower extremity.  PMS intact distally, dressings.  Not saturated   Skin:     General: Skin is warm and dry.      Findings: No erythema or rash.   Neurological:      Mental Status: She is alert and oriented to person, place, and time.   Psychiatric:         Behavior: Behavior normal.         MEDICAL DECISION MAKING / ED COURSE:   Summary of Patient Presentation:      1)  Number and Complexity of Problems  Problem List This Visit:    1. Postoperative pain    2. Drug-induced constipation        Differential Diagnosis: Postsurgical anemia versus sepsis versus postop pain    Diagnoses Considered but Do Not Suspect: Sepsis    Pertinent Comorbid Conditions:    Past Medical History:   Diagnosis Date    ADHD     mild, no RX    Arthritis     Blurred vision, right eye     wrinkle in Rt eye-    Breast cancer (HCC) 08/28/2023    Rt breast. Never had chemo or radiation. Only in the breast duct    CAD (coronary artery disease)     Noted on CT lung screen    Carotid artery disease ([facility])     dr paul sullivan last appt 1/20. Has carotid stenosis, pt reports \"left side is worse than right\". The pt just reported she saw him in 12/2023 and the vascular Dr said the patient's numbers are coming down\"    COPD (chronic obstructive pulmonary disease) ([facility])     Mild emphysema per CT lung screen    COVID-19 2019    \"happened right when COVID first came out\". The pt reports when she does the stairs she is breathing harder than normal. Per pt she never had any lung disease, it has just been since COVID she breaths harder at timeS\".  She  does not see a Pulmonologist    GERD (gastroesophageal reflux disease)     Hearing loss     has bilat hearing aids    Hiatal hernia     Moderate to large per CT lung screen    High cholesterol     History of closed shoulder dislocation     rt from recent fall    [place] (hard of hearing)     beto hearing aides    Hx of gastric ulcer     pt reports resolved    Hyperlipidemia     Hypertension 2007    IBS (irritable bowel syndrome)     Macular pucker, right eye     Skin cancer of forehead     pt reports \"squamous cell\". Just recently had it remomved from left leg    Syncope     hx recent falls. Sees a Neurologist Dr Dill at [facility]    Tension headache     Tremor 2020    Intermittent  Pt reports \"none in two months. Pt thinks it is a nervous thing\". Intermittent per pt, these tremors do not last all day when she has them    UTI (urinary tract infection)     Intermittent    Wears glasses        2)  Data Reviewed    External Documents Reviewed: Previous ER visits reviewed by myself      3)  Treatment and Disposition  [Patient repeat assessment, Disposition discussion with patient/family, Case discussed with consulting clinician, MIPS, Social determinants of health impacting treatment or disposition, Shared Decision Making, Code Status Discussion:]    X-rays obtained no acute process.  Patient feeling relieved.  Suffering also from some opioid-induced constipation started on Movantik.  Sent back to nursing facility      Based on the low acuity of concerning symptoms and improvement of symptoms, patient will be discharged with follow up and prescription information listed in the Disposition section.  Patient states they will follow-up with primary care physician and/or return to the emergency department should they experience a change or worsening of symptoms.  Patient will be discharged with resources: summary of visit, instructions, follow-up information, prescriptions if necessary.  Patient/ family instructed to read discharge

## 2024-08-29 NOTE — ED NOTES
Spoke with staff at [facility], updated them on patient returning to their facility.

## 2024-08-29 NOTE — TELEPHONE ENCOUNTER
Called and spoke with Carol and informed okay to change dressing to patient incision. Verbalized understanding.  Also spoke with  and informed him that orders would be given to staff to change bandage.

## 2024-08-29 NOTE — TELEPHONE ENCOUNTER
Attempted to reach nurse at [facility]. Writer is being put on hold and nobody answers. Called patient  back. He is concerned with the bandage from the outside. He is going to send a picture through [employer] to help further assess.

## 2024-08-29 NOTE — DISCHARGE INSTRUCTIONS
Take your medication as indicated and prescribed.  Take either milk of magnesium or 1/2 bottle of magnesium citrate if no bowel movement.  Eat foods high in fiber, or start using fiber supplements.    PLEASE RETURN TO THE EMERGENCY DEPARTMENT IMMEDIATELY for worsening symptoms, inability to have a bowel movement or if you develop any concerning symptoms such as: high fever not relieved by acetaminophen (Tylenol) and/or ibuprofen (Motrin / Advil), chills, shortness of breath, chest pain, feeling of your heart fluttering or racing, persistent nausea and/or vomiting, vomiting up blood, blood in your stool, numbness, loss of consciousness, weakness or tingling in the arms or legs or change in color of the extremities, changes in mental status, persistent headache, blurry vision, loss of bladder / bowel control, unable to follow up with your physician, or other any other care or concern.

## 2024-08-30 ENCOUNTER — HOSPITAL ENCOUNTER (OUTPATIENT)
Age: 69
Setting detail: SPECIMEN
Discharge: HOME | End: 2024-08-30

## 2024-08-30 LAB
ALBUMIN SERPL-MCNC: 2.8 G/DL (ref 3.5–5.2)
ALP SERPL-CCNC: 94 U/L (ref 35–104)
ALT SERPL-CCNC: 17 U/L (ref 5–33)
ANION GAP SERPL CALCULATED.3IONS-SCNC: 10 MMOL/L (ref 9–17)
AST SERPL-CCNC: 34 U/L
BASOPHILS # BLD: 0.03 K/UL (ref 0–0.2)
BASOPHILS NFR BLD: 0 % (ref 0–2)
BILIRUB SERPL-MCNC: 0.4 MG/DL (ref 0.3–1.2)
BUN SERPL-MCNC: 19 MG/DL (ref 8–23)
BUN/CREAT SERPL: 24 (ref 9–20)
CALCIUM SERPL-MCNC: 9 MG/DL (ref 8.6–10.4)
CHLORIDE SERPL-SCNC: 106 MMOL/L (ref 98–107)
CO2 SERPL-SCNC: 25 MMOL/L (ref 20–31)
CREAT SERPL-MCNC: 0.8 MG/DL (ref 0.5–0.9)
EOSINOPHIL # BLD: 0.24 K/UL (ref 0–0.44)
EOSINOPHILS RELATIVE PERCENT: 3 % (ref 1–4)
ERYTHROCYTE [DISTWIDTH] IN BLOOD BY AUTOMATED COUNT: 16.2 % (ref 11.8–14.4)
GFR, ESTIMATED: 79 ML/MIN/1.73M2
GLUCOSE SERPL-MCNC: 90 MG/DL (ref 70–99)
HCT VFR BLD AUTO: 27.7 % (ref 36.3–47.1)
HGB BLD-MCNC: 8.7 G/DL (ref 11.9–15.1)
IMM GRANULOCYTES # BLD AUTO: 0.09 K/UL (ref 0–0.3)
IMM GRANULOCYTES NFR BLD: 1 %
LYMPHOCYTES NFR BLD: 1.88 K/UL (ref 1.1–3.7)
LYMPHOCYTES RELATIVE PERCENT: 25 % (ref 24–43)
MCH RBC QN AUTO: 27.5 PG (ref 25.2–33.5)
MCHC RBC AUTO-ENTMCNC: 31.4 G/DL (ref 28.4–34.8)
MCV RBC AUTO: 87.7 FL (ref 82.6–102.9)
MONOCYTES NFR BLD: 0.71 K/UL (ref 0.1–1.2)
MONOCYTES NFR BLD: 9 % (ref 3–12)
NEUTROPHILS NFR BLD: 62 % (ref 36–65)
NEUTS SEG NFR BLD: 4.72 K/UL (ref 1.5–8.1)
NRBC BLD-RTO: 0 PER 100 WBC
PLATELET # BLD AUTO: 274 K/UL (ref 138–453)
PMV BLD AUTO: 10.8 FL (ref 8.1–13.5)
POTASSIUM SERPL-SCNC: 4.5 MMOL/L (ref 3.7–5.3)
PROT SERPL-MCNC: 5.8 G/DL (ref 6.4–8.3)
RBC # BLD AUTO: 3.16 M/UL (ref 3.95–5.11)
RBC # BLD: ABNORMAL 10*6/UL
SODIUM SERPL-SCNC: 141 MMOL/L (ref 135–144)
WBC OTHER # BLD: 7.7 K/UL (ref 3.5–11.3)

## 2024-08-30 PROCEDURE — 80053 COMPREHEN METABOLIC PANEL: CPT

## 2024-08-30 PROCEDURE — P9603 ONE-WAY ALLOW PRORATED MILES: HCPCS

## 2024-08-30 PROCEDURE — 85025 COMPLETE CBC W/AUTO DIFF WBC: CPT

## 2024-09-03 ENCOUNTER — HOSPITAL ENCOUNTER (OUTPATIENT)
Age: 69
Setting detail: SPECIMEN
Discharge: HOME | End: 2024-09-03

## 2024-09-03 LAB
ALBUMIN SERPL-MCNC: 3.4 G/DL (ref 3.5–5.2)
ALP SERPL-CCNC: 129 U/L (ref 35–104)
ALT SERPL-CCNC: 16 U/L (ref 5–33)
ANION GAP SERPL CALCULATED.3IONS-SCNC: 10 MMOL/L (ref 9–17)
AST SERPL-CCNC: 22 U/L
BASOPHILS # BLD: 0.04 K/UL (ref 0–0.2)
BASOPHILS NFR BLD: 1 % (ref 0–2)
BILIRUB SERPL-MCNC: 0.3 MG/DL (ref 0.3–1.2)
BUN SERPL-MCNC: 16 MG/DL (ref 8–23)
BUN/CREAT SERPL: 20 (ref 9–20)
CALCIUM SERPL-MCNC: 9.2 MG/DL (ref 8.6–10.4)
CHLORIDE SERPL-SCNC: 105 MMOL/L (ref 98–107)
CO2 SERPL-SCNC: 25 MMOL/L (ref 20–31)
CREAT SERPL-MCNC: 0.8 MG/DL (ref 0.5–0.9)
EOSINOPHIL # BLD: 0.28 K/UL (ref 0–0.44)
EOSINOPHILS RELATIVE PERCENT: 4 % (ref 1–4)
ERYTHROCYTE [DISTWIDTH] IN BLOOD BY AUTOMATED COUNT: 17.4 % (ref 11.8–14.4)
GFR, ESTIMATED: 79 ML/MIN/1.73M2
GLUCOSE SERPL-MCNC: 101 MG/DL (ref 70–99)
HCT VFR BLD AUTO: 32 % (ref 36.3–47.1)
HGB BLD-MCNC: 9.7 G/DL (ref 11.9–15.1)
IMM GRANULOCYTES # BLD AUTO: 0.08 K/UL (ref 0–0.3)
IMM GRANULOCYTES NFR BLD: 1 %
LYMPHOCYTES NFR BLD: 1.59 K/UL (ref 1.1–3.7)
LYMPHOCYTES RELATIVE PERCENT: 21 % (ref 24–43)
MCH RBC QN AUTO: 27.7 PG (ref 25.2–33.5)
MCHC RBC AUTO-ENTMCNC: 30.3 G/DL (ref 28.4–34.8)
MCV RBC AUTO: 91.4 FL (ref 82.6–102.9)
MONOCYTES NFR BLD: 0.64 K/UL (ref 0.1–1.2)
MONOCYTES NFR BLD: 9 % (ref 3–12)
NEUTROPHILS NFR BLD: 64 % (ref 36–65)
NEUTS SEG NFR BLD: 4.83 K/UL (ref 1.5–8.1)
NRBC BLD-RTO: 0 PER 100 WBC
PLATELET # BLD AUTO: 408 K/UL (ref 138–453)
PMV BLD AUTO: 10.3 FL (ref 8.1–13.5)
POTASSIUM SERPL-SCNC: 4.8 MMOL/L (ref 3.7–5.3)
PROT SERPL-MCNC: 6.7 G/DL (ref 6.4–8.3)
RBC # BLD AUTO: 3.5 M/UL (ref 3.95–5.11)
RBC # BLD: ABNORMAL 10*6/UL
SODIUM SERPL-SCNC: 140 MMOL/L (ref 135–144)
WBC OTHER # BLD: 7.5 K/UL (ref 3.5–11.3)

## 2024-09-03 PROCEDURE — 36415 COLL VENOUS BLD VENIPUNCTURE: CPT

## 2024-09-03 PROCEDURE — 85025 COMPLETE CBC W/AUTO DIFF WBC: CPT

## 2024-09-03 PROCEDURE — 80053 COMPREHEN METABOLIC PANEL: CPT

## 2024-09-03 PROCEDURE — P9603 ONE-WAY ALLOW PRORATED MILES: HCPCS

## 2024-09-05 RX ORDER — ANASTROZOLE 1 MG/1
1 TABLET ORAL DAILY
Qty: 30 TABLET | Refills: 0 | Status: SHIPPED | OUTPATIENT
Start: 2024-09-05 | End: 2024-10-05

## 2024-09-06 ENCOUNTER — HOSPITAL ENCOUNTER (OUTPATIENT)
Age: 69
Setting detail: SPECIMEN
Discharge: HOME | End: 2024-09-06

## 2024-09-06 LAB
ALBUMIN SERPL-MCNC: 3.4 G/DL (ref 3.5–5.2)
ALP SERPL-CCNC: 140 U/L (ref 35–104)
ALT SERPL-CCNC: 10 U/L (ref 5–33)
ANION GAP SERPL CALCULATED.3IONS-SCNC: 12 MMOL/L (ref 9–17)
AST SERPL-CCNC: 17 U/L
BASOPHILS # BLD: 0.03 K/UL (ref 0–0.2)
BASOPHILS NFR BLD: 1 % (ref 0–2)
BILIRUB SERPL-MCNC: 0.2 MG/DL (ref 0.3–1.2)
BUN SERPL-MCNC: 13 MG/DL (ref 8–23)
BUN/CREAT SERPL: 19 (ref 9–20)
CALCIUM SERPL-MCNC: 8.9 MG/DL (ref 8.6–10.4)
CHLORIDE SERPL-SCNC: 108 MMOL/L (ref 98–107)
CO2 SERPL-SCNC: 23 MMOL/L (ref 20–31)
CREAT SERPL-MCNC: 0.7 MG/DL (ref 0.5–0.9)
EOSINOPHIL # BLD: 0.21 K/UL (ref 0–0.44)
EOSINOPHILS RELATIVE PERCENT: 4 % (ref 1–4)
ERYTHROCYTE [DISTWIDTH] IN BLOOD BY AUTOMATED COUNT: 17.4 % (ref 11.8–14.4)
GFR, ESTIMATED: >90 ML/MIN/1.73M2
GLUCOSE SERPL-MCNC: 91 MG/DL (ref 70–99)
HCT VFR BLD AUTO: 31.9 % (ref 36.3–47.1)
HGB BLD-MCNC: 9.6 G/DL (ref 11.9–15.1)
IMM GRANULOCYTES # BLD AUTO: 0.03 K/UL (ref 0–0.3)
IMM GRANULOCYTES NFR BLD: 1 %
LYMPHOCYTES NFR BLD: 1.46 K/UL (ref 1.1–3.7)
LYMPHOCYTES RELATIVE PERCENT: 24 % (ref 24–43)
MCH RBC QN AUTO: 27.7 PG (ref 25.2–33.5)
MCHC RBC AUTO-ENTMCNC: 30.1 G/DL (ref 28.4–34.8)
MCV RBC AUTO: 91.9 FL (ref 82.6–102.9)
MONOCYTES NFR BLD: 0.61 K/UL (ref 0.1–1.2)
MONOCYTES NFR BLD: 10 % (ref 3–12)
NEUTROPHILS NFR BLD: 60 % (ref 36–65)
NEUTS SEG NFR BLD: 3.71 K/UL (ref 1.5–8.1)
NRBC BLD-RTO: 0 PER 100 WBC
PLATELET # BLD AUTO: 397 K/UL (ref 138–453)
PMV BLD AUTO: 10.2 FL (ref 8.1–13.5)
POTASSIUM SERPL-SCNC: 4.3 MMOL/L (ref 3.7–5.3)
PROT SERPL-MCNC: 6.4 G/DL (ref 6.4–8.3)
RBC # BLD AUTO: 3.47 M/UL (ref 3.95–5.11)
RBC # BLD: ABNORMAL 10*6/UL
SODIUM SERPL-SCNC: 143 MMOL/L (ref 135–144)
WBC OTHER # BLD: 6.1 K/UL (ref 3.5–11.3)

## 2024-09-06 PROCEDURE — 85025 COMPLETE CBC W/AUTO DIFF WBC: CPT

## 2024-09-06 PROCEDURE — 36415 COLL VENOUS BLD VENIPUNCTURE: CPT

## 2024-09-06 PROCEDURE — P9603 ONE-WAY ALLOW PRORATED MILES: HCPCS

## 2024-09-06 PROCEDURE — 80053 COMPREHEN METABOLIC PANEL: CPT

## 2024-09-09 ENCOUNTER — TELEPHONE (OUTPATIENT)
Dept: ONCOLOGY | Age: 69
End: 2024-09-09

## 2024-09-10 ENCOUNTER — HOSPITAL ENCOUNTER (OUTPATIENT)
Age: 69
Setting detail: SPECIMEN
Discharge: HOME | End: 2024-09-10

## 2024-09-10 LAB
ALBUMIN SERPL-MCNC: 3.5 G/DL (ref 3.5–5.2)
ALP SERPL-CCNC: 179 U/L (ref 35–104)
ALT SERPL-CCNC: 10 U/L (ref 5–33)
ANION GAP SERPL CALCULATED.3IONS-SCNC: 11 MMOL/L (ref 9–17)
AST SERPL-CCNC: 15 U/L
BASOPHILS # BLD: 0.03 K/UL (ref 0–0.2)
BASOPHILS NFR BLD: 1 % (ref 0–2)
BILIRUB SERPL-MCNC: 0.2 MG/DL (ref 0.3–1.2)
BUN SERPL-MCNC: 13 MG/DL (ref 8–23)
BUN/CREAT SERPL: 19 (ref 9–20)
CALCIUM SERPL-MCNC: 9.3 MG/DL (ref 8.6–10.4)
CHLORIDE SERPL-SCNC: 104 MMOL/L (ref 98–107)
CO2 SERPL-SCNC: 25 MMOL/L (ref 20–31)
CREAT SERPL-MCNC: 0.7 MG/DL (ref 0.5–0.9)
EOSINOPHIL # BLD: 0.16 K/UL (ref 0–0.44)
EOSINOPHILS RELATIVE PERCENT: 3 % (ref 1–4)
ERYTHROCYTE [DISTWIDTH] IN BLOOD BY AUTOMATED COUNT: 17.2 % (ref 11.8–14.4)
GFR, ESTIMATED: >90 ML/MIN/1.73M2
GLUCOSE SERPL-MCNC: 96 MG/DL (ref 70–99)
HCT VFR BLD AUTO: 32.8 % (ref 36.3–47.1)
HGB BLD-MCNC: 9.8 G/DL (ref 11.9–15.1)
IMM GRANULOCYTES # BLD AUTO: 0.01 K/UL (ref 0–0.3)
IMM GRANULOCYTES NFR BLD: 0 %
LYMPHOCYTES NFR BLD: 1.52 K/UL (ref 1.1–3.7)
LYMPHOCYTES RELATIVE PERCENT: 32 % (ref 24–43)
MCH RBC QN AUTO: 27.8 PG (ref 25.2–33.5)
MCHC RBC AUTO-ENTMCNC: 29.9 G/DL (ref 28.4–34.8)
MCV RBC AUTO: 92.9 FL (ref 82.6–102.9)
MONOCYTES NFR BLD: 0.51 K/UL (ref 0.1–1.2)
MONOCYTES NFR BLD: 11 % (ref 3–12)
NEUTROPHILS NFR BLD: 53 % (ref 36–65)
NEUTS SEG NFR BLD: 2.59 K/UL (ref 1.5–8.1)
NRBC BLD-RTO: 0 PER 100 WBC
PLATELET # BLD AUTO: 338 K/UL (ref 138–453)
PMV BLD AUTO: 10.1 FL (ref 8.1–13.5)
POTASSIUM SERPL-SCNC: 4.2 MMOL/L (ref 3.7–5.3)
PROT SERPL-MCNC: 6.4 G/DL (ref 6.4–8.3)
RBC # BLD AUTO: 3.53 M/UL (ref 3.95–5.11)
RBC # BLD: ABNORMAL 10*6/UL
SODIUM SERPL-SCNC: 140 MMOL/L (ref 135–144)
WBC OTHER # BLD: 4.8 K/UL (ref 3.5–11.3)

## 2024-09-10 PROCEDURE — P9603 ONE-WAY ALLOW PRORATED MILES: HCPCS

## 2024-09-10 PROCEDURE — 85025 COMPLETE CBC W/AUTO DIFF WBC: CPT

## 2024-09-10 PROCEDURE — 36415 COLL VENOUS BLD VENIPUNCTURE: CPT

## 2024-09-10 PROCEDURE — 80053 COMPREHEN METABOLIC PANEL: CPT

## 2024-09-11 ENCOUNTER — TELEPHONE (OUTPATIENT)
Dept: ORTHOPEDIC SURGERY | Age: 69
End: 2024-09-11

## 2024-09-11 ENCOUNTER — OFFICE VISIT (OUTPATIENT)
Dept: ORTHOPEDIC SURGERY | Age: 69
End: 2024-09-11

## 2024-09-11 VITALS — HEIGHT: 64 IN | BODY MASS INDEX: 27.79 KG/M2

## 2024-09-11 DIAGNOSIS — S72.142A INTERTROCHANTERIC FRACTURE OF LEFT HIP, CLOSED, INITIAL ENCOUNTER (HCC): ICD-10-CM

## 2024-09-11 DIAGNOSIS — R52 PAIN: Primary | ICD-10-CM

## 2024-09-11 PROCEDURE — 99024 POSTOP FOLLOW-UP VISIT: CPT | Performed by: STUDENT IN AN ORGANIZED HEALTH CARE EDUCATION/TRAINING PROGRAM

## 2024-09-11 RX ORDER — CYCLOBENZAPRINE HCL 5 MG
5 TABLET ORAL 2 TIMES DAILY PRN
Qty: 28 TABLET | Refills: 0 | Status: SHIPPED | OUTPATIENT
Start: 2024-09-11 | End: 2024-09-25

## 2024-09-13 ENCOUNTER — TELEPHONE (OUTPATIENT)
Dept: ORTHOPEDIC SURGERY | Age: 69
End: 2024-09-13

## 2024-09-16 ENCOUNTER — TELEPHONE (OUTPATIENT)
Dept: ORTHOPEDIC SURGERY | Age: 69
End: 2024-09-16

## 2024-09-18 NOTE — PATIENT INSTRUCTIONS
Immunosuppresion Meds: Tacrolimus  Patient Notified: 09/17/24  Patient Notified Time: 1104  Last Creatinine: 1.10  Tacrolimus Result Date: 09/17/24  Tacrolimus Test Result: 7.8  Current Dose: 3 mg bid  Current Range Goal: 6-8 closer to 8  Comments: no change    Melinda Ariza APNP Fieck, Nicole L, RN  His alk phos is stable with decrease in Tacro.  He can trial restarting his eye vitamins and repeat a CMP in one month.  QUAN Larsen    Call placed to pt and spouse to inform of above.      Agreeable to CMP in 1 mo and txp labs in December prior to f/u appt with Dr. Betts.    Learning About Living Jimena Marrow  What is a living will? A living will, also called a declaration, is a legal form. It tells your family and your doctor your wishes when you can't speak for yourself. It's used by the health professionals who will treat you as you near the end of your life or if you get seriously hurt or ill. If you put your wishes in writing, your loved ones and others will know what kind of care you want. They won't need to guess. This can ease your mind and be helpful to others. And you can change or cancel your living will at any time. A living will is not the same as an estate or property will. An estate will explains what you want to happen with your money and property after you die. How do you use it? A living will is used to describe the kinds of treatment or life support you want as you near the end of your life or if you get seriously hurt or ill. Keep these facts in mind about living mensah. · Your living will is used only if you can't speak or make decisions for yourself. Most often, one or more doctors must certify that you can't speak or decide for yourself before your living will takes effect. · If you get better and can speak for yourself again, you can accept or refuse any treatment. It doesn't matter what you said in your living will. · Some states may limit your right to refuse treatment in certain cases. For example, you may need to clearly state in your living will that you don't want artificial hydration and nutrition, such as being fed through a tube. Is a living will a legal document? A living will is a legal document. Each state has its own laws about living mensah. And a living will may be called something else in your state. Here are some things to know about living mensah. · You don't need an  to complete a living will. But legal advice can be helpful if your state's laws are unclear.  It can also help if your health history is complicated or your be donated after you die? What should you do with your living will? · Make sure that your family members and your health care agent have copies of your living will (also called a declaration). · Give your doctor a copy of your living will. Ask him or her to keep it as part of your medical record. If you have more than one doctor, make sure that each one has a copy. · Put a copy of your living will where it can be easily found. For example, some people may put a copy on their refrigerator door. If you are using a digital copy, be sure your doctor, family members, and health care agent know how to find and access it. Where can you learn more? Go to [email or web address] org and sign in to your [employer] account. Enter F617 in the "[employer]" box to learn more about \"Learning About Living Perpriscilla. \"     If you do not have an account, please click on the \"Sign Up Now\" link. Current as of: December 9, 2019               Content Version: 12.6  © 2424-8111 [employer]. Care instructions adapted under license by [facility] ([facility]). If you have questions about a medical condition or this instruction, always ask your healthcare professional. [address] any warranty or liability for your use of this information. Personalized Preventive Plan for Laurel Perales - 10/20/2020  Medicare offers a range of preventive health benefits. Some of the tests and screenings are paid in full while other may be subject to a deductible, co-insurance, and/or copay. Some of these benefits include a comprehensive review of your medical history including lifestyle, illnesses that may run in your family, and various assessments and screenings as appropriate. After reviewing your medical record and screening and assessments performed today your provider may have ordered immunizations, labs, imaging, and/or referrals for you.   A list of these orders (if applicable) as well as your Preventive Care list are included within your After Visit Summary for your review. Other Preventive Recommendations:    · A preventive eye exam performed by an eye specialist is recommended every 1-2 years to screen for glaucoma; cataracts, macular degeneration, and other eye disorders. · A preventive dental visit is recommended every 6 months. · Try to get at least 150 minutes of exercise per week or 10,000 steps per day on a pedometer . · Order or download the FREE \"Exercise & Physical Activity: Your Everyday Guide\" from The [employer]. Call 0-452.124.8273 or search The [employer] online. · You need 1047-3551 mg of calcium and 0009-9018 IU of vitamin D per day. It is possible to meet your calcium requirement with diet alone, but a vitamin D supplement is usually necessary to meet this goal.  · When exposed to the sun, use a sunscreen that protects against both UVA and UVB radiation with an SPF of 30 or greater. Reapply every 2 to 3 hours or after sweating, drying off with a towel, or swimming. · Always wear a seat belt when traveling in a car. Always wear a helmet when riding a bicycle or motorcycle.

## 2024-09-23 ENCOUNTER — TELEPHONE (OUTPATIENT)
Dept: ORTHOPEDIC SURGERY | Age: 69
End: 2024-09-23

## 2024-09-23 DIAGNOSIS — S72.142A INTERTROCHANTERIC FRACTURE OF LEFT HIP, CLOSED, INITIAL ENCOUNTER (HCC): Primary | ICD-10-CM

## 2024-09-30 ENCOUNTER — TELEPHONE (OUTPATIENT)
Dept: ORTHOPEDIC SURGERY | Age: 69
End: 2024-09-30

## 2024-09-30 NOTE — TELEPHONE ENCOUNTER
Judy from [facility] called in and stated that patient was being discharged and is moving back to [place] from [place] to start outpatient therapy.

## 2024-10-03 ENCOUNTER — HOSPITAL ENCOUNTER (OUTPATIENT)
Dept: PHYSICAL THERAPY | Age: 69
Setting detail: THERAPIES SERIES
Discharge: HOME | End: 2024-10-03
Attending: STUDENT IN AN ORGANIZED HEALTH CARE EDUCATION/TRAINING PROGRAM
Payer: MEDICARE

## 2024-10-03 PROCEDURE — 97161 PT EVAL LOW COMPLEX 20 MIN: CPT

## 2024-10-03 PROCEDURE — 97110 THERAPEUTIC EXERCISES: CPT

## 2024-10-03 NOTE — CONSULTS
[x] [facility]  Outpatient [facility] Therapy  [address]  Phone: [phone]  Fax: [phone] [] [facility] Outpatient Rehabilitation & Therapy  [address]   Phone: [phone]  Fax: [phone]  [] [facility]  [address]   [address]  Phone: [phone]   Fax: [phone]     Physical Therapy Evaluation    Date:  10/3/2024  Patient: Angela Hutchinson  : 1955  MRN: [id number]  Physician: Omid Hussein DO      Insurance: Medicare/ Med [facility], BMN  Medical Diagnosis: Intertrochanteric fracture of left hip, closed, initial encounter ([facility]) (S72.142A)    Rehab Codes: M25.552, M25.651, R26.89  Onset Date: 24                                 Next 's appt: 10/11/24    Subjective:   CC: Patient reports ongoing left hip pain, weakness, and difficulty walking following her recent fracture and surgery. She has finally returned home with her . She still has difficulty climbing stairs, walking up and down inclined surfaces, and squatting low.  Her  is very helpful with her ADLs and typical housework.  She currently ambulates with a rolling walker. She is fearful of falling again and wants to improve her balance.   HPI: Patient fell on  believing that her toe caught on her wood floor and tripped her. She fractured her left femur and required intramedullary nail fixation performed by Dr. Hussein on 24. She then was transferred to [facility] inpatient rehab for 2 weeks then went to live with her daughter in [facility] for a few more weeks and received Home health PT. She is now back home in [place] and ready to start outpatient PT.  She has had PT in past for lumbar issues, She currently has a spinal cord stimulator in place.     Past Medical History:   Diagnosis Date    ADHD     mild, no RX    Arthritis     Blurred vision, right eye     wrinkle in Rt eye-    Breast cancer  Alk phos 585, . Could be from liver disease vs bony mets. Of note, Zytiga can also cause transaminitis   -repeat CMP in AM

## 2024-10-07 ENCOUNTER — HOSPITAL ENCOUNTER (OUTPATIENT)
Dept: PHYSICAL THERAPY | Age: 69
Setting detail: THERAPIES SERIES
Discharge: HOME | End: 2024-10-07
Attending: STUDENT IN AN ORGANIZED HEALTH CARE EDUCATION/TRAINING PROGRAM
Payer: MEDICARE

## 2024-10-07 DIAGNOSIS — G89.29 CHRONIC BILATERAL LOW BACK PAIN WITH RIGHT-SIDED SCIATICA: ICD-10-CM

## 2024-10-07 DIAGNOSIS — M54.41 CHRONIC BILATERAL LOW BACK PAIN WITH RIGHT-SIDED SCIATICA: ICD-10-CM

## 2024-10-07 PROCEDURE — 97016 VASOPNEUMATIC DEVICE THERAPY: CPT

## 2024-10-07 PROCEDURE — 97110 THERAPEUTIC EXERCISES: CPT

## 2024-10-07 NOTE — FLOWSHEET NOTE
scale) 5-6/10  Pain altered Tx:  [x] No  [] Yes  Action:  Comments:  Patient reports more pain in the L lower calf.  Notes L side of low back that gets worse when sitting with difficulty sitting in car today.  Reports she has fallen many times over the past few months.      Objective:  Modalities: Vaso compression L hip 38 deg, mod pressure x10 min  Precautions [] No  [x] Yes:  WBAT LLE Date of Surgery: 8/22/24 CMN Left femur. Spinal cord stimulator present hold E-stim.                       Reports multiple falls with bouts of felling 'fluid in the ear'   Exercises:  Exercise Reps/ Time Weight/ Level Comments   NuStep 5' Level 3          Seated       Hamstring stretch 3x20'     Marches 15x           Supine      Bridges 10x     Bent knee fall outs 10x5\"     Hip add 10x     SL hip abd   Difficult   SL clams      Heel slides 10x     Abs slides w/ board 10x2  Added 10.7         Gait training 120 ft  Adjusted height of walker  Tandem gait  Discussed benefits of rolling walker                           Other:      Treatment Charges: Mins Units   []  Modalities      [x]  Ther Exercise 47 3   []  Manual Therapy     []  Ther Activities     []  Neuro Re-ed     [x]  Vasocompression 10 1   [] Gait     []  Other     Total Billable time 57 4       Assessment: [x] Progressing toward goals.  Started on NuStep for warm up while collecting subjective information.  Answered patient's questions, including concerning back pain.  Added supine hip abd on slide to improve hip strength as SL was too difficult and painful.  Ended with vaso compression to decrease pain.  Adjusted height of walker to improve posture.  Patient demonstrates tandem gait pattern with scuffing of shoes.  Encouraged wider stance with 50% carry over.      [] No change.     [x] Other: Discussed with patient and  about acquiring new vestibular therapy order for the vertigo.  Previous pending order from Nov. 2023 before another fall.    [x] Patient would

## 2024-10-08 ENCOUNTER — TELEPHONE (OUTPATIENT)
Dept: ONCOLOGY | Age: 69
End: 2024-10-08

## 2024-10-08 ENCOUNTER — OFFICE VISIT (OUTPATIENT)
Dept: ONCOLOGY | Age: 69
End: 2024-10-08
Payer: MEDICARE

## 2024-10-08 VITALS
BODY MASS INDEX: 27.79 KG/M2 | HEART RATE: 82 BPM | TEMPERATURE: 98.3 F | DIASTOLIC BLOOD PRESSURE: 76 MMHG | SYSTOLIC BLOOD PRESSURE: 129 MMHG | WEIGHT: 162 LBS

## 2024-10-08 DIAGNOSIS — D05.11 DUCTAL CARCINOMA IN SITU (DCIS) OF RIGHT BREAST: Primary | ICD-10-CM

## 2024-10-08 DIAGNOSIS — D64.9 NORMOCYTIC ANEMIA: ICD-10-CM

## 2024-10-08 DIAGNOSIS — Z12.31 ENCOUNTER FOR SCREENING MAMMOGRAM FOR BREAST CANCER: ICD-10-CM

## 2024-10-08 DIAGNOSIS — M85.89 OSTEOPENIA OF MULTIPLE SITES: ICD-10-CM

## 2024-10-08 PROCEDURE — 3078F DIAST BP <80 MM HG: CPT | Performed by: INTERNAL MEDICINE

## 2024-10-08 PROCEDURE — G8399 PT W/DXA RESULTS DOCUMENT: HCPCS | Performed by: INTERNAL MEDICINE

## 2024-10-08 PROCEDURE — 1036F TOBACCO NON-USER: CPT | Performed by: INTERNAL MEDICINE

## 2024-10-08 PROCEDURE — 3017F COLORECTAL CA SCREEN DOC REV: CPT | Performed by: INTERNAL MEDICINE

## 2024-10-08 PROCEDURE — 1123F ACP DISCUSS/DSCN MKR DOCD: CPT | Performed by: INTERNAL MEDICINE

## 2024-10-08 PROCEDURE — 3074F SYST BP LT 130 MM HG: CPT | Performed by: INTERNAL MEDICINE

## 2024-10-08 PROCEDURE — G8427 DOCREV CUR MEDS BY ELIG CLIN: HCPCS | Performed by: INTERNAL MEDICINE

## 2024-10-08 PROCEDURE — G8417 CALC BMI ABV UP PARAM F/U: HCPCS | Performed by: INTERNAL MEDICINE

## 2024-10-08 PROCEDURE — G8484 FLU IMMUNIZE NO ADMIN: HCPCS | Performed by: INTERNAL MEDICINE

## 2024-10-08 PROCEDURE — 99214 OFFICE O/P EST MOD 30 MIN: CPT | Performed by: INTERNAL MEDICINE

## 2024-10-08 PROCEDURE — 1090F PRES/ABSN URINE INCON ASSESS: CPT | Performed by: INTERNAL MEDICINE

## 2024-10-08 RX ORDER — BACLOFEN 10 MG/1
TABLET ORAL
Qty: 90 TABLET | Refills: 1 | Status: SHIPPED | OUTPATIENT
Start: 2024-10-08

## 2024-10-08 NOTE — TELEPHONE ENCOUNTER
AVS 10/8/24    Mammogram  Rtc in 6 weeks    SPENCER Digital Screen Bilateral    Labs ordered today  CBC with Auto Differential  Complete this on or around 10/15/2024.  Comprehensive Metabolic Panel  Complete this on or around 10/15/2024.    RV 11/19/24    Labs to be done week prior    SPENCER scheduled for 10/22/24 @ 2pm    PT was given AVS and appt schedule    Electronically signed by Mounika Shankar on 10/8/2024 at 3:33 PM

## 2024-10-08 NOTE — PROGRESS NOTES
mouth daily      sertraline (ZOLOFT) 100 MG tablet Take 1 tablet by mouth at bedtime      vitamin E 1000 units capsule Take 1 capsule by mouth daily      anastrozole (ARIMIDEX) 1 MG tablet TAKE 1 TABLET BY MOUTH DAILY 30 tablet 0    naloxegol (MOVANTIK) 25 MG TABS tablet Take 1 tablet by mouth every morning (before breakfast) 30 tablet 0    melatonin 5 MG TABS tablet Take 2 tablets by mouth nightly 30 tablet 1    carvedilol (COREG) 6.25 MG tablet Take 1 tablet by mouth 2 times daily (with meals) 60 tablet 0    gabapentin (NEURONTIN) 100 MG capsule Take 1 capsule by mouth 3 times daily for 90 days. 90 capsule 2    EPINEPHrine (EPIPEN 2-JONATHAN) 0.3 MG/0.3ML SOAJ injection Inject 0.3 mLs into the muscle once for 1 dose Use as directed for allergic reaction 0.3 mL 0    trospium (SANCTURA) 20 MG tablet Take 1 tablet by mouth 2 times daily (Patient not taking: Reported on 2024)       No current facility-administered medications for this visit.       Social History     Socioeconomic History    Marital status:      Spouse name: Not on file    Number of children: Not on file    Years of education: Not on file    Highest education level: Not on file   Occupational History    Not on file   Tobacco Use    Smoking status: Former     Current packs/day: 0.00     Average packs/day: 1 pack/day for 40.3 years (40.3 ttl pk-yrs)     Types: Cigarettes     Start date:      Quit date: 2018     Years since quittin.4     Passive exposure: Never    Smokeless tobacco: Never    Tobacco comments:     Light smoker.  Quit on and off several years.   Vaping Use    Vaping status: Never Used   Substance and Sexual Activity    Alcohol use: No    Drug use: No    Sexual activity: Not Currently   Other Topics Concern    Not on file   Social History Narrative    Not on file     Social Determinants of Health     Financial Resource Strain: Low Risk  (2023)    Overall Financial Resource Strain (CARDIA)     Difficulty of Paying

## 2024-10-08 NOTE — TELEPHONE ENCOUNTER
Last visit: 06/25/2024  Last Med refill: 06/25/2024-1 refill  Does patient have enough medication for 72 hours: No:     Next Visit Date:  Future Appointments   Date Time Provider Department Center   10/8/2024  2:30 PM Kodi Anderson MD [facility]   10/9/2024  9:30 AM Carlie Childress, PTA [facility]   10/11/2024  8:30 AM Omid Hussein, DO ORTHO SPECIA [facility]   10/11/2024 10:00 AM Carlie Childress, PTA [facility]   10/14/2024 10:30 AM Jah Villanueva, PT [facility]   10/16/2024  8:30 AM Jah Villanueva, PT [facility]   10/16/2024 11:00 [facility]   10/18/2024 11:00 AM Carlie Childress, PTA [facility]       Health Maintenance   Topic Date Due    Flu vaccine (1) 08/01/2024    Breast cancer screen  08/15/2024    COVID-19 Vaccine (8 - 2023-24 season) 09/01/2024    Shingles vaccine (2 of 3) 05/17/2025 (Originally 11/20/2016)    DTaP/Tdap/Td vaccine (1 - Tdap) 09/09/2030 (Originally 9/10/2020)    Annual Wellness Visit (Medicare)  11/06/2024    Lipids  12/06/2024    Depression Monitoring  01/11/2025    Lung Cancer Screening &/or Counseling  08/25/2025    Colorectal Cancer Screen  09/06/2027    DEXA (modify frequency per FRAX score)  Completed    Pneumococcal 65+ years Vaccine  Completed    Respiratory Syncytial Virus (RSV) Pregnant or age 60 yrs+  Completed    Hepatitis C screen  Completed    Hepatitis A vaccine  Aged Out    Hepatitis B vaccine  Aged Out    Hib vaccine  Aged Out    Polio vaccine  Aged Out    Meningococcal (ACWY) vaccine  Aged Out    A1C test (Diabetic or Prediabetic)  Discontinued    GFR test (Diabetes, CKD 3-4, OR last GFR 15-59)  Discontinued    Diabetes screen  Discontinued       Hemoglobin A1C (%)   Date Value   11/06/2023 5.3   08/23/2022 5.8             ( goal A1C is < 7)   No components found for: \"LABMICR\"  No components found for: \"LDLCHOLESTEROL\", \"LDLCALC\"    (goal LDL is <100)   AST (U/L)   Date Value

## 2024-10-09 ENCOUNTER — HOSPITAL ENCOUNTER (OUTPATIENT)
Dept: PHYSICAL THERAPY | Age: 69
Setting detail: THERAPIES SERIES
Discharge: HOME | End: 2024-10-09
Attending: STUDENT IN AN ORGANIZED HEALTH CARE EDUCATION/TRAINING PROGRAM
Payer: MEDICARE

## 2024-10-09 ENCOUNTER — TELEPHONE (OUTPATIENT)
Dept: FAMILY MEDICINE CLINIC | Age: 69
End: 2024-10-09

## 2024-10-09 PROCEDURE — 97016 VASOPNEUMATIC DEVICE THERAPY: CPT

## 2024-10-09 PROCEDURE — 97110 THERAPEUTIC EXERCISES: CPT

## 2024-10-09 PROCEDURE — 97116 GAIT TRAINING THERAPY: CPT

## 2024-10-09 NOTE — TELEPHONE ENCOUNTER
----- Message from Mumtaz SANDERS sent at 10/7/2024  3:45 PM EDT -----  Regarding: E[facility] Appointment Request  E[facility] Appointment Request    Patient needs appointment for E[facility] Appointment Type: Existing Condition Follow Up.    Patient Requested Dates(s):soon as possible   Patient Requested Time:afternoon   Provider Name:aida brown     Reason for Appointment Request: Established Patient - Available appointments did not meet patient need    Wants to talk about medication and follow up about her foot.   --------------------------------------------------------------------------------------------------------------------------    Relationship to Patient: Self     Call Back Information: OK to leave message on voicemail  Preferred Call Back Number: Phone [phone]

## 2024-10-09 NOTE — TELEPHONE ENCOUNTER
Attempted to schedule the patient at 830 on 10/16 but it would not allow me due to patients PT appt.     Pt states she will reschedule PT and give us a call back

## 2024-10-09 NOTE — FLOWSHEET NOTE
[x] [facility]  Outpatient Rehabilitation &  Therapy  [address]  [phone]  [phone]     Physical Therapy Daily Treatment Note    Date:  10/9/2024  Patient Name:  Angela Hutchinson    :  1955  MRN: [id number]  Physician: Omid Hussein DO                                       Insurance: Medicare/ Med [facility], BMN  Medical Diagnosis: Intertrochanteric fracture of left hip, closed, initial encounter ([facility]) (S72.142A)       Rehab Codes: M25.552, M25.651, R26.89  Onset Date: 24                                 Next 's appt: 10/11/24  Visit# / total visits: 3/20; Progress note for Medicare patient due at visit 10     Cancels/No Shows: 0/0    Subjective:    Pain:  [x] Yes  [] No  Location: L hip  Pain Rating: (0-10 scale) 3/10  Pain altered Tx:  [x] No  [] Yes  Action:  Comments: Patient notes pain is 3/10 in anterior shin upon arrival. Thinks she is having shin pain while getting on and off of toliet.     Objective:  Modalities: Vaso compression L hip 38 deg, mod pressure x10 min  Precautions [] No  [x] Yes:  WBAT LLE Date of Surgery: 24 CMN Left femur. Spinal cord stimulator present hold E-stim.                       Reports multiple falls with bouts of felling 'fluid in the ear'   Exercises:  Exercise Reps/ Time Weight/ Level Comments   NuStep 5' Level 3          Seated       Hamstring stretch 3x20'     Marches  15x     LAQs 15x  added         Supine      Bridges  10x     Bent knee fall outs  10x5\"     Hip add   10x  ball   SL hip abd   Difficult   SL clams      Heel slides 10x     Abs slides w/ board 10x2  reviewed   SLR 10x AAROM added         Gait training 250 ft  Heel/toe gait pattern, RW safety                           Other:      Treatment Charges: Mins Units   []  Modalities      [x]  Ther Exercise 35 2   []  Manual Therapy     []  Ther Activities     []  Neuro Re-ed     [x]  Vasocompression 10 1   [x] Gait 8 1   []  Other     Total Billable time 53

## 2024-10-11 ENCOUNTER — OFFICE VISIT (OUTPATIENT)
Dept: ORTHOPEDIC SURGERY | Age: 69
End: 2024-10-11

## 2024-10-11 ENCOUNTER — HOSPITAL ENCOUNTER (OUTPATIENT)
Dept: PHYSICAL THERAPY | Age: 69
Setting detail: THERAPIES SERIES
Discharge: HOME | End: 2024-10-11
Attending: STUDENT IN AN ORGANIZED HEALTH CARE EDUCATION/TRAINING PROGRAM
Payer: MEDICARE

## 2024-10-11 VITALS — HEIGHT: 64 IN | BODY MASS INDEX: 27.79 KG/M2

## 2024-10-11 DIAGNOSIS — R52 PAIN: ICD-10-CM

## 2024-10-11 DIAGNOSIS — S72.142A INTERTROCHANTERIC FRACTURE OF LEFT HIP, CLOSED, INITIAL ENCOUNTER (HCC): Primary | ICD-10-CM

## 2024-10-11 PROCEDURE — 99024 POSTOP FOLLOW-UP VISIT: CPT | Performed by: STUDENT IN AN ORGANIZED HEALTH CARE EDUCATION/TRAINING PROGRAM

## 2024-10-11 PROCEDURE — 97016 VASOPNEUMATIC DEVICE THERAPY: CPT

## 2024-10-11 PROCEDURE — 97110 THERAPEUTIC EXERCISES: CPT

## 2024-10-11 NOTE — PROGRESS NOTES
[facility]  [address]  [facility] [address]  Dept Phone: [phone]  Dept Fax: [phone]      Orthopaedic Trauma Clinic Follow Up      Subjective:   Date of Surgery: 8/22/2024    Angela Hutchinson is a 69 y.o. year old female who presents to the clinic today for follow up status post intramedullary nail fixation of Left femur.  Patient doing well.  She has been ambulate with a walker.  Family present during my evaluation.  Patient's main concern, is during her hospital stay, she was labeled as combative as she was in so much pain in the ED, this is very upsetting, she is concerned this will affect any future treatments as she is labeled as of this by a nurse.    Review of Systems  Gen: no fever, chills, malaise  CV: no chest pain or palpitations  Resp: no cough or shortness of breath  GI: no nausea, vomiting, diarrhea, or constipation  Neuro: no seizures, vertigo, or headache  Msk: Per HPI  10 remaining systems reviewed and negative    Objective :   There were no vitals filed for this visit.Body mass index is 27.79 kg/m².  General: No acute distress, resting comfortably in the clinic  Neuro: alert. oriented  Eyes: Extra-ocular muscles intact  Pulm: Respirations unlabored and regular.  Skin: warm, well perfused  Psych:   Patient has good fund of knowledge and displays understanding of exam, diagnosis, and plan.  Left Lower Extremity: mature scar noted to extremity from prior intervention. Compartments soft/compressible. Extremity warm/well perfused. EHL/FHL/TA/GSC motor intact. Patient expresses normal sensation L3-S1 distribution.       Radiology:  Imaging studies from today were independently reviewed and read as listed below. Any relevant images obtained prior to today's visit were also independently interpreted.      History: 69 y.o. year old female status post intramedullary fixation Left femur    Comparison: 9/11/2024    Findings: 2 views of the Left hip (AP/lateral) in a

## 2024-10-11 NOTE — FLOWSHEET NOTE
[x] [facility]  Outpatient Rehabilitation &  Therapy  [address]  [phone]  [phone]     Physical Therapy Daily Treatment Note    Date:  10/11/2024  Patient Name:  Angela Hutchinson    :  1955  MRN: [id number]  Physician: Omid Hussein DO                                       Insurance: Medicare/ Med [place] Supp, BMN  Medical Diagnosis: Intertrochanteric fracture of left hip, closed, initial encounter ([facility]) (S72.142A)       Rehab Codes: M25.552, M25.651, R26.89  Onset Date: 24                                 Next 's appt: 10/11/24  Visit# / total visits: ; Progress note for Medicare patient due at visit 10     Cancels/No Shows: 0/0    Subjective:    Pain:  [x] Yes  [] No  Location: L hip  Pain Rating: (0-10 scale) 3/10  Pain altered Tx:  [x] No  [] Yes  Action:  Comments: Patient seen Ortho prior to PT session this date. Patient walked from car to Ortho, then Ortho to PT this date.     Objective:  Modalities: Vaso compression L hip 38 deg, mod pressure x10 min  Precautions [] No  [x] Yes:  WBAT LLE Date of Surgery: 24 CMN Left femur. Spinal cord stimulator present hold E-stim.                       Reports multiple falls with bouts of felling 'fluid in the ear'   Exercises:  Exercise Reps/ Time Weight/ Level Comments   NuStep 5' Level 3          Seated       Hamstring stretch 3x20'     Marches    15x     LAQs   15x  reviewed         Supine      Bridges   10x     Bent knee fall outs   10x5\"     Hip add     13x  ball   SL hip abd   Difficult   SL clams      Heel slides   10x     Abs slides w/ board 10x2     SLR 10x AAROM reviewed         Gait training 160 ft  Heel/toe gait pattern, RW safety                           Other:      Treatment Charges: Mins Units   []  Modalities      [x]  Ther Exercise 29 2   []  Manual Therapy     []  Ther Activities     []  Neuro Re-ed     [x]  Vasocompression 10 1   [x] Gait 5 0   []  Other     Total Billable time 44 mins 3

## 2024-10-14 ENCOUNTER — HOSPITAL ENCOUNTER (OUTPATIENT)
Dept: PHYSICAL THERAPY | Age: 69
Setting detail: THERAPIES SERIES
Discharge: HOME | End: 2024-10-14
Attending: STUDENT IN AN ORGANIZED HEALTH CARE EDUCATION/TRAINING PROGRAM
Payer: MEDICARE

## 2024-10-14 PROCEDURE — 97110 THERAPEUTIC EXERCISES: CPT

## 2024-10-14 SDOH — ECONOMIC STABILITY: FOOD INSECURITY: WITHIN THE PAST 12 MONTHS, THE FOOD YOU BOUGHT JUST DIDN'T LAST AND YOU DIDN'T HAVE MONEY TO GET MORE.: NEVER TRUE

## 2024-10-14 SDOH — ECONOMIC STABILITY: INCOME INSECURITY: HOW HARD IS IT FOR YOU TO PAY FOR THE VERY BASICS LIKE FOOD, HOUSING, MEDICAL CARE, AND HEATING?: NOT VERY HARD

## 2024-10-14 SDOH — ECONOMIC STABILITY: TRANSPORTATION INSECURITY
IN THE PAST 12 MONTHS, HAS LACK OF TRANSPORTATION KEPT YOU FROM MEETINGS, WORK, OR FROM GETTING THINGS NEEDED FOR DAILY LIVING?: NO

## 2024-10-14 SDOH — ECONOMIC STABILITY: FOOD INSECURITY: WITHIN THE PAST 12 MONTHS, YOU WORRIED THAT YOUR FOOD WOULD RUN OUT BEFORE YOU GOT MONEY TO BUY MORE.: NEVER TRUE

## 2024-10-14 NOTE — FLOWSHEET NOTE
Manual Therapy     []  Ther Activities     []  Neuro Re-ed     [x]  Vasocompression     [x] Gait     []  Other     Total Billable time 55 4       Assessment: [x] Progressing toward goals. Added standing exercises and resistance to seated activity. with fair tolerance. Both hips fatique quickly during weightbearing exercises. SLR is improving but quadriceps lag is still present.     [] No change.     [] Other:   [x] Patient would continue to benefit from skilled physical therapy services in order to:  improve L hip ROM, strength, normalize gait and improve balance to reduce fall risk.     Problem list, as detailed above:   [x] ? Pain                       [x] ? ROM                      [x] ? Strength  [x] ? Flexibility:              [x] ? Function:   [x] ? Balance  [] Edema  [] Postural Deviations  [x] Gait Deviations  [] Other     STG: (to be met in 10 treatments)  ? Pain: 3/10 L hip pain with weightbearing activity.   ? ROM: L hip flexion to 100 degrees to improve squat depth.   ? Strength: 4/5 L hip flexion and abduction to improve L hip stability in standing.   ? Function: LEFS Score to 60% impaired or better to improve ADLs.   Independent with Home Exercise Programs     LTG: (to be met in 20 treatments)  Patient is able to climb stairs without UE support.  Patent is alexus to ambulate community distances without an AD.   TUG score in 13.5 seconds or less to reduce fall risk.      Patient goals: Improve walking and balance. Prevent falls.      Rehab Potential:  [x] Good  [] Fair  [] Poor              Suggested Professional Referral:  [x] No  [] Yes:  Barriers to Goal Achievement::  [x] No  [] Yes:  Domestic Concerns:  [x] No  [] Yes:    Pt. Education:  [x] Yes  [] No  [x] Reviewed Prior HEP/Ed  Method of Education: [x] Verbal  [x] Demo  [] Written  Comprehension of Education:  [x] Verbalizes understanding.  [x] Demonstrates understanding.  [x] Needs review.  [x] Demonstrates/verbalizes HEP/Ed previously

## 2024-10-15 NOTE — CARE COORDINATION
[x] [facility]  Outpatient Rehabilitation &  Therapy  [address]  [phone]  [phone] [] [facility]  Outpatient Rehabilitation &  Therapy  [address]  P: [phone]  F: [phone] [] [facility]  Outpatient Rehabilitation &  Therapy  [address]  P: [phone]  F: [phone] [] [facility]  Outpatient Rehabilitation &  Therapy  [address]  [phone]  [phone] [] [facility]  Outpatient Rehabilitation &  Therapy  [address]   P: [phone]  F: [phone]  [] [facility]  Outpatient Rehabilitation &  Therapy  [address]  P: [phone]  F: [phone] [] [facility]  Outpatient Rehabilitation &  Therapy  [address]  P: [phone]  F: [phone] [] [facility]  Outpatient Rehabilitation &  Therapy  [address]  P: [phone]  F: [phone] [] [facility]  Outpatient Rehabilitation &  Therapy  [address]  P: [phone]  F: [phone]  [] [facility] Outpatient Rehabilitation &  Therapy  [address]  P: [phone]  F: [phone]     THERAPY RESPONSIBILITY OF CARE TRANSFER FORM       PATIENT NAME: Angela Hutchinson  MRN: [id number]   : 1955      TRANSFERRING FACILITY:    [] [place]   [] [facility]   [] [place]   [] [facility]   [] [facility] [] [place]   [x] [facility]  [] [facility]  [] [facility]   [] [place]  [] [place]   [] Other:          ACCEPTING FACILITY  [] [place]   [] [facility]   [] [place]   [] [facility]   [] [facility] [] [place]   [x] [facility]  [] [facility]  [] [facility]   [] [place]  [] [place]   [] Other:         REASON FOR TRANSFER: Primary PT on Paternity

## 2024-10-16 ENCOUNTER — OFFICE VISIT (OUTPATIENT)
Dept: SURGERY | Age: 69
End: 2024-10-16

## 2024-10-16 ENCOUNTER — APPOINTMENT (OUTPATIENT)
Dept: PHYSICAL THERAPY | Age: 69
End: 2024-10-16
Attending: STUDENT IN AN ORGANIZED HEALTH CARE EDUCATION/TRAINING PROGRAM
Payer: MEDICARE

## 2024-10-16 ENCOUNTER — OFFICE VISIT (OUTPATIENT)
Dept: FAMILY MEDICINE CLINIC | Age: 69
End: 2024-10-16

## 2024-10-16 VITALS
SYSTOLIC BLOOD PRESSURE: 136 MMHG | HEART RATE: 65 BPM | WEIGHT: 151.6 LBS | DIASTOLIC BLOOD PRESSURE: 66 MMHG | OXYGEN SATURATION: 97 % | BODY MASS INDEX: 26.01 KG/M2 | TEMPERATURE: 97.7 F

## 2024-10-16 VITALS
DIASTOLIC BLOOD PRESSURE: 74 MMHG | BODY MASS INDEX: 25.78 KG/M2 | WEIGHT: 151 LBS | SYSTOLIC BLOOD PRESSURE: 122 MMHG | OXYGEN SATURATION: 99 % | HEIGHT: 64 IN | HEART RATE: 66 BPM

## 2024-10-16 DIAGNOSIS — R42 VERTIGO: ICD-10-CM

## 2024-10-16 DIAGNOSIS — M47.817 LUMBOSACRAL SPONDYLOSIS WITHOUT MYELOPATHY: ICD-10-CM

## 2024-10-16 DIAGNOSIS — S72.145D CLOSED NONDISPLACED INTERTROCHANTERIC FRACTURE OF LEFT FEMUR WITH ROUTINE HEALING, SUBSEQUENT ENCOUNTER: Primary | ICD-10-CM

## 2024-10-16 DIAGNOSIS — S22.070D CLOSED WEDGE COMPRESSION FRACTURE OF T9 VERTEBRA WITH ROUTINE HEALING, SUBSEQUENT ENCOUNTER: ICD-10-CM

## 2024-10-16 DIAGNOSIS — M85.89 OSTEOPENIA OF MULTIPLE SITES: ICD-10-CM

## 2024-10-16 DIAGNOSIS — R10.13 EPIGASTRIC ABDOMINAL PAIN: ICD-10-CM

## 2024-10-16 DIAGNOSIS — N63.23 MASS OF LOWER OUTER QUADRANT OF LEFT BREAST: ICD-10-CM

## 2024-10-16 DIAGNOSIS — Z23 NEED FOR IMMUNIZATION AGAINST INFLUENZA: ICD-10-CM

## 2024-10-16 DIAGNOSIS — N18.31 STAGE 3A CHRONIC KIDNEY DISEASE (HCC): ICD-10-CM

## 2024-10-16 DIAGNOSIS — S22.41XD CLOSED FRACTURE OF MULTIPLE RIBS OF RIGHT SIDE WITH ROUTINE HEALING, SUBSEQUENT ENCOUNTER: ICD-10-CM

## 2024-10-16 DIAGNOSIS — K21.9 GASTRO-ESOPHAGEAL REFLUX DISEASE WITHOUT ESOPHAGITIS: ICD-10-CM

## 2024-10-16 DIAGNOSIS — Z85.3 HISTORY OF BREAST CANCER: Primary | ICD-10-CM

## 2024-10-16 PROBLEM — G20.A1 PARKINSON'S DISEASE (HCC): Status: RESOLVED | Noted: 2024-02-06 | Resolved: 2024-10-16

## 2024-10-16 RX ORDER — TRAMADOL HYDROCHLORIDE 50 MG/1
50 TABLET ORAL EVERY 6 HOURS
Qty: 30 TABLET | Refills: 0 | Status: SHIPPED | OUTPATIENT
Start: 2024-10-16 | End: 2024-10-30

## 2024-10-16 RX ORDER — PREGABALIN 25 MG/1
25 CAPSULE ORAL PRN
COMMUNITY

## 2024-10-16 RX ORDER — MECLIZINE HYDROCHLORIDE 25 MG/1
25 TABLET ORAL 3 TIMES DAILY PRN
Qty: 90 TABLET | Refills: 5 | Status: SHIPPED | OUTPATIENT
Start: 2024-10-16

## 2024-10-16 RX ORDER — ALENDRONATE SODIUM 70 MG/1
70 TABLET ORAL
Qty: 12 TABLET | Refills: 1 | Status: CANCELLED | OUTPATIENT
Start: 2024-10-16

## 2024-10-16 RX ORDER — OMEPRAZOLE 40 MG/1
40 CAPSULE, DELAYED RELEASE ORAL 2 TIMES DAILY
Qty: 180 CAPSULE | Refills: 1 | Status: SHIPPED | OUTPATIENT
Start: 2024-10-16

## 2024-10-16 RX ORDER — TRAMADOL HYDROCHLORIDE 50 MG/1
50 TABLET ORAL EVERY 6 HOURS
COMMUNITY
Start: 2024-09-15 | End: 2024-10-16 | Stop reason: SDUPTHER

## 2024-10-16 NOTE — PROGRESS NOTES
[facility]  [address] [address]  Dept: [phone]  Dept Fax: [phone]      Angela Hutchinson is a 69 y.o. female who presents today for hermedical conditions/complaints as noted below.  Angela Hutchinson is c/o of Hypertension (F/u), Gastroesophageal Reflux (F/u), and Other (Pt would like to discuss starting a prolia injection, states she has broken a lot of bones in the last 6 months //Pt would like a letter to dismiss her from jury duty )        Assessment/Plan:     1. Closed nondisplaced intertrochanteric fracture of left femur with routine healing, subsequent encounter  -     traMADol (ULTRAM) 50 MG tablet; Take 1 tablet by mouth every 6 hours for 14 days. Max Daily Amount: 200 mg, Disp-30 tablet, R-0Normal  2. Osteopenia of multiple sites  3. Vertigo  -     meclizine (ANTIVERT) 25 MG tablet; Take 1 tablet by mouth 3 times daily as needed for Dizziness, Disp-90 tablet, R-5Normal  4. Gastro-esophageal reflux disease without esophagitis  -     omeprazole (PRILOSEC) 40 MG delayed release capsule; Take 1 capsule by mouth in the morning and at bedtime TAKE ONE CAPSULE BY MOUTH TWICE A DAY, Disp-180 capsule, R-1Normal  5. Need for immunization against influenza  -     Influenza, FLUAD Trivalent, (age 65 y+), IM, Preservative Free, 0.5mL  6. Epigastric abdominal pain  7. Lumbosacral spondylosis without myelopathy  -     traMADol (ULTRAM) 50 MG tablet; Take 1 tablet by mouth every 6 hours for 14 days. Max Daily Amount: 200 mg, Disp-30 tablet, R-0Normal  8. Closed fracture of multiple ribs of right side with routine healing, subsequent encounter  9. Stage 3a chronic kidney disease (HCC)  -     traMADol (ULTRAM) 50 MG tablet; Take 1 tablet by mouth every 6 hours for 14 days. Max Daily Amount: 200 mg, Disp-30 tablet, R-0Normal  10. Closed wedge compression fracture of T9 vertebra with routine healing, subsequent encounter  -     traMADol (ULTRAM) 50 MG

## 2024-10-16 NOTE — PROGRESS NOTES
Review of Systems   Constitutional:  Negative for activity change, appetite change, chills, diaphoresis, fatigue, fever and unexpected weight change.   Respiratory:  Negative for apnea, cough, chest tightness, shortness of breath, wheezing and stridor.    Cardiovascular:  Negative for chest pain and leg swelling.   Gastrointestinal:  Negative for abdominal distention, abdominal pain, anal bleeding, blood in stool, constipation, diarrhea, nausea, rectal pain and vomiting.   Genitourinary:  Negative for difficulty urinating, dysuria, enuresis, flank pain, frequency, hematuria and urgency.   Musculoskeletal:  Negative for back pain.   Skin:  Negative for color change and pallor.   Allergic/Immunologic: Negative for food allergies and immunocompromised state.   Neurological:  Negative for syncope, speech difficulty, weakness, light-headedness, numbness and headaches.   Hematological:  Negative for adenopathy. Does not bruise/bleed easily.   Psychiatric/Behavioral:  The patient is not nervous/anxious.      
mastectomy with reconstruction in March 2024.  This was for ductal carcinoma in situ.    She complains of occasional right breast pain that feels sore like a bruise.  She was due for a left screening mammogram prior to this visit.  She has not had any breast imaging since she was last seen in April.  However, she has had CT scans after a fall including CT of her chest abdomen pelvis which was negative for any evidence of metastasis.    The diagnostic mammogram of her left breast will be ordered as well as an ultrasound for the nodule in her breast which is likely benign.  We will have return to clinic in 6 months unless imaging requires sooner return.      The patient is on tamoxifen due to osteopenia.  She will continue to follow with medical oncology as indicated.    Return in about 6 months (around 4/16/2025).     Janneth Tsang MD  12/1/2024 12:12 PM

## 2024-10-17 ENCOUNTER — TELEPHONE (OUTPATIENT)
Dept: FAMILY MEDICINE CLINIC | Age: 69
End: 2024-10-17

## 2024-10-17 ENCOUNTER — TELEPHONE (OUTPATIENT)
Dept: SURGERY | Age: 69
End: 2024-10-17

## 2024-10-17 DIAGNOSIS — M81.0 AGE-RELATED OSTEOPOROSIS WITHOUT CURRENT PATHOLOGICAL FRACTURE: Primary | ICD-10-CM

## 2024-10-17 RX ORDER — ROMOSOZUMAB-AQQG 105 MG/1.17ML
210 INJECTION, SOLUTION SUBCUTANEOUS
Qty: 2.24 ML | Refills: 11 | Status: SHIPPED | OUTPATIENT
Start: 2024-10-17 | End: 2025-09-13

## 2024-10-17 RX ORDER — ROMOSOZUMAB-AQQG 105 MG/1.17ML
210 INJECTION, SOLUTION SUBCUTANEOUS
Qty: 2.24 ML | Refills: 11 | Status: SHIPPED | OUTPATIENT
Start: 2024-10-17 | End: 2024-10-17

## 2024-10-17 NOTE — TELEPHONE ENCOUNTER
----- Message from Dr. Omid Hussein, DO sent at 10/16/2024 11:12 AM EDT -----  No thank you for taking care of her    BB  ----- Message -----  From: Rosalia Dubois MD  Sent: 10/16/2024   9:16 AM EDT  To: Omid Hussein, DO    Good Morning!  I am seeing Ms. Hutchinson today and plan to start her on Evenity for one year followed by Prolia - just wanted to verify if there is any contraindication at this time regarding her recent fracture.    Appreciate your help.    - Dr. CRUM

## 2024-10-17 NOTE — TELEPHONE ENCOUNTER
Request to have US added to [facility] that is scheduled for 10/22/2024. Radiology states they are unable to do so and appointments would need to be scheduled separately. Called and left voicemail for patient to call back regarding the final outcome of this.     Nicole Zendejas., MA

## 2024-10-17 NOTE — TELEPHONE ENCOUNTER
Evenity ordered, standing calcium lab ordered as well. please send to infusion center and notify patient

## 2024-10-18 ENCOUNTER — HOSPITAL ENCOUNTER (OUTPATIENT)
Dept: PHYSICAL THERAPY | Age: 69
Setting detail: THERAPIES SERIES
Discharge: HOME | End: 2024-10-18
Attending: STUDENT IN AN ORGANIZED HEALTH CARE EDUCATION/TRAINING PROGRAM
Payer: MEDICARE

## 2024-10-18 NOTE — FLOWSHEET NOTE
[x] [facility]  Outpatient Rehabilitation &  Therapy  [address]  [phone]  [phone]     Therapy Cancel/No Show note    Date: 10/18/2024  Patient: Angela Hutchinson  : 1955  MRN: [id number]    Cancels/No Shows to date:     For today's appointment patient:    [x]  Cancelled    [] Rescheduled appointment    [] No-show     Reason given by patient:    [x]  Patient ill    []  Conflicting appointment    [] No transportation      [] Conflict with work    [] No reason given    [] Weather related    [] COVID-19    [x] Other: not feeling well      Comments:        [x] Next appointment was confirmed    Electronically signed by: FRANDY AGUILAR PTA

## 2024-10-22 ENCOUNTER — HOSPITAL ENCOUNTER (OUTPATIENT)
Dept: PHYSICAL THERAPY | Age: 69
Setting detail: THERAPIES SERIES
Discharge: HOME | End: 2024-10-22
Attending: STUDENT IN AN ORGANIZED HEALTH CARE EDUCATION/TRAINING PROGRAM
Payer: MEDICARE

## 2024-10-22 DIAGNOSIS — M81.0 SENILE OSTEOPOROSIS: Primary | ICD-10-CM

## 2024-10-22 RX ORDER — ALBUTEROL SULFATE 90 UG/1
4 INHALANT RESPIRATORY (INHALATION) PRN
OUTPATIENT
Start: 2024-10-23

## 2024-10-22 RX ORDER — ACETAMINOPHEN 325 MG/1
650 TABLET ORAL
OUTPATIENT
Start: 2024-10-23

## 2024-10-22 RX ORDER — ONDANSETRON 2 MG/ML
8 INJECTION INTRAMUSCULAR; INTRAVENOUS
OUTPATIENT
Start: 2024-10-23

## 2024-10-22 RX ORDER — EPINEPHRINE 1 MG/ML
0.3 INJECTION, SOLUTION, CONCENTRATE INTRAVENOUS PRN
OUTPATIENT
Start: 2024-10-23

## 2024-10-22 RX ORDER — SODIUM CHLORIDE 9 MG/ML
INJECTION, SOLUTION INTRAVENOUS CONTINUOUS
OUTPATIENT
Start: 2024-10-23

## 2024-10-22 RX ORDER — DIPHENHYDRAMINE HYDROCHLORIDE 50 MG/ML
50 INJECTION INTRAMUSCULAR; INTRAVENOUS
OUTPATIENT
Start: 2024-10-23

## 2024-10-22 NOTE — FLOWSHEET NOTE
[x] [facility]  Outpatient Rehabilitation &  Therapy  [address]  [phone]  [phone]     Therapy Cancel/No Show note    Date: 10/22/2024  Patient: Angela Hutchinson  : 1955  MRN: [id number]    Cancels/No Shows to date: 0    For today's appointment patient:    [x]  Cancelled    [] Rescheduled appointment    [] No-show     Reason given by patient:    []  Patient ill    []  Conflicting appointment    [] No transportation      [] Conflict with work    [] No reason given    [] Weather related    [] COVID-19    [x] Other: patient called 10/21 at 1832 to cancel on her way to [place] her brother had a stroke and he is not going to make it. Patient will call back 10/23 in regards to 10/24 appt.    Comments:        [x] Next appointment was confirmed    Electronically signed by: FRANDY AGUILAR PTA

## 2024-10-24 ENCOUNTER — TELEPHONE (OUTPATIENT)
Dept: INFUSION THERAPY | Age: 69
End: 2024-10-24

## 2024-10-24 ENCOUNTER — APPOINTMENT (OUTPATIENT)
Dept: PHYSICAL THERAPY | Age: 69
End: 2024-10-24
Attending: STUDENT IN AN ORGANIZED HEALTH CARE EDUCATION/TRAINING PROGRAM
Payer: MEDICARE

## 2024-10-24 NOTE — TELEPHONE ENCOUNTER
Office Received order for PT to have an Infusion     Called PT to schedule     Pt would like to call insurance company first to make sure its covered office will call back next week     Electronically signed by Dina Hagan on 10/24/2024 at 1:41 PM

## 2024-10-29 ENCOUNTER — HOSPITAL ENCOUNTER (OUTPATIENT)
Dept: PHYSICAL THERAPY | Age: 69
Setting detail: THERAPIES SERIES
Discharge: HOME | End: 2024-10-29
Attending: STUDENT IN AN ORGANIZED HEALTH CARE EDUCATION/TRAINING PROGRAM
Payer: MEDICARE

## 2024-10-29 PROCEDURE — 97110 THERAPEUTIC EXERCISES: CPT

## 2024-10-29 NOTE — FLOWSHEET NOTE
[x] [facility]  Outpatient Rehabilitation &  Therapy  [address]  [phone]  [phone]     Physical Therapy Daily Treatment Note    Date:  10/29/2024  Patient Name:  Angela Hutchinson    :  1955  MRN: [id number]  Physician: Omid Hussein DO                                       Insurance: Medicare/ Med [place] Supp, BMN  Medical Diagnosis: Intertrochanteric fracture of left hip, closed, initial encounter ([facility]) (S72.142A)       Rehab Codes: M25.552, M25.651, R26.89  Onset Date: 24                                 Next 's appt: 1/15/24  Visit# / total visits: ; Progress note for Medicare patient due at visit 10     Cancels/No Shows: 2/0    Subjective:    Pain:  [x] Yes  [] No  Location: L hip  Pain Rating: (0-10 scale) 3-4/10  Pain altered Tx:  [x] No  [] Yes  Action:  Comments: States she is having more pain in lateral hip. She did do a lot of walking and on uneven surface.      Objective:  Modalities: Vaso compression L hip 38 deg, mod pressure x10 min HELD  Precautions [] No  [x] Yes:  WBAT LLE Date of Surgery: 24 CMN Left femur. Spinal cord stimulator present hold E-stim.                       Reports multiple falls with bouts of felling 'fluid in the ear'   Exercises:  Exercise Reps/ Time Weight/ Level Comments   NuStep  Level 3 Held this date late arrival         Standing       Calf stretch  3x20\"      3 way hip   10x  bilateral   Marching   10x     HS Curls  10x           Seated       Hamstring stretch 3x20'     LAQs   20x     HS curls  20x lime          Supine      Bridges   10x     Bent knee fall outs   10x5\"     Hip add     15x3\"  ball         Heel slides   15x 2 lb    Hip abd 15x 2 lb    SLR 10x AAROM Very minimal assistance         Gait training   Heel/toe gait pattern, RW safety                           Other: Patient tends to complete more repetitions than instructed.       Treatment Charges: Mins Units   []  Modalities      [x]  Ther Exercise

## 2024-10-31 ENCOUNTER — HOSPITAL ENCOUNTER (OUTPATIENT)
Dept: PHYSICAL THERAPY | Age: 69
Setting detail: THERAPIES SERIES
Discharge: HOME | End: 2024-10-31
Attending: STUDENT IN AN ORGANIZED HEALTH CARE EDUCATION/TRAINING PROGRAM
Payer: MEDICARE

## 2024-10-31 PROCEDURE — 97110 THERAPEUTIC EXERCISES: CPT

## 2024-10-31 NOTE — FLOWSHEET NOTE
tightness for good releases 5 mins    Treatment Charges: Mins Units   []  Modalities      [x]  Ther Exercise 41 3   [x]  Manual Therapy 5 0   []  Ther Activities     []  Neuro Re-ed     []  Vasocompression     [] Gait     []  Other     Total Billable time 46 mins 3       Assessment: [x] Progressing toward goals. Added SKTC with towel to increase hip flexion and help get her shoes on. Continued exercises per log to increase hip ROM and strength. Verbal cues for exercise progressions with good carryover of techniques. Manual therapy with MFR stick to anterior and lateral hip to decrease tightness. Patient felt okay after treatment.    [] No change.     [] Other:   [x] Patient would continue to benefit from skilled physical therapy services in order to:  improve L hip ROM, strength, normalize gait and improve balance to reduce fall risk.     Problem list, as detailed above:   [x] ? Pain                       [x] ? ROM                      [x] ? Strength  [x] ? Flexibility:              [x] ? Function:   [x] ? Balance  [] Edema  [] Postural Deviations  [x] Gait Deviations  [] Other     STG: (to be met in 10 treatments)  ? Pain: 3/10 L hip pain with weightbearing activity.   ? ROM: L hip flexion to 100 degrees to improve squat depth.   ? Strength: 4/5 L hip flexion and abduction to improve L hip stability in standing.   ? Function: LEFS Score to 60% impaired or better to improve ADLs.   Independent with Home Exercise Programs     LTG: (to be met in 20 treatments)  Patient is able to climb stairs without UE support.  Patent is alexus to ambulate community distances without an AD.   TUG score in 13.5 seconds or less to reduce fall risk.      Patient goals: Improve walking and balance. Prevent falls.      Rehab Potential:  [x] Good  [] Fair  [] Poor              Suggested Professional Referral:  [x] No  [] Yes:  Barriers to Goal Achievement::  [x] No  [] Yes:  Domestic Concerns:  [x] No  [] Yes:    Pt. Education:  [x] Yes  []

## 2024-11-06 ENCOUNTER — HOSPITAL ENCOUNTER (OUTPATIENT)
Dept: PHYSICAL THERAPY | Age: 69
Setting detail: THERAPIES SERIES
Discharge: HOME | End: 2024-11-06
Attending: STUDENT IN AN ORGANIZED HEALTH CARE EDUCATION/TRAINING PROGRAM
Payer: MEDICARE

## 2024-11-06 PROCEDURE — 97110 THERAPEUTIC EXERCISES: CPT

## 2024-11-06 NOTE — FLOWSHEET NOTE
complete more repetitions than instructed.   Ambulation around clinic without walker this date    Manual therapy: MFR stick to anterior and lateral hip to decrease tightness for good releases 5 mins--HELD this date    Treatment Charges: Mins Units   []  Modalities      [x]  Ther Exercise 36 2   []  Manual Therapy     []  Ther Activities     []  Neuro Re-ed     []  Vasocompression     [] Gait     []  Other     Total Billable time 36 mins 2       Assessment: [] Progressing toward goals.     [x] No change in pain.     [x] Other: Completed exercises per log, no advances secondary to increased activity and noted soreness. Reviewed SKTC and educated patient on figure 4 stretch to help get her shoes on. Minimal assistance still needed for SLRs. Patient able to ambulate without rolling walker around clinic this date; mild antalgic gait pattern noted. Patient notes feeling better after treatment.   [x] Patient would continue to benefit from skilled physical therapy services in order to:  improve L hip ROM, strength, normalize gait and improve balance to reduce fall risk.     STG: (to be met in 10 treatments)  ? Pain: 3/10 L hip pain with weightbearing activity.   ? ROM: L hip flexion to 100 degrees to improve squat depth.   ? Strength: 4/5 L hip flexion and abduction to improve L hip stability in standing.   ? Function: LEFS Score to 60% impaired or better to improve ADLs.   Independent with Home Exercise Programs     LTG: (to be met in 20 treatments)  Patient is able to climb stairs without UE support.  Patent is alexus to ambulate community distances without an AD.   TUG score in 13.5 seconds or less to reduce fall risk.      Patient goals: Improve walking and balance. Prevent falls.      Rehab Potential:  [x] Good  [] Fair  [] Poor              Suggested Professional Referral:  [x] No  [] Yes:  Barriers to Goal Achievement::  [x] No  [] Yes:  Domestic Concerns:  [x] No  [] Yes:    Pt. Education:  [x] Yes  [] No  [x]

## 2024-11-07 ENCOUNTER — HOSPITAL ENCOUNTER (OUTPATIENT)
Dept: WOMENS IMAGING | Age: 69
Discharge: HOME | End: 2024-11-09
Attending: SURGERY
Payer: MEDICARE

## 2024-11-07 ENCOUNTER — PATIENT MESSAGE (OUTPATIENT)
Dept: FAMILY MEDICINE CLINIC | Age: 69
End: 2024-11-07

## 2024-11-07 DIAGNOSIS — N63.23 MASS OF LOWER OUTER QUADRANT OF LEFT BREAST: ICD-10-CM

## 2024-11-07 DIAGNOSIS — Z85.3 HISTORY OF BREAST CANCER: ICD-10-CM

## 2024-11-07 DIAGNOSIS — Z87.891 PERSONAL HISTORY OF TOBACCO USE: Primary | ICD-10-CM

## 2024-11-07 PROCEDURE — G0279 TOMOSYNTHESIS, MAMMO: HCPCS

## 2024-11-07 PROCEDURE — 76642 ULTRASOUND BREAST LIMITED: CPT

## 2024-11-08 ENCOUNTER — HOSPITAL ENCOUNTER (OUTPATIENT)
Dept: PHYSICAL THERAPY | Age: 69
Setting detail: THERAPIES SERIES
Discharge: HOME | End: 2024-11-08
Attending: STUDENT IN AN ORGANIZED HEALTH CARE EDUCATION/TRAINING PROGRAM
Payer: MEDICARE

## 2024-11-08 ENCOUNTER — HOSPITAL ENCOUNTER (OUTPATIENT)
Age: 69
Discharge: HOME | End: 2024-11-08
Payer: MEDICARE

## 2024-11-08 LAB
ALBUMIN SERPL-MCNC: 4.1 G/DL (ref 3.5–5.2)
ALBUMIN/GLOB SERPL: 1.4 {RATIO} (ref 1–2.5)
ALP SERPL-CCNC: 133 U/L (ref 35–104)
ALT SERPL-CCNC: 13 U/L (ref 10–35)
ANION GAP SERPL CALCULATED.3IONS-SCNC: 12 MMOL/L (ref 9–16)
AST SERPL-CCNC: 22 U/L (ref 10–35)
BASOPHILS # BLD: 0.04 K/UL (ref 0–0.2)
BASOPHILS NFR BLD: 1 % (ref 0–2)
BILIRUB SERPL-MCNC: <0.2 MG/DL (ref 0–1.2)
BUN SERPL-MCNC: 23 MG/DL (ref 8–23)
CALCIUM SERPL-MCNC: 9.9 MG/DL (ref 8.8–10.2)
CHLORIDE SERPL-SCNC: 105 MMOL/L (ref 98–107)
CO2 SERPL-SCNC: 24 MMOL/L (ref 20–31)
CREAT SERPL-MCNC: 0.9 MG/DL (ref 0.5–0.9)
EOSINOPHIL # BLD: 0.14 K/UL (ref 0–0.44)
EOSINOPHILS RELATIVE PERCENT: 2 % (ref 1–4)
ERYTHROCYTE [DISTWIDTH] IN BLOOD BY AUTOMATED COUNT: 14.7 % (ref 11.8–14.4)
GFR, ESTIMATED: 72 ML/MIN/1.73M2
GLUCOSE SERPL-MCNC: 90 MG/DL (ref 82–115)
HCT VFR BLD AUTO: 38 % (ref 36.3–47.1)
HGB BLD-MCNC: 12 G/DL (ref 11.9–15.1)
IMM GRANULOCYTES # BLD AUTO: 0.02 K/UL (ref 0–0.3)
IMM GRANULOCYTES NFR BLD: 0 %
LYMPHOCYTES NFR BLD: 2.56 K/UL (ref 1.1–3.7)
LYMPHOCYTES RELATIVE PERCENT: 29 % (ref 24–43)
MCH RBC QN AUTO: 27.6 PG (ref 25.2–33.5)
MCHC RBC AUTO-ENTMCNC: 31.6 G/DL (ref 28.4–34.8)
MCV RBC AUTO: 87.6 FL (ref 82.6–102.9)
MONOCYTES NFR BLD: 0.82 K/UL (ref 0.1–1.2)
MONOCYTES NFR BLD: 9 % (ref 3–12)
NEUTROPHILS NFR BLD: 59 % (ref 36–65)
NEUTS SEG NFR BLD: 5.26 K/UL (ref 1.5–8.1)
NRBC BLD-RTO: 0 PER 100 WBC
PLATELET # BLD AUTO: 255 K/UL (ref 138–453)
PMV BLD AUTO: 10.4 FL (ref 8.1–13.5)
POTASSIUM SERPL-SCNC: 4.4 MMOL/L (ref 3.7–5.3)
PROT SERPL-MCNC: 7.1 G/DL (ref 6.6–8.7)
RBC # BLD AUTO: 4.34 M/UL (ref 3.95–5.11)
RBC # BLD: ABNORMAL 10*6/UL
SODIUM SERPL-SCNC: 140 MMOL/L (ref 136–145)
WBC OTHER # BLD: 8.8 K/UL (ref 3.5–11.3)

## 2024-11-08 PROCEDURE — 80053 COMPREHEN METABOLIC PANEL: CPT

## 2024-11-08 PROCEDURE — 97116 GAIT TRAINING THERAPY: CPT

## 2024-11-08 PROCEDURE — 85025 COMPLETE CBC W/AUTO DIFF WBC: CPT

## 2024-11-08 PROCEDURE — 97110 THERAPEUTIC EXERCISES: CPT

## 2024-11-08 PROCEDURE — 36415 COLL VENOUS BLD VENIPUNCTURE: CPT

## 2024-11-08 NOTE — FLOWSHEET NOTE
[x] [facility]  Outpatient Rehabilitation &  Therapy  [address]  [phone]  [phone]     Physical Therapy Daily Treatment Note    Date:  2024  Patient Name:  Angela Hutchinson    :  1955  MRN: [id number]  Physician: Omid Hussein DO                                       Insurance: Medicare/ Med [place] Supp, BMN  Medical Diagnosis: Intertrochanteric fracture of left hip, closed, initial encounter ([facility]) (S72.142A)       Rehab Codes: M25.552, M25.651, R26.89  Onset Date: 24                                 Next 's appt: 1/15/24  Visit# / total visits: ; Progress note for Medicare patient due at visit 10     Cancels/No Shows: 2/0    Subjective:    Pain:  [x] Yes  [] No  Location: L hip  Pain Rating: (0-10 scale) 2/10  Pain altered Tx:  [x] No  [] Yes  Action:  Comments: Patient arrives to PT with RW but left with her . States that she was able to walk from front entrance to PT clinic. Patient ambulating back to clinic without AD. Notes she was sore after last session    Objective:  Modalities: Vaso compression L hip 38 deg, mod pressure x10 min HELD, patient notes she has been using heat and will do at home  Precautions [] No  [x] Yes:  WBAT LLE Date of Surgery: 24 CMN Left femur. Spinal cord stimulator present hold E-stim.                       Reports multiple falls with bouts of felling 'fluid in the ear'   Exercises:  Exercise Reps/ Time Weight/ Level Comments   NuStep 5 mins Level 3          Standing       Calf stretch    3x20\"      3 way hip     10x  bilateral   Marching  10x     HS Curls    10x           Seated       Hamstring stretch 3x20'     LAQs     20x     HS curls  20x lime          Supine      Bridges   10x     Bent knee fall outs  10x5\"     Hip add    15x3\"  ball   SKTC 5x 5 sec    Heel slides   15x 2 lb    Hip abd     15x 2 lb    SLR     10x AAROM Very minimal assistance   Figure 4 stretch 3x 15 sec Reviewed          Gait training

## 2024-11-09 DIAGNOSIS — G25.81 RESTLESS LEG SYNDROME: ICD-10-CM

## 2024-11-11 RX ORDER — ROPINIROLE 0.25 MG/1
0.25 TABLET, FILM COATED ORAL NIGHTLY
Qty: 90 TABLET | Refills: 1 | Status: SHIPPED | OUTPATIENT
Start: 2024-11-11

## 2024-11-11 NOTE — TELEPHONE ENCOUNTER
11/08/2024 23     BP Readings from Last 3 Encounters:   10/16/24 122/74   10/16/24 136/66   10/08/24 129/76          (goal 120/80)    All Future Testing planned in [facility]  Lab Frequency Next Occurrence   Vestibular test Once 12/07/2023   Vascular duplex carotid bilateral Once 01/09/2025   CBC with Auto Differential Once 05/28/2024   Comprehensive Metabolic Panel Once 05/28/2024   SPENCER DIGITAL DIAGNOSTIC AUGMENTED BILATERAL Once 08/12/2024   SPENCER DIGITAL SCREEN W OR WO CAD BILATERAL Once 10/15/2024   CBC with Auto Differential Once 10/15/2024   Comprehensive Metabolic Panel Once 10/15/2024   SPENCER DIGITAL SCREEN W OR WO CAD BILATERAL Once 10/08/2024   Comprehensive metabolic panel Once 11/17/2024   Comprehensive metabolic panel Once 12/15/2024   Calcium Every 4 Weeks 12/6/2024, 1/3/2025, 1/31/2025, 2/28/2025, 3/28/2025, 4/25/2025, 5/23/2025, 6/20/2025, 7/18/2025, 8/15/2025, 9/12/2025               Patient Active Problem List:     Gastro-esophageal reflux disease without esophagitis     Epigastric abdominal pain     Allergic to bees     Anxiety     Depression     Esophageal spasm     Gas bloat syndrome     Hyperlipidemia     Hypertension     Irritable bowel syndrome     Osteoarthritis of knee     Osteopenia     Paraesophageal hernia     Primary hypertriglyceridemia     Tubular adenoma of colon     Macular pucker, right     Macular edema, cystoid, right     Centrilobular emphysema (HCC)     Vertebrogenic low back pain     Carotid stenosis, asymptomatic, bilateral     Neuropathy     Failed back syndrome     Chronic lumbar radiculopathy     Lumbosacral spondylosis without myelopathy     Arthralgia     Arthritis of foot     Closed fracture of base of fifth metatarsal bone     Lower extremity edema     Other mechanical complication of implanted electronic neurostimulator, generator, subsequent encounter     Spinal stenosis of thoracic region     Weakness of both lower extremities     Ductal carcinoma in situ (DCIS) of

## 2024-11-13 ENCOUNTER — HOSPITAL ENCOUNTER (OUTPATIENT)
Dept: PHYSICAL THERAPY | Age: 69
Setting detail: THERAPIES SERIES
Discharge: HOME | End: 2024-11-13
Attending: STUDENT IN AN ORGANIZED HEALTH CARE EDUCATION/TRAINING PROGRAM
Payer: MEDICARE

## 2024-11-13 PROCEDURE — 97110 THERAPEUTIC EXERCISES: CPT

## 2024-11-13 NOTE — FLOWSHEET NOTE
[x] [facility]  Outpatient Rehabilitation &  Therapy  [address]  [phone]  [phone]     Physical Therapy Daily Treatment Note    Date:  2024  Patient Name:  Angela Hutchinson    :  1955  MRN: [id number]  Physician: Omid Hussein DO                                       Insurance: Medicare/ Med [place] Supp, BMN  Medical Diagnosis: Intertrochanteric fracture of left hip, closed, initial encounter ([facility]) (S72.142A)       Rehab Codes: M25.552, M25.651, R26.89  Onset Date: 24                                 Next 's appt: 1/15/24  Visit# / total visits: ; Progress note for Medicare patient due at visit 10     Cancels/No Shows: 2/0    Subjective:    Pain:  [x] Yes  [] No  Location: L hip  Pain Rating: (0-10 scale) 3/10  Pain altered Tx:  [x] No  [] Yes  Action:  Comments: Reports achy in legs since Covid shot on Saturday. Monday night and Yesterday was unable to move her legs. States she is also doing housework but nothing like moving heavy furniture.     Objective:  Modalities: none, patient will complete at home  Precautions [] No  [x] Yes:  WBAT LLE Date of Surgery: 24 CMN Left femur. Spinal cord stimulator present hold E-stim.                       Reports multiple falls with bouts of felling 'fluid in the ear'   Exercises:  Exercise Reps/ Time Weight/ Level Comments   NuStep 5 mins Level 3          Standing    Increased reps   Calf stretch  3x20\"      3 way hip    15x  bilateral   Marching    15x     HS Curls     15x           Seated       Hamstring stretch 3x20'     LAQs      20x     HS curls    20x lime          Supine      Bridges   10x     Bent knee fall outs    10x5\"     Hip add     20x3\"  ball   SKTC    5x 5 sec Towel   Heel slides     15x 2 lb    Hip abd       15x 2 lb    SLR       10x A/AAROM Very minimal assistance   Figure 4 stretch   3x 15 sec          Gait training 20 ft x 1  Heel/toe gait pattern, no AD         MFR stick

## 2024-11-15 ENCOUNTER — HOSPITAL ENCOUNTER (OUTPATIENT)
Dept: PHYSICAL THERAPY | Age: 69
Setting detail: THERAPIES SERIES
Discharge: HOME | End: 2024-11-15
Attending: STUDENT IN AN ORGANIZED HEALTH CARE EDUCATION/TRAINING PROGRAM
Payer: MEDICARE

## 2024-11-15 PROCEDURE — 97110 THERAPEUTIC EXERCISES: CPT

## 2024-11-15 NOTE — PROGRESS NOTES
is able to climb stairs without UE support.  Patent is alexus to ambulate community distances without an AD.   TUG score in 13.5 seconds or less to reduce fall risk.    Treatment Plan:  [x] Therapeutic Exercise   67679  [] Iontophoresis: 4 mg/mL Dexamethasone Sodium Phosphate  mAmin  71720   [x] Therapeutic Activity  51359 [x] Vasopneumatic cold with compression  87305    [x] Gait Training   30626 [] Ultrasound   73617   [x] Neuromuscular Re-education  49305 [] Electrical Stimulation Unattended  77018   [x] Manual Therapy  59424 [] Electrical Stimulation Attended  48161   [x] Instruction in HEP  [] Lumbar/Cervical Traction  05271   [] Aquatic Therapy   25002 [x] Cold/hotpack    [] Massage   23428      [] Dry Needling, 1 or 2 muscles  25820   [] Biofeedback, first 15 minutes   10848  [] Biofeedback, additional 15 minutes   65993 [] Dry Needling, 3 or more muscles  02394       Patient Status:     [x] Continue per initial plan of care.  Pt has 10 Rx remaining on POC.      [] Additional visits necessary.    [x] Other:   Patient would continue to benefit from skilled physical therapy services in order to: improve L hip strength, normalize gait, improve balance to reduce fall risk, and improve function.       Electronically signed by DANIEL TORRES PT on 11/15/2024 at 12:57 PM        If you have any questions or concerns, please don't hesitate to call.  Thank you for your referral.    Physician Signature:________________________________Date:__________________  By signing above or cosigning this note, I have reviewed this plan of care and certify a need for medically necessary rehabilitation services.     *PLEASE SIGN ABOVE AND FAX BACK ALL PAGES*

## 2024-11-15 NOTE — FLOWSHEET NOTE
MFR stick                  Other: Patient tends to complete more repetitions than instructed.       Manual therapy: MFR stick to anterior and lateral hip to decrease tightness for good releases 5 mins--HELD this date    Treatment Charges: Mins Units   []  Modalities      [x]  Ther Exercise 65 4   []  Manual Therapy     []  Ther Activities     []  Neuro Re-ed     []  Vasocompression     [] Gait     []  Other     Total Billable time 65 mins 4   Nustep 7 mins, LEFS 5 mins    Assessment: [x] Progressing toward goals. Added SAQs to increase quad strength. Also added step exercises to increase hip strength and ROM. Assessed goals this date, see progress note of same date. Verbal cues for exercise progressions with fairly good carryover.     [] No change in pain.     [] Other:   [x] Patient would continue to benefit from skilled physical therapy services in order to:  improve L hip ROM, strength, normalize gait and improve balance to reduce fall risk.     STG: (to be met in 10 treatments)  ? Pain: 3/10 L hip pain with weightbearing activity.   ? ROM: L hip flexion to 100 degrees to improve squat depth.   ? Strength: 4/5 L hip flexion and abduction to improve L hip stability in standing.   ? Function: LEFS Score to 60% impaired or better to improve ADLs.   Independent with Home Exercise Programs     LTG: (to be met in 20 treatments)  Patient is able to climb stairs without UE support.  Patent is alexus to ambulate community distances without an AD.   TUG score in 13.5 seconds or less to reduce fall risk.      Patient goals: Improve walking and balance. Prevent falls.      Rehab Potential:  [x] Good  [] Fair  [] Poor              Suggested Professional Referral:  [x] No  [] Yes:  Barriers to Goal Achievement::  [x] No  [] Yes:  Domestic Concerns:  [x] No  [] Yes:    Pt. Education:  [x] Yes  [] No  [x] Reviewed Prior HEP/Ed  Method of Education: [x] Verbal  [x] Demo  [] Written  Comprehension of Education:  [x]

## 2024-11-19 ENCOUNTER — PATIENT MESSAGE (OUTPATIENT)
Dept: SURGERY | Age: 69
End: 2024-11-19

## 2024-11-19 ENCOUNTER — HOSPITAL ENCOUNTER (OUTPATIENT)
Dept: PHYSICAL THERAPY | Age: 69
Setting detail: THERAPIES SERIES
Discharge: HOME | End: 2024-11-19
Attending: STUDENT IN AN ORGANIZED HEALTH CARE EDUCATION/TRAINING PROGRAM
Payer: MEDICARE

## 2024-11-19 PROCEDURE — 97110 THERAPEUTIC EXERCISES: CPT

## 2024-11-19 PROCEDURE — 97116 GAIT TRAINING THERAPY: CPT

## 2024-11-19 NOTE — FLOWSHEET NOTE
[x] [facility]  Outpatient Rehabilitation &  Therapy  [address]  [phone]  [phone]     Physical Therapy Daily Treatment Note    Date:  2024  Patient Name:  Angela Hutchinson    :  1955  MRN: [id number]  Physician: Omid Hussein DO                                       Insurance: Medicare/ Med [place] Supp, BMN  Medical Diagnosis: Intertrochanteric fracture of left hip, closed, initial encounter ([facility]) (S72.142A)       Rehab Codes: M25.552, M25.651, R26.89  Onset Date: 24                                 Next 's appt: 1/15/24  Visit# / total visits: ; Progress note for Medicare patient due at visit 10  (updated visit count)  Cancels/No Shows: 2/0    Subjective:    Pain:  [x] Yes  [] No  Location: L hip  Pain Rating: (0-10 scale) 1-2/10  Pain altered Tx:  [x] No  [] Yes  Action:  Comments: Patient arrives with RW but left in lobby with her . Pain is there she states. Notes she was able to put on socks and slip on shoes herself on .     Objective:  Modalities: none, patient will complete at home  Precautions [] No  [x] Yes:  WBAT LLE Date of Surgery: 24 CMN Left femur. Spinal cord stimulator present hold E-stim.                       Reports multiple falls with bouts of felling 'fluid in the ear'   Exercises:  Exercise Reps/ Time Weight/ Level Comments   NuStep 5 mins Level 3          Standing       Calf stretch  3x20\"      3 way hip      15x  bilateral   Marching     15x     HS Curls    15x     Step ups   10x 4 in reviewed   Step downs  10x 4 in reviewed         Seated       Hamstring stretch 3x20'     LAQs       20x     HS curls    20x lime          Supine      Bridges   10x     Bent knee fall outs   10x5\"     Hip add        20x3\"  ball   SKTC    5x 5 sec Towel   Heel slides      15x 2 lb    Hip abd        15x 2 lb    SLR       10x A/AAROM Very minimal assistance   Figure 4 stretch    3x 15 sec    SAQs    15x  reviewed         Gait

## 2024-11-22 ENCOUNTER — HOSPITAL ENCOUNTER (OUTPATIENT)
Dept: PHYSICAL THERAPY | Age: 69
Setting detail: THERAPIES SERIES
Discharge: HOME | End: 2024-11-22
Attending: STUDENT IN AN ORGANIZED HEALTH CARE EDUCATION/TRAINING PROGRAM
Payer: MEDICARE

## 2024-11-22 PROCEDURE — 97110 THERAPEUTIC EXERCISES: CPT

## 2024-11-22 NOTE — FLOWSHEET NOTE
[x] [facility]  Outpatient Rehabilitation &  Therapy  [address]  [phone]  [phone]     Physical Therapy Daily Treatment Note    Date:  2024  Patient Name:  Angela Hutchinson    :  1955  MRN: [id number]  Physician: Omid Hussein DO                                       Insurance: Medicare/ Med [place] Supp, BMN  Medical Diagnosis: Intertrochanteric fracture of left hip, closed, initial encounter ([facility]) (S72.142A)       Rehab Codes: M25.552, M25.651, R26.89  Onset Date: 24                                 Next 's appt: 1/15/24  Visit# / total visits: ; Progress note for Medicare patient due at visit 10  (updated visit count)  Cancels/No Shows: 2/0    Subjective:    Pain:  [x] Yes  [] No  Location: L hip  Pain Rating: (0-10 scale) 4/10  Pain altered Tx:  [x] No  [] Yes  Action:  Comments: Woke up with pain today. States she would like to cancel appointment on .    Objective:  Modalities: none, patient will complete at home  Precautions [] No  [x] Yes:  WBAT LLE Date of Surgery: 24 CMN Left femur. Spinal cord stimulator present hold E-stim.                       Reports multiple falls with bouts of felling 'fluid in the ear'   Exercises:  Exercise Reps/ Time Weight/ Level Comments   NuStep 5 mins Level 3          Standing       Calf stretch  3x20\"   wedge   3 way hip       15x  bilateral   Marching     15x     HS Curls     15x     Step ups   20x 4 in Increased reps   Step downs  20x 4 in Increased reps         Seated       Hamstring stretch 3x20'     LAQs       20x     HS curls    20x lime          Supine      Bridges  15x  Increased reps   Bent knee fall outs   15x5\"  Increased reps   Hip add         20x3\"  ball   SKTC      12x 5 sec Towel increased reps   Heel slides      20x 2 lb    Hip abd        15x 2 lb    SLR       10x AROM    Figure 4 stretch     3x 15 sec    SAQs      20x           Gait training 160 ft x 1  Heel/toe gait pattern,

## 2024-11-25 ENCOUNTER — APPOINTMENT (OUTPATIENT)
Dept: PHYSICAL THERAPY | Age: 69
End: 2024-11-25
Attending: STUDENT IN AN ORGANIZED HEALTH CARE EDUCATION/TRAINING PROGRAM
Payer: MEDICARE

## 2024-11-26 ENCOUNTER — APPOINTMENT (OUTPATIENT)
Dept: PHYSICAL THERAPY | Age: 69
End: 2024-11-26
Attending: STUDENT IN AN ORGANIZED HEALTH CARE EDUCATION/TRAINING PROGRAM
Payer: MEDICARE

## 2024-12-03 ENCOUNTER — TELEPHONE (OUTPATIENT)
Dept: ONCOLOGY | Age: 69
End: 2024-12-03

## 2024-12-03 ENCOUNTER — OFFICE VISIT (OUTPATIENT)
Dept: ONCOLOGY | Age: 69
End: 2024-12-03
Payer: MEDICARE

## 2024-12-03 ENCOUNTER — HOSPITAL ENCOUNTER (OUTPATIENT)
Dept: PHYSICAL THERAPY | Age: 69
Setting detail: THERAPIES SERIES
Discharge: HOME | End: 2024-12-03
Attending: STUDENT IN AN ORGANIZED HEALTH CARE EDUCATION/TRAINING PROGRAM
Payer: MEDICARE

## 2024-12-03 VITALS
WEIGHT: 156.6 LBS | HEART RATE: 78 BPM | DIASTOLIC BLOOD PRESSURE: 82 MMHG | SYSTOLIC BLOOD PRESSURE: 136 MMHG | TEMPERATURE: 98.7 F | BODY MASS INDEX: 26.88 KG/M2

## 2024-12-03 DIAGNOSIS — N64.89 BREAST ASYMMETRY: ICD-10-CM

## 2024-12-03 DIAGNOSIS — Z17.0 MALIGNANT NEOPLASM OF LOWER-INNER QUADRANT OF RIGHT BREAST OF FEMALE, ESTROGEN RECEPTOR POSITIVE (HCC): Primary | ICD-10-CM

## 2024-12-03 DIAGNOSIS — D05.11 DUCTAL CARCINOMA IN SITU (DCIS) OF RIGHT BREAST: ICD-10-CM

## 2024-12-03 DIAGNOSIS — C50.311 MALIGNANT NEOPLASM OF LOWER-INNER QUADRANT OF RIGHT BREAST OF FEMALE, ESTROGEN RECEPTOR POSITIVE (HCC): Primary | ICD-10-CM

## 2024-12-03 PROCEDURE — G8417 CALC BMI ABV UP PARAM F/U: HCPCS | Performed by: INTERNAL MEDICINE

## 2024-12-03 PROCEDURE — 1036F TOBACCO NON-USER: CPT | Performed by: INTERNAL MEDICINE

## 2024-12-03 PROCEDURE — G8427 DOCREV CUR MEDS BY ELIG CLIN: HCPCS | Performed by: INTERNAL MEDICINE

## 2024-12-03 PROCEDURE — 97110 THERAPEUTIC EXERCISES: CPT

## 2024-12-03 PROCEDURE — 1090F PRES/ABSN URINE INCON ASSESS: CPT | Performed by: INTERNAL MEDICINE

## 2024-12-03 PROCEDURE — 3075F SYST BP GE 130 - 139MM HG: CPT | Performed by: INTERNAL MEDICINE

## 2024-12-03 PROCEDURE — 99214 OFFICE O/P EST MOD 30 MIN: CPT | Performed by: INTERNAL MEDICINE

## 2024-12-03 PROCEDURE — 1159F MED LIST DOCD IN RCRD: CPT | Performed by: INTERNAL MEDICINE

## 2024-12-03 PROCEDURE — 1123F ACP DISCUSS/DSCN MKR DOCD: CPT | Performed by: INTERNAL MEDICINE

## 2024-12-03 PROCEDURE — G8399 PT W/DXA RESULTS DOCUMENT: HCPCS | Performed by: INTERNAL MEDICINE

## 2024-12-03 PROCEDURE — 3017F COLORECTAL CA SCREEN DOC REV: CPT | Performed by: INTERNAL MEDICINE

## 2024-12-03 PROCEDURE — G8482 FLU IMMUNIZE ORDER/ADMIN: HCPCS | Performed by: INTERNAL MEDICINE

## 2024-12-03 PROCEDURE — 3079F DIAST BP 80-89 MM HG: CPT | Performed by: INTERNAL MEDICINE

## 2024-12-03 NOTE — TELEPHONE ENCOUNTER
AVS from 12/3/24    Rtc in 3 months with labs    Labs ordered today  CBC with Auto Differential  Complete this on or around 12/10/2024.    Rv on 3/4/25    Labs drawn week prior     PT was given AVS and appt schedule    Electronically signed by Dina Hagan on 12/3/2024 at 2:38 PM

## 2024-12-03 NOTE — PROGRESS NOTES
tablet by mouth 2 times daily      vitamin D 1000 units CAPS Take 1 capsule by mouth daily      sertraline (ZOLOFT) 100 MG tablet Take 1 tablet by mouth at bedtime      vitamin E 1000 units capsule Take 1 capsule by mouth daily      anastrozole (ARIMIDEX) 1 MG tablet TAKE 1 TABLET BY MOUTH DAILY (Patient not taking: Reported on 10/16/2024) 30 tablet 0    gabapentin (NEURONTIN) 100 MG capsule Take 1 capsule by mouth 3 times daily for 90 days. 90 capsule 2    EPINEPHrine (EPIPEN 2-JONATHAN) 0.3 MG/0.3ML SOAJ injection Inject 0.3 mLs into the muscle once for 1 dose Use as directed for allergic reaction 0.3 mL 0    trospium (SANCTURA) 20 MG tablet Take 1 tablet by mouth 2 times daily (Patient not taking: Reported on 2024)       No current facility-administered medications for this visit.       Social History     Socioeconomic History    Marital status:      Spouse name: Not on file    Number of children: Not on file    Years of education: Not on file    Highest education level: Not on file   Occupational History    Not on file   Tobacco Use    Smoking status: Former     Current packs/day: 0.00     Average packs/day: 1 pack/day for 40.3 years (40.3 ttl pk-yrs)     Types: Cigarettes     Start date:      Quit date: 2018     Years since quittin.6     Passive exposure: Never    Smokeless tobacco: Never    Tobacco comments:     Light smoker.  Quit on and off several years.   Vaping Use    Vaping status: Never Used   Substance and Sexual Activity    Alcohol use: No    Drug use: No    Sexual activity: Not Currently   Other Topics Concern    Not on file   Social History Narrative    Not on file     Social Determinants of Health     Financial Resource Strain: Low Risk  (10/14/2024)    Overall Financial Resource Strain (CARDIA)     Difficulty of Paying Living Expenses: Not very hard   Food Insecurity: No Food Insecurity (10/14/2024)    Hunger Vital Sign     Worried About Running Out of Food in the Last Year:

## 2024-12-03 NOTE — FLOWSHEET NOTE
[x] [facility]  Outpatient Rehabilitation &  Therapy  [address]  [phone]  [phone]     Physical Therapy Daily Treatment Note    Date:  12/3/2024  Patient Name:  Angela Hutchinson    :  1955  MRN: [id number]  Physician: Omid Hussein DO                                       Insurance: Medicare/ Med [place] Supp, BMN  Medical Diagnosis: Intertrochanteric fracture of left hip, closed, initial encounter ([facility]) (S72.142A)       Rehab Codes: M25.552, M25.651, R26.89  Onset Date: 24                                 Next 's appt: 1/15/24  Visit# / total visits: ; Progress note for Medicare patient due at visit 20  (updated visit count)  Cancels/No Shows: 2/0    Subjective:    Pain:  [x] Yes  [] No  Location: L hip  Pain Rating: (0-10 scale) 2/10  Pain altered Tx:  [x] No  [] Yes  Action:  Comments: States that she needs to leave early for another appointment at 2 pm at [place]. Patient notes she is having back pain today. Pain in anterior hip, nagging. States she did a lot last week with Thanksgiving.    Objective:  Modalities: none, patient will complete at home  Precautions [] No  [x] Yes:  WBAT LLE Date of Surgery: 24 CMN Left femur. Spinal cord stimulator present hold E-stim.                       Reports multiple falls with bouts of felling 'fluid in the ear'   Exercises:  Exercise Reps/ Time Weight/ Level Comments   NuStep 5 mins Level 3          Standing       Calf stretch  3x20\"   wedge   3 way hip     15x  bilateral   Marching     15x     HS Curls    15x     Step ups    20x 4 in    Step downs   20x 4 in          Seated       Hamstring stretch 3x20'     LAQs       20x     HS curls    20x lime          Supine      Bridges   15x     Bent knee fall outs    15x5\"     Hip add     20x3\"  ball   SKTC        Towel    Heel slides        20x 2 lb    Hip abd        20x 2 lb Increased reps   SLR        15x AROM Increased reps   Figure 4 stretch  3x 15 sec

## 2024-12-05 ENCOUNTER — HOSPITAL ENCOUNTER (OUTPATIENT)
Dept: PHYSICAL THERAPY | Age: 69
Setting detail: THERAPIES SERIES
Discharge: HOME | End: 2024-12-05
Attending: STUDENT IN AN ORGANIZED HEALTH CARE EDUCATION/TRAINING PROGRAM
Payer: MEDICARE

## 2024-12-05 PROCEDURE — 97116 GAIT TRAINING THERAPY: CPT

## 2024-12-05 PROCEDURE — 97110 THERAPEUTIC EXERCISES: CPT

## 2024-12-05 NOTE — FLOWSHEET NOTE
[x] [facility]  Outpatient Rehabilitation &  Therapy  [address]  [phone]  [phone]     Physical Therapy Daily Treatment Note    Date:  2024  Patient Name:  Angela Hutchinson    :  1955  MRN: [id number]  Physician: Omid Hussein DO                                       Insurance: Medicare/ Med [place] Supp, BMN  Medical Diagnosis: Intertrochanteric fracture of left hip, closed, initial encounter ([facility]) (S72.142A)       Rehab Codes: M25.552, M25.651, R26.89  Onset Date: 24                                 Next 's appt: 1/15/24  Visit# / total visits: 15/20; Progress note for Medicare patient due at visit 20  (updated visit count)  Cancels/No Shows: 2/0    Subjective:    Pain:  [x] Yes  [] No  Location: L hip  Pain Rating: (0-10 scale) 0/10  Pain altered Tx:  [x] No  [] Yes  Action:  Comments: Patient arrives noting she is feeling pretty good today. Notes she is going to take medication for her dizziness.     Objective:  Modalities: none, patient will complete at home  Precautions [] No  [x] Yes:  WBAT LLE Date of Surgery: 24 CMN Left femur. Spinal cord stimulator present hold E-stim.                       Reports multiple falls with bouts of felling 'fluid in the ear'   Exercises:  Exercise Reps/ Time Weight/ Level Comments   Precor seated bike 5 mins Level 1          Standing       Calf stretch  3x20\"   wedge   3 way hip     15x  bilateral   Marching     25x     HS Curls      15x     Step ups    15x 6 in Increased step height     Step downs   15x 6 in Increased step height          Seated       Hamstring stretch 3x20'     LAQs       25x     HS curls    20x lime          Supine      Bridges    15x     Bent knee fall outs    15x5\"     Hip add    20x3\"  ball   SKTC       5x 5 sec Towel    Heel slides         20x 2 lb    Hip abd        20x 2 lb    SLR         15x AROM    Figure 4 stretch   3x 15 sec    SAQs    20x           Sidelying   added   Clamshells 15x

## 2024-12-10 ENCOUNTER — HOSPITAL ENCOUNTER (OUTPATIENT)
Dept: PHYSICAL THERAPY | Age: 69
Setting detail: THERAPIES SERIES
Discharge: HOME | End: 2024-12-10
Attending: STUDENT IN AN ORGANIZED HEALTH CARE EDUCATION/TRAINING PROGRAM
Payer: MEDICARE

## 2024-12-10 PROCEDURE — 97110 THERAPEUTIC EXERCISES: CPT

## 2024-12-10 NOTE — FLOWSHEET NOTE
[x] [facility]  Outpatient Rehabilitation &  Therapy  [address]  [phone]  [phone]     Physical Therapy Daily Treatment Note    Date:  12/10/2024  Patient Name:  Angela Hutchinson    :  1955  MRN: [id number]  Physician: Omid Hussein DO                                       Insurance: Medicare/ Med [place] Supp, BMN  Medical Diagnosis: Intertrochanteric fracture of left hip, closed, initial encounter ([facility]) (S72.142A)       Rehab Codes: M25.552, M25.651, R26.89  Onset Date: 24                                 Next 's appt: 1/15/24  Visit# / total visits: ; Progress note for Medicare patient due at visit 20  (updated visit count)  Cancels/No Shows: 2/0    Subjective:    Pain:  [x] Yes  [] No  Location: L hip  Pain Rating: (0-10 scale) 0/10  Pain altered Tx:  [x] No  [] Yes  Action:  Comments: States she was sore and fatigued after last session but since then has been walking better without the limp. Able to get her shoes on this date.     As talking with patient about remaining visits after this week, patient notes that her son is coming into town on  and with the holidays coming up would like to complete PT on Thursday this week. Possibly restart PT after the holidays and she sees the back doctor.     Objective:  Modalities: none, patient will complete at home  Precautions [] No  [x] Yes:  WBAT LLE Date of Surgery: 24 CMN Left femur. Spinal cord stimulator present hold E-stim.                       Reports multiple falls with bouts of felling 'fluid in the ear'   Exercises:  Exercise Reps/ Time Weight/ Level Comments   Precor seated bike 5 mins Level 1          Standing       Calf stretch   3x20\"   wedge   3 way hip    20x abd  15x ext  bilateral   Marching     25x     HS Curls      15x     Step ups     15x 6 in    Step downs 15x 6 in          Seated       Hamstring stretch 3x20'     LAQs       25x     HS curls    20x blue          Supine

## 2024-12-11 ENCOUNTER — OFFICE VISIT (OUTPATIENT)
Dept: SURGERY | Age: 69
End: 2024-12-11
Payer: MEDICARE

## 2024-12-11 VITALS
BODY MASS INDEX: 28.68 KG/M2 | HEIGHT: 64 IN | RESPIRATION RATE: 18 BRPM | HEART RATE: 78 BPM | WEIGHT: 168 LBS | SYSTOLIC BLOOD PRESSURE: 128 MMHG | DIASTOLIC BLOOD PRESSURE: 78 MMHG

## 2024-12-11 DIAGNOSIS — Z85.3 HISTORY OF BREAST CANCER: Primary | ICD-10-CM

## 2024-12-11 PROCEDURE — G8427 DOCREV CUR MEDS BY ELIG CLIN: HCPCS | Performed by: PLASTIC SURGERY

## 2024-12-11 PROCEDURE — 1126F AMNT PAIN NOTED NONE PRSNT: CPT | Performed by: PLASTIC SURGERY

## 2024-12-11 PROCEDURE — 1123F ACP DISCUSS/DSCN MKR DOCD: CPT | Performed by: PLASTIC SURGERY

## 2024-12-11 PROCEDURE — G8399 PT W/DXA RESULTS DOCUMENT: HCPCS | Performed by: PLASTIC SURGERY

## 2024-12-11 PROCEDURE — G8417 CALC BMI ABV UP PARAM F/U: HCPCS | Performed by: PLASTIC SURGERY

## 2024-12-11 PROCEDURE — 1036F TOBACCO NON-USER: CPT | Performed by: PLASTIC SURGERY

## 2024-12-11 PROCEDURE — 3078F DIAST BP <80 MM HG: CPT | Performed by: PLASTIC SURGERY

## 2024-12-11 PROCEDURE — G8482 FLU IMMUNIZE ORDER/ADMIN: HCPCS | Performed by: PLASTIC SURGERY

## 2024-12-11 PROCEDURE — 3017F COLORECTAL CA SCREEN DOC REV: CPT | Performed by: PLASTIC SURGERY

## 2024-12-11 PROCEDURE — 99212 OFFICE O/P EST SF 10 MIN: CPT | Performed by: PLASTIC SURGERY

## 2024-12-11 PROCEDURE — 1159F MED LIST DOCD IN RCRD: CPT | Performed by: PLASTIC SURGERY

## 2024-12-11 PROCEDURE — 3074F SYST BP LT 130 MM HG: CPT | Performed by: PLASTIC SURGERY

## 2024-12-11 PROCEDURE — 1090F PRES/ABSN URINE INCON ASSESS: CPT | Performed by: PLASTIC SURGERY

## 2024-12-11 NOTE — PROGRESS NOTES
History:   Procedure Laterality Date    APPENDECTOMY      BACK SURGERY      L4-5 had herniated disc    BREAST ENHANCEMENT SURGERY Right 10/11/2023    RIGHT BREAST TISSUE EXPANDER INSERTION  WITH BIOPATCH AROUND ROBIN  FANY WOUND VAC   SERRATUS  FLAP    PEC BLOCK performed by Jo oCnway MD at [facility] OR    CATARACT REMOVAL WITH IMPLANT Bilateral     CHOLECYSTECTOMY      COLONOSCOPY N/A 2016    removed colon polyps    ESOPHAGOGASTRIC FUNDOPLICATION  1999    EYE SURGERY      FINGER SURGERY Left     4th finger    HIATAL HERNIA REPAIR  1999    x2    SPENCER STEROTACTIC LOC BREAST BIOPSY RIGHT Right 08/29/2023    SPENCER STEROTACTIC LOC BREAST BIOPSY RIGHT 8/29/2023 [facility]    MASTECTOMY Right 10/11/2023    RIGHT SIMPLE MASTECTOMY WITH SENTINEL NODE BIOPSY @ 9AM performed by Janneth Tsang MD at [facility] OR    PAIN MANAGEMENT PROCEDURE N/A 06/19/2023    BASIVERTEBRAL NERVE NERVE RADIOFREQUENCY ABLATION INTRACEPT PROCEDURE at L4/5/S1 performed by Maynor Bolaños MD at [facility] OR    RADIOFREQUENCY ABLATION  05/2023    REVISION TOTAL KNEE ARTHROPLASTY Right     UPPER GASTROINTESTINAL ENDOSCOPY      UPPER GASTROINTESTINAL ENDOSCOPY N/A 10/26/2018    EGD BIOPSY AND DILITATION WITH BRUNO performed by Delmar Hart MD at [facility] OR    VITRECTOMY Right 03/10/2020    VITRECTOMY 23 GAUGE, MEMBRANE PEELING, GAS FLUID EXCHANGE, ICG     VITRECTOMY Right 03/10/2020    VITRECTOMY 23 GAUGE, MEMBRANE PEELING, AIR FLUID EXCHANGE, ICG performed by Bladimir Au MD at [facility] OR     Social History     Socioeconomic History    Marital status:      Spouse name: Not on file    Number of children: Not on file    Years of education: Not on file    Highest education level: Not on file   Occupational History    Not on file   Tobacco Use    Smoking status: Former     Current packs/day: 0.00     Average packs/day: 1 pack/day for 40.3 years (40.3 ttl pk-yrs)     Types: Cigarettes     Start date: 1978     Quit date: 4/17/2018     Years since

## 2024-12-12 ENCOUNTER — HOSPITAL ENCOUNTER (OUTPATIENT)
Dept: INFUSION THERAPY | Age: 69
Setting detail: INFUSION SERIES
Discharge: HOME | End: 2024-12-12
Payer: MEDICARE

## 2024-12-12 ENCOUNTER — HOSPITAL ENCOUNTER (OUTPATIENT)
Dept: PHYSICAL THERAPY | Age: 69
Setting detail: THERAPIES SERIES
Discharge: HOME | End: 2024-12-12
Attending: STUDENT IN AN ORGANIZED HEALTH CARE EDUCATION/TRAINING PROGRAM
Payer: MEDICARE

## 2024-12-12 VITALS
SYSTOLIC BLOOD PRESSURE: 133 MMHG | RESPIRATION RATE: 16 BRPM | DIASTOLIC BLOOD PRESSURE: 77 MMHG | OXYGEN SATURATION: 97 % | TEMPERATURE: 98.5 F | HEART RATE: 86 BPM

## 2024-12-12 DIAGNOSIS — M81.0 SENILE OSTEOPOROSIS: Primary | ICD-10-CM

## 2024-12-12 PROCEDURE — 6360000002 HC RX W HCPCS: Performed by: INTERNAL MEDICINE

## 2024-12-12 PROCEDURE — 97110 THERAPEUTIC EXERCISES: CPT

## 2024-12-12 PROCEDURE — 96372 THER/PROPH/DIAG INJ SC/IM: CPT

## 2024-12-12 RX ORDER — DIPHENHYDRAMINE HYDROCHLORIDE 50 MG/ML
50 INJECTION INTRAMUSCULAR; INTRAVENOUS
OUTPATIENT
Start: 2025-01-05

## 2024-12-12 RX ORDER — HYDROCORTISONE SODIUM SUCCINATE 100 MG/2ML
100 INJECTION INTRAMUSCULAR; INTRAVENOUS
OUTPATIENT
Start: 2025-01-05

## 2024-12-12 RX ORDER — SODIUM CHLORIDE 9 MG/ML
INJECTION, SOLUTION INTRAVENOUS CONTINUOUS
OUTPATIENT
Start: 2025-01-05

## 2024-12-12 RX ORDER — ONDANSETRON 2 MG/ML
8 INJECTION INTRAMUSCULAR; INTRAVENOUS
OUTPATIENT
Start: 2025-01-05

## 2024-12-12 RX ORDER — ACETAMINOPHEN 325 MG/1
650 TABLET ORAL
OUTPATIENT
Start: 2025-01-05

## 2024-12-12 RX ORDER — ALBUTEROL SULFATE 90 UG/1
4 INHALANT RESPIRATORY (INHALATION) PRN
OUTPATIENT
Start: 2025-01-05

## 2024-12-12 RX ORDER — EPINEPHRINE 1 MG/ML
0.3 INJECTION, SOLUTION, CONCENTRATE INTRAVENOUS PRN
OUTPATIENT
Start: 2025-01-05

## 2024-12-12 RX ADMIN — ROMOSOZUMAB-AQQG 105 MG: 105 INJECTION, SOLUTION SUBCUTANEOUS at 14:45

## 2024-12-12 NOTE — FLOWSHEET NOTE
[email or web address]  Date: 10/03/2024  Prepared by: Benny Villanueva     Exercises  - Supine Straight Leg Raises  - 1 x daily - 4 x weekly - 3 sets - 10 reps  - Supine Bridge  - 1 x daily - 4 x weekly - 3 sets - 10 reps  - Bent Knee Fallouts  - 1 x daily - 4 x weekly - 3 sets - 10 reps - 10 hold  - Supine Hip Adduction Isometric with Ball  - 1 x daily - 4 x weekly - 3 sets - 10 reps - 3 sec hold  - Clamshell  - 1 x daily - 4 x weekly - 3 sets - 10 reps  - Supine Hip Abduction  - 1 x daily - 4 x weekly - 3 sets - 10 reps     Access Code: [email or web address]  URL: [email or web address]  Date: 12/12/2024  Prepared by: Frandy Aguilar    Exercises  - Gastroc Stretch on Wall  - 1 x daily - 7 x weekly - 1 sets - 3 reps - 20 hold  - Standing March with Counter Support  - 1 x daily - 7 x weekly - 3 sets - 10 reps  - Standing Hip Flexion with Counter Support  - 1 x daily - 7 x weekly - 3 sets - 10 reps  - Standing Hip Abduction with Counter Support  - 1 x daily - 7 x weekly - 3 sets - 10 reps  - Step Up  - 1 x daily - 7 x weekly - 3 sets - 10 reps  - Forward Step Down  - 1 x daily - 7 x weekly - 3 sets - 10 reps  - Seated Long Arc Quad  - 1 x daily - 7 x weekly - 3 sets - 10 reps  - Seated Hamstring Curl with Anchored Resistance  - 1 x daily - 7 x weekly - 3 sets - 10 reps  - Supine Single Knee to Chest Stretch  - 1 x daily - 7 x weekly - 1 sets - 5 reps - 5 hold  - Supine Figure 4 Piriformis Stretch  - 1 x daily - 7 x weekly - 1 sets - 3 reps - 20 hold  - Supine Short Arc Quad  - 1 x daily - 7 x weekly - 3 sets - 10 reps  - Sidelying Hip Abduction  - 1 x daily - 7 x weekly - 3 sets - 10 reps  - Clam  - 1 x daily - 7 x weekly - 3 sets - 10 reps    Plan: [x] Discharge to [facility]      Time In: 11:00 am          Time Out: 11:46 am    Electronically signed by:  FRANDY AGUILAR PTA

## 2024-12-12 NOTE — THERAPY DISCHARGE
[x] [facility]  Outpatient Rehabilitation &  Therapy  [address]  [phone]  [phone]              Physical Therapy Discharge Note    Date: 2024      Patient: Angela Hutchinson  : 1955  MRN: [id number]    Physician: Omid Hussein DO                                       Insurance: Medicare/ [employer] [place] [employer], BMN  Medical Diagnosis: Intertrochanteric fracture of left hip, closed, initial encounter ([facility]) (S72.142A)       Rehab Codes: M25.552, M25.651, R26.89  Onset Date: 24                                 Next 's appt: 1/15/24    Total visits attended: 17  Cancels/No shows: 2/0  Date of initial visit: 10/03/2024                Date of final visit: 2024      Subjective:  Pain:  [x] Yes  [] No               Location: L hip            Pain Rating: (0-10 scale) 0/10  Pain altered Tx:  [x] No  [] Yes  Action:  Comments: Pt w/out pain last 3 treatments, cont w/some soreness and fatigue after PT treatments and after doing a lot for Thanksgiving holiday.  Patient notes that her son is coming into town on  and with the holidays coming up she would like to discontinue PT at this time.     Objective:  Test Measurements:  Function: Pt amb to PT clinic from hospital entrance without RW. Pt feels she is amb better w/out limp.     Assessment:  STG: (to be met in 10 treatments) Assessed 11/15  ? Pain: 3/10 L hip pain with weightbearing activity-Met   ? ROM: L hip flexion to 100 degrees to improve squat depth -Met   ? Strength: 4/5 L hip flexion and abduction to improve L hip stability in standing-Partially Met   ? Function: LEFS Score to 60% impaired or better to improve ADLs--Met, 24% impairment 24    Independent with Home Exercise Programs-Met  LTG: (to be met in 20 treatments) Assessed 24  Patient is able to climb stairs without UE support.--progress, one UE support  Patent is alexus to ambulate community distances without an AD.--MET   TUG score in

## 2024-12-17 ENCOUNTER — OFFICE VISIT (OUTPATIENT)
Dept: PAIN MANAGEMENT | Age: 69
End: 2024-12-17
Payer: MEDICARE

## 2024-12-17 VITALS — BODY MASS INDEX: 28.68 KG/M2 | HEIGHT: 64 IN | WEIGHT: 168 LBS

## 2024-12-17 DIAGNOSIS — M85.89 OSTEOPENIA OF MULTIPLE SITES: ICD-10-CM

## 2024-12-17 DIAGNOSIS — M80.88XA OTHER OSTEOPOROSIS WITH CURRENT PATHOLOGICAL FRACTURE, VERTEBRA(E), INITIAL ENCOUNTER FOR FRACTURE (HCC): ICD-10-CM

## 2024-12-17 DIAGNOSIS — S22.060G CLOSED WEDGE COMPRESSION FRACTURE OF T8 VERTEBRA WITH DELAYED HEALING, SUBSEQUENT ENCOUNTER: Primary | ICD-10-CM

## 2024-12-17 PROBLEM — S22.060A CLOSED WEDGE COMPRESSION FRACTURE OF T8 VERTEBRA (HCC): Status: ACTIVE | Noted: 2024-08-22

## 2024-12-17 PROCEDURE — 1090F PRES/ABSN URINE INCON ASSESS: CPT | Performed by: ANESTHESIOLOGY

## 2024-12-17 PROCEDURE — 3017F COLORECTAL CA SCREEN DOC REV: CPT | Performed by: ANESTHESIOLOGY

## 2024-12-17 PROCEDURE — 1160F RVW MEDS BY RX/DR IN RCRD: CPT | Performed by: ANESTHESIOLOGY

## 2024-12-17 PROCEDURE — 99214 OFFICE O/P EST MOD 30 MIN: CPT | Performed by: ANESTHESIOLOGY

## 2024-12-17 PROCEDURE — G8417 CALC BMI ABV UP PARAM F/U: HCPCS | Performed by: ANESTHESIOLOGY

## 2024-12-17 PROCEDURE — 1159F MED LIST DOCD IN RCRD: CPT | Performed by: ANESTHESIOLOGY

## 2024-12-17 PROCEDURE — G8427 DOCREV CUR MEDS BY ELIG CLIN: HCPCS | Performed by: ANESTHESIOLOGY

## 2024-12-17 PROCEDURE — G8482 FLU IMMUNIZE ORDER/ADMIN: HCPCS | Performed by: ANESTHESIOLOGY

## 2024-12-17 PROCEDURE — G8399 PT W/DXA RESULTS DOCUMENT: HCPCS | Performed by: ANESTHESIOLOGY

## 2024-12-17 PROCEDURE — 1123F ACP DISCUSS/DSCN MKR DOCD: CPT | Performed by: ANESTHESIOLOGY

## 2024-12-17 PROCEDURE — 1036F TOBACCO NON-USER: CPT | Performed by: ANESTHESIOLOGY

## 2024-12-17 PROCEDURE — 1125F AMNT PAIN NOTED PAIN PRSNT: CPT | Performed by: ANESTHESIOLOGY

## 2024-12-17 ASSESSMENT — ENCOUNTER SYMPTOMS
RESPIRATORY NEGATIVE: 1
BACK PAIN: 1

## 2024-12-17 NOTE — PROGRESS NOTES
(REQUIP) 0.25 MG tablet TAKE ONE TABLET BY MOUTH ONCE NIGHTLY 90 tablet 1    romosozumab-aqqg (EVENITY) 105 MG/1.17ML SOSY injection Inject 2.34 mLs into the skin every 30 days for 12 doses 2.24 mL 11    pregabalin (LYRICA) 25 MG capsule Take 1 capsule by mouth as needed.      meclizine (ANTIVERT) 25 MG tablet Take 1 tablet by mouth 3 times daily as needed for Dizziness 90 tablet 5    omeprazole (PRILOSEC) 40 MG delayed release capsule Take 1 capsule by mouth in the morning and at bedtime TAKE ONE CAPSULE BY MOUTH TWICE A  capsule 1    baclofen (LIORESAL) 10 MG tablet TAKE 1 TABLET BY MOUTH 3 TIMES A DAY AS NEEDED FOR PAIN 90 tablet 1    pravastatin (PRAVACHOL) 20 MG tablet Take 1 tablet by mouth nightly      amLODIPine (NORVASC) 10 MG tablet TAKE ONE TABLET BY MOUTH EVERY NIGHT AT BEDTIME 90 tablet 1    ipratropium (ATROVENT) 0.02 % nebulizer solution Take 2.5 mLs by nebulization 4 times daily 300 mL 5    Lactobacillus (PROBIOTIC ACIDOPHILUS PO) Take 1 tablet by mouth daily      umeclidinium-vilanterol (ANORO ELLIPTA) 62.5-25 MCG/ACT inhaler Inhale 1 puff into the lungs daily 3 each 1    DULoxetine (CYMBALTA) 30 MG extended release capsule TAKE 1 CAPSULE BY MOUTH DAILY 90 capsule 1    trospium (SANCTURA) 20 MG tablet Take 1 tablet by mouth 2 times daily      triamcinolone (KENALOG) 0.025 % cream Apply topically daily as needed On face      celecoxib (CELEBREX) 100 MG capsule TAKE ONE CAPSULE BY MOUTH DAILY (Patient taking differently: Take 1 capsule by mouth daily) 90 capsule 1    albuterol sulfate HFA (VENTOLIN HFA) 108 (90 Base) MCG/ACT inhaler Inhale 2 puffs into the lungs 4 times daily as needed for Wheezing 1 each 3    Probiotic Product (PROBIOTIC-10 PO) Take 1 capsule by mouth daily      aspirin 81 MG tablet Take 1 tablet by mouth daily      OMEGA-3 KRILL OIL PO Take 1 tablet by mouth daily      calcium carbonate 600 MG TABS tablet Take 1 tablet by mouth 2 times daily      vitamin D 1000 units CAPS

## 2024-12-26 DIAGNOSIS — M80.08XA AGE-RELATED OSTEOPOROSIS WITH CURRENT PATHOLOGICAL FRACTURE, VERTEBRA(E), INITIAL ENCOUNTER FOR FRACTURE (HCC): ICD-10-CM

## 2024-12-26 DIAGNOSIS — M48.54XG PATHOLOGIC COMPRESSION FRACTURE OF THORACIC VERTEBRA WITH DELAYED HEALING: Primary | ICD-10-CM

## 2024-12-26 DIAGNOSIS — S22.060G CLOSED WEDGE COMPRESSION FRACTURE OF T8 VERTEBRA WITH DELAYED HEALING, SUBSEQUENT ENCOUNTER: ICD-10-CM

## 2024-12-26 PROBLEM — M80.88XA OTHER OSTEOPOROSIS WITH CURRENT PATHOLOGICAL FRACTURE, VERTEBRA(E), INITIAL ENCOUNTER FOR FRACTURE (HCC): Status: ACTIVE | Noted: 2024-12-17

## 2024-12-30 ENCOUNTER — ANESTHESIA EVENT (OUTPATIENT)
Dept: OPERATING ROOM | Age: 69
End: 2024-12-30
Payer: MEDICARE

## 2024-12-30 ENCOUNTER — ANESTHESIA (OUTPATIENT)
Dept: OPERATING ROOM | Age: 69
End: 2024-12-30
Payer: MEDICARE

## 2024-12-30 ENCOUNTER — APPOINTMENT (OUTPATIENT)
Dept: GENERAL RADIOLOGY | Age: 69
End: 2024-12-30
Attending: ANESTHESIOLOGY
Payer: MEDICARE

## 2024-12-30 ENCOUNTER — HOSPITAL ENCOUNTER (OUTPATIENT)
Age: 69
Setting detail: OUTPATIENT SURGERY
Discharge: HOME | End: 2024-12-30
Attending: ANESTHESIOLOGY | Admitting: ANESTHESIOLOGY
Payer: MEDICARE

## 2024-12-30 VITALS
BODY MASS INDEX: 25.78 KG/M2 | DIASTOLIC BLOOD PRESSURE: 66 MMHG | OXYGEN SATURATION: 96 % | HEART RATE: 69 BPM | SYSTOLIC BLOOD PRESSURE: 120 MMHG | TEMPERATURE: 97.2 F | RESPIRATION RATE: 16 BRPM | HEIGHT: 64 IN | WEIGHT: 151 LBS

## 2024-12-30 PROCEDURE — 2720000010 HC SURG SUPPLY STERILE: Performed by: ANESTHESIOLOGY

## 2024-12-30 PROCEDURE — 7100000010 HC PHASE II RECOVERY - FIRST 15 MIN: Performed by: ANESTHESIOLOGY

## 2024-12-30 PROCEDURE — 7100000011 HC PHASE II RECOVERY - ADDTL 15 MIN: Performed by: ANESTHESIOLOGY

## 2024-12-30 PROCEDURE — 3700000000 HC ANESTHESIA ATTENDED CARE: Performed by: ANESTHESIOLOGY

## 2024-12-30 PROCEDURE — 6360000002 HC RX W HCPCS: Performed by: ANESTHESIOLOGY

## 2024-12-30 PROCEDURE — 2580000003 HC RX 258: Performed by: ANESTHESIOLOGY

## 2024-12-30 PROCEDURE — 6360000004 HC RX CONTRAST MEDICATION: Performed by: ANESTHESIOLOGY

## 2024-12-30 PROCEDURE — 2709999900 HC NON-CHARGEABLE SUPPLY: Performed by: ANESTHESIOLOGY

## 2024-12-30 PROCEDURE — 3600000002 HC SURGERY LEVEL 2 BASE: Performed by: ANESTHESIOLOGY

## 2024-12-30 PROCEDURE — 3600000012 HC SURGERY LEVEL 2 ADDTL 15MIN: Performed by: ANESTHESIOLOGY

## 2024-12-30 PROCEDURE — 6360000002 HC RX W HCPCS: Performed by: NURSE ANESTHETIST, CERTIFIED REGISTERED

## 2024-12-30 PROCEDURE — 3700000001 HC ADD 15 MINUTES (ANESTHESIA): Performed by: ANESTHESIOLOGY

## 2024-12-30 PROCEDURE — 2500000003 HC RX 250 WO HCPCS: Performed by: ANESTHESIOLOGY

## 2024-12-30 PROCEDURE — 22513 PERQ VERTEBRAL AUGMENTATION: CPT | Performed by: ANESTHESIOLOGY

## 2024-12-30 PROCEDURE — C1713 ANCHOR/SCREW BN/BN,TIS/BN: HCPCS | Performed by: ANESTHESIOLOGY

## 2024-12-30 DEVICE — 20 GRAM TWIN PACK (1/2 DOSE)
Type: IMPLANTABLE DEVICE | Site: BACK | Status: FUNCTIONAL
Brand: VERTAPLEX HV

## 2024-12-30 RX ORDER — METOCLOPRAMIDE HYDROCHLORIDE 5 MG/ML
10 INJECTION INTRAMUSCULAR; INTRAVENOUS
Status: DISCONTINUED | OUTPATIENT
Start: 2024-12-30 | End: 2024-12-30 | Stop reason: HOSPADM

## 2024-12-30 RX ORDER — SODIUM CHLORIDE 0.9 % (FLUSH) 0.9 %
5-40 SYRINGE (ML) INJECTION PRN
Status: DISCONTINUED | OUTPATIENT
Start: 2024-12-30 | End: 2024-12-30 | Stop reason: HOSPADM

## 2024-12-30 RX ORDER — MEPERIDINE HYDROCHLORIDE 50 MG/ML
12.5 INJECTION INTRAMUSCULAR; INTRAVENOUS; SUBCUTANEOUS EVERY 5 MIN PRN
Status: DISCONTINUED | OUTPATIENT
Start: 2024-12-30 | End: 2024-12-30 | Stop reason: HOSPADM

## 2024-12-30 RX ORDER — FENTANYL CITRATE 50 UG/ML
25 INJECTION, SOLUTION INTRAMUSCULAR; INTRAVENOUS EVERY 5 MIN PRN
Status: DISCONTINUED | OUTPATIENT
Start: 2024-12-30 | End: 2024-12-30 | Stop reason: HOSPADM

## 2024-12-30 RX ORDER — SODIUM CHLORIDE 9 MG/ML
INJECTION, SOLUTION INTRAVENOUS CONTINUOUS
Status: DISCONTINUED | OUTPATIENT
Start: 2024-12-30 | End: 2024-12-30 | Stop reason: HOSPADM

## 2024-12-30 RX ORDER — CEFAZOLIN SODIUM/WATER 2 G/20 ML
2000 SYRINGE (ML) INTRAVENOUS ONCE
Status: COMPLETED | OUTPATIENT
Start: 2024-12-30 | End: 2024-12-30

## 2024-12-30 RX ORDER — OXYCODONE HYDROCHLORIDE 5 MG/1
5 TABLET ORAL
Status: DISCONTINUED | OUTPATIENT
Start: 2024-12-30 | End: 2024-12-30 | Stop reason: HOSPADM

## 2024-12-30 RX ORDER — PROCHLORPERAZINE EDISYLATE 5 MG/ML
10 INJECTION INTRAMUSCULAR; INTRAVENOUS
Status: DISCONTINUED | OUTPATIENT
Start: 2024-12-30 | End: 2024-12-30 | Stop reason: HOSPADM

## 2024-12-30 RX ORDER — SODIUM CHLORIDE, SODIUM LACTATE, POTASSIUM CHLORIDE, CALCIUM CHLORIDE 600; 310; 30; 20 MG/100ML; MG/100ML; MG/100ML; MG/100ML
INJECTION, SOLUTION INTRAVENOUS CONTINUOUS
Status: DISCONTINUED | OUTPATIENT
Start: 2024-12-30 | End: 2024-12-30 | Stop reason: HOSPADM

## 2024-12-30 RX ORDER — NALOXONE HYDROCHLORIDE 0.4 MG/ML
INJECTION, SOLUTION INTRAMUSCULAR; INTRAVENOUS; SUBCUTANEOUS PRN
Status: DISCONTINUED | OUTPATIENT
Start: 2024-12-30 | End: 2024-12-30 | Stop reason: HOSPADM

## 2024-12-30 RX ORDER — LIDOCAINE HYDROCHLORIDE 20 MG/ML
INJECTION, SOLUTION EPIDURAL; INFILTRATION; INTRACAUDAL; PERINEURAL
Status: DISCONTINUED | OUTPATIENT
Start: 2024-12-30 | End: 2024-12-30 | Stop reason: SDUPTHER

## 2024-12-30 RX ORDER — SODIUM CHLORIDE 0.9 % (FLUSH) 0.9 %
5-40 SYRINGE (ML) INJECTION EVERY 12 HOURS SCHEDULED
Status: DISCONTINUED | OUTPATIENT
Start: 2024-12-30 | End: 2024-12-30 | Stop reason: HOSPADM

## 2024-12-30 RX ORDER — LIDOCAINE HYDROCHLORIDE 10 MG/ML
1 INJECTION, SOLUTION EPIDURAL; INFILTRATION; INTRACAUDAL; PERINEURAL
Status: DISCONTINUED | OUTPATIENT
Start: 2024-12-30 | End: 2024-12-30 | Stop reason: HOSPADM

## 2024-12-30 RX ORDER — FENTANYL CITRATE 50 UG/ML
INJECTION, SOLUTION INTRAMUSCULAR; INTRAVENOUS
Status: DISCONTINUED | OUTPATIENT
Start: 2024-12-30 | End: 2024-12-30 | Stop reason: SDUPTHER

## 2024-12-30 RX ORDER — HYDROMORPHONE HYDROCHLORIDE 1 MG/ML
0.5 INJECTION, SOLUTION INTRAMUSCULAR; INTRAVENOUS; SUBCUTANEOUS EVERY 5 MIN PRN
Status: DISCONTINUED | OUTPATIENT
Start: 2024-12-30 | End: 2024-12-30 | Stop reason: HOSPADM

## 2024-12-30 RX ORDER — PROPOFOL 10 MG/ML
INJECTION, EMULSION INTRAVENOUS
Status: DISCONTINUED | OUTPATIENT
Start: 2024-12-30 | End: 2024-12-30 | Stop reason: SDUPTHER

## 2024-12-30 RX ORDER — DIPHENHYDRAMINE HYDROCHLORIDE 50 MG/ML
12.5 INJECTION INTRAMUSCULAR; INTRAVENOUS
Status: DISCONTINUED | OUTPATIENT
Start: 2024-12-30 | End: 2024-12-30 | Stop reason: HOSPADM

## 2024-12-30 RX ORDER — SODIUM CHLORIDE 9 MG/ML
INJECTION, SOLUTION INTRAVENOUS PRN
Status: DISCONTINUED | OUTPATIENT
Start: 2024-12-30 | End: 2024-12-30 | Stop reason: HOSPADM

## 2024-12-30 RX ORDER — IOPAMIDOL 612 MG/ML
INJECTION, SOLUTION INTRAVASCULAR PRN
Status: DISCONTINUED | OUTPATIENT
Start: 2024-12-30 | End: 2024-12-30 | Stop reason: ALTCHOICE

## 2024-12-30 RX ORDER — BUPIVACAINE HYDROCHLORIDE AND EPINEPHRINE 5; 5 MG/ML; UG/ML
INJECTION, SOLUTION EPIDURAL; INTRACAUDAL; PERINEURAL PRN
Status: DISCONTINUED | OUTPATIENT
Start: 2024-12-30 | End: 2024-12-30 | Stop reason: ALTCHOICE

## 2024-12-30 RX ADMIN — FENTANYL CITRATE 25 MCG: 50 INJECTION INTRAMUSCULAR; INTRAVENOUS at 14:09

## 2024-12-30 RX ADMIN — LIDOCAINE HYDROCHLORIDE 60 MG: 20 INJECTION, SOLUTION EPIDURAL; INFILTRATION; INTRACAUDAL; PERINEURAL at 13:58

## 2024-12-30 RX ADMIN — SODIUM CHLORIDE: 9 INJECTION, SOLUTION INTRAVENOUS at 12:38

## 2024-12-30 RX ADMIN — FENTANYL CITRATE 25 MCG: 50 INJECTION INTRAMUSCULAR; INTRAVENOUS at 14:03

## 2024-12-30 RX ADMIN — PROPOFOL 50 MCG/KG/MIN: 10 INJECTION, EMULSION INTRAVENOUS at 13:58

## 2024-12-30 RX ADMIN — Medication 2000 MG: at 14:04

## 2024-12-30 RX ADMIN — FENTANYL CITRATE 25 MCG: 50 INJECTION INTRAMUSCULAR; INTRAVENOUS at 13:58

## 2024-12-30 ASSESSMENT — PAIN DESCRIPTION - DESCRIPTORS: DESCRIPTORS: BURNING

## 2024-12-30 ASSESSMENT — PAIN - FUNCTIONAL ASSESSMENT: PAIN_FUNCTIONAL_ASSESSMENT: 0-10

## 2024-12-30 NOTE — OP NOTE
live floro in lateral view, total injection volume was 4 mL The needles were then removed.  There was  no leakage, no drainage in any direction of the vertebral body.  Cement was  spread across the midline.  Site of the needle placement was then closed  with suture and covered with a sterile dressing.     Patient was then turned supine and was extubated by the anesthesiologist.  Patient was assisted in the recovery area.        Electronically signed by Maynor Bolaños MD on 12/30/2024 at 3:03 PM

## 2024-12-30 NOTE — ANESTHESIA POSTPROCEDURE EVALUATION
Department of Anesthesiology  Postprocedure Note    Patient: Angela Hutchinson  MRN: [id number]  YOB: 1955  Date of evaluation: 12/30/2024    Procedure Summary       Date: 12/30/24 Room / Location: [address]    Anesthesia Start: 1354 Anesthesia Stop: 1504    Procedure: KYPHOPLASTY T8 (Back) Diagnosis:       Closed wedge compression fracture of T8 vertebra with delayed healing, subsequent encounter      Osteopenia of multiple sites      Other osteoporosis with current pathological fracture, vertebra(e), initial encounter for fracture (HCC)      (Closed wedge compression fracture of T8 vertebra with delayed healing, subsequent encounter [S22.060G])      (Osteopenia of multiple sites [M85.89])      (Other osteoporosis with current pathological fracture, vertebra(e), initial encounter for fracture (HCC) [M80.88XA])    Surgeons: Maynor Bolaños MD Responsible Provider: Aubrey Serna DO    Anesthesia Type: MAC ASA Status: 3            Anesthesia Type: MAC    Kristel Phase I:      Kristel Phase II: Kristel Score: 10    Anesthesia Post Evaluation    Patient location during evaluation: PACU  Patient participation: complete - patient participated  Level of consciousness: awake and alert  Airway patency: patent  Nausea & Vomiting: no nausea and no vomiting  Cardiovascular status: hemodynamically stable  Respiratory status: acceptable  Hydration status: euvolemic  Pain management: adequate    No notable events documented.

## 2024-12-30 NOTE — ANESTHESIA PRE PROCEDURE
mL IntraDERmal Once PRN Lexis Gold MD       • 0.9 % sodium chloride infusion   IntraVENous Continuous Lexis Gold  mL/hr at 12/30/24 1238 New Bag at 12/30/24 1238   • lactated ringers infusion   IntraVENous Continuous Lexis Gold MD       • sodium chloride flush 0.9 % injection 5-40 mL  5-40 mL IntraVENous 2 times per day Lexis Gold MD       • sodium chloride flush 0.9 % injection 5-40 mL  5-40 mL IntraVENous PRN Lexis Gold MD       • 0.9 % sodium chloride infusion   IntraVENous PRN Lexis Gold MD           Allergies:    Allergies   Allergen Reactions   • Morphine Itching   • Bee Venom    • Adhesive Tape    • Wasp Venom Protein Swelling       Problem List:    Patient Active Problem List   Diagnosis Code   • Gastro-esophageal reflux disease without esophagitis K21.9   • Epigastric abdominal pain R10.13   • Allergic to bees Z91.030   • Anxiety F41.9   • Depression F32.A   • Esophageal spasm K22.4   • Gas bloat syndrome K92.89   • Hyperlipidemia E78.5   • Hypertension I10   • Irritable bowel syndrome K58.9   • Osteoarthritis of knee M17.9   • Osteopenia M85.80   • Paraesophageal hernia K44.9   • Primary hypertriglyceridemia E78.1   • Tubular adenoma of colon D12.6   • Macular pucker, right H35.371   • Macular edema, cystoid, right H35.351   • Centrilobular emphysema (HCC) J43.2   • Vertebrogenic low back pain M54.51   • Carotid stenosis, asymptomatic, bilateral I65.23   • Neuropathy G62.9   • Failed back syndrome M96.1   • Chronic lumbar radiculopathy M54.16   • Lumbosacral spondylosis without myelopathy M47.817   • Arthralgia M25.50   • Arthritis of foot M19.079   • Closed fracture of base of fifth metatarsal bone S92.353A   • Lower extremity edema R60.0   • Other mechanical complication of implanted electronic neurostimulator, generator, subsequent encounter T85.193D   • Spinal stenosis of thoracic region M48.04   • Weakness of both lower extremities R29.898   • Ductal carcinoma in situ

## 2024-12-30 NOTE — DISCHARGE INSTRUCTIONS
To achieve optimal recovery and results,   follow these instructions carefully.     What to Expect After the Procedure      _    You should be discharged and able to walk on the same day of the procedure.   _    As with any procedure, you may feel sore afterwards. If you feel extreme discomfort or pain, contact your physician immediately.       Medication      Tell your physician about any prescribed or over-the-counter medications you are currently taking.  Continue taking them as directed by your physician.  If you feel soreness or minor discomfort, you may take the prescribe pain medication directed      Activity Restrictions & Resumption      Avoid strenuous exercise and heavy lifting for 7 days.  Walking is the best exercise, and daily walking is strongly recommended.  Gradually increase the length and distance of your walks. Start inside your home, then progress to out and around your home, as tolerated, and progress to your neighborhood or shopping center.  Try to limit long car rides (greater than 1 hour) for a few weeks, or as tolerated.  Do not drive while on pain medications.  Start Home exercise program /physical therapy / Rehab as planned 1 week post op for physical conditioning and core strengthening.      Incision Care      Remove the outer dressings 3 days after the procedure.  Adhesive strips will cover the incision(s) and should begin to fall off within 7-10 days after the procedure. If they have not fallen off after 14 days, you may remove them.  You may begin to shower 3 days after the procedure.   Do not sit in the bath.  Do not rub the incision.  Do not apply lotion, medication, or cream to the incision.              Post-Procedure Office Visits      __  If your follow-up appointments have not been scheduled, please contact STAFF at the phone number below within 24 hours after your procedure to schedule your appointments.  [facility] PAIN MEDIC[facility]  [address]  [address]

## 2024-12-30 NOTE — H&P
twisting  Describes it as sharp pain affecting quality of life  For ongoing pain had MRI thoracic spine 2 weeks back  Report was reviewed  Reported T8 compression fracture with ongoing marrow edema suggesting poor and nonhealing  Positive for point tenderness in the area    Discussed balloon kyphoplasty at T8 with cement stabilization  Procedure discussed at length  Information material provided  Patient is interested in the procedure  Will try to schedule for December 30      Patient is here today for: back pain and MRI review   Pain level: 4  Character: Burning  Radiating: No  Weakness or numbness: No  Aggravating Factors: Being on feet   Alleviating Factors: Heating pad and lying down   Constant or intermitting: Constant  Bladder/bowel loss: No         Past Medical History:   Diagnosis Date    ADHD     mild, no RX    Arthritis     Blurred vision, right eye     wrinkle in Rt eye-    BRCA1 negative     BRCA2 negative     Breast cancer (HCC) 08/28/2023    Rt breast. Never had chemo or radiation. Only in the breast duct    CAD (coronary artery disease)     Noted on CT lung screen    Carotid artery disease (HCC)     dr paul sullivan last appt 1/20. Has carotid stenosis, pt reports \"left side is worse than right\". The pt just reported she saw him in 12/2023 and the vascular Dr said the patient's numbers are coming down\"    COPD (chronic obstructive pulmonary disease) ([facility])     Mild emphysema per CT lung screen    COVID-19 2019    \"happened right when COVID first came out\". The pt reports when she does the stairs she is breathing harder than normal. Per pt she never had any lung disease, it has just been since COVID she breaths harder at timeS\".  She does not see a Pulmonologist    GERD (gastroesophageal reflux disease)     Hearing loss     has bilat hearing aids    Hiatal hernia     Moderate to large per CT lung screen    High cholesterol     History of closed shoulder dislocation     rt from recent fall    [place] (hard

## 2025-01-07 ENCOUNTER — OFFICE VISIT (OUTPATIENT)
Dept: FAMILY MEDICINE CLINIC | Age: 70
End: 2025-01-07

## 2025-01-07 VITALS
DIASTOLIC BLOOD PRESSURE: 82 MMHG | WEIGHT: 156.8 LBS | HEART RATE: 86 BPM | OXYGEN SATURATION: 95 % | SYSTOLIC BLOOD PRESSURE: 132 MMHG | BODY MASS INDEX: 26.91 KG/M2

## 2025-01-07 DIAGNOSIS — H00.011 HORDEOLUM EXTERNUM OF RIGHT UPPER EYELID: Primary | ICD-10-CM

## 2025-01-07 RX ORDER — ERYTHROMYCIN 5 MG/G
OINTMENT OPHTHALMIC EVERY 6 HOURS
Qty: 1 G | Refills: 0 | Status: SHIPPED | OUTPATIENT
Start: 2025-01-07 | End: 2025-01-14

## 2025-01-07 ASSESSMENT — PATIENT HEALTH QUESTIONNAIRE - PHQ9
1. LITTLE INTEREST OR PLEASURE IN DOING THINGS: NOT AT ALL
3. TROUBLE FALLING OR STAYING ASLEEP: NOT AT ALL
6. FEELING BAD ABOUT YOURSELF - OR THAT YOU ARE A FAILURE OR HAVE LET YOURSELF OR YOUR FAMILY DOWN: NOT AT ALL
SUM OF ALL RESPONSES TO PHQ QUESTIONS 1-9: 1
1. LITTLE INTEREST OR PLEASURE IN DOING THINGS: NOT AT ALL
SUM OF ALL RESPONSES TO PHQ9 QUESTIONS 1 & 2: 0
7. TROUBLE CONCENTRATING ON THINGS, SUCH AS READING THE NEWSPAPER OR WATCHING TELEVISION: SEVERAL DAYS
4. FEELING TIRED OR HAVING LITTLE ENERGY: NOT AT ALL
5. POOR APPETITE OR OVEREATING: NOT AT ALL
9. THOUGHTS THAT YOU WOULD BE BETTER OFF DEAD, OR OF HURTING YOURSELF: NOT AT ALL
5. POOR APPETITE OR OVEREATING: NOT AT ALL
8. MOVING OR SPEAKING SO SLOWLY THAT OTHER PEOPLE COULD HAVE NOTICED. OR THE OPPOSITE - BEING SO FIDGETY OR RESTLESS THAT YOU HAVE BEEN MOVING AROUND A LOT MORE THAN USUAL: NOT AT ALL
9. THOUGHTS THAT YOU WOULD BE BETTER OFF DEAD, OR OF HURTING YOURSELF: NOT AT ALL
SUM OF ALL RESPONSES TO PHQ QUESTIONS 1-9: 1
2. FEELING DOWN, DEPRESSED OR HOPELESS: NOT AT ALL
SUM OF ALL RESPONSES TO PHQ QUESTIONS 1-9: 1
2. FEELING DOWN, DEPRESSED OR HOPELESS: NOT AT ALL
3. TROUBLE FALLING OR STAYING ASLEEP: NOT AT ALL
6. FEELING BAD ABOUT YOURSELF - OR THAT YOU ARE A FAILURE OR HAVE LET YOURSELF OR YOUR FAMILY DOWN: NOT AT ALL
4. FEELING TIRED OR HAVING LITTLE ENERGY: NOT AT ALL
10. IF YOU CHECKED OFF ANY PROBLEMS, HOW DIFFICULT HAVE THESE PROBLEMS MADE IT FOR YOU TO DO YOUR WORK, TAKE CARE OF THINGS AT HOME, OR GET ALONG WITH OTHER PEOPLE: SOMEWHAT DIFFICULT
7. TROUBLE CONCENTRATING ON THINGS, SUCH AS READING THE NEWSPAPER OR WATCHING TELEVISION: SEVERAL DAYS
10. IF YOU CHECKED OFF ANY PROBLEMS, HOW DIFFICULT HAVE THESE PROBLEMS MADE IT FOR YOU TO DO YOUR WORK, TAKE CARE OF THINGS AT HOME, OR GET ALONG WITH OTHER PEOPLE: SOMEWHAT DIFFICULT
SUM OF ALL RESPONSES TO PHQ QUESTIONS 1-9: 1
8. MOVING OR SPEAKING SO SLOWLY THAT OTHER PEOPLE COULD HAVE NOTICED. OR THE OPPOSITE, BEING SO FIGETY OR RESTLESS THAT YOU HAVE BEEN MOVING AROUND A LOT MORE THAN USUAL: NOT AT ALL
SUM OF ALL RESPONSES TO PHQ QUESTIONS 1-9: 1

## 2025-01-07 NOTE — PROGRESS NOTES
Angela Hutchinson (:  1955) is a 69 y.o. female,Established patient, here for evaluation of the following chief complaint(s):  Eye Burn (Her RT eye is red, burning, has been un going for a month. She was treated with antibiotic and steroid 24 from  but no change)         Assessment & Plan  Hordeolum externum of right upper eyelid   Eye swelling and erythema ongoing since  without improvement  Followed up with urgent care, was given antibiotics and steroids which did not help symptoms has remained the same  Discussed applying warm compresses, will try antibiotic ointment   Discussed red flag symptoms that if vision changes, pain or difficulty with movement to go to the hospital  Will give referral to ophthalmology if symptoms dont improve in one to two weeks     Orders:    External Referral To Ophthalmology      No follow-ups on file.       Subjective   69 yr old here due to ongoing right upper eye lid pain went to urgent care had given her steroids and antibiotics back around 24, per patient rash and bump has not gone away. Has been using warm compresses but has not been consistent. Per patient no vision loss, no difficulty with eye movement, per patient has some discharge that comes out, has not grown, has stayed the same, itchy at times.         Review of Systems   Eyes:  Positive for discharge, redness and itching.        Right eye swelling    All other systems reviewed and are negative.         Objective   Physical Exam  Vitals reviewed.   Constitutional:       Appearance: Normal appearance.   Eyes:      General: Lids are normal.         Right eye: Hordeolum (near lacrimal sac, with mild erythema) present.      Extraocular Movements: Extraocular movements intact.      Conjunctiva/sclera: Conjunctivae normal.   Neurological:      Mental Status: She is alert.                An electronic signature was used to authenticate this note.    --Amy Souza MD On the basis of

## 2025-01-08 ASSESSMENT — ENCOUNTER SYMPTOMS
EYE DISCHARGE: 1
EYE REDNESS: 1
EYE ITCHING: 1

## 2025-01-09 ENCOUNTER — HOSPITAL ENCOUNTER (OUTPATIENT)
Dept: INFUSION THERAPY | Age: 70
Setting detail: INFUSION SERIES
Discharge: HOME | End: 2025-01-09
Payer: MEDICARE

## 2025-01-09 VITALS
SYSTOLIC BLOOD PRESSURE: 152 MMHG | TEMPERATURE: 97 F | OXYGEN SATURATION: 99 % | HEART RATE: 79 BPM | RESPIRATION RATE: 16 BRPM | DIASTOLIC BLOOD PRESSURE: 82 MMHG

## 2025-01-09 DIAGNOSIS — M81.0 SENILE OSTEOPOROSIS: Primary | ICD-10-CM

## 2025-01-09 PROCEDURE — 6360000002 HC RX W HCPCS: Performed by: INTERNAL MEDICINE

## 2025-01-09 PROCEDURE — 96372 THER/PROPH/DIAG INJ SC/IM: CPT

## 2025-01-09 RX ORDER — ACETAMINOPHEN 325 MG/1
650 TABLET ORAL
OUTPATIENT
Start: 2025-02-06

## 2025-01-09 RX ORDER — HYDROCORTISONE SODIUM SUCCINATE 100 MG/2ML
100 INJECTION INTRAMUSCULAR; INTRAVENOUS
OUTPATIENT
Start: 2025-02-06

## 2025-01-09 RX ORDER — ALBUTEROL SULFATE 90 UG/1
4 INHALANT RESPIRATORY (INHALATION) PRN
OUTPATIENT
Start: 2025-02-06

## 2025-01-09 RX ORDER — SODIUM CHLORIDE 9 MG/ML
INJECTION, SOLUTION INTRAVENOUS CONTINUOUS
OUTPATIENT
Start: 2025-02-06

## 2025-01-09 RX ORDER — DIPHENHYDRAMINE HYDROCHLORIDE 50 MG/ML
50 INJECTION INTRAMUSCULAR; INTRAVENOUS
OUTPATIENT
Start: 2025-02-06

## 2025-01-09 RX ORDER — ONDANSETRON 2 MG/ML
8 INJECTION INTRAMUSCULAR; INTRAVENOUS
OUTPATIENT
Start: 2025-02-06

## 2025-01-09 RX ORDER — EPINEPHRINE 1 MG/ML
0.3 INJECTION, SOLUTION, CONCENTRATE INTRAVENOUS PRN
OUTPATIENT
Start: 2025-02-06

## 2025-01-09 RX ADMIN — ROMOSOZUMAB-AQQG 105 MG: 105 INJECTION, SOLUTION SUBCUTANEOUS at 14:50

## 2025-01-09 NOTE — PROGRESS NOTES
Pt seen this date for evinity injection. VS obtained and  Injection given in R arm. Pt tolerated injection well and discharged home with self.

## 2025-01-13 ENCOUNTER — HOSPITAL ENCOUNTER (OUTPATIENT)
Dept: PHYSICAL THERAPY | Age: 70
Setting detail: THERAPIES SERIES
Discharge: HOME | End: 2025-01-13
Payer: MEDICARE

## 2025-01-13 PROCEDURE — 97162 PT EVAL MOD COMPLEX 30 MIN: CPT

## 2025-01-13 PROCEDURE — 97110 THERAPEUTIC EXERCISES: CPT

## 2025-01-13 NOTE — THERAPY EVALUATION
[facility]   Outpatient Rehabilitation & Therapy  [address]  P: [phone]   F: [phone]      Physical Therapy Vestibular Rehab Evaluation    Date:  2025  Patient: Angela Hutchinson  : 1955  MRN: [id number]  Physician: Rosalia Dubois MD    Insurance: Medicare - [facility]   Medical Diagnosis: R42 (ICD-10-CM) - Dizziness     Rehab Codes: R23.9 abnormal posture, M25.60 stiffness, R53.1 weakness, R26.81 unsteadiness on feet    Onset date: 24 - date of referral   Next Dr's appt.: 25 - PCP     Precautions: R femur fx with IMN  in Aug 2024, lumbar Kyphoplasty 24, B LE neuropathy     Subjective:   Pt notes she is losing balance a lot. She feels the swishing feeling in the ears & notes this occurs with the balance problems. If she takes decongestant it tends to get better. Notes this is a chronic issue. She felt after a nasal surgery got some relief but I has returned. Notes the balance issues are quite frequently -- felt off balance walking into the clinic. She notes hx of multiple falls with significant injuries -- multiple broken bones, R shoulder dislocation & R femur fracture (just finished PT). Notes she walks slowly & has her  with her due to fear of falling. She used to use a cane or walker intermittently but has not used as she does not want to get dependent on it.     In terms of dizziness -- feels like extra weight/pressure in the head. When bending over and arising out of bed feels like she will pass out. This feeling of her \"dizziness\" lasts for the whole day until she takes decongestant. She has seen ENT with no significant findings & imaging of brain with no significant findings. She takes meclizine when she begins to hear fluid in the head to prevent symptoms from increasing. She has not taken any recently.     She did have recent lumbar kyphoplasty 24 but notes no restrictions. She also B LE neuropathy -- limited feeling in feet.

## 2025-01-14 NOTE — FLOWSHEET NOTE
[facility]   Outpatient Rehabilitation & Therapy  [address]  P: [phone]   F: [phone]    Physical Therapy Daily Treatment Note      Date:  1/15/2025  Patient Name:  Angela Hutchinson    :  1955  MRN: [id number]  Physician: Rosalia Dubois MD                            Insurance: Medicare - [facility]   Medical Diagnosis: R42 (ICD-10-CM) - Dizziness                 Rehab Codes: R23.9 abnormal posture, M25.60 stiffness, R53.1 weakness, R26.81 unsteadiness on feet    Onset date: 24 - date of referral                      Next Dr's appt.: 25 - PCP      Precautions: R femur fx with IMN  in Aug 2024, lumbar Kyphoplasty 24, B LE neuropathy   Visit# / total visits:   Cancels/No Shows: 0/0    Subjective:  1/15 Patient arrived and states she did HEP but feels like she is not doing correctly States it is hard for her to \"relax\" so she feels very tense and hard to stretch. States she recently have lumbar surgery and feels like the corner pec stretch causes more pain in lumbar. Requests to review stretches to assure she is doing it right.   Pain:  [] Yes  [x] No Location:  Pain Rating: (0-10 scale) denies/10  Pain altered Tx:  [] No  [] Yes  Action:    Objective:  Modalities:   Precautions:  INTERVENTIONS   intervention Reps/ Time Weight/ Level Completed today  Comments                MANUAL             STM cervical paraspinals & UT  20'  x In supine  - difficulty for patient to relax   PROM  20x5\" hold    x In all planes - hold time at end ranges post manual                STRETCHING            UT stretch   3x30\"    x     Levator scap stretch   3x30\"    x     Pec stretch   4x15\"     x Started with 1 rep of 30 seconds but caused increased pain in mid thoracic, switched to 15 minute increments and it became more tolerable for patient.                POSTURAL STRENGTHENING            seated scap retraction  10x5\" hold    x    seated posterior shoulder rolls  10x   x

## 2025-01-15 ENCOUNTER — OFFICE VISIT (OUTPATIENT)
Dept: ORTHOPEDIC SURGERY | Age: 70
End: 2025-01-15
Payer: MEDICARE

## 2025-01-15 ENCOUNTER — HOSPITAL ENCOUNTER (OUTPATIENT)
Dept: PHYSICAL THERAPY | Age: 70
Setting detail: THERAPIES SERIES
Discharge: HOME | End: 2025-01-15
Payer: MEDICARE

## 2025-01-15 VITALS — BODY MASS INDEX: 26.9 KG/M2 | HEIGHT: 64 IN

## 2025-01-15 DIAGNOSIS — S72.142A INTERTROCHANTERIC FRACTURE OF LEFT HIP, CLOSED, INITIAL ENCOUNTER (HCC): Primary | ICD-10-CM

## 2025-01-15 PROCEDURE — 1123F ACP DISCUSS/DSCN MKR DOCD: CPT | Performed by: STUDENT IN AN ORGANIZED HEALTH CARE EDUCATION/TRAINING PROGRAM

## 2025-01-15 PROCEDURE — G8399 PT W/DXA RESULTS DOCUMENT: HCPCS | Performed by: STUDENT IN AN ORGANIZED HEALTH CARE EDUCATION/TRAINING PROGRAM

## 2025-01-15 PROCEDURE — 1036F TOBACCO NON-USER: CPT | Performed by: STUDENT IN AN ORGANIZED HEALTH CARE EDUCATION/TRAINING PROGRAM

## 2025-01-15 PROCEDURE — G8427 DOCREV CUR MEDS BY ELIG CLIN: HCPCS | Performed by: STUDENT IN AN ORGANIZED HEALTH CARE EDUCATION/TRAINING PROGRAM

## 2025-01-15 PROCEDURE — 1159F MED LIST DOCD IN RCRD: CPT | Performed by: STUDENT IN AN ORGANIZED HEALTH CARE EDUCATION/TRAINING PROGRAM

## 2025-01-15 PROCEDURE — 97140 MANUAL THERAPY 1/> REGIONS: CPT

## 2025-01-15 PROCEDURE — 97110 THERAPEUTIC EXERCISES: CPT

## 2025-01-15 PROCEDURE — 99213 OFFICE O/P EST LOW 20 MIN: CPT | Performed by: STUDENT IN AN ORGANIZED HEALTH CARE EDUCATION/TRAINING PROGRAM

## 2025-01-15 PROCEDURE — G8417 CALC BMI ABV UP PARAM F/U: HCPCS | Performed by: STUDENT IN AN ORGANIZED HEALTH CARE EDUCATION/TRAINING PROGRAM

## 2025-01-15 PROCEDURE — 1090F PRES/ABSN URINE INCON ASSESS: CPT | Performed by: STUDENT IN AN ORGANIZED HEALTH CARE EDUCATION/TRAINING PROGRAM

## 2025-01-15 PROCEDURE — 3017F COLORECTAL CA SCREEN DOC REV: CPT | Performed by: STUDENT IN AN ORGANIZED HEALTH CARE EDUCATION/TRAINING PROGRAM

## 2025-01-15 PROCEDURE — 1126F AMNT PAIN NOTED NONE PRSNT: CPT | Performed by: STUDENT IN AN ORGANIZED HEALTH CARE EDUCATION/TRAINING PROGRAM

## 2025-01-15 NOTE — PROGRESS NOTES
[facility]  [address]  [facility] [address]  Dept Phone: [phone]  Dept Fax: [phone]      Orthopaedic Trauma Clinic Follow Up      Subjective:   Date of Surgery: 8/22/24    Angela Hutchinson is a 69 y.o. year old female who presents to the clinic today for follow up status post intramedullary nail fixation of Left femur.  Patient doing well.  She ambulates without any assistive devices.  Patient's  present during my evaluation.  Since last encounter, patient has had undergo 2 kyphoplasty's.  She is currently on Biologics for her osteoporosis.  Denies any new injuries or falls.  Denies any numbness or tingling.    Review of Systems  Gen: no fever, chills, malaise  CV: no chest pain or palpitations  Resp: no cough or shortness of breath  GI: no nausea, vomiting, diarrhea, or constipation  Neuro: no seizures, vertigo, or headache  Msk: Per HPI  10 remaining systems reviewed and negative    Objective :   There were no vitals filed for this visit.There is no height or weight on file to calculate BMI.  General: No acute distress, resting comfortably in the clinic  Neuro: alert. oriented  Eyes: Extra-ocular muscles intact  Pulm: Respirations unlabored and regular.  Skin: warm, well perfused  Psych:   Patient has good fund of knowledge and displays understanding of exam, diagnosis, and plan.  Left Lower Extremity: mature scar noted to extremity from prior intervention. Compartments soft/compressible. Extremity warm/well perfused. EHL/FHL/TA/GSC motor intact. Patient expresses normal sensation L3-S1 distribution.       Radiology:  Imaging studies from today were independently reviewed and read as listed below. Any relevant images obtained prior to today's visit were also independently interpreted.      History: 69 y.o. year old female status post intramedullary fixation Left femur    Comparison: 10/11/24    Findings: AP pelvis and 2 views of the Left hip (AP/lateral) in a

## 2025-01-24 ENCOUNTER — APPOINTMENT (OUTPATIENT)
Dept: PHYSICAL THERAPY | Age: 70
End: 2025-01-24
Payer: MEDICARE

## 2025-01-27 ENCOUNTER — HOSPITAL ENCOUNTER (OUTPATIENT)
Dept: PHYSICAL THERAPY | Age: 70
Setting detail: THERAPIES SERIES
Discharge: HOME | End: 2025-01-27
Payer: MEDICARE

## 2025-01-27 PROCEDURE — 97140 MANUAL THERAPY 1/> REGIONS: CPT

## 2025-01-27 PROCEDURE — 97112 NEUROMUSCULAR REEDUCATION: CPT

## 2025-01-27 PROCEDURE — 97110 THERAPEUTIC EXERCISES: CPT

## 2025-01-27 NOTE — FLOWSHEET NOTE
[facility]   Outpatient Rehabilitation & Therapy  [address]  P: [phone]   F: [phone]    Physical Therapy Daily Treatment Note      Date:  2025  Patient Name:  Angela Hutchinson    :  1955  MRN: [id number]  Physician: Rosalia Dubois MD                            Insurance: Medicare - [facility] [place]   Medical Diagnosis: R42 (ICD-10-CM) - Dizziness                 Rehab Codes: R23.9 abnormal posture, M25.60 stiffness, R53.1 weakness, R26.81 unsteadiness on feet    Onset date: 24 - date of referral                      Next Dr's appt.: 25 - PCP    Visit# / total visits: 3/14  Cancels/No Shows: 0/0    Precautions: R femur fx with IMN  in Aug 2024, lumbar Kyphoplasty 24, B LE neuropathy     Subjective: Pt has travelled since last visit. She notes there was 1 instance of dizziness but unable to recall events around this. She denies any falls. Still feels there is pressure in the ear -- feels like it fluctuates with weather. Feels she has to hold onto something with bending over. Felt good after last session.       Pain:  [] Yes  [x] No Location:  Pain Rating: (0-10 scale) denies/10  Pain altered Tx:  [] No  [] Yes  Action:    Objective:    INTERVENTIONS   intervention Reps/ Time Weight/ Level Completed today  Comments                MANUAL             STM cervical paraspinals & UT  10'  x Head at slight angle    PROM -- rotation  20x5\" hold    x                STRETCHING            UT stretch   2x30\"    x  supine, therapist OP    Levator scap stretch   2x30\"    x  supine, therapist OP    Pec stretch   4x15\"     x Doorway                POSTURAL STRENGTHENING            seated scap retraction  15x5\" hold    x    seated posterior shoulder rolls  10x   x    seated hor.abduction  10x2 Yellow  x     seated (B) ER 10x2 Yellow x    Seated rows  10x2  Yellow  x           BALANCE            Normal EC with head turns: H, V, & diagonals   20x ea     x     Staggered stance

## 2025-01-31 ENCOUNTER — HOSPITAL ENCOUNTER (OUTPATIENT)
Dept: PHYSICAL THERAPY | Age: 70
Setting detail: THERAPIES SERIES
Discharge: HOME | End: 2025-01-31
Payer: MEDICARE

## 2025-01-31 PROCEDURE — 97112 NEUROMUSCULAR REEDUCATION: CPT

## 2025-01-31 PROCEDURE — 97110 THERAPEUTIC EXERCISES: CPT

## 2025-01-31 NOTE — FLOWSHEET NOTE
3x30s  x 1/31 added    H/T raises  10x  X  1/31 added    Stepping to targets             marching EO/EC 10x ea   x 1/31 added      Specific Instructions for next treatment:   - manual work to reduce Cv and UT tension   - postural strengthening   - balance exercises of various stances with EO/EC      Assessment: [x] Progressing toward goals. 1/31 Initial minutes spent completing passive stretching and PROM to limit tightness in cervical area. Patient continues to have increased difficulty with relaxing during passive stretching. Elected to resume seated stretches with cueing required for proper technique as she tends to compensate with lateral trunk lean. Continued with standing balance with addition of eyes open/closed exercises along with increased sets with static balancing as noted above.  Patient very reluctant to complete balance exercises especially when encourage for finger tip touch support only.     [] No change.     [] Other: .     [x] Patient would continue to benefit from skilled physical therapy services in order to: reduce cervical and postural muscle tightness, improve overall balance sense, and increase postural awareness to reduce symptoms and decrease her fall risk while improving balance confidence.      Short Term Goals: (to be met in 7-8   Treatments)   Pt will report 50% improvement in dizziness showing a lessening of symptoms impacting daily life    Pt will decrease score on DHI to </=  40 points showing improvement of dizziness that has minimal effect on their everyday life.   Pt will improve cervical ROM of flexion > 40 deg, extension >  35 deg, B side bending > 30 deg, and B rotation > 60 deg supporting restored mobility needed to visually scan and progress program.       Long Term Goals: (to be met in 14     Treatments)  Pt will be independent with home program addressing strength, flexibility and balance to maximize gains made in therapy to improve overall functional capacity for

## 2025-02-02 DIAGNOSIS — I10 ESSENTIAL HYPERTENSION: ICD-10-CM

## 2025-02-03 ENCOUNTER — HOSPITAL ENCOUNTER (OUTPATIENT)
Dept: PHYSICAL THERAPY | Age: 70
Setting detail: THERAPIES SERIES
Discharge: HOME | End: 2025-02-03
Payer: MEDICARE

## 2025-02-03 RX ORDER — AMLODIPINE BESYLATE 10 MG/1
10 TABLET ORAL NIGHTLY
Qty: 90 TABLET | Refills: 1 | Status: SHIPPED | OUTPATIENT
Start: 2025-02-03

## 2025-02-03 NOTE — FLOWSHEET NOTE
[facility]   Outpatient Rehabilitation & Therapy  [address]  P: [phone]   F: [phone]     Physical Therapy Cancel/No Show note    Date: 2/3/2025  Patient: Angela Hutchinson  : 1955  MRN: [id number]    Visit Count:   Cancels/No Shows to date:     For today's appointment patient:    [x]  Cancelled    [] Rescheduled appointment    [] No-show     Reason given by patient:    []  Patient ill    [x]  Conflicting appointment    [] No transportation      [] Conflict with work    [] No reason given    [] Weather related    [] COVID-19    [x] Other:      Comments:  Patient cancelled today's appointment due to getting into the eye doctor early due to cancellation.  Confirmed next appointment.       [x] Next appointment was confirmed    Electronically signed by: Golden Hemphill, PTA

## 2025-02-03 NOTE — TELEPHONE ENCOUNTER
Last visit: 01/07/2025  Last Med refill: 08/08/2024  Does patient have enough medication for 72 hours: Yes    Next Visit Date:  Future Appointments   Date Time Provider Department Center   2/3/2025  2:30 PM Golden Hemphill, PTA [facility]   2/6/2025  2:30 PM Dmitry Nevarez, PT [facility]   2/13/2025  2:30 PM [facility] OP INFUSION CHAIR 07 [facility]   2/14/2025  2:15 PM Madeline Walton, PTA [facility]   2/17/2025  3:30 PM Rosalia Dubois MD [facility]   2/19/2025 11:15 AM Madeline Walton, PTA [facility]   2/21/2025  2:15 PM Dmitry Nevarez, PT [facility]   3/4/2025  2:00 PM Kodi Anderson MD [facility]   3/13/2025  2:30 PM [facility] OP INFUSION CHAIR 01 [facility]   4/10/2025  2:30 PM [facility] INFUSION CHAIR 06 [facility]   4/14/2025  2:00 PM Janneth Tsang MD [facility]   5/8/2025  2:30 PM [facility] INFUSION CHAIR 06 [facility]   6/12/2025  2:30 PM [facility] OP INFUSION CHAIR 06 [facility]   7/10/2025  2:30 PM [facility] OP INFUSION CHAIR 06 [facility]   8/14/2025  2:30 PM [facility] OP INFUSION CHAIR 06 [facility]   9/11/2025  2:30 PM [facility] OP INFUSION CHAIR 06 [facility]   10/9/2025  2:30 PM [facility] OP INFUSION CHAIR 06 [facility]   11/13/2025  2:30 PM [facility] OP INFUSION CHAIR 06 [facility]   12/10/2025 11:30 AM Jo Conway MD [facility]       Health Maintenance   Topic Date Due    Annual Wellness Visit (Medicare)  11/06/2024    Lipids  12/06/2024    Shingles vaccine (2 of 3) 05/17/2025 (Originally 11/20/2016)    DTaP/Tdap/Td vaccine (1 - Tdap) 09/09/2030 (Originally 9/10/2020)    Lung Cancer Screening &/or Counseling  08/25/2025    Breast cancer screen  11/07/2025    GFR test (Diabetes, CKD 3-4, OR last GFR 15-59)  11/08/2025    Depression Monitoring  01/07/2026    Colorectal Cancer Screen  09/06/2027

## 2025-02-04 ENCOUNTER — TELEPHONE (OUTPATIENT)
Dept: NEUROLOGY | Age: 70
End: 2025-02-04

## 2025-02-06 ENCOUNTER — HOSPITAL ENCOUNTER (OUTPATIENT)
Dept: PHYSICAL THERAPY | Age: 70
Setting detail: THERAPIES SERIES
Discharge: HOME | End: 2025-02-06
Payer: MEDICARE

## 2025-02-06 PROCEDURE — 97112 NEUROMUSCULAR REEDUCATION: CPT

## 2025-02-06 PROCEDURE — 6360000002 HC RX W HCPCS

## 2025-02-06 PROCEDURE — 97110 THERAPEUTIC EXERCISES: CPT

## 2025-02-06 NOTE — FLOWSHEET NOTE
[facility]   Outpatient Rehabilitation & Therapy  [address]  P: [phone]   F: [phone]    Physical Therapy Daily Treatment Note      Date:  2025  Patient Name:  Angela Hutchinson    :  1955  MRN: [id number]  Physician: Rosalia Dubois MD                            Insurance: Medicare - [facility]   Medical Diagnosis: R42 (ICD-10-CM) - Dizziness                 Rehab Codes: R23.9 abnormal posture, M25.60 stiffness, R53.1 weakness, R26.81 unsteadiness on feet    Onset date: 24 - date of referral                      Next Dr's appt.: 25 - PCP    Visit# / total visits:   Cancels/No Shows: 0/0    Precautions: R femur fx with IMN  in Aug 2024, lumbar Kyphoplasty 24, B LE neuropathy     Subjective: She notes she saw neuro-ophthalmology. He is working with getting her vision back and notes her eyes are not working together well. She denies any dizziness over the last few days. She notes she had more difficulty with balance with eyes closed. She finds it is easier to balance when she is barefoot. States she doesn't have dizziness upon arrival.       Pain:  [] Yes  [x] No Location:  Pain Rating: (0-10 scale) denies/10  Pain altered Tx:  [] No  [] Yes  Action:    Objective:    INTERVENTIONS   intervention Reps/ Time Weight/ Level Completed today  Comments                MANUAL             STM cervical paraspinals & UT  10'   Head at slight angle    PROM -- rotation  20x5\" hold                    STRETCHING            UT stretch   2x30\"   x  seated, self OP -- guidance on form    Levator scap stretch   2x30\"   x  seated, self OP - guidance on form    Pec stretch   2x30\"   x Doorway or corner                POSTURAL STRENGTHENING         focusing on keeping gaze straight forward,  mod cues for form    seated scap retraction  15x5\" hold    x    seated posterior shoulder rolls  10x   x    seated hor.abduction  10x2 Red  x     seated (B) ER 10x2 Red  x    Seated rows

## 2025-02-14 ENCOUNTER — HOSPITAL ENCOUNTER (OUTPATIENT)
Dept: PHYSICAL THERAPY | Age: 70
Setting detail: THERAPIES SERIES
Discharge: HOME | End: 2025-02-14
Payer: MEDICARE

## 2025-02-14 NOTE — FLOWSHEET NOTE
[facility]   Outpatient Rehabilitation & Therapy  [address] [facility] [address]  P: [phone]   F: [phone]     Physical Therapy Cancel/No Show note    Date: 2025  Patient: Angela Hutchinson  : 1955  MRN: [id number]    Visit Count:   Cancels/No Shows to date:     For today's appointment patient:    [x]  Cancelled    [] Rescheduled appointment    [] No-show     Reason given by patient:    []  Patient ill    []  Conflicting appointment    [] No transportation      [] Conflict with work    [] No reason given    [x] Weather related    [] COVID-19    [] Other:      Comments:        [x] Next appointment was confirmed    Electronically signed by: Madeline Walton PTA

## 2025-02-16 SDOH — HEALTH STABILITY: PHYSICAL HEALTH: ON AVERAGE, HOW MANY DAYS PER WEEK DO YOU ENGAGE IN MODERATE TO STRENUOUS EXERCISE (LIKE A BRISK WALK)?: 1 DAY

## 2025-02-16 ASSESSMENT — PATIENT HEALTH QUESTIONNAIRE - PHQ9
SUM OF ALL RESPONSES TO PHQ QUESTIONS 1-9: 2
1. LITTLE INTEREST OR PLEASURE IN DOING THINGS: MORE THAN HALF THE DAYS
SUM OF ALL RESPONSES TO PHQ QUESTIONS 1-9: 2
SUM OF ALL RESPONSES TO PHQ9 QUESTIONS 1 & 2: 2
2. FEELING DOWN, DEPRESSED OR HOPELESS: NOT AT ALL
SUM OF ALL RESPONSES TO PHQ QUESTIONS 1-9: 2
SUM OF ALL RESPONSES TO PHQ QUESTIONS 1-9: 2

## 2025-02-16 ASSESSMENT — LIFESTYLE VARIABLES
HOW MANY STANDARD DRINKS CONTAINING ALCOHOL DO YOU HAVE ON A TYPICAL DAY: PATIENT DOES NOT DRINK
HOW OFTEN DO YOU HAVE A DRINK CONTAINING ALCOHOL: 1
HOW OFTEN DO YOU HAVE SIX OR MORE DRINKS ON ONE OCCASION: 1
HOW MANY STANDARD DRINKS CONTAINING ALCOHOL DO YOU HAVE ON A TYPICAL DAY: 0
HOW OFTEN DO YOU HAVE A DRINK CONTAINING ALCOHOL: NEVER

## 2025-02-17 ENCOUNTER — OFFICE VISIT (OUTPATIENT)
Dept: FAMILY MEDICINE CLINIC | Age: 70
End: 2025-02-17

## 2025-02-17 VITALS
OXYGEN SATURATION: 95 % | TEMPERATURE: 98.4 F | BODY MASS INDEX: 27.31 KG/M2 | SYSTOLIC BLOOD PRESSURE: 120 MMHG | DIASTOLIC BLOOD PRESSURE: 68 MMHG | HEIGHT: 64 IN | WEIGHT: 160 LBS | HEART RATE: 75 BPM

## 2025-02-17 DIAGNOSIS — M81.0 SENILE OSTEOPOROSIS: ICD-10-CM

## 2025-02-17 DIAGNOSIS — E78.2 MIXED HYPERLIPIDEMIA: ICD-10-CM

## 2025-02-17 DIAGNOSIS — Z91.030 BEE STING ALLERGY: ICD-10-CM

## 2025-02-17 DIAGNOSIS — I10 PRIMARY HYPERTENSION: ICD-10-CM

## 2025-02-17 DIAGNOSIS — M47.817 LUMBOSACRAL SPONDYLOSIS WITHOUT MYELOPATHY: ICD-10-CM

## 2025-02-17 DIAGNOSIS — R92.8 ABNORMALITY OF LEFT BREAST ON SCREENING MAMMOGRAM: ICD-10-CM

## 2025-02-17 DIAGNOSIS — K21.9 GASTRO-ESOPHAGEAL REFLUX DISEASE WITHOUT ESOPHAGITIS: ICD-10-CM

## 2025-02-17 DIAGNOSIS — M25.552 PAIN OF LEFT HIP JOINT: ICD-10-CM

## 2025-02-17 DIAGNOSIS — Z00.00 MEDICARE ANNUAL WELLNESS VISIT, SUBSEQUENT: Primary | ICD-10-CM

## 2025-02-17 DIAGNOSIS — M54.16 CHRONIC LUMBAR RADICULOPATHY: ICD-10-CM

## 2025-02-17 DIAGNOSIS — Z13.6 SCREENING FOR CARDIOVASCULAR CONDITION: ICD-10-CM

## 2025-02-17 DIAGNOSIS — Z17.0 MALIGNANT NEOPLASM OF LOWER-INNER QUADRANT OF RIGHT BREAST OF FEMALE, ESTROGEN RECEPTOR POSITIVE (HCC): ICD-10-CM

## 2025-02-17 DIAGNOSIS — C50.311 MALIGNANT NEOPLASM OF LOWER-INNER QUADRANT OF RIGHT BREAST OF FEMALE, ESTROGEN RECEPTOR POSITIVE (HCC): ICD-10-CM

## 2025-02-17 DIAGNOSIS — Z13.29 SCREENING FOR THYROID DISORDER: ICD-10-CM

## 2025-02-17 DIAGNOSIS — K22.4 ESOPHAGEAL SPASM: ICD-10-CM

## 2025-02-17 DIAGNOSIS — J43.2 CENTRILOBULAR EMPHYSEMA (HCC): ICD-10-CM

## 2025-02-17 DIAGNOSIS — N18.31 STAGE 3A CHRONIC KIDNEY DISEASE (HCC): ICD-10-CM

## 2025-02-17 RX ORDER — OMEPRAZOLE 40 MG/1
40 CAPSULE, DELAYED RELEASE ORAL 2 TIMES DAILY
Qty: 180 CAPSULE | Refills: 1 | Status: SHIPPED | OUTPATIENT
Start: 2025-02-17

## 2025-02-17 RX ORDER — EPINEPHRINE 0.3 MG/.3ML
0.3 INJECTION SUBCUTANEOUS ONCE
Qty: 0.3 ML | Refills: 0 | Status: SHIPPED | OUTPATIENT
Start: 2025-02-17 | End: 2025-02-17

## 2025-02-17 RX ORDER — NEOMYCIN SULFATE, POLYMYXIN B SULFATE, AND DEXAMETHASONE 3.5; 10000; 1 MG/G; [USP'U]/G; MG/G
OINTMENT OPHTHALMIC
COMMUNITY
Start: 2025-01-24

## 2025-02-17 RX ORDER — GABAPENTIN 100 MG/1
100 CAPSULE ORAL 3 TIMES DAILY
Qty: 90 CAPSULE | Refills: 2 | Status: SHIPPED | OUTPATIENT
Start: 2025-02-17 | End: 2025-05-18

## 2025-02-17 NOTE — PROGRESS NOTES
Medicare Annual Wellness Visit    Angela Hutchinson is here for Medicare AWV, Headache (Having a headache since last Thursday, did have the flu last week), and Other (Discuss EVENITY)    Assessment & Plan   Medicare annual wellness visit, subsequent  Lumbosacral spondylosis without myelopathy  -     gabapentin (NEURONTIN) 100 MG capsule; Take 1 capsule by mouth 3 times daily for 90 days., Disp-90 capsule, R-2Normal  Chronic lumbar radiculopathy  -     gabapentin (NEURONTIN) 100 MG capsule; Take 1 capsule by mouth 3 times daily for 90 days., Disp-90 capsule, R-2Normal  Bee sting allergy  -     EPINEPHrine (EPIPEN 2-JONATHAN) 0.3 MG/0.3ML SOAJ injection; Inject 0.3 mLs into the muscle once for 1 dose Use as directed for allergic reaction, Disp-0.3 mL, R-0Normal  Gastro-esophageal reflux disease without esophagitis  -     omeprazole (PRILOSEC) 40 MG delayed release capsule; Take 1 capsule by mouth in the morning and at bedtime TAKE ONE CAPSULE BY MOUTH TWICE A DAY, Disp-180 capsule, R-1Normal  Mixed hyperlipidemia  -     Lipid, Fasting; Future  -     Comprehensive Metabolic Panel; Future  Primary hypertension  -     Comprehensive Metabolic Panel; Future  Esophageal spasm  Centrilobular emphysema (HCC)  Pain of left hip joint  -     CK; Future  Senile osteoporosis  -     CK; Future  Screening for thyroid disorder  -     TSH reflex to FT4; Future  Stage 3a chronic kidney disease (HCC)  Malignant neoplasm of lower-inner quadrant of right breast of female, estrogen receptor positive (HCC)  Screening for cardiovascular condition  -     DE Intensive Behavior Counseling for Cardiovascular Diseases, 8-15 minutes []       No follow-ups on file.     Subjective   The following acute and/or chronic problems were also addressed today:  Getting over stomach flu, throwing up and diarrhea for about a week, last episode of diarrhea was Friday 2/14/25. No hematemesis, melena, hematochezia. Hasn't had a BM since then, has been two days.

## 2025-02-19 ENCOUNTER — HOSPITAL ENCOUNTER (OUTPATIENT)
Dept: INFUSION THERAPY | Age: 70
Setting detail: INFUSION SERIES
Discharge: HOME | End: 2025-02-19
Payer: MEDICARE

## 2025-02-19 ENCOUNTER — HOSPITAL ENCOUNTER (OUTPATIENT)
Dept: PHYSICAL THERAPY | Age: 70
Setting detail: THERAPIES SERIES
Discharge: HOME | End: 2025-02-19
Payer: MEDICARE

## 2025-02-19 VITALS
TEMPERATURE: 97 F | SYSTOLIC BLOOD PRESSURE: 134 MMHG | RESPIRATION RATE: 17 BRPM | HEART RATE: 76 BPM | OXYGEN SATURATION: 96 % | DIASTOLIC BLOOD PRESSURE: 75 MMHG

## 2025-02-19 DIAGNOSIS — D05.11 DUCTAL CARCINOMA IN SITU (DCIS) OF RIGHT BREAST: ICD-10-CM

## 2025-02-19 DIAGNOSIS — M81.0 SENILE OSTEOPOROSIS: Primary | ICD-10-CM

## 2025-02-19 PROCEDURE — 6360000002 HC RX W HCPCS: Performed by: INTERNAL MEDICINE

## 2025-02-19 PROCEDURE — 96372 THER/PROPH/DIAG INJ SC/IM: CPT

## 2025-02-19 PROCEDURE — 97112 NEUROMUSCULAR REEDUCATION: CPT

## 2025-02-19 RX ORDER — ACETAMINOPHEN 325 MG/1
650 TABLET ORAL
OUTPATIENT
Start: 2025-03-02

## 2025-02-19 RX ORDER — ONDANSETRON 2 MG/ML
8 INJECTION INTRAMUSCULAR; INTRAVENOUS
OUTPATIENT
Start: 2025-03-02

## 2025-02-19 RX ORDER — EPINEPHRINE 1 MG/ML
0.3 INJECTION, SOLUTION, CONCENTRATE INTRAVENOUS PRN
OUTPATIENT
Start: 2025-03-02

## 2025-02-19 RX ORDER — DIPHENHYDRAMINE HYDROCHLORIDE 50 MG/ML
50 INJECTION INTRAMUSCULAR; INTRAVENOUS
OUTPATIENT
Start: 2025-03-02

## 2025-02-19 RX ORDER — HYDROCORTISONE SODIUM SUCCINATE 100 MG/2ML
100 INJECTION INTRAMUSCULAR; INTRAVENOUS
OUTPATIENT
Start: 2025-03-02

## 2025-02-19 RX ORDER — SODIUM CHLORIDE 9 MG/ML
INJECTION, SOLUTION INTRAVENOUS CONTINUOUS
OUTPATIENT
Start: 2025-03-02

## 2025-02-19 RX ORDER — ALBUTEROL SULFATE 90 UG/1
4 INHALANT RESPIRATORY (INHALATION) PRN
OUTPATIENT
Start: 2025-03-02

## 2025-02-19 RX ADMIN — ROMOSOZUMAB-AQQG 105 MG: 105 INJECTION, SOLUTION SUBCUTANEOUS at 13:43

## 2025-02-19 RX ADMIN — ROMOSOZUMAB-AQQG 105 MG: 105 INJECTION, SOLUTION SUBCUTANEOUS at 13:44

## 2025-02-19 NOTE — FLOWSHEET NOTE
[facility]   Outpatient Rehabilitation & Therapy  [address]  P: [phone]   F: [phone]    Physical Therapy Daily Treatment Note      Date:  2025  Patient Name:  Angela Hutchinson    :  1955  MRN: [id number]  Physician: Rosalia Dubois MD                            Insurance: Medicare - [facility]   Medical Diagnosis: R42 (ICD-10-CM) - Dizziness                 Rehab Codes: R23.9 abnormal posture, M25.60 stiffness, R53.1 weakness, R26.81 unsteadiness on feet    Onset date: 24 - date of referral                      Next Dr's appt.: 25 - PCP    Visit# / total visits:   Cancels/No Shows: 0/0    Precautions: R femur fx with IMN  in Aug 2024, lumbar Kyphoplasty 24, B LE neuropathy     Subjective:  Patient arrived and states she is getting over the flu and still has sinus things going on. States she feels minimal dizziness but believes its caused by sinuses. Patient states she is still not fully recovered from flu and is very tired today.       Pain:  [] Yes  [x] No Location:  Pain Rating: (0-10 scale) denies/10  Pain altered Tx:  [] No  [] Yes  Action:    Objective:    INTERVENTIONS   intervention Reps/ Time Weight/ Level Completed today  Comments                MANUAL             STM cervical paraspinals & UT  10'   Head at slight angle    PROM -- rotation  20x5\" hold                    STRETCHING            UT stretch   2x30\"     seated, self OP -- guidance on form    Levator scap stretch   2x30\"     seated, self OP - guidance on form    Pec stretch   2x30\"    Doorway or corner                POSTURAL STRENGTHENING         focusing on keeping gaze straight forward,  mod cues for form    seated scap retraction  15x5\" hold    x    seated posterior shoulder rolls  10x   x    seated hor.abduction  10x2 Red  x     seated (B) ER 10x2 Red  x    Seated rows  10x2  Red  x           BALANCE            Normal EC with head turns: H, V, & diagonals   20x ea    x

## 2025-02-19 NOTE — PROGRESS NOTES
Pt arrived for Evenity injections.  Vitals obtained.  Injections given in R arm.  Pt tolerated well.  No s/s adverse reaction noted.  Pt discharged home, ambulatory per self with .

## 2025-02-20 ENCOUNTER — HOSPITAL ENCOUNTER (OUTPATIENT)
Age: 70
Setting detail: SPECIMEN
Discharge: HOME | End: 2025-02-20

## 2025-02-20 DIAGNOSIS — E78.2 MIXED HYPERLIPIDEMIA: ICD-10-CM

## 2025-02-20 DIAGNOSIS — M25.552 PAIN OF LEFT HIP JOINT: ICD-10-CM

## 2025-02-20 DIAGNOSIS — M81.0 SENILE OSTEOPOROSIS: ICD-10-CM

## 2025-02-20 DIAGNOSIS — I10 PRIMARY HYPERTENSION: ICD-10-CM

## 2025-02-20 DIAGNOSIS — M81.0 AGE-RELATED OSTEOPOROSIS WITHOUT CURRENT PATHOLOGICAL FRACTURE: ICD-10-CM

## 2025-02-20 DIAGNOSIS — Z13.29 SCREENING FOR THYROID DISORDER: ICD-10-CM

## 2025-02-20 LAB
ALBUMIN SERPL-MCNC: 4 G/DL (ref 3.5–5.2)
ALBUMIN/GLOB SERPL: 1.3 {RATIO} (ref 1–2.5)
ALP SERPL-CCNC: 105 U/L (ref 35–104)
ALT SERPL-CCNC: 15 U/L (ref 10–35)
ANION GAP SERPL CALCULATED.3IONS-SCNC: 12 MMOL/L (ref 9–16)
AST SERPL-CCNC: 21 U/L (ref 10–35)
BILIRUB SERPL-MCNC: 0.2 MG/DL (ref 0–1.2)
BUN SERPL-MCNC: 13 MG/DL (ref 8–23)
CALCIUM SERPL-MCNC: 9.4 MG/DL (ref 8.6–10.4)
CHLORIDE SERPL-SCNC: 108 MMOL/L (ref 98–107)
CHOLEST SERPL-MCNC: 191 MG/DL (ref 0–199)
CHOLESTEROL/HDL RATIO: 3.7
CK SERPL-CCNC: 33 U/L (ref 26–192)
CO2 SERPL-SCNC: 24 MMOL/L (ref 20–31)
CREAT SERPL-MCNC: 0.7 MG/DL (ref 0.6–0.9)
GFR, ESTIMATED: >90 ML/MIN/1.73M2
GLUCOSE SERPL-MCNC: 81 MG/DL (ref 74–99)
HDLC SERPL-MCNC: 51 MG/DL
LDLC SERPL CALC-MCNC: 97 MG/DL (ref 0–100)
POTASSIUM SERPL-SCNC: 4.5 MMOL/L (ref 3.7–5.3)
PROT SERPL-MCNC: 7.1 G/DL (ref 6.6–8.7)
SODIUM SERPL-SCNC: 144 MMOL/L (ref 136–145)
TRIGL SERPL-MCNC: 216 MG/DL (ref 0–149)
TSH SERPL DL<=0.05 MIU/L-ACNC: 1.88 UIU/ML (ref 0.27–4.2)
VLDLC SERPL CALC-MCNC: 43 MG/DL (ref 1–30)

## 2025-02-21 ENCOUNTER — APPOINTMENT (OUTPATIENT)
Dept: PHYSICAL THERAPY | Age: 70
End: 2025-02-21
Payer: MEDICARE

## 2025-02-27 ENCOUNTER — TELEPHONE (OUTPATIENT)
Dept: ONCOLOGY | Age: 70
End: 2025-02-27

## 2025-02-27 NOTE — TELEPHONE ENCOUNTER
** Spoke with pt, pt aware of appt**     Electronically signed by Mounika Shankar on 2/27/2025 at 2:44 PM

## 2025-03-04 ENCOUNTER — TELEPHONE (OUTPATIENT)
Dept: ONCOLOGY | Age: 70
End: 2025-03-04

## 2025-03-04 ENCOUNTER — OFFICE VISIT (OUTPATIENT)
Dept: ONCOLOGY | Age: 70
End: 2025-03-04
Payer: MEDICARE

## 2025-03-04 VITALS
WEIGHT: 157.4 LBS | SYSTOLIC BLOOD PRESSURE: 111 MMHG | OXYGEN SATURATION: 94 % | BODY MASS INDEX: 27 KG/M2 | HEART RATE: 75 BPM | DIASTOLIC BLOOD PRESSURE: 71 MMHG | TEMPERATURE: 99.1 F | RESPIRATION RATE: 18 BRPM

## 2025-03-04 DIAGNOSIS — C50.011: ICD-10-CM

## 2025-03-04 DIAGNOSIS — D05.11 DUCTAL CARCINOMA IN SITU (DCIS) OF RIGHT BREAST: ICD-10-CM

## 2025-03-04 DIAGNOSIS — M85.89 OSTEOPENIA OF MULTIPLE SITES: ICD-10-CM

## 2025-03-04 DIAGNOSIS — N18.31 STAGE 3A CHRONIC KIDNEY DISEASE (HCC): Primary | ICD-10-CM

## 2025-03-04 PROBLEM — C50.019 PAGET DISEASE OF BREAST (HCC): Status: ACTIVE | Noted: 2025-03-04

## 2025-03-04 PROCEDURE — 99211 OFF/OP EST MAY X REQ PHY/QHP: CPT | Performed by: INTERNAL MEDICINE

## 2025-03-04 NOTE — TELEPHONE ENCOUNTER
AVS 03/04/25      Rtc in 4 months with labs    Labs ordered today  CBC with Auto Differential  Complete this on or around 3/11/2025.  Comprehensive Metabolic Panel  Complete this on or around 3/11/2025.    RV 07/18/25    Labs to be done week prior    PT was given AVS and appt schedule    Electronically signed by Mounika Shankar on 3/4/2025 at 2:33 PM

## 2025-03-04 NOTE — PROGRESS NOTES
Patient ID: Angela Hutchinson, 1955, [id number], 69 y.o.  Referred by :  No ref. 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 female.  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% and negative for progesterone  Osteopenia with recurrent fracture  Patient today to discuss MRI finding and adjuvant hormonal treatment  Bone density scan did show osteopenia and decided not to proceed with aromatase inhibitors but tamoxifen which not able to tolerate it due to hair loss  Status post fall and a fracture of the left femur status post reduction  She decided to do to poor bone health/recurrent fracture to stop anastrozole    Age at menarche 11  Number of pregnancy 4  Menopause 43  Birth control for 5 years  No family history of cancer        Interval history  Had no symptoms  Mammogram the breast did show right breast abnormalities likely postsurgical  Hemoglobin up to normal  During this visit patient's allergy, social, medical, surgical history and medications were reviewed and

## 2025-03-13 ENCOUNTER — HOSPITAL ENCOUNTER (OUTPATIENT)
Dept: INFUSION THERAPY | Age: 70
Setting detail: INFUSION SERIES
Discharge: HOME | End: 2025-03-13
Payer: MEDICARE

## 2025-03-13 VITALS
HEART RATE: 84 BPM | SYSTOLIC BLOOD PRESSURE: 131 MMHG | OXYGEN SATURATION: 95 % | RESPIRATION RATE: 17 BRPM | TEMPERATURE: 97.5 F | DIASTOLIC BLOOD PRESSURE: 75 MMHG

## 2025-03-13 DIAGNOSIS — M81.0 SENILE OSTEOPOROSIS: Primary | ICD-10-CM

## 2025-03-13 PROCEDURE — 96372 THER/PROPH/DIAG INJ SC/IM: CPT

## 2025-03-13 PROCEDURE — 6360000002 HC RX W HCPCS: Performed by: INTERNAL MEDICINE

## 2025-03-13 RX ORDER — EPINEPHRINE 1 MG/ML
0.3 INJECTION, SOLUTION, CONCENTRATE INTRAVENOUS PRN
OUTPATIENT
Start: 2025-03-19

## 2025-03-13 RX ORDER — ONDANSETRON 2 MG/ML
8 INJECTION INTRAMUSCULAR; INTRAVENOUS
OUTPATIENT
Start: 2025-03-19

## 2025-03-13 RX ORDER — DIPHENHYDRAMINE HYDROCHLORIDE 50 MG/ML
50 INJECTION INTRAMUSCULAR; INTRAVENOUS
OUTPATIENT
Start: 2025-03-19

## 2025-03-13 RX ORDER — ALBUTEROL SULFATE 90 UG/1
4 INHALANT RESPIRATORY (INHALATION) PRN
OUTPATIENT
Start: 2025-03-19

## 2025-03-13 RX ORDER — ACETAMINOPHEN 325 MG/1
650 TABLET ORAL
OUTPATIENT
Start: 2025-03-19

## 2025-03-13 RX ORDER — HYDROCORTISONE SODIUM SUCCINATE 100 MG/2ML
100 INJECTION INTRAMUSCULAR; INTRAVENOUS
OUTPATIENT
Start: 2025-03-19

## 2025-03-13 RX ORDER — SODIUM CHLORIDE 9 MG/ML
INJECTION, SOLUTION INTRAVENOUS CONTINUOUS
OUTPATIENT
Start: 2025-03-19

## 2025-03-13 RX ADMIN — ROMOSOZUMAB-AQQG 105 MG: 105 INJECTION, SOLUTION SUBCUTANEOUS at 14:49

## 2025-03-13 NOTE — PROGRESS NOTES
Pt arrived for Evenity injections.  Vitals obtained.  Injections given in R arm.  Pt tolerated well.  No s/s adverse reaction noted.  Pt discharged home, ambulatory per self.

## 2025-04-09 DIAGNOSIS — J43.2 CENTRILOBULAR EMPHYSEMA (HCC): ICD-10-CM

## 2025-04-09 RX ORDER — UMECLIDINIUM BROMIDE AND VILANTEROL TRIFENATATE 62.5; 25 UG/1; UG/1
1 POWDER RESPIRATORY (INHALATION) DAILY
Qty: 1 EACH | Refills: 5 | Status: SHIPPED | OUTPATIENT
Start: 2025-04-09

## 2025-04-09 NOTE — TELEPHONE ENCOUNTER
Last visit: 02/17/2025  Last Med refill: 11/13/2023  Does patient have enough medication for 72 hours: No: no script since 11/13/2023     Next Visit Date:  Future Appointments   Date Time Provider Department Center   4/10/2025  2:30 PM [facility] OP INFUSION CHAIR 04 [facility]   4/14/2025  2:00 PM Janneth Tsang MD [facility]   5/8/2025  2:00 PM STC CT RM 2 FAST SCANNER [facility] CT SCAN [facility]   5/8/2025  2:30 PM [facility] OP INFUSION CHAIR 01 [facility]   5/8/2025  3:00 PM [facility] DIAG MAMMO RM [facility] MAMMO [facility]   5/8/2025  3:30 PM [facility] WC US RM [facility] MAMMO [facility]   6/12/2025  2:30 PM [facility] OP INFUSION CHAIR 01 [facility]   6/17/2025  2:45 PM Rosalia Dubois MD [facility]   7/10/2025  2:30 PM [facility] OP INFUSION CHAIR 06 [facility]   7/18/2025  1:00 PM Kodi Anderson MD [facility]   8/14/2025  2:30 PM [facility] OP INFUSION CHAIR 06 [facility]   9/11/2025  2:30 PM [facility] OP INFUSION CHAIR 01 [facility]   10/9/2025  2:30 PM [facility] OP INFUSION CHAIR 06 [facility]   11/13/2025  2:30 PM [facility] OP INFUSION CHAIR 06 [facility]   12/10/2025 11:30 AM Jo Conway MD [facility]   2/19/2026  3:00 PM Rosalia [facility]ois [facility]       Health Maintenance   Topic Date Due    Shingles vaccine (2 of 3) 05/17/2025 (Originally 11/20/2016)    DTaP/Tdap/Td vaccine (1 - Tdap) 09/09/2030 (Originally 9/10/2020)    COVID-19 Vaccine (9 - 2024-25 season) 05/08/2025    Lung Cancer Screening &/or Counseling  08/25/2025    Breast cancer screen  11/07/2025    Depression Monitoring  02/16/2026    Annual Wellness Visit (Medicare)  02/18/2026    Lipids  02/20/2026    GFR test (Diabetes, CKD 3-4, OR last GFR 15-59)  02/20/2026    Colorectal Cancer Screen  09/06/2027    DEXA (modify frequency per FRAX score)  Completed    Flu vaccine  Completed    Pneumococcal 50+ years Vaccine  Completed

## 2025-04-10 ENCOUNTER — HOSPITAL ENCOUNTER (OUTPATIENT)
Dept: INFUSION THERAPY | Age: 70
Setting detail: INFUSION SERIES
Discharge: HOME | End: 2025-04-10
Payer: MEDICARE

## 2025-04-10 VITALS
SYSTOLIC BLOOD PRESSURE: 138 MMHG | DIASTOLIC BLOOD PRESSURE: 79 MMHG | HEART RATE: 73 BPM | OXYGEN SATURATION: 100 % | TEMPERATURE: 97.2 F | RESPIRATION RATE: 16 BRPM

## 2025-04-10 DIAGNOSIS — M81.0 SENILE OSTEOPOROSIS: Primary | ICD-10-CM

## 2025-04-10 PROCEDURE — 6360000002 HC RX W HCPCS: Performed by: INTERNAL MEDICINE

## 2025-04-10 PROCEDURE — 96372 THER/PROPH/DIAG INJ SC/IM: CPT

## 2025-04-10 RX ORDER — EPINEPHRINE 1 MG/ML
0.3 INJECTION, SOLUTION, CONCENTRATE INTRAVENOUS PRN
OUTPATIENT
Start: 2025-04-17

## 2025-04-10 RX ORDER — ALBUTEROL SULFATE 90 UG/1
4 INHALANT RESPIRATORY (INHALATION) PRN
OUTPATIENT
Start: 2025-04-17

## 2025-04-10 RX ORDER — DIPHENHYDRAMINE HYDROCHLORIDE 50 MG/ML
50 INJECTION, SOLUTION INTRAMUSCULAR; INTRAVENOUS
OUTPATIENT
Start: 2025-04-17

## 2025-04-10 RX ORDER — HYDROCORTISONE SODIUM SUCCINATE 100 MG/2ML
100 INJECTION INTRAMUSCULAR; INTRAVENOUS
OUTPATIENT
Start: 2025-04-17

## 2025-04-10 RX ORDER — SODIUM CHLORIDE 9 MG/ML
INJECTION, SOLUTION INTRAVENOUS CONTINUOUS
OUTPATIENT
Start: 2025-04-17

## 2025-04-10 RX ORDER — ONDANSETRON 2 MG/ML
8 INJECTION INTRAMUSCULAR; INTRAVENOUS
OUTPATIENT
Start: 2025-04-17

## 2025-04-10 RX ORDER — ACETAMINOPHEN 325 MG/1
650 TABLET ORAL
OUTPATIENT
Start: 2025-04-17

## 2025-04-10 RX ADMIN — ROMOSOZUMAB-AQQG 105 MG: 105 INJECTION, SOLUTION SUBCUTANEOUS at 14:59

## 2025-04-10 NOTE — PROGRESS NOTES
Pt arrived for Evenity injections.  Vitals obtained.  Injections given in L arm.  Pt tolerated well.  No s/s adverse reaction noted.  Pt discharged home, ambulatory per self.

## 2025-04-14 ENCOUNTER — OFFICE VISIT (OUTPATIENT)
Dept: SURGERY | Age: 70
End: 2025-04-14
Payer: MEDICARE

## 2025-04-14 VITALS
BODY MASS INDEX: 26.29 KG/M2 | HEART RATE: 76 BPM | HEIGHT: 64 IN | DIASTOLIC BLOOD PRESSURE: 82 MMHG | SYSTOLIC BLOOD PRESSURE: 146 MMHG | WEIGHT: 154 LBS

## 2025-04-14 DIAGNOSIS — Z85.3 HISTORY OF BREAST CANCER: Primary | ICD-10-CM

## 2025-04-14 DIAGNOSIS — R07.89 RIGHT-SIDED CHEST WALL PAIN: ICD-10-CM

## 2025-04-14 DIAGNOSIS — Z90.11 ACQUIRED ABSENCE OF RIGHT BREAST: ICD-10-CM

## 2025-04-14 PROCEDURE — 3077F SYST BP >= 140 MM HG: CPT | Performed by: SURGERY

## 2025-04-14 PROCEDURE — G8427 DOCREV CUR MEDS BY ELIG CLIN: HCPCS | Performed by: SURGERY

## 2025-04-14 PROCEDURE — 99213 OFFICE O/P EST LOW 20 MIN: CPT | Performed by: SURGERY

## 2025-04-14 PROCEDURE — 3079F DIAST BP 80-89 MM HG: CPT | Performed by: SURGERY

## 2025-04-14 PROCEDURE — 1036F TOBACCO NON-USER: CPT | Performed by: SURGERY

## 2025-04-14 PROCEDURE — G8399 PT W/DXA RESULTS DOCUMENT: HCPCS | Performed by: SURGERY

## 2025-04-14 PROCEDURE — 1090F PRES/ABSN URINE INCON ASSESS: CPT | Performed by: SURGERY

## 2025-04-14 PROCEDURE — 1123F ACP DISCUSS/DSCN MKR DOCD: CPT | Performed by: SURGERY

## 2025-04-14 PROCEDURE — 1126F AMNT PAIN NOTED NONE PRSNT: CPT | Performed by: SURGERY

## 2025-04-14 PROCEDURE — G8417 CALC BMI ABV UP PARAM F/U: HCPCS | Performed by: SURGERY

## 2025-04-14 PROCEDURE — 3017F COLORECTAL CA SCREEN DOC REV: CPT | Performed by: SURGERY

## 2025-04-14 PROCEDURE — 1159F MED LIST DOCD IN RCRD: CPT | Performed by: SURGERY

## 2025-04-14 NOTE — PATIENT INSTRUCTIONS
For the chest wall pain:  Heating pad on low 20-30 minutes  Massage the area and stretch during and after    Check an ultrasound.    Follow-up with Dr. Conway

## 2025-04-14 NOTE — PROGRESS NOTES
Patient's Name/Date of Birth: Angela Hutchinson / 1955 (69 y.o.)    Date: April 14, 2025    HPI: Pt is a 69 y.o. female who presents to [facility] for a follow-up visit after a right mastectomy reconstruction that was performed in March 2024 for ductal carcinoma in situ.    In the interim since her last visit on October 16, 2024, she has had the following imaging: Left diagnostic mammogram and ultrasound on November 7, 2024 that the following results:  IMPRESSION:  1. Stable benign left mammographic findings.   2. Targeted ultrasound in the area of described palpated concern in the 4-5  o'clock position reveals a probably benign cluster of cysts in few discrete  subcentimeter cysts for which ultrasound follow-up in 6 months is recommended.   BI-RADS 3    She has the following issues that she would like addressed:  Complains of right upper outer breast pain. It feels like a bruise. Feels like she can't sleep on the right side. Feels better if she pulls the implant medially and then she is more comfortable. Also when she pulls the implant, she feels a small knot on her side.    Physical Exam:  Vitals:    04/14/25 1353   BP: (!) 146/82   Pulse: 76     General:A & O x3  HEENT:  NCAT  BREAST: the ridght reconstructed  breast has no nodules. The nodule on side wall is about 1 cm and mildly ttp. Left reduced bread is normal.   Lymph Nodes:  There is no lymphadenopathy in the supraclavicular, infraclavicular, or axillary areas bilaterally.  Extremity: Normal, without deformities, edema, or skin discoloration  SKIN: Skin color, texture, turgor normal. No rashes or lesions.         Assessment/Plan:  1. History of breast cancer    2. Acquired absence of right breast    3. Right-sided chest wall pain          Angela Hutchinson is a 69 y.o. female that is seen today for a follow-up visit after a right mastectomy reconstruction that was performed in March 2024 for ductal carcinoma in situ.    She has no

## 2025-04-14 NOTE — PROGRESS NOTES
Review of Systems   Constitutional:  Negative for activity change, appetite change, chills, diaphoresis, fatigue, fever and unexpected weight change.   Respiratory:  Negative for apnea, cough, chest tightness, shortness of breath, wheezing and stridor.    Cardiovascular:  Negative for chest pain and leg swelling.   Gastrointestinal:  Positive for diarrhea. Negative for abdominal distention, abdominal pain, anal bleeding, blood in stool, constipation, nausea, rectal pain and vomiting.   Genitourinary:  Positive for urgency. Negative for difficulty urinating, dysuria, enuresis, flank pain, frequency and hematuria.   Musculoskeletal:  Negative for back pain.   Skin:  Negative for color change and pallor.   Allergic/Immunologic: Negative for food allergies and immunocompromised state.   Neurological:  Negative for syncope, speech difficulty, weakness, light-headedness, numbness and headaches.   Hematological:  Negative for adenopathy. Bruises/bleeds easily.   Psychiatric/Behavioral:  The patient is not nervous/anxious.

## 2025-04-18 ENCOUNTER — TELEPHONE (OUTPATIENT)
Dept: SURGERY | Age: 70
End: 2025-04-18

## 2025-04-18 DIAGNOSIS — Z85.3 HISTORY OF BREAST CANCER: Primary | ICD-10-CM

## 2025-04-18 DIAGNOSIS — R07.89 RIGHT-SIDED CHEST WALL PAIN: ICD-10-CM

## 2025-04-18 DIAGNOSIS — Z90.11 ACQUIRED ABSENCE OF RIGHT BREAST: ICD-10-CM

## 2025-04-18 NOTE — TELEPHONE ENCOUNTER
Alison with [facility] central scheduling called in request for a bilateral diagnostic mammogram to accompany the current right breast ultrasound. Patient will be scheduled once this order is in.    [phone] or extension [phone] if further discussion if needed

## 2025-05-08 ENCOUNTER — APPOINTMENT (OUTPATIENT)
Dept: WOMENS IMAGING | Age: 70
End: 2025-05-08
Payer: MEDICARE

## 2025-05-08 ENCOUNTER — APPOINTMENT (OUTPATIENT)
Dept: WOMENS IMAGING | Age: 70
End: 2025-05-08
Attending: SURGERY
Payer: MEDICARE

## 2025-05-08 ENCOUNTER — HOSPITAL ENCOUNTER (OUTPATIENT)
Dept: INFUSION THERAPY | Age: 70
Setting detail: INFUSION SERIES
Discharge: HOME | End: 2025-05-08
Payer: MEDICARE

## 2025-05-08 ENCOUNTER — HOSPITAL ENCOUNTER (OUTPATIENT)
Dept: CT IMAGING | Age: 70
Discharge: HOME | End: 2025-05-10
Payer: MEDICARE

## 2025-05-08 VITALS
RESPIRATION RATE: 16 BRPM | DIASTOLIC BLOOD PRESSURE: 71 MMHG | TEMPERATURE: 97.3 F | HEART RATE: 89 BPM | OXYGEN SATURATION: 94 % | SYSTOLIC BLOOD PRESSURE: 117 MMHG

## 2025-05-08 VITALS — HEIGHT: 64 IN | WEIGHT: 154 LBS | BODY MASS INDEX: 26.29 KG/M2

## 2025-05-08 DIAGNOSIS — M81.0 SENILE OSTEOPOROSIS: Primary | ICD-10-CM

## 2025-05-08 DIAGNOSIS — Z87.891 PERSONAL HISTORY OF TOBACCO USE: ICD-10-CM

## 2025-05-08 PROCEDURE — 71271 CT THORAX LUNG CANCER SCR C-: CPT

## 2025-05-08 PROCEDURE — 96372 THER/PROPH/DIAG INJ SC/IM: CPT

## 2025-05-08 PROCEDURE — 6360000002 HC RX W HCPCS: Performed by: INTERNAL MEDICINE

## 2025-05-08 RX ORDER — ONDANSETRON 2 MG/ML
8 INJECTION INTRAMUSCULAR; INTRAVENOUS
OUTPATIENT
Start: 2025-05-29

## 2025-05-08 RX ORDER — ALBUTEROL SULFATE 90 UG/1
4 INHALANT RESPIRATORY (INHALATION) PRN
OUTPATIENT
Start: 2025-05-29

## 2025-05-08 RX ORDER — DIPHENHYDRAMINE HYDROCHLORIDE 50 MG/ML
50 INJECTION, SOLUTION INTRAMUSCULAR; INTRAVENOUS
OUTPATIENT
Start: 2025-05-29

## 2025-05-08 RX ORDER — HYDROCORTISONE SODIUM SUCCINATE 100 MG/2ML
100 INJECTION INTRAMUSCULAR; INTRAVENOUS
OUTPATIENT
Start: 2025-05-29

## 2025-05-08 RX ORDER — EPINEPHRINE 1 MG/ML
0.3 INJECTION, SOLUTION, CONCENTRATE INTRAVENOUS PRN
OUTPATIENT
Start: 2025-05-29

## 2025-05-08 RX ORDER — ACETAMINOPHEN 325 MG/1
650 TABLET ORAL
OUTPATIENT
Start: 2025-05-29

## 2025-05-08 RX ORDER — SODIUM CHLORIDE 9 MG/ML
INJECTION, SOLUTION INTRAVENOUS CONTINUOUS
OUTPATIENT
Start: 2025-05-29

## 2025-05-08 RX ADMIN — ROMOSOZUMAB-AQQG 105 MG: 105 INJECTION, SOLUTION SUBCUTANEOUS at 14:50

## 2025-05-15 ENCOUNTER — HOSPITAL ENCOUNTER (OUTPATIENT)
Dept: WOMENS IMAGING | Age: 70
Discharge: HOME | End: 2025-05-17
Payer: MEDICARE

## 2025-05-15 DIAGNOSIS — R92.8 ABNORMALITY OF LEFT BREAST ON SCREENING MAMMOGRAM: ICD-10-CM

## 2025-05-15 DIAGNOSIS — Z90.11 ACQUIRED ABSENCE OF RIGHT BREAST: ICD-10-CM

## 2025-05-15 DIAGNOSIS — R07.89 RIGHT-SIDED CHEST WALL PAIN: ICD-10-CM

## 2025-05-15 DIAGNOSIS — Z85.3 HISTORY OF BREAST CANCER: ICD-10-CM

## 2025-05-15 PROCEDURE — 76642 ULTRASOUND BREAST LIMITED: CPT

## 2025-05-23 ENCOUNTER — PATIENT MESSAGE (OUTPATIENT)
Dept: FAMILY MEDICINE CLINIC | Age: 70
End: 2025-05-23

## 2025-06-12 ENCOUNTER — HOSPITAL ENCOUNTER (OUTPATIENT)
Dept: INFUSION THERAPY | Age: 70
Setting detail: INFUSION SERIES
Discharge: HOME | End: 2025-06-12
Payer: MEDICARE

## 2025-06-12 VITALS
DIASTOLIC BLOOD PRESSURE: 80 MMHG | TEMPERATURE: 98 F | HEART RATE: 79 BPM | SYSTOLIC BLOOD PRESSURE: 119 MMHG | RESPIRATION RATE: 17 BRPM | OXYGEN SATURATION: 93 %

## 2025-06-12 DIAGNOSIS — M81.0 SENILE OSTEOPOROSIS: Primary | ICD-10-CM

## 2025-06-12 PROCEDURE — 6360000002 HC RX W HCPCS: Performed by: INTERNAL MEDICINE

## 2025-06-12 PROCEDURE — 96372 THER/PROPH/DIAG INJ SC/IM: CPT

## 2025-06-12 RX ORDER — EPINEPHRINE 1 MG/ML
0.3 INJECTION, SOLUTION, CONCENTRATE INTRAVENOUS PRN
OUTPATIENT
Start: 2025-06-22

## 2025-06-12 RX ORDER — ONDANSETRON 2 MG/ML
8 INJECTION INTRAMUSCULAR; INTRAVENOUS
OUTPATIENT
Start: 2025-06-22

## 2025-06-12 RX ORDER — DIPHENHYDRAMINE HYDROCHLORIDE 50 MG/ML
50 INJECTION, SOLUTION INTRAMUSCULAR; INTRAVENOUS
OUTPATIENT
Start: 2025-06-22

## 2025-06-12 RX ORDER — HYDROCORTISONE SODIUM SUCCINATE 100 MG/2ML
100 INJECTION INTRAMUSCULAR; INTRAVENOUS
OUTPATIENT
Start: 2025-06-22

## 2025-06-12 RX ORDER — ALBUTEROL SULFATE 90 UG/1
4 INHALANT RESPIRATORY (INHALATION) PRN
OUTPATIENT
Start: 2025-06-22

## 2025-06-12 RX ORDER — SODIUM CHLORIDE 9 MG/ML
INJECTION, SOLUTION INTRAVENOUS CONTINUOUS
OUTPATIENT
Start: 2025-06-22

## 2025-06-12 RX ORDER — ACETAMINOPHEN 325 MG/1
650 TABLET ORAL
OUTPATIENT
Start: 2025-06-22

## 2025-06-12 RX ADMIN — ROMOSOZUMAB-AQQG 105 MG: 105 INJECTION, SOLUTION SUBCUTANEOUS at 14:47

## 2025-06-12 RX ADMIN — ROMOSOZUMAB-AQQG 105 MG: 105 INJECTION, SOLUTION SUBCUTANEOUS at 14:48

## 2025-06-17 ENCOUNTER — OFFICE VISIT (OUTPATIENT)
Dept: FAMILY MEDICINE CLINIC | Age: 70
End: 2025-06-17
Payer: MEDICARE

## 2025-06-17 VITALS
WEIGHT: 155.8 LBS | BODY MASS INDEX: 26.74 KG/M2 | HEART RATE: 82 BPM | OXYGEN SATURATION: 95 % | SYSTOLIC BLOOD PRESSURE: 120 MMHG | DIASTOLIC BLOOD PRESSURE: 78 MMHG

## 2025-06-17 DIAGNOSIS — M54.41 CHRONIC BILATERAL LOW BACK PAIN WITH RIGHT-SIDED SCIATICA: ICD-10-CM

## 2025-06-17 DIAGNOSIS — K21.9 GASTRO-ESOPHAGEAL REFLUX DISEASE WITHOUT ESOPHAGITIS: ICD-10-CM

## 2025-06-17 DIAGNOSIS — G25.81 RESTLESS LEG SYNDROME: ICD-10-CM

## 2025-06-17 DIAGNOSIS — M54.16 CHRONIC LUMBAR RADICULOPATHY: ICD-10-CM

## 2025-06-17 DIAGNOSIS — M47.817 LUMBOSACRAL SPONDYLOSIS WITHOUT MYELOPATHY: ICD-10-CM

## 2025-06-17 DIAGNOSIS — J43.2 CENTRILOBULAR EMPHYSEMA (HCC): Primary | ICD-10-CM

## 2025-06-17 DIAGNOSIS — G89.29 CHRONIC BILATERAL LOW BACK PAIN WITH RIGHT-SIDED SCIATICA: ICD-10-CM

## 2025-06-17 DIAGNOSIS — Z87.81 HISTORY OF FRACTURE OF LEFT HIP: ICD-10-CM

## 2025-06-17 DIAGNOSIS — K22.4 ESOPHAGEAL SPASM: ICD-10-CM

## 2025-06-17 DIAGNOSIS — R42 VERTIGO: ICD-10-CM

## 2025-06-17 PROBLEM — S72.142A INTERTROCHANTERIC FRACTURE OF LEFT HIP, CLOSED, INITIAL ENCOUNTER (HCC): Status: RESOLVED | Noted: 2024-08-22 | Resolved: 2025-06-17

## 2025-06-17 PROCEDURE — 1090F PRES/ABSN URINE INCON ASSESS: CPT | Performed by: INTERNAL MEDICINE

## 2025-06-17 PROCEDURE — 3023F SPIROM DOC REV: CPT | Performed by: INTERNAL MEDICINE

## 2025-06-17 PROCEDURE — 1123F ACP DISCUSS/DSCN MKR DOCD: CPT | Performed by: INTERNAL MEDICINE

## 2025-06-17 PROCEDURE — 3078F DIAST BP <80 MM HG: CPT | Performed by: INTERNAL MEDICINE

## 2025-06-17 PROCEDURE — 99214 OFFICE O/P EST MOD 30 MIN: CPT | Performed by: INTERNAL MEDICINE

## 2025-06-17 PROCEDURE — 3017F COLORECTAL CA SCREEN DOC REV: CPT | Performed by: INTERNAL MEDICINE

## 2025-06-17 PROCEDURE — 1036F TOBACCO NON-USER: CPT | Performed by: INTERNAL MEDICINE

## 2025-06-17 PROCEDURE — 1159F MED LIST DOCD IN RCRD: CPT | Performed by: INTERNAL MEDICINE

## 2025-06-17 PROCEDURE — G8427 DOCREV CUR MEDS BY ELIG CLIN: HCPCS | Performed by: INTERNAL MEDICINE

## 2025-06-17 PROCEDURE — G8399 PT W/DXA RESULTS DOCUMENT: HCPCS | Performed by: INTERNAL MEDICINE

## 2025-06-17 PROCEDURE — 3074F SYST BP LT 130 MM HG: CPT | Performed by: INTERNAL MEDICINE

## 2025-06-17 PROCEDURE — G8417 CALC BMI ABV UP PARAM F/U: HCPCS | Performed by: INTERNAL MEDICINE

## 2025-06-17 RX ORDER — BACLOFEN 10 MG/1
10 TABLET ORAL 3 TIMES DAILY
Qty: 90 TABLET | Refills: 1 | Status: SHIPPED | OUTPATIENT
Start: 2025-06-17

## 2025-06-17 RX ORDER — ROPINIROLE 0.25 MG/1
0.25 TABLET, FILM COATED ORAL NIGHTLY
Qty: 90 TABLET | Refills: 1 | Status: SHIPPED | OUTPATIENT
Start: 2025-06-17

## 2025-06-17 NOTE — PROGRESS NOTES
[facility]  [place] [address]  Dept: [phone]  Dept Fax: [phone]      Angela Hutchinson is a 69 y.o. female who presents today for hermedical conditions/complaints as noted below.  Angela Hutchinson is c/o of Follow-up, Hypertension (F/u), and Other (Discuss PT )        Assessment/Plan:     1. Centrilobular emphysema (HCC)  2. Chronic bilateral low back pain with right-sided sciatica  -     baclofen (LIORESAL) 10 MG tablet; Take 1 tablet by mouth 3 times daily, Disp-90 tablet, R-1Normal  3. Lumbosacral spondylosis without myelopathy  4. Chronic lumbar radiculopathy  5. Restless leg syndrome  -     rOPINIRole (REQUIP) 0.25 MG tablet; Take 1 tablet by mouth nightly, Disp-90 tablet, R-1Normal  6. Vertigo  7. Esophageal spasm  8. Gastro-esophageal reflux disease without esophagitis  9. History of fracture of left hip          No follow-ups on file.      HPI     Fell at her son's house onto left knee, onto tile floor. Hurts to bend the leg at the knee, hurts to walk on it.   She is going to keep her appointment for PT - alternative tehrapy for balance and strengthening her legs   Dizziness is bad during allergy season - has had to take her meclizine which helps. Mostly has dizziness when turning to the side.   She is afraid to walk outside the house, go up or down on a curb because she is afraid of falling    Hypertension  Pertinent negatives include no chest pain, palpitations or shortness of breath.       BP Readings from Last 3 Encounters:   06/17/25 120/78   06/12/25 119/80   05/08/25 117/71              Past Medical History:   Diagnosis Date    ADHD     mild, no RX    Arthritis     Blurred vision, right eye     wrinkle in Rt eye-    BRCA1 negative     BRCA2 negative     Breast cancer (HCC) 08/28/2023    Rt breast. Never had chemo or radiation. Only in the breast duct    CAD (coronary artery disease)     Noted on CT lung screen    Carotid artery

## 2025-06-24 ENCOUNTER — RESULTS FOLLOW-UP (OUTPATIENT)
Dept: FAMILY MEDICINE CLINIC | Age: 70
End: 2025-06-24

## 2025-06-24 ASSESSMENT — ENCOUNTER SYMPTOMS
BLOOD IN STOOL: 0
COUGH: 0
SHORTNESS OF BREATH: 0
CHOKING: 0
CHEST TIGHTNESS: 0
CONSTIPATION: 0
ANAL BLEEDING: 0
NAUSEA: 0
VOMITING: 0
DIARRHEA: 0
ABDOMINAL PAIN: 0
WHEEZING: 0

## 2025-06-24 ASSESSMENT — VISUAL ACUITY: OU: 1

## 2025-07-10 ENCOUNTER — HOSPITAL ENCOUNTER (OUTPATIENT)
Dept: INFUSION THERAPY | Age: 70
Setting detail: INFUSION SERIES
Discharge: HOME | End: 2025-07-10
Payer: MEDICARE

## 2025-07-10 VITALS
DIASTOLIC BLOOD PRESSURE: 62 MMHG | TEMPERATURE: 97.6 F | OXYGEN SATURATION: 95 % | RESPIRATION RATE: 16 BRPM | SYSTOLIC BLOOD PRESSURE: 121 MMHG | HEART RATE: 79 BPM

## 2025-07-10 DIAGNOSIS — M85.89 OSTEOPENIA OF MULTIPLE SITES: ICD-10-CM

## 2025-07-10 DIAGNOSIS — M81.0 SENILE OSTEOPOROSIS: Primary | ICD-10-CM

## 2025-07-10 DIAGNOSIS — D05.11 DUCTAL CARCINOMA IN SITU (DCIS) OF RIGHT BREAST: ICD-10-CM

## 2025-07-10 LAB
ALBUMIN SERPL-MCNC: 4 G/DL (ref 3.5–5.2)
ALP SERPL-CCNC: 108 U/L (ref 35–104)
ALT SERPL-CCNC: 19 U/L (ref 10–35)
ANION GAP SERPL CALCULATED.3IONS-SCNC: 11 MMOL/L (ref 9–16)
AST SERPL-CCNC: 26 U/L (ref 10–35)
BASOPHILS # BLD: <0.03 K/UL (ref 0–0.2)
BASOPHILS NFR BLD: 0 % (ref 0–2)
BILIRUB SERPL-MCNC: <0.2 MG/DL (ref 0–1.2)
BUN SERPL-MCNC: 17 MG/DL (ref 8–23)
CALCIUM SERPL-MCNC: 9.1 MG/DL (ref 8.6–10.4)
CHLORIDE SERPL-SCNC: 107 MMOL/L (ref 98–107)
CO2 SERPL-SCNC: 22 MMOL/L (ref 20–31)
CREAT SERPL-MCNC: 1 MG/DL (ref 0.7–1.2)
EOSINOPHIL # BLD: 0.12 K/UL (ref 0–0.44)
EOSINOPHILS RELATIVE PERCENT: 2 % (ref 0–4)
ERYTHROCYTE [DISTWIDTH] IN BLOOD BY AUTOMATED COUNT: 14.2 % (ref 11.5–14.9)
GFR, ESTIMATED: 60 ML/MIN/1.73M2
GLUCOSE SERPL-MCNC: 103 MG/DL (ref 74–99)
HCT VFR BLD AUTO: 38.3 % (ref 36–46)
HGB BLD-MCNC: 12.2 G/DL (ref 12–16)
IMM GRANULOCYTES # BLD AUTO: <0.03 K/UL (ref 0–0.3)
IMM GRANULOCYTES NFR BLD: 0 %
LYMPHOCYTES NFR BLD: 1.76 K/UL (ref 1.1–3.7)
LYMPHOCYTES RELATIVE PERCENT: 28 % (ref 24–44)
MCH RBC QN AUTO: 27.6 PG (ref 26–34)
MCHC RBC AUTO-ENTMCNC: 31.9 G/DL (ref 31–37)
MCV RBC AUTO: 86.7 FL (ref 80–100)
MONOCYTES NFR BLD: 0.53 K/UL (ref 0.1–1.2)
MONOCYTES NFR BLD: 8 % (ref 3–12)
NEUTROPHILS NFR BLD: 62 % (ref 36–66)
NEUTS SEG NFR BLD: 3.85 K/UL (ref 1.5–8.1)
NRBC BLD-RTO: 0 PER 100 WBC
PLATELET # BLD AUTO: 216 K/UL (ref 150–450)
PMV BLD AUTO: 9.8 FL (ref 8–13.5)
POTASSIUM SERPL-SCNC: 4.6 MMOL/L (ref 3.7–5.3)
PROT SERPL-MCNC: 6.9 G/DL (ref 6.6–8.7)
RBC # BLD AUTO: 4.42 M/UL (ref 3.95–5.11)
SODIUM SERPL-SCNC: 140 MMOL/L (ref 136–145)
WBC OTHER # BLD: 6.3 K/UL (ref 3.5–11)

## 2025-07-10 PROCEDURE — 85025 COMPLETE CBC W/AUTO DIFF WBC: CPT

## 2025-07-10 PROCEDURE — 6360000002 HC RX W HCPCS: Performed by: INTERNAL MEDICINE

## 2025-07-10 PROCEDURE — 96372 THER/PROPH/DIAG INJ SC/IM: CPT

## 2025-07-10 PROCEDURE — 80053 COMPREHEN METABOLIC PANEL: CPT

## 2025-07-10 PROCEDURE — 36415 COLL VENOUS BLD VENIPUNCTURE: CPT

## 2025-07-10 RX ORDER — SODIUM CHLORIDE 9 MG/ML
INJECTION, SOLUTION INTRAVENOUS CONTINUOUS
OUTPATIENT
Start: 2025-07-24

## 2025-07-10 RX ORDER — EPINEPHRINE 1 MG/ML
0.3 INJECTION, SOLUTION, CONCENTRATE INTRAVENOUS PRN
OUTPATIENT
Start: 2025-07-24

## 2025-07-10 RX ORDER — HYDROCORTISONE SODIUM SUCCINATE 100 MG/2ML
100 INJECTION INTRAMUSCULAR; INTRAVENOUS
OUTPATIENT
Start: 2025-07-24

## 2025-07-10 RX ORDER — ACETAMINOPHEN 325 MG/1
650 TABLET ORAL
OUTPATIENT
Start: 2025-07-24

## 2025-07-10 RX ORDER — ONDANSETRON 2 MG/ML
8 INJECTION INTRAMUSCULAR; INTRAVENOUS
OUTPATIENT
Start: 2025-07-24

## 2025-07-10 RX ORDER — ALBUTEROL SULFATE 90 UG/1
4 INHALANT RESPIRATORY (INHALATION) PRN
OUTPATIENT
Start: 2025-07-24

## 2025-07-10 RX ORDER — DIPHENHYDRAMINE HYDROCHLORIDE 50 MG/ML
50 INJECTION, SOLUTION INTRAMUSCULAR; INTRAVENOUS
OUTPATIENT
Start: 2025-07-24

## 2025-07-10 RX ADMIN — ROMOSOZUMAB-AQQG 105 MG: 105 INJECTION, SOLUTION SUBCUTANEOUS at 15:06

## 2025-07-18 ENCOUNTER — OFFICE VISIT (OUTPATIENT)
Age: 70
End: 2025-07-18
Payer: MEDICARE

## 2025-07-18 ENCOUNTER — TELEPHONE (OUTPATIENT)
Age: 70
End: 2025-07-18

## 2025-07-18 VITALS
SYSTOLIC BLOOD PRESSURE: 125 MMHG | DIASTOLIC BLOOD PRESSURE: 76 MMHG | TEMPERATURE: 98.1 F | OXYGEN SATURATION: 95 % | HEART RATE: 74 BPM

## 2025-07-18 DIAGNOSIS — R92.8 ABNORMAL MAMMOGRAM OF LEFT BREAST: ICD-10-CM

## 2025-07-18 DIAGNOSIS — D05.11 DUCTAL CARCINOMA IN SITU (DCIS) OF RIGHT BREAST: Primary | ICD-10-CM

## 2025-07-18 DIAGNOSIS — M81.0 SENILE OSTEOPOROSIS: ICD-10-CM

## 2025-07-18 PROCEDURE — 99213 OFFICE O/P EST LOW 20 MIN: CPT | Performed by: INTERNAL MEDICINE

## 2025-07-18 PROCEDURE — 99211 OFF/OP EST MAY X REQ PHY/QHP: CPT | Performed by: INTERNAL MEDICINE

## 2025-07-18 NOTE — PROGRESS NOTES
Patient ID: Angela Hutchinson, 1955, [id number], 69 y.o.  Referred by :  No ref. 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 female.  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% and negative for progesterone  Osteopenia with recurrent fracture  Patient today to discuss MRI finding and adjuvant hormonal treatment  Bone density scan did show osteopenia and decided not to proceed with aromatase inhibitors but tamoxifen which not able to tolerate it due to hair loss  Status post fall and a fracture of the left femur status post reduction  She decided to do to poor bone health/recurrent fracture to stop anastrozole    Age at menarche 11  Number of pregnancy 4  Menopause 43  Birth control for 5 years  No family history of cancer        Interval history/  History of Present Illness  The patient is a female seen for right DCIS and an abnormal left mammogram.  She reports no missed appointments and has completed her blood work as instructed. A recent mammogram of both breasts was

## 2025-07-18 NOTE — TELEPHONE ENCOUNTER
[id number]    Instructions   from Dr. Kodi Anderson MD    Rtc in 4 months after mamogram in november    [facility] W CAD LEFT  Scheduled for 11/20/2025    [id number]    An After Visit Summary was printed and given to the patient.    Electronically signed by Alejandra Pizarro MA on 7/18/2025 at 1:44 PM

## 2025-07-29 ENCOUNTER — OFFICE VISIT (OUTPATIENT)
Dept: PAIN MANAGEMENT | Age: 70
End: 2025-07-29
Payer: MEDICARE

## 2025-07-29 VITALS — WEIGHT: 155 LBS | BODY MASS INDEX: 26.46 KG/M2 | HEIGHT: 64 IN

## 2025-07-29 DIAGNOSIS — M25.552 CHRONIC LEFT HIP PAIN: ICD-10-CM

## 2025-07-29 DIAGNOSIS — G89.29 CHRONIC LEFT HIP PAIN: ICD-10-CM

## 2025-07-29 DIAGNOSIS — M54.14 THORACIC RADICULITIS: ICD-10-CM

## 2025-07-29 DIAGNOSIS — M51.24 THORACIC DISC HERNIATION: Primary | ICD-10-CM

## 2025-07-29 PROCEDURE — G8417 CALC BMI ABV UP PARAM F/U: HCPCS | Performed by: ANESTHESIOLOGY

## 2025-07-29 PROCEDURE — 1125F AMNT PAIN NOTED PAIN PRSNT: CPT | Performed by: ANESTHESIOLOGY

## 2025-07-29 PROCEDURE — G8427 DOCREV CUR MEDS BY ELIG CLIN: HCPCS | Performed by: ANESTHESIOLOGY

## 2025-07-29 PROCEDURE — 1159F MED LIST DOCD IN RCRD: CPT | Performed by: ANESTHESIOLOGY

## 2025-07-29 PROCEDURE — G8399 PT W/DXA RESULTS DOCUMENT: HCPCS | Performed by: ANESTHESIOLOGY

## 2025-07-29 PROCEDURE — 1123F ACP DISCUSS/DSCN MKR DOCD: CPT | Performed by: ANESTHESIOLOGY

## 2025-07-29 PROCEDURE — 1036F TOBACCO NON-USER: CPT | Performed by: ANESTHESIOLOGY

## 2025-07-29 PROCEDURE — 3017F COLORECTAL CA SCREEN DOC REV: CPT | Performed by: ANESTHESIOLOGY

## 2025-07-29 PROCEDURE — 1090F PRES/ABSN URINE INCON ASSESS: CPT | Performed by: ANESTHESIOLOGY

## 2025-07-29 PROCEDURE — 99214 OFFICE O/P EST MOD 30 MIN: CPT | Performed by: ANESTHESIOLOGY

## 2025-07-29 PROCEDURE — 1160F RVW MEDS BY RX/DR IN RCRD: CPT | Performed by: ANESTHESIOLOGY

## 2025-07-29 RX ORDER — SOLIFENACIN SUCCINATE 5 MG/1
TABLET, FILM COATED ORAL
COMMUNITY
Start: 2025-06-11

## 2025-07-29 ASSESSMENT — ENCOUNTER SYMPTOMS
BACK PAIN: 1
RESPIRATORY NEGATIVE: 1
GASTROINTESTINAL NEGATIVE: 1

## 2025-07-29 NOTE — PROGRESS NOTES
tablet Take 1 tablet by mouth nightly 90 tablet 1    umeclidinium-vilanterol (ANORO ELLIPTA) 62.5-25 MCG/ACT inhaler Inhale 1 puff into the lungs daily 1 each 5    neomycin-polymyxin-dexameth 3.5-39844-3.1 OINT Place into the right eye      omeprazole (PRILOSEC) 40 MG delayed release capsule Take 1 capsule by mouth in the morning and at bedtime TAKE ONE CAPSULE BY MOUTH TWICE A  capsule 1    amLODIPine (NORVASC) 10 MG tablet TAKE 1 TABLET BY MOUTH EVERY NIGHT AT BEDTIME 90 tablet 1    romosozumab-aqqg (EVENITY) 105 MG/1.17ML SOSY injection Inject 2.34 mLs into the skin every 30 days for 12 doses 2.24 mL 11    meclizine (ANTIVERT) 25 MG tablet Take 1 tablet by mouth 3 times daily as needed for Dizziness 90 tablet 5    pravastatin (PRAVACHOL) 20 MG tablet Take 1 tablet by mouth nightly      ipratropium (ATROVENT) 0.02 % nebulizer solution Take 2.5 mLs by nebulization 4 times daily 300 mL 5    Lactobacillus (PROBIOTIC ACIDOPHILUS PO) Take 1 tablet by mouth daily      trospium (SANCTURA) 20 MG tablet Take 1 tablet by mouth 2 times daily      triamcinolone (KENALOG) 0.025 % cream Apply topically daily as needed On face      albuterol sulfate HFA (VENTOLIN HFA) 108 (90 Base) MCG/ACT inhaler Inhale 2 puffs into the lungs 4 times daily as needed for Wheezing 1 each 3    aspirin 81 MG tablet Take 1 tablet by mouth daily      OMEGA-3 KRILL OIL PO Take 1 tablet by mouth daily      calcium carbonate 600 MG TABS tablet Take 1 tablet by mouth 2 times daily      vitamin D 1000 units CAPS Take 1 capsule by mouth daily      sertraline (ZOLOFT) 100 MG tablet Take 1 tablet by mouth at bedtime      vitamin E 1000 units capsule Take 1 capsule by mouth daily      gabapentin (NEURONTIN) 100 MG capsule Take 1 capsule by mouth 3 times daily for 90 days. (Patient taking differently: Take 1 capsule by mouth as needed.) 90 capsule 2    EPINEPHrine (EPIPEN 2-JONATHAN) 0.3 MG/0.3ML SOAJ injection Inject 0.3 mLs into the muscle once for 1 dose

## 2025-08-14 DIAGNOSIS — I10 ESSENTIAL HYPERTENSION: ICD-10-CM

## 2025-08-15 RX ORDER — AMLODIPINE BESYLATE 10 MG/1
10 TABLET ORAL NIGHTLY
Qty: 90 TABLET | Refills: 1 | Status: SHIPPED | OUTPATIENT
Start: 2025-08-15

## 2025-08-21 ENCOUNTER — HOSPITAL ENCOUNTER (OUTPATIENT)
Dept: INFUSION THERAPY | Age: 70
Setting detail: INFUSION SERIES
Discharge: HOME | End: 2025-08-21
Payer: MEDICARE

## 2025-08-21 VITALS
RESPIRATION RATE: 16 BRPM | SYSTOLIC BLOOD PRESSURE: 137 MMHG | DIASTOLIC BLOOD PRESSURE: 68 MMHG | HEART RATE: 87 BPM | OXYGEN SATURATION: 95 % | TEMPERATURE: 97.1 F

## 2025-08-21 DIAGNOSIS — M81.0 SENILE OSTEOPOROSIS: Primary | ICD-10-CM

## 2025-08-21 PROCEDURE — 96372 THER/PROPH/DIAG INJ SC/IM: CPT

## 2025-08-21 PROCEDURE — 6360000002 HC RX W HCPCS: Performed by: INTERNAL MEDICINE

## 2025-08-21 RX ORDER — EPINEPHRINE 1 MG/ML
0.3 INJECTION, SOLUTION, CONCENTRATE INTRAVENOUS PRN
OUTPATIENT
Start: 2025-09-04

## 2025-08-21 RX ORDER — ALBUTEROL SULFATE 90 UG/1
4 INHALANT RESPIRATORY (INHALATION) PRN
OUTPATIENT
Start: 2025-09-04

## 2025-08-21 RX ORDER — HYDROCORTISONE SODIUM SUCCINATE 100 MG/2ML
100 INJECTION INTRAMUSCULAR; INTRAVENOUS
OUTPATIENT
Start: 2025-09-04

## 2025-08-21 RX ORDER — ACETAMINOPHEN 325 MG/1
650 TABLET ORAL
OUTPATIENT
Start: 2025-09-04

## 2025-08-21 RX ORDER — DIPHENHYDRAMINE HYDROCHLORIDE 50 MG/ML
50 INJECTION, SOLUTION INTRAMUSCULAR; INTRAVENOUS
OUTPATIENT
Start: 2025-09-04

## 2025-08-21 RX ORDER — ONDANSETRON 2 MG/ML
8 INJECTION INTRAMUSCULAR; INTRAVENOUS
OUTPATIENT
Start: 2025-09-04

## 2025-08-21 RX ORDER — SODIUM CHLORIDE 9 MG/ML
INJECTION, SOLUTION INTRAVENOUS CONTINUOUS
OUTPATIENT
Start: 2025-09-04

## 2025-08-21 RX ADMIN — ROMOSOZUMAB-AQQG 105 MG: 105 INJECTION, SOLUTION SUBCUTANEOUS at 14:56

## 2025-08-27 ENCOUNTER — HOSPITAL ENCOUNTER (OUTPATIENT)
Dept: PAIN MANAGEMENT | Facility: CLINIC | Age: 70
Discharge: HOME | End: 2025-08-27
Payer: MEDICARE

## 2025-08-27 VITALS
BODY MASS INDEX: 26.46 KG/M2 | HEIGHT: 64 IN | WEIGHT: 155 LBS | TEMPERATURE: 98.4 F | RESPIRATION RATE: 9 BRPM | OXYGEN SATURATION: 95 % | DIASTOLIC BLOOD PRESSURE: 84 MMHG | HEART RATE: 75 BPM | SYSTOLIC BLOOD PRESSURE: 140 MMHG

## 2025-08-27 DIAGNOSIS — M54.14 THORACIC RADICULITIS: ICD-10-CM

## 2025-08-27 DIAGNOSIS — R52 PAIN MANAGEMENT: ICD-10-CM

## 2025-08-27 DIAGNOSIS — M51.24 THORACIC DISC HERNIATION: Primary | ICD-10-CM

## 2025-08-27 PROCEDURE — 62321 NJX INTERLAMINAR CRV/THRC: CPT

## 2025-08-27 PROCEDURE — 6360000004 HC RX CONTRAST MEDICATION: Performed by: ANESTHESIOLOGY

## 2025-08-27 PROCEDURE — 6360000002 HC RX W HCPCS: Performed by: ANESTHESIOLOGY

## 2025-08-27 RX ORDER — SODIUM CHLORIDE 0.9 % (FLUSH) 0.9 %
SYRINGE (ML) INJECTION
Status: COMPLETED | OUTPATIENT
Start: 2025-08-27 | End: 2025-08-27

## 2025-08-27 RX ORDER — LIDOCAINE HYDROCHLORIDE 10 MG/ML
INJECTION, SOLUTION EPIDURAL; INFILTRATION; INTRACAUDAL; PERINEURAL
Status: COMPLETED | OUTPATIENT
Start: 2025-08-27 | End: 2025-08-27

## 2025-08-27 RX ORDER — DEXAMETHASONE SODIUM PHOSPHATE 10 MG/ML
INJECTION, SOLUTION INTRAMUSCULAR; INTRAVENOUS
Status: COMPLETED | OUTPATIENT
Start: 2025-08-27 | End: 2025-08-27

## 2025-08-27 RX ORDER — FENTANYL CITRATE 50 UG/ML
INJECTION, SOLUTION INTRAMUSCULAR; INTRAVENOUS
Status: COMPLETED | OUTPATIENT
Start: 2025-08-27 | End: 2025-08-27

## 2025-08-27 RX ORDER — MIDAZOLAM HYDROCHLORIDE 2 MG/2ML
INJECTION, SOLUTION INTRAMUSCULAR; INTRAVENOUS
Status: COMPLETED | OUTPATIENT
Start: 2025-08-27 | End: 2025-08-27

## 2025-08-27 RX ADMIN — FENTANYL CITRATE 50 MCG: 50 INJECTION, SOLUTION INTRAMUSCULAR; INTRAVENOUS at 10:00

## 2025-08-27 RX ADMIN — LIDOCAINE HYDROCHLORIDE 5 ML: 10 INJECTION, SOLUTION EPIDURAL; INFILTRATION; INTRACAUDAL; PERINEURAL at 10:00

## 2025-08-27 RX ADMIN — DEXAMETHASONE SODIUM PHOSPHATE 10 MG: 10 INJECTION INTRAMUSCULAR; INTRAVENOUS at 10:02

## 2025-08-27 RX ADMIN — Medication 5 ML: at 10:02

## 2025-08-27 RX ADMIN — IOHEXOL 3 ML: 180 INJECTION INTRAVENOUS at 10:01

## 2025-08-27 RX ADMIN — MIDAZOLAM HYDROCHLORIDE 1 MG: 1 INJECTION, SOLUTION INTRAMUSCULAR; INTRAVENOUS at 10:00

## 2025-08-27 ASSESSMENT — PAIN - FUNCTIONAL ASSESSMENT
PAIN_FUNCTIONAL_ASSESSMENT: 0-10
PAIN_FUNCTIONAL_ASSESSMENT: PREVENTS OR INTERFERES SOME ACTIVE ACTIVITIES AND ADLS
PAIN_FUNCTIONAL_ASSESSMENT: 0-10
PAIN_FUNCTIONAL_ASSESSMENT: 0-10

## 2025-08-27 ASSESSMENT — PAIN DESCRIPTION - DESCRIPTORS: DESCRIPTORS: BURNING;SHARP

## (undated) DEVICE — INTRACEPT RF PROBE: Brand: INTRACEPT

## (undated) DEVICE — Z DISCONTINUED USE 2424143 ADAPTER O2 SWVL CHRISTMAS TREE GRN

## (undated) DEVICE — DECANTER FLD 9IN ST BG FOR ASEP TRNSF OF FLD

## (undated) DEVICE — ULTRA HIGH PROFILE, UH 350CC GEL SIZER: Brand: MENTOR MEMORYGEL RESTERILIZABLE GEL SIZER

## (undated) DEVICE — ELECTRODE PT RET AD L9FT HI MOIST COND ADH HYDRGEL CORDED

## (undated) DEVICE — BLADE,CARBON-STEEL,15,STRL,DISPOSABLE,TB: Brand: MEDLINE

## (undated) DEVICE — LIQUIBAND RAPID ADHESIVE 36/CS 0.8ML: Brand: MEDLINE

## (undated) DEVICE — SUTURE STRATAFIX SPRL MCRYL + 3 0 SGL ARMED PS 1 24 IN LEN SXMP1B103

## (undated) DEVICE — DRAIN SURG 19FR 100% SIL RADPQ RND CHN FULL FLUT

## (undated) DEVICE — TUBING, SUCTION, 1/4" X 12', STRAIGHT: Brand: MEDLINE

## (undated) DEVICE — Device

## (undated) DEVICE — SVMMC ORTH SPL DRP PK

## (undated) DEVICE — PAD PT POS 36 IN SURGYPAD DISP

## (undated) DEVICE — KYPHOPLASTY KIT BLLN 10 GAX15 MM FRAC IVAS ELITE DISP

## (undated) DEVICE — MINOR BSIN PK

## (undated) DEVICE — SUTURE PDS II SZ 0 L27IN ABSRB VLT L36MM CT-1 1/2 CIR Z340H

## (undated) DEVICE — ROD RM L1150MM DIA2.5MM TI W/ EXTN BALL TIP FOR IM NAIL

## (undated) DEVICE — 6619 2 PTNT ISO SYS INCISE AREA&LT;(&GT;&&LT;)&GT;P: Brand: STERI-DRAPE™ IOBAN™ 2

## (undated) DEVICE — DRAPE,REIN 53X77,STERILE: Brand: MEDLINE

## (undated) DEVICE — APPLICATOR MEDICATED 26 CC SOLUTION HI LT ORNG CHLORAPREP

## (undated) DEVICE — GLOVE SURG SZ 7 L12IN FNGR THK79MIL GRN LTX FREE

## (undated) DEVICE — MASTISOL ADHESIVE LIQ 2/3ML

## (undated) DEVICE — SURGICAL PROCEDURE KIT BASIN MAJ SET UP

## (undated) DEVICE — BRA COMPR LG NYL LYCRA SPANDEX WHT CURAD

## (undated) DEVICE — SUTURE VCRL + SZ 2-0 L36IN ABSRB UD L36MM CT-1 1/2 CIR VCP945H

## (undated) DEVICE — STAZ MAJOR BASIN: Brand: MEDLINE INDUSTRIES, INC.

## (undated) DEVICE — RETINA PK

## (undated) DEVICE — SYRINGE MED 20ML STD CLR PLAS LUERLOCK TIP N CTRL DISP

## (undated) DEVICE — CONNECTOR DSG Y FOR 1 VAC THER UNIT TRAC

## (undated) DEVICE — GLOVE SURG SZ 8 THK118MIL BLK LTX FREE POLYISOPRENE BEAD CUF

## (undated) DEVICE — C-ARM: Brand: UNBRANDED

## (undated) DEVICE — SOLUTION PREP POVIDONE IOD FOR SKIN MUCOUS MEM PRIOR TO

## (undated) DEVICE — SUTURE STRATAFIX SPRL MCRYL + SZ 2-0 L18IN ABSRB UD CT-1 SXMP1B413

## (undated) DEVICE — SYRINGE IRRIG 60ML SFT PLIABLE BLB EZ TO GRP 1 HND USE W/

## (undated) DEVICE — STRAP ARMBRD W1.5XL32IN FOAM STR YET SFT W/ HK AND LOOP

## (undated) DEVICE — RESERVOIR,SUCTION,100CC,SILICONE: Brand: MEDLINE

## (undated) DEVICE — SUTURE VCRL SZ 3-0 L27IN ABSRB UD L26MM SH 1/2 CIR J416H

## (undated) DEVICE — SYRINGE MED 5ML STD CLR PLAS LUERLOCK TIP N CTRL DISP

## (undated) DEVICE — TOWEL,OR,DSP,ST,BLUE,DLX,XR,4/PK,20PK/CS: Brand: MEDLINE

## (undated) DEVICE — FORCEPS BX L240CM WRK CHN 2.8MM STD CAP W/ NDL MIC MESH

## (undated) DEVICE — GAUZE, BORDER, 3"X6", 1.5"X4"PAD, STERIL: Brand: MEDLINE INDUSTRIES, INC.

## (undated) DEVICE — NEEDLE FLTR 18GA L1.5IN MEM THK5UM BLNT DISP

## (undated) DEVICE — 1016 S-DRAPE IRRIG POUCH 10/BOX: Brand: STERI-DRAPE™

## (undated) DEVICE — 1010 S-DRAPE TOWEL DRAPE 10/BX: Brand: STERI-DRAPE™

## (undated) DEVICE — CONTAINER,SPECIMEN,OR STERILE,4OZ: Brand: MEDLINE

## (undated) DEVICE — GLOVE SURG SZ 75 CRM LTX FREE POLYISOPRENE POLYMER BEAD ANTI

## (undated) DEVICE — SUTURE NONABSORBABLE MONOFILAMENT 2-0 FS 18 IN ETHILON 664H

## (undated) DEVICE — BIT DRL L300MM DIA10MM CANN TAPR L QUIK CPL FOR DH DC TFN

## (undated) DEVICE — SUTURE PDS II SZ 2-0 L27IN ABSRB VLT L36MM CT-1 1/2 CIR Z339H

## (undated) DEVICE — SYSTEM WND THER 14 DAY 150 CC CANSTR THER UNIT PREVENA + 125

## (undated) DEVICE — NEEDLE SPINAL 22GA L3.5IN SPINOCAN

## (undated) DEVICE — DRESSING NEG PRESSURE 21X19 CM STRL PREVENA RESTOR ARTH FRM

## (undated) DEVICE — GLOVE SURG SZ 7 CRM LTX FREE POLYISOPRENE POLYMER BEAD ANTI

## (undated) DEVICE — DRAPE,MEDI-SLUSH,STERILE: Brand: MEDLINE

## (undated) DEVICE — STRIP,CLOSURE,WOUND,MEDI-STRIP,1/2X4: Brand: MEDLINE

## (undated) DEVICE — 3M™ IOBAN™ 2 ANTIMICROBIAL INCISE DRAPE 6650EZ: Brand: IOBAN™ 2

## (undated) DEVICE — BANDAGE,GAUZE,BULKEE II,4.5"X4.1YD,STRL: Brand: MEDLINE

## (undated) DEVICE — GOWN,SIRUS,NONRNF,SETINSLV,2XL,18/CS: Brand: MEDLINE

## (undated) DEVICE — RETRACTOR 90X30MM WITH BUILT-IN SINGLE-USE MULTI-LED LIGHT SOURCE - STERILE: Brand: ONETRAC LX

## (undated) DEVICE — APPLIER LIG CLP M L11IN TI STR RNG HNDL FOR 20 CLP DISP

## (undated) DEVICE — DRESSING FOAM W4XL4IN AG SIL FACE BORD IONIC ANTIMIC ADH

## (undated) DEVICE — SURGICAL PROCEDURE PACK 23 GA VITRECTOMY

## (undated) DEVICE — INTENDED FOR TISSUE SEPARATION, AND OTHER PROCEDURES THAT REQUIRE A SHARP SURGICAL BLADE TO PUNCTURE OR CUT.: Brand: BARD-PARKER ® CARBON RIB-BACK BLADES

## (undated) DEVICE — DRAPE EQUIP CARM PK SNAP OEC/GE 9800 PLAS STRL

## (undated) DEVICE — DRAPE,UTILTY,TAPE,15X26, 4EA/PK: Brand: MEDLINE

## (undated) DEVICE — SMARTGOWN SURGICAL GOWN, 3XL, LONG: Brand: CONVERTORS

## (undated) DEVICE — DRESSING FOAM W4XL10IN AG SIL ADH ANTIMIC POSTOP OPTIFOAM

## (undated) DEVICE — SUTURE VCRL SZ 2-0 L18IN ABSRB UD CT-1 L36MM 1/2 CIR J839D

## (undated) DEVICE — Device: Brand: MENTOR CENTERSCOPE MAGNETIC DETECTION DEVICE

## (undated) DEVICE — GOWN,AURORA,NONRNF,XL,30/CS: Brand: MEDLINE

## (undated) DEVICE — ROD RMR L950MM DIA2.5MM W/ EXTN BALL TIP

## (undated) DEVICE — 450 ML BOTTLE OF 0.05% CHLORHEXIDINE GLUCONATE IN 99.95% STERILE WATER FOR IRRIGATION, USP AND APPLICATOR.: Brand: IRRISEPT ANTIMICROBIAL WOUND LAVAGE

## (undated) DEVICE — APPLICATOR MEDICATED 10.5 CC SOLUTION HI LT ORNG CHLORAPREP

## (undated) DEVICE — SUTURE PROL SZ 2-0 L18IN NONABSORBABLE BLU FS L26MM 3/8 CIR 8685H

## (undated) DEVICE — PADDING UNDERCAST W6INXL4YD RAYON POLY SYN NONADHESIVE

## (undated) DEVICE — SUTURE MONOCRYL SZ 2-0 L27IN ABSRB UD SH L26MM TAPERPOINT NDL Y417H

## (undated) DEVICE — STAZ MINOR LAPAROTOMY: Brand: MEDLINE INDUSTRIES, INC.

## (undated) DEVICE — SOLUTION IRRIG 1000ML PENTALYTE PLAS POUR BTL TIS U SOL

## (undated) DEVICE — GLOVE ORTHO 8   MSG9480

## (undated) DEVICE — JELLY,LUBE,STERILE,FLIP TOP,TUBE,2-OZ: Brand: MEDLINE

## (undated) DEVICE — MARKER,SKIN,WI/RULER AND LABELS: Brand: MEDLINE

## (undated) DEVICE — PROVE COVER: Brand: UNBRANDED

## (undated) DEVICE — GLOVE ORANGE PI 7   MSG9070

## (undated) DEVICE — SYSTEM IMPL DEL FOR BRST IMPL FUN EA = 5 UNITS

## (undated) DEVICE — SYRINGE MED 10ML LUERLOCK TIP W/O SFTY DISP

## (undated) DEVICE — Z DUPLICATE USE 2527422 TUBING O2 STD 7 FT EXTN NO CRUSH VISUAL CNTRST LF

## (undated) DEVICE — SYRINGE MED 50ML LUERLOCK TIP

## (undated) DEVICE — PROTECTOR ULN NRV PUR FOAM HK LOOP STRP ANATOMICALLY

## (undated) DEVICE — CANNULA NSL AD TBNG L7FT PVC STR NONFLARED PRNG O2 DEL W STD

## (undated) DEVICE — STERILE POLYISOPRENE POWDER-FREE SURGICAL GLOVES WITH EMOLLIENT COATING: Brand: PROTEXIS

## (undated) DEVICE — SHEET, T, LAPAROTOMY, STERILE: Brand: MEDLINE

## (undated) DEVICE — GLOVE SURG SZ 7 L12IN THK7.5MIL DK GRN LTX FREE MSG6570] MEDLINE INDUSTRIES INC]

## (undated) DEVICE — GOWN,AURORA,NONREINFORCED,LARGE: Brand: MEDLINE

## (undated) DEVICE — PAD N ADH W3XL4IN POLY COT SFT PERF FLM EASILY CUT ABSRB

## (undated) DEVICE — SUTURE VCRL + SZ 3-0 L27IN ABSRB UD L26MM SH 1/2 CIR VCP416H

## (undated) DEVICE — SOLUTION IRRIG 1000ML 0.9% SOD CHL USP POUR PLAS BTL

## (undated) DEVICE — DRAPE,T,LAPARO,TRANS,STERILE: Brand: MEDLINE

## (undated) DEVICE — BLADE ES ELASTOMERIC COAT INSUL DURABLE BEND UPTO 90DEG

## (undated) DEVICE — INTRACEPT ACCESS INSTRUMENTS: Brand: INTRACEPT

## (undated) DEVICE — DRAPE,TOWEL,LARGE,INVISISHIELD: Brand: MEDLINE

## (undated) DEVICE — C-ARMOR C-ARM EQUIPMENT COVERS CLEAR STERILE UNIVERSAL FIT 12 PER CASE: Brand: C-ARMOR

## (undated) DEVICE — M4 CEMENT MIXER WITH HV BONE CEMENT: Brand: AUTOPLEX VERTAPLEX

## (undated) DEVICE — FORCEPS SURG 23GA FN TIP ECKARDT INT LIMITING MEMBRN PINN

## (undated) DEVICE — GOWN,POLY REINFORCED,LG: Brand: MEDLINE

## (undated) DEVICE — BLADE ES L6IN ELASTOMERIC COAT INSUL DURABLE BEND UPTO

## (undated) DEVICE — BLANKET WRM W40.2XL55.9IN IORT LO BODY + MISTRAL AIR

## (undated) DEVICE — SUTURE VCRL SZ 0 L36IN ABSRB UD L36MM CT-1 1/2 CIR J946H

## (undated) DEVICE — DRESSING GERM 6-12FR DIA1IN CNTR H 4MM ANTIMIC PROTCT DISK

## (undated) DEVICE — GLOVE ORANGE PI 8   MSG9080

## (undated) DEVICE — NEPTUNE E-SEP 165MM SUCTION SLEEVE: Brand: NEPTUNE E-SEP

## (undated) DEVICE — DRAPE,U/ SHT,SPLIT,PLAS,STERIL: Brand: MEDLINE

## (undated) DEVICE — SUTURE VCRL + SZ 2-0 L27IN ABSRB WHT SH 1/2 CIR TAPERCUT VCP417H

## (undated) DEVICE — GAUZE,SPONGE,FLUFF,6"X6.75",STRL,5/TRAY: Brand: MEDLINE

## (undated) DEVICE — 1 EACH 40411 STERILE DISPOSABLE SUPER VIEW® LENS SET & 1 EACH 40100 STERILE MICROSCOPE DRAPE: Brand: SUPER VIEW® PACK

## (undated) DEVICE — 23GA EZPASS SOFT TIP CANNULA BOX OF 5: Brand: VORTEX SURGICAL INC

## (undated) DEVICE — BANDAGE ADH W1XL3IN NAT FAB WVN FLX DURABLE N ADH PD SEAL

## (undated) DEVICE — NEEDLE HYPO 25GA L1.5IN BLU POLYPR HUB S STL REG BVL STR

## (undated) DEVICE — SOLUTION SCRB 4OZ 10% POVIDONE IOD ANTIMIC BTL

## (undated) DEVICE — PAD,ABDOMINAL,5"X9",ST,LF,25/BX: Brand: MEDLINE INDUSTRIES, INC.

## (undated) DEVICE — STOCKINETTE,IMPERVIOUS,12X48,STERILE: Brand: MEDLINE

## (undated) DEVICE — KIT TISS EXP W/ 60ML SYR 122CM TRNSF SET PIERCING DEV L25CM

## (undated) DEVICE — BITEBLOCK 54FR W/ DENT RIM BLOX

## (undated) DEVICE — YANKAUER,FLEXIBLE HANDLE,REGLR CAPACITY: Brand: MEDLINE INDUSTRIES, INC.

## (undated) DEVICE — SURGICAL SUCTION CONNECTING TUBE WITH MALE CONNECTOR AND SUCTION CLAMP, 2 FT. LONG (.6 M), 5 MM I.D.: Brand: CONMED

## (undated) DEVICE — SUTURE MCRYL SZ 4-0 L27IN ABSRB UD L19MM PS-2 1/2 CIR PRIM Y426H

## (undated) DEVICE — 4-PORT MANIFOLD: Brand: NEPTUNE 2

## (undated) DEVICE — STRAP,POSITIONING,KNEE/BODY,FOAM,4X60": Brand: MEDLINE

## (undated) DEVICE — STANDARD HYPODERMIC NEEDLE,ALUMINUM HUB: Brand: MONOJECT

## (undated) DEVICE — SOLUTION IV 1000ML 0.9% SOD CHL PH 5 INJ USP VIAFLX PLAS

## (undated) DEVICE — SUTURE ABSORB MONOFILAMENT 3-0 RB 1 6IN STRATAFIX SPRL SXMP2B402

## (undated) DEVICE — Z INACTIVE USE 2525529 CONNECTOR TBNG FOR O2

## (undated) DEVICE — AGENT VISCOELASTIC 1ML 2% HYDROXYPROPYL METHCELL PRELD GLS

## (undated) DEVICE — DEFENDO AIR WATER SUCTION AND BIOPSY VALVE KIT FOR  OLYMPUS: Brand: DEFENDO AIR/WATER/SUCTION AND BIOPSY VALVE